# Patient Record
Sex: MALE | Race: WHITE | NOT HISPANIC OR LATINO | Employment: OTHER | URBAN - METROPOLITAN AREA
[De-identification: names, ages, dates, MRNs, and addresses within clinical notes are randomized per-mention and may not be internally consistent; named-entity substitution may affect disease eponyms.]

---

## 2017-01-10 ENCOUNTER — GENERIC CONVERSION - ENCOUNTER (OUTPATIENT)
Dept: FAMILY MEDICINE CLINIC | Facility: CLINIC | Age: 72
End: 2017-01-10

## 2017-01-21 ENCOUNTER — ALLSCRIPTS OFFICE VISIT (OUTPATIENT)
Dept: OTHER | Facility: OTHER | Age: 72
End: 2017-01-21

## 2017-06-02 ENCOUNTER — GENERIC CONVERSION - ENCOUNTER (OUTPATIENT)
Dept: OTHER | Facility: OTHER | Age: 72
End: 2017-06-02

## 2017-06-06 ENCOUNTER — GENERIC CONVERSION - ENCOUNTER (OUTPATIENT)
Dept: OTHER | Facility: OTHER | Age: 72
End: 2017-06-06

## 2017-06-09 ENCOUNTER — ALLSCRIPTS OFFICE VISIT (OUTPATIENT)
Dept: OTHER | Facility: OTHER | Age: 72
End: 2017-06-09

## 2017-06-12 RX ORDER — LISINOPRIL 10 MG/1
10 TABLET ORAL DAILY
COMMUNITY
End: 2018-12-05 | Stop reason: SDUPTHER

## 2017-06-16 ENCOUNTER — ALLSCRIPTS OFFICE VISIT (OUTPATIENT)
Dept: OTHER | Facility: OTHER | Age: 72
End: 2017-06-16

## 2017-06-19 ENCOUNTER — LAB CONVERSION - ENCOUNTER (OUTPATIENT)
Dept: OTHER | Facility: OTHER | Age: 72
End: 2017-06-19

## 2017-06-19 LAB
A/G RATIO (HISTORICAL): 2.1 (CALC) (ref 1–2.5)
ALBUMIN SERPL BCP-MCNC: 4.1 G/DL (ref 3.6–5.1)
ALP SERPL-CCNC: 53 U/L (ref 40–115)
ALT SERPL W P-5'-P-CCNC: 10 U/L (ref 9–46)
AST SERPL W P-5'-P-CCNC: 16 U/L (ref 10–35)
BILIRUB SERPL-MCNC: 0.5 MG/DL (ref 0.2–1.2)
BUN SERPL-MCNC: 17 MG/DL (ref 7–25)
BUN/CREA RATIO (HISTORICAL): ABNORMAL (CALC) (ref 6–22)
CALCIUM SERPL-MCNC: 9.6 MG/DL (ref 8.6–10.3)
CHLORIDE SERPL-SCNC: 105 MMOL/L (ref 98–110)
CHOLEST SERPL-MCNC: 154 MG/DL (ref 125–200)
CHOLEST/HDLC SERPL: 3.7 (CALC)
CO2 SERPL-SCNC: 26 MMOL/L (ref 20–31)
CREAT SERPL-MCNC: 1.14 MG/DL (ref 0.7–1.18)
EGFR AFRICAN AMERICAN (HISTORICAL): 75 ML/MIN/1.73M2
EGFR-AMERICAN CALC (HISTORICAL): 64 ML/MIN/1.73M2
GAMMA GLOBULIN (HISTORICAL): 2 G/DL (CALC) (ref 1.9–3.7)
GLUCOSE (HISTORICAL): 105 MG/DL (ref 65–99)
HBA1C MFR BLD HPLC: 6.4 % OF TOTAL HGB
HDLC SERPL-MCNC: 42 MG/DL
LDL CHOLESTEROL (HISTORICAL): 99 MG/DL (CALC)
NON-HDL-CHOL (CHOL-HDL) (HISTORICAL): 112 MG/DL (CALC)
POTASSIUM SERPL-SCNC: 5.2 MMOL/L (ref 3.5–5.3)
PROSTATE SPECIFIC ANTIGEN TOTAL (HISTORICAL): <0.1 NG/ML
SODIUM SERPL-SCNC: 140 MMOL/L (ref 135–146)
TOTAL PROTEIN (HISTORICAL): 6.1 G/DL (ref 6.1–8.1)
TRIGL SERPL-MCNC: 63 MG/DL

## 2017-06-20 ENCOUNTER — GENERIC CONVERSION - ENCOUNTER (OUTPATIENT)
Dept: OTHER | Facility: OTHER | Age: 72
End: 2017-06-20

## 2017-06-21 RX ORDER — LIDOCAINE HYDROCHLORIDE AND EPINEPHRINE 10; 10 MG/ML; UG/ML
1 INJECTION, SOLUTION INFILTRATION; PERINEURAL ONCE
Status: CANCELLED | OUTPATIENT
Start: 2017-06-22

## 2017-06-22 ENCOUNTER — HOSPITAL ENCOUNTER (OUTPATIENT)
Facility: HOSPITAL | Age: 72
Setting detail: OUTPATIENT SURGERY
Discharge: HOME | End: 2017-06-22
Attending: SURGERY | Admitting: SURGERY
Payer: MEDICARE

## 2017-06-22 VITALS
WEIGHT: 167 LBS | BODY MASS INDEX: 25.31 KG/M2 | RESPIRATION RATE: 16 BRPM | HEART RATE: 61 BPM | HEIGHT: 68 IN | OXYGEN SATURATION: 100 % | SYSTOLIC BLOOD PRESSURE: 167 MMHG | TEMPERATURE: 97.8 F | DIASTOLIC BLOOD PRESSURE: 72 MMHG

## 2017-06-22 DIAGNOSIS — C44.91 BASAL CELL CARCINOMA OF SKIN: ICD-10-CM

## 2017-06-22 PROCEDURE — 88305 TISSUE EXAM BY PATHOLOGIST: CPT | Performed by: SURGERY

## 2017-06-22 RX ORDER — LIDOCAINE HYDROCHLORIDE AND EPINEPHRINE 10; 10 MG/ML; UG/ML
INJECTION, SOLUTION INFILTRATION; PERINEURAL AS NEEDED
Status: DISCONTINUED | OUTPATIENT
Start: 2017-06-22 | End: 2017-06-22 | Stop reason: HOSPADM

## 2017-06-22 RX ORDER — LIDOCAINE HYDROCHLORIDE AND EPINEPHRINE 10; 10 MG/ML; UG/ML
INJECTION, SOLUTION INFILTRATION; PERINEURAL
Status: DISPENSED
Start: 2017-06-22 | End: 2017-06-22

## 2017-07-07 ENCOUNTER — ALLSCRIPTS OFFICE VISIT (OUTPATIENT)
Dept: OTHER | Facility: OTHER | Age: 72
End: 2017-07-07

## 2017-07-13 ENCOUNTER — ALLSCRIPTS OFFICE VISIT (OUTPATIENT)
Dept: OTHER | Facility: OTHER | Age: 72
End: 2017-07-13

## 2017-10-18 ENCOUNTER — GENERIC CONVERSION - ENCOUNTER (OUTPATIENT)
Dept: OTHER | Facility: OTHER | Age: 72
End: 2017-10-18

## 2017-12-18 ENCOUNTER — ALLSCRIPTS OFFICE VISIT (OUTPATIENT)
Dept: OTHER | Facility: OTHER | Age: 72
End: 2017-12-18

## 2017-12-18 DIAGNOSIS — E11.9 TYPE 2 DIABETES MELLITUS WITHOUT COMPLICATIONS (HCC): ICD-10-CM

## 2017-12-19 NOTE — PROGRESS NOTES
Assessment  1  Hypertension (401 9) (I10)   2  DMII (diabetes mellitus, type 2) (250 00) (E11 9)   3  Dyslipidemia (272 4) (E78 5)   4  Anemia of chronic disease (285 29) (D63 8)    Plan  DMII (diabetes mellitus, type 2)    · Atorvastatin Calcium 10 MG Oral Tablet; take 1 tablet every day   · (1) CBC/PLT/DIFF; Status:Active; Requested for:95Euq6950;    · (1) COMPREHENSIVE METABOLIC PANEL; Status:Active; Requested for:17Vlv8035;    · (1) HEMOGLOBIN A1C; Status:Active; Requested for:96Meu4955;    · (1) TSH; Status:Active; Requested for:42Ubo4025;    · Follow-up visit in 6 months Evaluation and Treatment  Follow-up  Status: Hold For -Scheduling  Requested for: 45WHH5247   · Fluzone High-Dose 0 5 ML Intramuscular Suspension Prefilled Syringe; Sorin Done: 97QRL5808  Need for immunization against influenza    · Prevnar 13 Intramuscular Suspension; INJECT 0 5  ML Intramuscular; To BeDone: 86IJV0439    Discussion/Summary    In summary then this is a 17-year-old male here for follow-up of type 2 diabetes, hypertension,, dyslipidemia, anemia of chronic disease as well as history of radiation proctitis  He received influenza vaccine today as well as a Prevnar  We noted that he is not on a statin we decided to add atorvastatin 10 mg daily to which she is in favor of  Will get blood for CBC CMP lipids and TSH  PSA over the summer was less than 0 1  Will see him back in 6 months or sooner as needed  He agrees  Chief Complaint  No burning, tingling or other foot c/o  No polyuria, polydipsia  Eye exam at [facility]  History of Present Illness  The patient is a 17-year-old male here for follow-up of essential hypertension, type 2 diabetes as well as dyslipidemia history of anemia of chronic disease and well as radiation proctitis  He states he is feeling well and he has no cardiovascular pulmonary complaint  He has no polyuria or polydipsia or polyphagia  He has no burning dysesthesias ulcers or lesions of his feet   He has been compliant with all of his medications and has no complaints was seen  The patient is being seen for follow-up of anemia of chronic disease  The patient reports doing well  The patient is currently asymptomatic  The patient presents for follow-up of essential hypertension  The patient states he has been stable with his blood pressure control since the last visit  He has no comorbid illnesses  Symptoms: The patient is currently asymptomatic  Associated symptoms include no headache  Home monitoring: The patient is not checking blood pressure at home  Lifestyle: Diet: He consumes a diverse and healthy diet  Weight Issues: He does not have any weight concerns  Exercise: He exercises regularly  Smoking: He does not use tobacco Alcohol: He denies alcohol use  Drug Use: He denies drug use  Medications: the patient is adherent with his medication regimen  -- He denies medication side effects  Medication(s): an ACE inhibitor  Disease Management: the patient is doing well with his blood pressure goals  The patient is due for a lipid panel-- and-- a serum creatinine  The patient states he has been stable with his Type II Diabetes control since the last visit  Comorbid Illnesses: hypertension  Symptoms: denies a foot ulcer-- and-- denies foot pain  Review of Systems   Constitutional: no recent weight gain-- and-- no recent weight loss  Cardiovascular: No complaints of slow heart rate, no fast heart rate, no chest pain, no palpitations, no leg claudication, no lower extremity  Respiratory: No complaints of shortness of breath, no wheezing, no cough, no SOB on exertion, no orthopnea or PND  Gastrointestinal: diarrhea-- and-- Radiation proctitis, but-- as noted in HPI  Active Problems  1  Actinic keratosis (702 0) (L57 0)   2  Adenocarcinoma of prostate (185) (C61)   3  Anemia of chronic disease (285 29) (D63 8)   4  Carcinoma, basal cell, skin (173 91) (C44 91)   5  Dermatitis (692 9) (L30 9)   6  DMII (diabetes mellitus, type 2) (250 00) (E11 9)   7  Dyslipidemia (272 4) (E78 5)   8  Hypertension (401 9) (I10)   9  Radiation proctitis (569 49) (K62 7)    Past Medical History  1  History of Abrasion, knee (916 0) (S80 219A)   2  History of Acute Lyme disease (088 81) (A69 20)    The active problems and past medical history were reviewed and updated today  Family History  Mother    1  Family history of cardiac disorder (V17 49) (Z82 49)  Brother    2  Family history of diabetes mellitus (V18 0) (Z83 3)    Social History   · Denied: History of Alcohol   · Never A Smoker   · Denied: History of Tobacco Use  The social history was reviewed and updated today  The social history was reviewed and is unchanged  Current Meds   1  Aspirin Low Dose 81 MG Oral Tablet Delayed Release; TAKE 1 TABLET DAILY AS DIRECTED; Therapy: 75JMX8949 to (Evaluate:36Cht2420); Last Rx:06Jun2016 Ordered   2  GlipiZIDE ER 5 MG Oral Tablet Extended Release 24 Hour; take one tablet by mouth twice daily; Therapy: 73ZYC9873 to (Evaluate:03Jun2018)  Requested for: 45ZOG2942; Last Rx:21Iwy4390 Ordered   3  Lisinopril 10 MG Oral Tablet; TAKE ONE TABLET BY MOUTH ONCE DAILY; Therapy: 42XIH6094 to (Fatou Best)  Requested for: 47GLT1637; Last Rx:80Fbp8174 Ordered   4  MetFORMIN HCl - 1000 MG Oral Tablet; take one tablet by mouth twice daily; Therapy: 27PWE4589 to (Evaluate:91Zod9759)  Requested for: 16IJK3372; Last Rx:13Nov2017 Ordered    Allergies  1  No Known Drug Allergies    Vitals  Vital Signs    Recorded: 02LXS8626 17:99LD   Systolic 735   Diastolic 72   Height 5 ft 7 8 in   Weight 157 lb    BMI Calculated 24 01   BSA Calculated 1 84     Physical Exam   Constitutional  General appearance: No acute distress, well appearing and well nourished  Pulmonary  Respiratory effort: No increased work of breathing or signs of respiratory distress     Auscultation of lungs: Clear to auscultation, equal breath sounds bilaterally, no wheezes, no rales, no rhonci  -- Lungs are clear to auscultation  Cardiovascular  Auscultation of heart: Normal rate and rhythm, normal S1 and S2, without murmurs  -- Cardiac exam reveals a regular rhythm and normal rate without murmur gallop  Examination of extremities for edema and/or varicosities: Normal  -- No edema  Carotid pulses: Normal  -- Normal carotid upstroke without bruit  Musculoskeletal  Digits and nails: Normal without clubbing or cyanosis  Neurologic  Cranial nerves: Abnormal  -- Hard of hearing  Psychiatric  Orientation to person, place and time: Normal    Mood and affect: Normal    Diabetic Foot Screen: Normal    Socks and shoes removed, the Right Foot: the foot was normal, no swelling, no erythema  Normal tactile sensation with monofilament testing throughout the right foot  Socks and shoes removed, Left Foot: the foot was normal, no swelling, no erythema  Normal tactile sensation with monofilament testing throughout the left foot  Capillary refills findings on the right were normal in the toes  Pulses:  1+ in the dorsalis pedis on the right  Capillary refills findings on the left were normal in the toes  Pulses:  1+ in the dorsalis pedis on the left  Assign Risk Category: 0: No loss of protective sensation, no deformity  No present risk        Signatures   Electronically signed by : RILEY Young ; Dec 18 2017 10:15AM EST                       (Author)

## 2017-12-20 ENCOUNTER — LAB CONVERSION - ENCOUNTER (OUTPATIENT)
Dept: OTHER | Facility: OTHER | Age: 72
End: 2017-12-20

## 2017-12-20 ENCOUNTER — GENERIC CONVERSION - ENCOUNTER (OUTPATIENT)
Dept: OTHER | Facility: OTHER | Age: 72
End: 2017-12-20

## 2017-12-20 LAB
A/G RATIO (HISTORICAL): 2 (CALC) (ref 1–2.5)
ALBUMIN SERPL BCP-MCNC: 4.3 G/DL (ref 3.6–5.1)
ALP SERPL-CCNC: 60 U/L (ref 40–115)
ALT SERPL W P-5'-P-CCNC: 11 U/L (ref 9–46)
AST SERPL W P-5'-P-CCNC: 18 U/L (ref 10–35)
BASOPHILS # BLD AUTO: 0.8 %
BASOPHILS # BLD AUTO: 59 CELLS/UL (ref 0–200)
BILIRUB SERPL-MCNC: 0.6 MG/DL (ref 0.2–1.2)
BUN SERPL-MCNC: 22 MG/DL (ref 7–25)
BUN/CREA RATIO (HISTORICAL): ABNORMAL (CALC) (ref 6–22)
CALCIUM SERPL-MCNC: 9.7 MG/DL (ref 8.6–10.3)
CHLORIDE SERPL-SCNC: 101 MMOL/L (ref 98–110)
CO2 SERPL-SCNC: 25 MMOL/L (ref 20–31)
CREAT SERPL-MCNC: 1.16 MG/DL (ref 0.7–1.18)
DEPRECATED RDW RBC AUTO: 13.4 % (ref 11–15)
EGFR AFRICAN AMERICAN (HISTORICAL): 73 ML/MIN/1.73M2
EGFR-AMERICAN CALC (HISTORICAL): 63 ML/MIN/1.73M2
EOSINOPHIL # BLD AUTO: 192 CELLS/UL (ref 15–500)
EOSINOPHIL # BLD AUTO: 2.6 %
GAMMA GLOBULIN (HISTORICAL): 2.2 G/DL (CALC) (ref 1.9–3.7)
GLUCOSE (HISTORICAL): 162 MG/DL (ref 65–99)
HBA1C MFR BLD HPLC: 6.4 % OF TOTAL HGB
HCT VFR BLD AUTO: 38.8 % (ref 38.5–50)
HGB BLD-MCNC: 13.1 G/DL (ref 13.2–17.1)
LYMPHOCYTES # BLD AUTO: 1258 CELLS/UL (ref 850–3900)
LYMPHOCYTES # BLD AUTO: 17 %
MCH RBC QN AUTO: 28.9 PG (ref 27–33)
MCHC RBC AUTO-ENTMCNC: 33.8 G/DL (ref 32–36)
MCV RBC AUTO: 85.7 FL (ref 80–100)
MONOCYTES # BLD AUTO: 414 CELLS/UL (ref 200–950)
MONOCYTES (HISTORICAL): 5.6 %
NEUTROPHILS # BLD AUTO: 5476 CELLS/UL (ref 1500–7800)
NEUTROPHILS # BLD AUTO: 74 %
PLATELET # BLD AUTO: 214 THOUSAND/UL (ref 140–400)
PMV BLD AUTO: 12 FL (ref 7.5–12.5)
POTASSIUM SERPL-SCNC: 4.9 MMOL/L (ref 3.5–5.3)
RBC # BLD AUTO: 4.53 MILLION/UL (ref 4.2–5.8)
SODIUM SERPL-SCNC: 139 MMOL/L (ref 135–146)
TOTAL PROTEIN (HISTORICAL): 6.5 G/DL (ref 6.1–8.1)
TSH SERPL DL<=0.05 MIU/L-ACNC: 3.21 MIU/L (ref 0.4–4.5)
WBC # BLD AUTO: 7.4 THOUSAND/UL (ref 3.8–10.8)

## 2018-01-10 ENCOUNTER — GENERIC CONVERSION - ENCOUNTER (OUTPATIENT)
Dept: FAMILY MEDICINE CLINIC | Facility: CLINIC | Age: 73
End: 2018-01-10

## 2018-01-10 NOTE — PROGRESS NOTES
Active Problems    1  Actinic keratosis (702 0) (L57 0)   2  Adenocarcinoma of prostate (185) (C61)   3  Anemia of chronic disease (285 29) (D63 8)   4  Carcinoma, basal cell, skin (173 91) (C44 91)   5  Dermatitis (692 9) (L30 9)   6  DMII (diabetes mellitus, type 2) (250 00) (E11 9)   7  Dyslipidemia (272 4) (E78 5)   8  Hypertension (401 9) (I10)   9  Radiation proctitis (569 49) (K62 7)    Current Meds   1  Aspirin Low Dose 81 MG Oral Tablet Delayed Release; TAKE 1 TABLET DAILY AS   DIRECTED; Therapy: 83PJX5940 to (Evaluate:72Msp2077); Last Rx:06Jun2016 Ordered   2  GlipiZIDE ER 5 MG Oral Tablet Extended Release 24 Hour (Glucotrol XL); take one   tablet by mouth twice daily; Therapy: 22JVV3637 to (Evaluate:62Zza6134)  Requested for: 39VBE7957; Last   Rx:16Jun2017 Ordered   3  Lisinopril 10 MG Oral Tablet; TAKE ONE TABLET BY MOUTH ONCE DAILY; Therapy: 05SUD4063 to (Evaluate:52Yis8433)  Requested for: 80UDR1109; Last   Rx:06Jun2016 Ordered   4  MetFORMIN HCl - 1000 MG Oral Tablet; take one tablet by mouth twice daily; Therapy: 26GLR6930 to (96 804061)  Requested for: 69Hoe6763; Last   Rx:13Dml7177 Ordered    Allergies    1  No Known Drug Allergies    Procedure  Procedure: suture removal  The wound was located on the left shoulder  Wound Exam: well healed with no sign of infection  Procedure Note: sutures were removed  Dressing: an antibiotic ointment was applied  Patient Status:  the patient tolerated the procedure well  Complications:  there were no complications  Discussion/Summary  Patient presents for suture removal left shoulder  Incision well healed  Post op scar instructions reviewed and given  Pathology report reviewed with patient that all margins were negative  Patient verbalized understanding of all  and will follow up with Dr Eddie Dalal in 3 months  Future Appointments    Date/Time Provider Specialty Site   12/18/2017 09:30 AM RILEY Pruitt   [facility] [address] [facility]   10/11/2017 11:00 AM RILEY Ch  Plastic Surgery BODY EVOLUTION PLASTIC RECONS [address] [facility]     Signatures   Electronically signed by : Joann Hanley RN; Jul 7 2017  3:12PM EST                       (Author)    Electronically signed by : RILEY eMnon ; Jul 12 2017  2:22PM EST

## 2018-01-10 NOTE — RESULT NOTES
Verified Results  (1) COMPREHENSIVE METABOLIC PANEL 70XLP0836 88:50AX Ayana Feliciano     Test Name Result Flag Reference   GLUCOSE 105 mg/dL H 65-99   Fasting reference interval     For someone without known diabetes, a glucose value  between 100 and 125 mg/dL is consistent with  prediabetes and should be confirmed with a  follow-up test    UREA NITROGEN (BUN) 17 mg/dL  7-25   CREATININE 1 14 mg/dL  0 70-1 18   For patients >52years of age, the reference limit  for Creatinine is approximately 13% higher for people  identified as -American  eGFR NON-AFR  AMERICAN 64 mL/min/1 73m2  > OR = 60   eGFR AFRICAN AMERICAN 75 mL/min/1 73m2  > OR = 60   BUN/CREATININE RATIO   1-05   NOT APPLICABLE (calc)   SODIUM 140 mmol/L  135-146   POTASSIUM 5 2 mmol/L  3 5-5 3   CHLORIDE 105 mmol/L     CARBON DIOXIDE 26 mmol/L  20-31   CALCIUM 9 6 mg/dL  8 6-10 3   PROTEIN, TOTAL 6 1 g/dL  6 1-8 1   ALBUMIN 4 1 g/dL  3 6-5 1   GLOBULIN 2 0 g/dL (calc)  1 9-3 7   ALBUMIN/GLOBULIN RATIO 2 1 (calc)  1 0-2 5   BILIRUBIN, TOTAL 0 5 mg/dL  0 2-1 2   ALKALINE PHOSPHATASE 53 U/L     AST 16 U/L  10-35   ALT 10 U/L  9-46     (1) LIPID PANEL, FASTING [id number] 12:00AM Jah Henderson     Test Name Result Flag Reference   CHOLESTEROL, TOTAL 154 mg/dL  125-200   HDL CHOLESTEROL 42 mg/dL  > OR = 40   TRIGLICERIDES 63 mg/dL  <642   LDL-CHOLESTEROL 99 mg/dL (calc)  <130   Desirable range <100 mg/dL for patients with CHD or  diabetes and <70 mg/dL for diabetic patients with  known heart disease  CHOL/HDLC RATIO 3 7 (calc)  < OR = 5 0   NON HDL CHOLESTEROL 112 mg/dL (calc)     Target for non-HDL cholesterol is 30 mg/dL higher than   LDL cholesterol target       (Q) CBC (H/H, RBC, INDICES, WBC, PLT) [id number] 12:00AM Jah Henderson     Test Name Result Flag Reference   WHITE BLOOD CELL COUNT 6 8 Thousand/uL  3 8-10 8   RED BLOOD CELL COUNT 4 33 Million/uL  4 20-5 80   HEMOGLOBIN 12 3 g/dL L 13 2-17 1   HEMATOCRIT 37 2 % L 38 5-50 0   MCV 85 8 fL  80 0-100 0   MCH 28 5 pg  27 0-33 0   MCHC 33 2 g/dL  32 0-36 0   RDW 14 5 %  11 0-15 0   PLATELET COUNT 707 Thousand/uL  140-400   WE RECEIVED YOUR HANDWRITTEN TEST ORDER AND  PERFORMED A HEMOGRAM WITH A PLATELET WITHOUT  A DIFFERENTIAL  IF THIS IS NOT WHAT YOU INTENDED  TO ORDER, PLEASE CONTACT YOUR LOCAL CLIENT SERVICE  REPRESENTATIVE IMMEDIATELY SO THAT WE CAN   ADJUST OUR BILLING APPROPRIATELY  YOU MAY ALSO   INQUIRE ABOUT ALTERNATIVE OR ADDITIONAL TESTING  MPV 10 9 fL  7 5-12 5           (1) PSA (SCREEN) (Dx V76 44 Screen for Prostate Cancer) [id number] 12:00AM [employer]     Test Name Result Flag Reference   PSA, TOTAL <0 1 ng/mL  < OR = 4 0   The total PSA value from this assay system is   standardized against the WHO standard  The test   result will be approximately 20% lower when compared   to the equimolar-standardized total PSA (Cat   Leo)  Comparison of serial PSA results should be   interpreted with this fact in mind  This test was performed using the Siemens   chemiluminescent method  Values obtained from   different assay methods cannot be used  interchangeably  PSA levels, regardless of  value, should not be interpreted as absolute  evidence of the presence or absence of disease  (Q) HEMOGLOBIN A1c [id number] 12:00AM [employer]     Test Name Result Flag Reference   HEMOGLOBIN A1c 6 4 % of total Hgb H <5 7   For someone without known diabetes, a hemoglobin   A1c value between 5 7% and 6 4% is consistent with  prediabetes and should be confirmed with a   follow-up test      For someone with known diabetes, a value <7%  indicates that their diabetes is well controlled  A1c  targets should be individualized based on duration of  diabetes, age, comorbid conditions, and other  considerations  This assay result is consistent with an increased risk  of diabetes  Currently, no consensus exists regarding use of  hemoglobin A1c for diagnosis of diabetes for children

## 2018-01-12 VITALS
TEMPERATURE: 97.9 F | WEIGHT: 158 LBS | HEIGHT: 68 IN | BODY MASS INDEX: 23.95 KG/M2 | SYSTOLIC BLOOD PRESSURE: 120 MMHG | DIASTOLIC BLOOD PRESSURE: 50 MMHG

## 2018-01-12 NOTE — RESULT NOTES
Verified Results  (1) CBC/PLT/DIFF [id number] 04:25PM Gladystine Mariella     Test Name Result Flag Reference   WBC COUNT 6 62 Thousand/uL  4 31-10 16   RBC COUNT 4 31 Million/uL  3 88-5 62   HEMOGLOBIN 12 3 g/dL  12 0-17 0   HEMATOCRIT 37 9 %  36 5-49 3   MCV 88 fL  82-98   MCH 28 5 pg  26 8-34 3   MCHC 32 5 g/dL  31 4-37 4   RDW 13 8 %  11 6-15 1   MPV 12 2 fL  8 9-12 7   PLATELET COUNT 215 Thousands/uL  149-390   nRBC AUTOMATED 0 /100 WBCs     NEUTROPHILS RELATIVE PERCENT 73 %  43-75   LYMPHOCYTES RELATIVE PERCENT 19 %  14-44   MONOCYTES RELATIVE PERCENT 6 %  4-12   EOSINOPHILS RELATIVE PERCENT 1 %  0-6   BASOPHILS RELATIVE PERCENT 1 %  0-1   NEUTROPHILS ABSOLUTE COUNT 4 80 Thousands/?L  1 85-7 62   LYMPHOCYTES ABSOLUTE COUNT 1 26 Thousands/?L  0 60-4 47   MONOCYTES ABSOLUTE COUNT 0 42 Thousand/?L  0 17-1 22   EOSINOPHILS ABSOLUTE COUNT 0 09 Thousand/?L  0 00-0 61   BASOPHILS ABSOLUTE COUNT 0 03 Thousands/?L  0 00-0 10     (1) COMPREHENSIVE METABOLIC PANEL [id number] 00:79KE Gladystine Mariella     Test Name Result Flag Reference   GLUCOSE,RANDM 124 mg/dL     If the patient is fasting, the ADA then defines impaired fasting glucose as > 100 mg/dL and diabetes as > or equal to 123 mg/dL  SODIUM 140 mmol/L  136-145   POTASSIUM 4 5 mmol/L  3 5-5 3   CHLORIDE 105 mmol/L  100-108   CARBON DIOXIDE 29 mmol/L  21-32   ANION GAP (CALC) 6 mmol/L  4-13   BLOOD UREA NITROGEN 14 mg/dL  5-25   CREATININE 1 13 mg/dL  0 60-1 30   Standardized to IDMS reference method   CALCIUM 9 3 mg/dL  8 3-10 1   BILI, TOTAL 0 33 mg/dL  0 20-1 00   ALK PHOSPHATAS 54 U/L     ALT (SGPT) 21 U/L  12-78   AST(SGOT) 15 U/L  5-45   ALBUMIN 3 6 g/dL  3 5-5 0   TOTAL PROTEIN 6 2 g/dL L 6 4-8 2   eGFR Non-African American      >60 0 ml/min/1 73sq [facility] Disease Education Program recommendations are as follows:  GFR calculation is accurate only with a steady state creatinine  Chronic Kidney disease less than 60 ml/min/1 73 sq  meters  Kidney failure less than 15 ml/min/1 73 sq  meters  (1) HEMOGLOBIN A1C [id number] 04:25PM Servando Kimberly     Test Name Result Flag Reference   HEMOGLOBIN A1C 7 1 % H 4 2-6 3   EST  AVG  GLUCOSE 157 mg/dl         Plan  DMII (diabetes mellitus, type 2)    · (1) HEMOGLOBIN A1C ; every 6 months;  Last [id number]; Status:Active

## 2018-01-13 VITALS
TEMPERATURE: 97.6 F | BODY MASS INDEX: 23.95 KG/M2 | HEIGHT: 68 IN | WEIGHT: 158 LBS | DIASTOLIC BLOOD PRESSURE: 50 MMHG | SYSTOLIC BLOOD PRESSURE: 102 MMHG

## 2018-01-13 VITALS — SYSTOLIC BLOOD PRESSURE: 136 MMHG | TEMPERATURE: 98.5 F | DIASTOLIC BLOOD PRESSURE: 70 MMHG

## 2018-01-13 NOTE — RESULT NOTES
Verified Results  (1) CBC/PLT/DIFF [id number] 12:00AM Duncan Lemons     Test Name Result Flag Reference   WHITE BLOOD CELL COUNT 6 8 Thousand/uL  3 8-10 8   RED BLOOD CELL COUNT 4 28 Million/uL  4 20-5 80   HEMOGLOBIN 12 2 g/dL L 13 2-17 1   HEMATOCRIT 37 5 % L 38 5-50 0   MCV 87 5 fL  80 0-100 0   MCH 28 6 pg  27 0-33 0   MCHC 32 7 g/dL  32 0-36 0   RDW 14 1 %  11 0-15 0   PLATELET COUNT 020 Thousand/uL  140-400   MPV 10 8 fL  7 5-11 5   ABSOLUTE NEUTROPHILS 5148 cells/uL  0857-2587   ABSOLUTE LYMPHOCYTES 1074 cells/uL  850-3900   ABSOLUTE MONOCYTES 374 cells/uL  200-950   ABSOLUTE EOSINOPHILS 184 cells/uL     ABSOLUTE BASOPHILS 20 cells/uL  0-200   NEUTROPHILS 75 7 %     LYMPHOCYTES 15 8 %     MONOCYTES 5 5 %     EOSINOPHILS 2 7 %     BASOPHILS 0 3 %       (1) COMPREHENSIVE METABOLIC PANEL [id number] 10:99SZ Jah Henderson     Test Name Result Flag Reference   GLUCOSE 156 mg/dL H 65-99   Fasting reference interval   UREA NITROGEN (BUN) 20 mg/dL  7-25   CREATININE 1 11 mg/dL  0 70-1 18   For patients >52years of age, the reference limit  for Creatinine is approximately 13% higher for people  identified as -American  eGFR NON-AFR   AMERICAN 66 mL/min/1 73m2  > OR = 60   eGFR AFRICAN AMERICAN 77 mL/min/1 73m2  > OR = 60   BUN/CREATININE RATIO   1-30   NOT APPLICABLE (calc)   SODIUM 140 mmol/L  135-146   POTASSIUM 4 7 mmol/L  3 5-5 3   CHLORIDE 106 mmol/L     CARBON DIOXIDE 27 mmol/L  20-31   CALCIUM 9 3 mg/dL  8 6-10 3   PROTEIN, TOTAL 6 1 g/dL  6 1-8 1   ALBUMIN 4 0 g/dL  3 6-5 1   GLOBULIN 2 1 g/dL (calc)  1 9-3 7   ALBUMIN/GLOBULIN RATIO 1 9 (calc)  1 0-2 5   BILIRUBIN, TOTAL 0 5 mg/dL  0 2-1 2   ALKALINE PHOSPHATASE 53 U/L     AST 16 U/L  10-35   ALT 13 U/L  9-46     (1) LIPID PANEL, FASTING [id number] 12:00AM Jah Henderson     Test Name Result Flag Reference   CHOLESTEROL, TOTAL 164 mg/dL  125-200   HDL CHOLESTEROL 41 mg/dL  > OR = 40   TRIGLICERIDES 81 mg/dL  <858   LDL-CHOLESTEROL 107 mg/dL (calc)  <130   Desirable range <100 mg/dL for patients with CHD or  diabetes and <70 mg/dL for diabetic patients with  known heart disease  CHOL/HDLC RATIO 4 0 (calc)  < OR = 5 0   NON HDL CHOLESTEROL 123 mg/dL (calc)     Target for non-HDL cholesterol is 30 mg/dL higher than   LDL cholesterol target  (Q) HEMOGLOBIN A1c [id number] 12:00AM Chandan Muta     Test Name Result Flag Reference   HEMOGLOBIN A1c 6 9 % of total Hgb H <5 7   According to ADA guidelines, hemoglobin A1c <7 0%  represents optimal control in non-pregnant diabetic  patients  Different metrics may apply to specific  patient populations  Standards of Medical Care in    Diabetes Care  2013;36:s11-s66     For the purpose of screening for the presence of  diabetes  <5 7%       Consistent with the absence of diabetes  5 7-6 4%    Consistent with increased risk for diabetes              (prediabetes)  >or=6 5%    Consistent with diabetes     This assay result is consistent with diabetes  mellitus  Currently, no consensus exists for use of hemoglobin  A1c for diagnosis of diabetes for children

## 2018-01-22 VITALS — HEIGHT: 68 IN | WEIGHT: 157 LBS | BODY MASS INDEX: 23.79 KG/M2

## 2018-01-23 VITALS
HEIGHT: 68 IN | BODY MASS INDEX: 23.79 KG/M2 | WEIGHT: 157 LBS | SYSTOLIC BLOOD PRESSURE: 138 MMHG | DIASTOLIC BLOOD PRESSURE: 72 MMHG

## 2018-01-23 NOTE — RESULT NOTES
Verified Results  (1) CBC/PLT/DIFF [id number] 12:00AM Jah Henderson     Test Name Result Flag Reference   WHITE BLOOD CELL COUNT 7 4 Thousand/uL  3 8-10 8   RED BLOOD CELL COUNT 4 53 Million/uL  4 20-5 80   HEMOGLOBIN 13 1 g/dL L 13 2-17 1   HEMATOCRIT 38 8 %  38 5-50 0   MCV 85 7 fL  80 0-100 0   MCH 28 9 pg  27 0-33 0   MCHC 33 8 g/dL  32 0-36 0   RDW 13 4 %  11 0-15 0   PLATELET COUNT 155 Thousand/uL  140-400   ABSOLUTE NEUTROPHILS 5476 cells/uL  8036-2480   ABSOLUTE LYMPHOCYTES 1258 cells/uL  850-3900   ABSOLUTE MONOCYTES 414 cells/uL  200-950   ABSOLUTE EOSINOPHILS 192 cells/uL     ABSOLUTE BASOPHILS 59 cells/uL  0-200   NEUTROPHILS 74 %     LYMPHOCYTES 17 0 %     MONOCYTES 5 6 %     EOSINOPHILS 2 6 %     BASOPHILS 0 8 %     MPV 12 0 fL  7 5-12 5     (1) COMPREHENSIVE METABOLIC PANEL [id number] 03:89MA Herminioio Dancer     Test Name Result Flag Reference   GLUCOSE 162 mg/dL H 65-99   Fasting reference interval     For someone without known diabetes, a glucose  value >125 mg/dL indicates that they may have  diabetes and this should be confirmed with a  follow-up test    UREA NITROGEN (BUN) 22 mg/dL  7-25   CREATININE 1 16 mg/dL  0 70-1 18   For patients >52years of age, the reference limit  for Creatinine is approximately 13% higher for people  identified as -American  eGFR NON-AFR   AMERICAN 63 mL/min/1 73m2  > OR = 60   eGFR AFRICAN AMERICAN 73 mL/min/1 73m2  > OR = 60   BUN/CREATININE RATIO   1-43   NOT APPLICABLE (calc)   SODIUM 139 mmol/L  135-146   POTASSIUM 4 9 mmol/L  3 5-5 3   CHLORIDE 101 mmol/L     CARBON DIOXIDE 25 mmol/L  20-31   CALCIUM 9 7 mg/dL  8 6-10 3   PROTEIN, TOTAL 6 5 g/dL  6 1-8 1   ALBUMIN 4 3 g/dL  3 6-5 1   GLOBULIN 2 2 g/dL (calc)  1 9-3 7   ALBUMIN/GLOBULIN RATIO 2 0 (calc)  1 0-2 5   BILIRUBIN, TOTAL 0 6 mg/dL  0 2-1 2   ALKALINE PHOSPHATASE 60 U/L     AST 18 U/L  10-35   ALT 11 U/L  9-46     (Q) TSH, 3RD GENERATION [id number] 12:00AM Jah Henderson Name Result Flag Reference   TSH 3 21 mIU/L  0 40-4 50     (Q) HEMOGLOBIN A1c [id number] 12:00AM Jah Henderson     Test Name Result Flag Reference   HEMOGLOBIN A1c 6 4 % of total Hgb H <5 7   For someone without known diabetes, a hemoglobin   A1c value between 5 7% and 6 4% is consistent with  prediabetes and should be confirmed with a   follow-up test      For someone with known diabetes, a value <7%  indicates that their diabetes is well controlled  A1c  targets should be individualized based on duration of  diabetes, age, comorbid conditions, and other  considerations  This assay result is consistent with an increased risk  of diabetes  Currently, no consensus exists regarding use of  hemoglobin A1c for diagnosis of diabetes for children

## 2018-05-31 DIAGNOSIS — E11.9 TYPE 2 DIABETES MELLITUS WITHOUT COMPLICATION, WITHOUT LONG-TERM CURRENT USE OF INSULIN (HCC): Primary | ICD-10-CM

## 2018-06-16 RX ORDER — ATORVASTATIN CALCIUM 10 MG/1
1 TABLET, FILM COATED ORAL DAILY
COMMUNITY
Start: 2017-12-18 | End: 2022-08-02 | Stop reason: SDUPTHER

## 2018-06-16 RX ORDER — GLIPIZIDE 5 MG/1
TABLET, EXTENDED RELEASE ORAL
COMMUNITY
Start: 2018-03-18 | End: 2018-06-21 | Stop reason: SDUPTHER

## 2018-06-21 ENCOUNTER — OFFICE VISIT (OUTPATIENT)
Dept: FAMILY MEDICINE CLINIC | Facility: CLINIC | Age: 73
End: 2018-06-21
Payer: MEDICARE

## 2018-06-21 VITALS
OXYGEN SATURATION: 99 % | HEART RATE: 50 BPM | DIASTOLIC BLOOD PRESSURE: 60 MMHG | WEIGHT: 156 LBS | TEMPERATURE: 97 F | BODY MASS INDEX: 23.64 KG/M2 | HEIGHT: 68 IN | SYSTOLIC BLOOD PRESSURE: 118 MMHG

## 2018-06-21 DIAGNOSIS — Z11.59 NEED FOR HEPATITIS C SCREENING TEST: ICD-10-CM

## 2018-06-21 DIAGNOSIS — E78.5 DYSLIPIDEMIA: ICD-10-CM

## 2018-06-21 DIAGNOSIS — I10 ESSENTIAL HYPERTENSION: ICD-10-CM

## 2018-06-21 DIAGNOSIS — D63.8 ANEMIA OF CHRONIC DISEASE: ICD-10-CM

## 2018-06-21 DIAGNOSIS — E11.9 TYPE 2 DIABETES MELLITUS WITHOUT COMPLICATION, UNSPECIFIED WHETHER LONG TERM INSULIN USE (HCC): Primary | ICD-10-CM

## 2018-06-21 DIAGNOSIS — Z13.0 SCREENING FOR IRON DEFICIENCY ANEMIA: ICD-10-CM

## 2018-06-21 LAB
ALBUMIN SERPL BCP-MCNC: 3.9 G/DL (ref 3.5–5)
ALP SERPL-CCNC: 51 U/L (ref 46–116)
ALT SERPL W P-5'-P-CCNC: 17 U/L (ref 12–78)
ANION GAP SERPL CALCULATED.3IONS-SCNC: 10 MMOL/L (ref 4–13)
AST SERPL W P-5'-P-CCNC: 15 U/L (ref 5–45)
BILIRUB SERPL-MCNC: 0.55 MG/DL (ref 0.2–1)
BUN SERPL-MCNC: 17 MG/DL (ref 5–25)
CALCIUM SERPL-MCNC: 8.9 MG/DL (ref 8.3–10.1)
CHLORIDE SERPL-SCNC: 104 MMOL/L (ref 100–108)
CHOLEST SERPL-MCNC: 168 MG/DL (ref 50–200)
CO2 SERPL-SCNC: 26 MMOL/L (ref 21–32)
CREAT SERPL-MCNC: 1.05 MG/DL (ref 0.6–1.3)
CREAT UR-MCNC: 81.9 MG/DL
ERYTHROCYTE [DISTWIDTH] IN BLOOD BY AUTOMATED COUNT: 14.1 % (ref 11.6–15.1)
GFR SERPL CREATININE-BSD FRML MDRD: 71 ML/MIN/1.73SQ M
GLUCOSE P FAST SERPL-MCNC: 95 MG/DL (ref 65–99)
HCT VFR BLD AUTO: 39.2 % (ref 36.5–49.3)
HDLC SERPL-MCNC: 40 MG/DL (ref 40–60)
HGB BLD-MCNC: 12.2 G/DL (ref 12–17)
LDLC SERPL CALC-MCNC: 114 MG/DL (ref 0–100)
MCH RBC QN AUTO: 28.3 PG (ref 26.8–34.3)
MCHC RBC AUTO-ENTMCNC: 31.1 G/DL (ref 31.4–37.4)
MCV RBC AUTO: 91 FL (ref 82–98)
MICROALBUMIN UR-MCNC: 9.1 MG/L (ref 0–20)
MICROALBUMIN/CREAT 24H UR: 11 MG/G CREATININE (ref 0–30)
NONHDLC SERPL-MCNC: 128 MG/DL
PLATELET # BLD AUTO: 195 THOUSANDS/UL (ref 149–390)
PMV BLD AUTO: 11.4 FL (ref 8.9–12.7)
POTASSIUM SERPL-SCNC: 4.6 MMOL/L (ref 3.5–5.3)
PROT SERPL-MCNC: 6.3 G/DL (ref 6.4–8.2)
RBC # BLD AUTO: 4.31 MILLION/UL (ref 3.88–5.62)
SL AMB POCT HEMOGLOBIN AIC: 6.7
SODIUM SERPL-SCNC: 140 MMOL/L (ref 136–145)
TRIGL SERPL-MCNC: 69 MG/DL
WBC # BLD AUTO: 6.27 THOUSAND/UL (ref 4.31–10.16)

## 2018-06-21 PROCEDURE — 36415 COLL VENOUS BLD VENIPUNCTURE: CPT | Performed by: FAMILY MEDICINE

## 2018-06-21 PROCEDURE — 80061 LIPID PANEL: CPT | Performed by: FAMILY MEDICINE

## 2018-06-21 PROCEDURE — 99214 OFFICE O/P EST MOD 30 MIN: CPT | Performed by: FAMILY MEDICINE

## 2018-06-21 PROCEDURE — 85027 COMPLETE CBC AUTOMATED: CPT | Performed by: FAMILY MEDICINE

## 2018-06-21 PROCEDURE — 86803 HEPATITIS C AB TEST: CPT | Performed by: FAMILY MEDICINE

## 2018-06-21 PROCEDURE — 82570 ASSAY OF URINE CREATININE: CPT | Performed by: FAMILY MEDICINE

## 2018-06-21 PROCEDURE — 83036 HEMOGLOBIN GLYCOSYLATED A1C: CPT | Performed by: FAMILY MEDICINE

## 2018-06-21 PROCEDURE — 82043 UR ALBUMIN QUANTITATIVE: CPT | Performed by: FAMILY MEDICINE

## 2018-06-21 PROCEDURE — 80053 COMPREHEN METABOLIC PANEL: CPT | Performed by: FAMILY MEDICINE

## 2018-06-21 RX ORDER — GLIPIZIDE 5 MG/1
5 TABLET, EXTENDED RELEASE ORAL DAILY
Qty: 90 TABLET | Refills: 1 | Status: SHIPPED | OUTPATIENT
Start: 2018-06-21 | End: 2018-11-26 | Stop reason: SDUPTHER

## 2018-06-21 NOTE — PATIENT INSTRUCTIONS
Continue with your current regimen  Please call in 1 week for your blood work results  Follow-up in 6 months

## 2018-06-21 NOTE — ASSESSMENT & PLAN NOTE
Lab Results   Component Value Date    HGBA1C 6 7 06/21/2018    His hemoglobin A1c was 6 7 today which is increased from 6 4 over the winter  We told him that he is still at goal but he needs to continue to watch his diet  He is in agreement  He did have his eye exam 2 months ago at [place]  No results for input(s): POCGLU in the last 72 hours      Blood Sugar Average: Last 72 hrs:

## 2018-06-21 NOTE — ASSESSMENT & PLAN NOTE
Blood pressure is well controlled and he will continue with lisinopril    We will get CBC CMP and lipids today

## 2018-06-21 NOTE — PROGRESS NOTES
Assessment/Plan:  DMII (diabetes mellitus, type 2) ([facility])  Lab Results   Component Value Date    HGBA1C 6 7 06/21/2018    His hemoglobin A1c was 6 7 today which is increased from 6 4 over the winter  We told him that he is still at goal but he needs to continue to watch his diet  He is in agreement  He did have his eye exam 2 months ago at [facility]  No results for input(s): POCGLU in the last 72 hours  Blood Sugar Average: Last 72 hrs:      Hypertension  Blood pressure is well controlled and he will continue with lisinopril  We will get CBC CMP and lipids today    Anemia of chronic disease  CBC today  Dyslipidemia  Continue with atorvastatin  Lipid profile today  Diagnoses and all orders for this visit:    Type 2 diabetes mellitus without complication, unspecified whether long term insulin use (HCC)  -     POCT hemoglobin A1c  -     GLIPIZIDE XL 5 MG 24 hr tablet; Take 1 tablet (5 mg total) by mouth daily  -     CBC and Platelet  -     Comprehensive metabolic panel  -     Microalbumin / creatinine urine ratio    Screening for iron deficiency anemia    Need for hepatitis C screening test  -     Hepatitis C antibody    Dyslipidemia  -     CBC and Platelet  -     Comprehensive metabolic panel  -     Lipid panel    Essential hypertension  -     CBC and Platelet  -     Comprehensive metabolic panel    Anemia of chronic disease    Other orders  -     atorvastatin (LIPITOR) 10 mg tablet; Take 1 tablet by mouth daily  -     Discontinue: GLIPIZIDE XL 5 MG 24 hr tablet;           Subjective:   Chief Complaint   Patient presents with    med check     pt here for his 6 month med check and fasting labs today  he does need a refill on his glipizide which i will renew  Patient ID: Renae Scott is a 67 y o  male  [facility]  HPI  The patient is a 63-year-old male with a history of type 2 diabetes, hypertension, hyperlipidemia as well as prostate carcinoma and basal cell carcinoma    He also has mild anemia of chronic disease  He states that overall he has been doing fairly well  He denies any polyuria polydipsia polyphagia  He has had no weight gain or weight loss  Has no cardiovascular pulmonary complaint  No headache diplopia double vision X cetera  He is compliant with his medications and has no issues with them  The following portions of the patient's history were reviewed and updated as appropriate: allergies, current medications, past family history, past medical history, past social history, past surgical history and problem list     Review of Systems   Constitution: Negative for decreased appetite, weight gain and weight loss  Cardiovascular: Negative  Respiratory: Negative  Endocrine: Negative for polydipsia, polyphagia and polyuria  Hematologic/Lymphatic: Negative for adenopathy and bleeding problem  Skin: Positive for skin cancer  Negative for suspicious lesions  Multiple scars from removals of basal cell carcinoma  No new lesions that he is aware of  Musculoskeletal: Negative  Gastrointestinal: Positive for diarrhea  Negative for constipation and hematochezia  Neurological: Negative  Psychiatric/Behavioral: Negative  Objective:    Physical Exam   Constitutional: He is oriented to person, place, and time  He appears well-developed and well-nourished  No distress  HENT:   Mouth/Throat: Oropharynx is clear and moist  No oropharyngeal exudate  Eyes: Conjunctivae are normal  No scleral icterus  Neck: Neck supple  No JVD present  No thyromegaly present  Cardiovascular: Normal rate, regular rhythm and normal heart sounds  Exam reveals no gallop  No murmur heard  Pulmonary/Chest: Effort normal and breath sounds normal  He has no wheezes  He has no rales  Musculoskeletal: He exhibits no edema  Lymphadenopathy:     He has no cervical adenopathy  Neurological: He is alert and oriented to person, place, and time   A cranial nerve deficit is present  Hard of hearing   Skin: No erythema  Multiple surgical scars from removals of basal cell carcinoma of his posterior thorax   Psychiatric: He has a normal mood and affect   Thought content normal

## 2018-06-22 LAB — HCV AB SER QL: NORMAL

## 2018-11-26 DIAGNOSIS — E11.9 TYPE 2 DIABETES MELLITUS WITHOUT COMPLICATION, UNSPECIFIED WHETHER LONG TERM INSULIN USE (HCC): ICD-10-CM

## 2018-11-26 RX ORDER — GLIPIZIDE 5 MG/1
5 TABLET, FILM COATED, EXTENDED RELEASE ORAL DAILY
Qty: 90 TABLET | Refills: 1 | Status: SHIPPED | OUTPATIENT
Start: 2018-11-26 | End: 2019-05-29 | Stop reason: SDUPTHER

## 2018-12-05 DIAGNOSIS — I10 ESSENTIAL HYPERTENSION: Primary | ICD-10-CM

## 2018-12-05 RX ORDER — LISINOPRIL 10 MG/1
10 TABLET ORAL DAILY
Qty: 90 TABLET | Refills: 1 | Status: SHIPPED | OUTPATIENT
Start: 2018-12-05 | End: 2019-06-05 | Stop reason: SDUPTHER

## 2018-12-17 ENCOUNTER — OFFICE VISIT (OUTPATIENT)
Dept: FAMILY MEDICINE CLINIC | Facility: CLINIC | Age: 73
End: 2018-12-17
Payer: MEDICARE

## 2018-12-17 VITALS
WEIGHT: 155 LBS | SYSTOLIC BLOOD PRESSURE: 118 MMHG | HEIGHT: 68 IN | DIASTOLIC BLOOD PRESSURE: 66 MMHG | OXYGEN SATURATION: 99 % | TEMPERATURE: 98.1 F | HEART RATE: 51 BPM | BODY MASS INDEX: 23.49 KG/M2

## 2018-12-17 DIAGNOSIS — E11.9 TYPE 2 DIABETES MELLITUS WITHOUT COMPLICATION, WITHOUT LONG-TERM CURRENT USE OF INSULIN (HCC): ICD-10-CM

## 2018-12-17 DIAGNOSIS — D63.8 ANEMIA OF CHRONIC DISEASE: ICD-10-CM

## 2018-12-17 DIAGNOSIS — E78.5 DYSLIPIDEMIA: ICD-10-CM

## 2018-12-17 DIAGNOSIS — I10 ESSENTIAL HYPERTENSION: ICD-10-CM

## 2018-12-17 DIAGNOSIS — Z23 ENCOUNTER FOR IMMUNIZATION: Primary | ICD-10-CM

## 2018-12-17 LAB — SL AMB POCT HEMOGLOBIN AIC: 6.9

## 2018-12-17 PROCEDURE — 90662 IIV NO PRSV INCREASED AG IM: CPT

## 2018-12-17 PROCEDURE — 36415 COLL VENOUS BLD VENIPUNCTURE: CPT | Performed by: FAMILY MEDICINE

## 2018-12-17 PROCEDURE — G0008 ADMIN INFLUENZA VIRUS VAC: HCPCS

## 2018-12-17 PROCEDURE — 99214 OFFICE O/P EST MOD 30 MIN: CPT | Performed by: FAMILY MEDICINE

## 2018-12-17 PROCEDURE — 83036 HEMOGLOBIN GLYCOSYLATED A1C: CPT | Performed by: FAMILY MEDICINE

## 2018-12-17 NOTE — ASSESSMENT & PLAN NOTE
His a history of dyslipidemia  He continues on atorvastatin for this as well as his diabetes  Will get a lipid profile today  Will continue to watch his diet

## 2018-12-17 NOTE — PROGRESS NOTES
Assessment/Plan:  DMII (diabetes mellitus, type 2) ([facility] [address] )  Lab Results   Component Value Date    HGBA1C 6 9 12/17/2018      Patient's hemoglobin A1c is controlled at 6 9  It is somewhat higher than his last visit  Going to work harder on his diet though he does admit he has been eating cookies during holiday season  His weight is stable  Will continue with his glipizide and metformin  Will see him back in 6 months or sooner as needed  No results for input(s): POCGLU in the last 72 hours  Blood Sugar Average: Last 72 hrs:      Hypertension  His blood pressure is well controlled on lisinopril today  He will continue with same  We are going to get CBC and CMP today  Dyslipidemia  His a history of dyslipidemia  He continues on atorvastatin for this as well as his diabetes  Will get a lipid profile today  Will continue to watch his diet  Anemia of chronic disease  Will evaluate CBC today  CBC CMP and lipids     Diagnoses and all orders for this visit:    Encounter for immunization  -     PREFERRED: influenza vaccine, [id number], high-dose, PF 0 5 mL, for patients 65 yr+ (FLUZONE HIGH-DOSE)    Type 2 diabetes mellitus without complication, without long-term current use of insulin (HCC)  -     POCT hemoglobin A1c    Essential hypertension    Dyslipidemia    Anemia of chronic disease          Subjective:   Chief Complaint   Patient presents with    med check     here for checkup and fbw today  pt given a flu shot  he does his diabetic foot exam         Patient ID: Elena Hopper is a 68 y o  male  HPI  The patient is a 80-year-old male who presents today for follow-up of hypertension, type 2 diabetes as well as dyslipidemia in addition to anemia of chronic disease state, history of prostate carcinoma and multiple basal cell carcinomas of the skin  He states he has been feeling well overall  He has been compliant with his Lipitor, glipizide as well as Zestril and Glucophage    The following portions of the patient's history were reviewed and updated as appropriate: allergies, current medications, past family history, past medical history, past social history, past surgical history and problem list     Review of Systems   Constitution: Negative for decreased appetite, weight gain and weight loss  Cardiovascular: Negative for chest pain, claudication, dyspnea on exertion, leg swelling, palpitations and paroxysmal nocturnal dyspnea  Respiratory: Negative for cough, shortness of breath and wheezing  Endocrine: Negative for polydipsia, polyphagia and polyuria  Gastrointestinal: Positive for diarrhea  Negative for constipation  Genitourinary:        2-3x   Neurological: Negative for dizziness, headaches, paresthesias and sensory change  Psychiatric/Behavioral: Negative for depression  The patient does not have insomnia and is not nervous/anxious  Objective:    Physical Exam   Constitutional: He is oriented to person, place, and time  He appears well-developed and well-nourished  Cardiovascular: Normal rate, regular rhythm and normal heart sounds  Exam reveals no gallop  Pulses are no weak pulses  No murmur heard  Pulses:       Dorsalis pedis pulses are 2+ on the right side, and 2+ on the left side  Pulmonary/Chest: Effort normal and breath sounds normal  No respiratory distress  He has no wheezes  He has no rales  Feet:   Right Foot:   Skin Integrity: Positive for dry skin  Negative for ulcer, skin breakdown, erythema or warmth  Left Foot:   Skin Integrity: Positive for dry skin  Negative for ulcer, skin breakdown, erythema, warmth or callus  Neurological: He is alert and oriented to person, place, and time  A cranial nerve deficit is present  [facility]    Psychiatric: He has a normal mood and affect  Thought content normal    Nursing note and vitals reviewed  Patient's shoes and socks removed  Right Foot/Ankle   Right Foot Inspection  Skin Exam: skin intact and dry skin no warmth, no erythema, no maceration, no abnormal color, no pre-ulcer and no ulcer                            Sensory       Monofilament testing: intact  Vascular  Capillary refills: < 3 seconds  The right DP pulse is 2+  Left Foot/Ankle  Left Foot Inspection  Skin Exam: skin intact and dry skinno warmth, no erythema, no maceration, normal color, no pre-ulcer, no ulcer and no callus                                         Sensory       Monofilament: intact  Vascular  Capillary refills: < 3 seconds  The left DP pulse is 2+  Assign Risk Category:  No deformity present; No loss of protective sensation;  No weak pulses       Risk: 0

## 2018-12-17 NOTE — ASSESSMENT & PLAN NOTE
Lab Results   Component Value Date    HGBA1C 6 9 12/17/2018      Patient's hemoglobin A1c is controlled at 6 9  It is somewhat higher than his last visit  Going to work harder on his diet though he does admit he has been eating cookies during holiday season  His weight is stable  Will continue with his glipizide and metformin  Will see him back in 6 months or sooner as needed  No results for input(s): POCGLU in the last 72 hours      Blood Sugar Average: Last 72 hrs:

## 2018-12-17 NOTE — ASSESSMENT & PLAN NOTE
His blood pressure is well controlled on lisinopril today  He will continue with same  We are going to get CBC and CMP today

## 2018-12-18 LAB
ALBUMIN SERPL-MCNC: 4.1 G/DL (ref 3.6–5.1)
ALBUMIN/GLOB SERPL: 2.1 (CALC) (ref 1–2.5)
ALP SERPL-CCNC: 49 U/L (ref 40–115)
ALT SERPL-CCNC: 10 U/L (ref 9–46)
AST SERPL-CCNC: 15 U/L (ref 10–35)
BILIRUB SERPL-MCNC: 0.5 MG/DL (ref 0.2–1.2)
BUN SERPL-MCNC: 15 MG/DL (ref 7–25)
BUN/CREAT SERPL: ABNORMAL (CALC) (ref 6–22)
CALCIUM SERPL-MCNC: 9.6 MG/DL (ref 8.6–10.3)
CHLORIDE SERPL-SCNC: 104 MMOL/L (ref 98–110)
CHOLEST SERPL-MCNC: 178 MG/DL
CHOLEST/HDLC SERPL: 3.7 (CALC)
CO2 SERPL-SCNC: 30 MMOL/L (ref 20–32)
CREAT SERPL-MCNC: 1.11 MG/DL (ref 0.7–1.18)
ERYTHROCYTE [DISTWIDTH] IN BLOOD BY AUTOMATED COUNT: 13.2 % (ref 11–15)
GLOBULIN SER CALC-MCNC: 2 G/DL (CALC) (ref 1.9–3.7)
GLUCOSE SERPL-MCNC: 162 MG/DL (ref 65–99)
HCT VFR BLD AUTO: 36.8 % (ref 38.5–50)
HDLC SERPL-MCNC: 48 MG/DL
HGB BLD-MCNC: 12.3 G/DL (ref 13.2–17.1)
LDLC SERPL CALC-MCNC: 115 MG/DL (CALC)
MCH RBC QN AUTO: 28.9 PG (ref 27–33)
MCHC RBC AUTO-ENTMCNC: 33.4 G/DL (ref 32–36)
MCV RBC AUTO: 86.6 FL (ref 80–100)
NONHDLC SERPL-MCNC: 130 MG/DL (CALC)
PLATELET # BLD AUTO: 240 THOUSAND/UL (ref 140–400)
PMV BLD REES-ECKER: 11.8 FL (ref 7.5–12.5)
POTASSIUM SERPL-SCNC: 5.2 MMOL/L (ref 3.5–5.3)
PROT SERPL-MCNC: 6.1 G/DL (ref 6.1–8.1)
RBC # BLD AUTO: 4.25 MILLION/UL (ref 4.2–5.8)
SL AMB EGFR AFRICAN AMERICAN: 76 ML/MIN/1.73M2
SL AMB EGFR NON AFRICAN AMERICAN: 66 ML/MIN/1.73M2
SODIUM SERPL-SCNC: 139 MMOL/L (ref 135–146)
TRIGL SERPL-MCNC: 59 MG/DL
WBC # BLD AUTO: 6.7 THOUSAND/UL (ref 3.8–10.8)

## 2019-02-19 ENCOUNTER — CLINICAL SUPPORT (OUTPATIENT)
Dept: FAMILY MEDICINE CLINIC | Facility: CLINIC | Age: 74
End: 2019-02-19
Payer: MEDICARE

## 2019-02-19 DIAGNOSIS — C61 ADENOCARCINOMA OF PROSTATE (HCC): Primary | ICD-10-CM

## 2019-02-19 PROCEDURE — 36415 COLL VENOUS BLD VENIPUNCTURE: CPT

## 2019-02-20 LAB — PSA SERPL-MCNC: <0.1 NG/ML (ref 0–4)

## 2019-05-29 DIAGNOSIS — E11.9 TYPE 2 DIABETES MELLITUS WITHOUT COMPLICATION, UNSPECIFIED WHETHER LONG TERM INSULIN USE (HCC): ICD-10-CM

## 2019-05-29 RX ORDER — GLIPIZIDE 5 MG/1
5 TABLET, FILM COATED, EXTENDED RELEASE ORAL DAILY
Qty: 90 TABLET | Refills: 1 | Status: SHIPPED | OUTPATIENT
Start: 2019-05-29 | End: 2019-11-27 | Stop reason: SDUPTHER

## 2019-06-05 DIAGNOSIS — I10 ESSENTIAL HYPERTENSION: ICD-10-CM

## 2019-06-05 RX ORDER — LISINOPRIL 10 MG/1
10 TABLET ORAL DAILY
Qty: 90 TABLET | Refills: 1 | Status: SHIPPED | OUTPATIENT
Start: 2019-06-05 | End: 2019-11-04 | Stop reason: SDUPTHER

## 2019-06-07 LAB
LEFT EYE DIABETIC RETINOPATHY: NORMAL
RIGHT EYE DIABETIC RETINOPATHY: NORMAL

## 2019-06-10 ENCOUNTER — OFFICE VISIT (OUTPATIENT)
Dept: FAMILY MEDICINE CLINIC | Facility: CLINIC | Age: 74
End: 2019-06-10
Payer: MEDICARE

## 2019-06-10 VITALS
HEART RATE: 50 BPM | SYSTOLIC BLOOD PRESSURE: 144 MMHG | HEIGHT: 68 IN | TEMPERATURE: 97.7 F | DIASTOLIC BLOOD PRESSURE: 70 MMHG | WEIGHT: 161 LBS | BODY MASS INDEX: 24.4 KG/M2 | OXYGEN SATURATION: 99 %

## 2019-06-10 DIAGNOSIS — C61 ADENOCARCINOMA OF PROSTATE (HCC): ICD-10-CM

## 2019-06-10 DIAGNOSIS — D63.8 ANEMIA OF CHRONIC DISEASE: ICD-10-CM

## 2019-06-10 DIAGNOSIS — I10 ESSENTIAL HYPERTENSION: ICD-10-CM

## 2019-06-10 DIAGNOSIS — E78.5 DYSLIPIDEMIA: ICD-10-CM

## 2019-06-10 DIAGNOSIS — E11.9 TYPE 2 DIABETES MELLITUS WITHOUT COMPLICATION, WITHOUT LONG-TERM CURRENT USE OF INSULIN (HCC): Primary | ICD-10-CM

## 2019-06-10 LAB — SL AMB POCT HEMOGLOBIN AIC: 7.2 (ref ?–6.5)

## 2019-06-10 PROCEDURE — 99214 OFFICE O/P EST MOD 30 MIN: CPT | Performed by: FAMILY MEDICINE

## 2019-06-10 PROCEDURE — 83036 HEMOGLOBIN GLYCOSYLATED A1C: CPT | Performed by: FAMILY MEDICINE

## 2019-06-10 PROCEDURE — 36415 COLL VENOUS BLD VENIPUNCTURE: CPT | Performed by: FAMILY MEDICINE

## 2019-06-10 PROCEDURE — G0439 PPPS, SUBSEQ VISIT: HCPCS | Performed by: FAMILY MEDICINE

## 2019-06-11 LAB
ALBUMIN SERPL-MCNC: 4 G/DL (ref 3.6–5.1)
ALBUMIN/GLOB SERPL: 2 (CALC) (ref 1–2.5)
ALP SERPL-CCNC: 52 U/L (ref 40–115)
ALT SERPL-CCNC: 10 U/L (ref 9–46)
AST SERPL-CCNC: 13 U/L (ref 10–35)
BILIRUB SERPL-MCNC: 0.4 MG/DL (ref 0.2–1.2)
BUN SERPL-MCNC: 17 MG/DL (ref 7–25)
BUN/CREAT SERPL: ABNORMAL (CALC) (ref 6–22)
CALCIUM SERPL-MCNC: 9.2 MG/DL (ref 8.6–10.3)
CHLORIDE SERPL-SCNC: 107 MMOL/L (ref 98–110)
CHOLEST SERPL-MCNC: 159 MG/DL
CHOLEST/HDLC SERPL: 3.3 (CALC)
CO2 SERPL-SCNC: 25 MMOL/L (ref 20–32)
CREAT SERPL-MCNC: 1.06 MG/DL (ref 0.7–1.18)
ERYTHROCYTE [DISTWIDTH] IN BLOOD BY AUTOMATED COUNT: 13.3 % (ref 11–15)
GLOBULIN SER CALC-MCNC: 2 G/DL (CALC) (ref 1.9–3.7)
GLUCOSE SERPL-MCNC: 148 MG/DL (ref 65–99)
HCT VFR BLD AUTO: 36.5 % (ref 38.5–50)
HDLC SERPL-MCNC: 48 MG/DL
HGB BLD-MCNC: 11.7 G/DL (ref 13.2–17.1)
LDLC SERPL CALC-MCNC: 97 MG/DL (CALC)
MCH RBC QN AUTO: 27.9 PG (ref 27–33)
MCHC RBC AUTO-ENTMCNC: 32.1 G/DL (ref 32–36)
MCV RBC AUTO: 86.9 FL (ref 80–100)
NONHDLC SERPL-MCNC: 111 MG/DL (CALC)
PLATELET # BLD AUTO: 202 THOUSAND/UL (ref 140–400)
PMV BLD REES-ECKER: 11.7 FL (ref 7.5–12.5)
POTASSIUM SERPL-SCNC: 5.2 MMOL/L (ref 3.5–5.3)
PROT SERPL-MCNC: 6 G/DL (ref 6.1–8.1)
RBC # BLD AUTO: 4.2 MILLION/UL (ref 4.2–5.8)
SL AMB EGFR AFRICAN AMERICAN: 80 ML/MIN/1.73M2
SL AMB EGFR NON AFRICAN AMERICAN: 69 ML/MIN/1.73M2
SODIUM SERPL-SCNC: 140 MMOL/L (ref 135–146)
TRIGL SERPL-MCNC: 47 MG/DL
WBC # BLD AUTO: 6.3 THOUSAND/UL (ref 3.8–10.8)

## 2019-06-17 LAB
ALBUMIN/CREAT UR: 15 MCG/MG CREAT
CREAT UR-MCNC: 40 MG/DL (ref 20–320)
MICROALBUMIN UR-MCNC: 0.6 MG/DL

## 2019-06-22 DIAGNOSIS — E11.9 TYPE 2 DIABETES MELLITUS WITHOUT COMPLICATION, WITHOUT LONG-TERM CURRENT USE OF INSULIN (HCC): ICD-10-CM

## 2019-06-29 DIAGNOSIS — E11.9 TYPE 2 DIABETES MELLITUS WITHOUT COMPLICATION, WITHOUT LONG-TERM CURRENT USE OF INSULIN (HCC): ICD-10-CM

## 2019-08-13 ENCOUNTER — OFFICE VISIT (OUTPATIENT)
Dept: FAMILY MEDICINE CLINIC | Facility: CLINIC | Age: 74
End: 2019-08-13
Payer: MEDICARE

## 2019-08-13 VITALS
SYSTOLIC BLOOD PRESSURE: 118 MMHG | WEIGHT: 151 LBS | BODY MASS INDEX: 22.88 KG/M2 | HEIGHT: 68 IN | DIASTOLIC BLOOD PRESSURE: 60 MMHG

## 2019-08-13 DIAGNOSIS — T14.8XXA WOUND OF SKIN: Primary | ICD-10-CM

## 2019-08-13 PROCEDURE — 99213 OFFICE O/P EST LOW 20 MIN: CPT | Performed by: FAMILY MEDICINE

## 2019-08-13 NOTE — ASSESSMENT & PLAN NOTE
The patient has what appears to be superficial skin wound of his left calf  Etiology is unclear  May possibly represent spider bite does appears relatively superficial   Could represent wound from CT though does not appear to be a scratch or bite  The epidermis is denuded  There is no evidence of secondary infection  We are going to give the patient some Silvadene ointment to use daily for the next 5-7 days  We also dressed with Telfa and gave him some dressings so that he may do the same  He is asked to call 5 7 days if it does not appear essentially healed  Will call sooner or seek more urgent medical attention if he sees drainage, develops fever or any other progressive symptoms  He agrees

## 2019-08-13 NOTE — PROGRESS NOTES
Assessment/Plan:  Wound of skin  The patient has what appears to be superficial skin wound of his left calf  Etiology is unclear  May possibly represent spider bite does appears relatively superficial   Could represent wound from CT though does not appear to be a scratch or bite  The epidermis is denuded  There is no evidence of secondary infection  We are going to give the patient some Silvadene ointment to use daily for the next 5-7 days  We also dressed with Telfa and gave him some dressings so that he may do the same  He is asked to call 5 7 days if it does not appear essentially healed  Will call sooner or seek more urgent medical attention if he sees drainage, develops fever or any other progressive symptoms  He agrees  Diagnoses and all orders for this visit:    Wound of skin          Subjective:   Chief Complaint   Patient presents with    Insect Bite     Left lower leg  Noticed this am         Patient ID: Radha Joseph is a 68 y o  male  HPI  The patient is a 72-year-old male who presents today stating that he noted 3 or 4 days ago a problem with his left posterior lower leg  This morning when he awakened it was uncomfortable and he was concerned about it  He is not sure if he may have been bitten or if his cat may have scratched him in that area  He states that the cat is known to scratch him about the ankles  He has had no fevers chills or constitutional symptoms  There has been some heme tinge on his socks but he has noticed no purulence  He has no other lesions  The following portions of the patient's history were reviewed and updated as appropriate: allergies, current medications, past medical history, past social history, past surgical history and problem list     Review of Systems   Constitution: Negative  Endocrine: Negative  Hematologic/Lymphatic: Positive for bleeding problem          As noted in the HPI   Skin:        As noted in the HPI Objective:    Physical Exam   Constitutional: He is oriented to person, place, and time  He appears well-developed and well-nourished  Neurological: He is alert and oriented to person, place, and time  A cranial nerve deficit is present  Severely hard of hearing   Skin:   Approximately 10 cm proximal to his left calcaneus over his posterior calf is irregular area of denuded epidermis of approximately 2 5 cm in diameter  There is as eschar present over the wound  There is no significant surrounding induration  There is no drainage  There is no lymphangitic streaking  Psychiatric: He has a normal mood and affect   Thought content normal

## 2019-09-18 ENCOUNTER — OFFICE VISIT (OUTPATIENT)
Dept: FAMILY MEDICINE CLINIC | Facility: CLINIC | Age: 74
End: 2019-09-18
Payer: MEDICARE

## 2019-09-18 VITALS
BODY MASS INDEX: 23.19 KG/M2 | DIASTOLIC BLOOD PRESSURE: 80 MMHG | HEIGHT: 68 IN | SYSTOLIC BLOOD PRESSURE: 128 MMHG | WEIGHT: 153 LBS

## 2019-09-18 DIAGNOSIS — L60.2 HYPERTROPHIC TOENAIL: Primary | ICD-10-CM

## 2019-09-18 DIAGNOSIS — B35.1 ONYCHOMYCOSIS: ICD-10-CM

## 2019-09-18 PROCEDURE — 99213 OFFICE O/P EST LOW 20 MIN: CPT | Performed by: FAMILY MEDICINE

## 2019-09-18 NOTE — ASSESSMENT & PLAN NOTE
His right great toenail was significantly hypertrophic due to onychomycosis  This was debrided by about 50%  It no longer comes in proximity to his 2nd toe  He is going to debride some of the fungal debris at home  He is asked to soak the foot in Epson salt soaks  If the 2nd toe does not appear to be improving over the next several days he is going to call for re-evaluation or sooner as needed  He agrees  I did tell him that he may require podiatric care in the future

## 2019-09-18 NOTE — PROGRESS NOTES
Assessment/Plan:  Hypertrophic toenail  His right great toenail was significantly hypertrophic due to onychomycosis  This was debrided by about 50%  It no longer comes in proximity to his 2nd toe  He is going to debride some of the fungal debris at home  He is asked to soak the foot in Epson salt soaks  If the 2nd toe does not appear to be improving over the next several days he is going to call for re-evaluation or sooner as needed  He agrees  I did tell him that he may require podiatric care in the future  Diagnoses and all orders for this visit:    Hypertrophic toenail    Onychomycosis          Subjective:   No chief complaint on file  Patient ID: Bereket Montano is a 76 y o  male  HPI  The patient is a 51-year-old male who presents today with concerns over his right great toenail  He states that it appears black  It is also doing into his 2nd toe and he is concerned about the possibility of infection in this area  He has no claudicatory symptoms  No significant drainage  It is uncomfortable especially when he is wearing shoe  The following portions of the patient's history were reviewed and updated as appropriate: allergies, current medications, past family history, past medical history, past social history, past surgical history and problem list     Review of Systems   Constitution: Negative  Cardiovascular: Negative  Endocrine: Negative  Hematologic/Lymphatic: Negative  Skin: Positive for color change and nail changes  Musculoskeletal: Positive for joint pain  Gastrointestinal: Negative  Genitourinary: Negative  Neurological: Negative  Psychiatric/Behavioral: Negative  Objective:    Physical Exam   Constitutional: He appears well-developed and well-nourished  Musculoskeletal: He exhibits no edema  Neurological: A cranial nerve deficit is present  Severely hard of hearing   Skin: No rash noted  There is erythema     He has a darkened hypertrophic right great toenail  It has significant curl laterally and the edge has lacerated the skin of the 2nd toe in the area of the dorsum the IP joint  There is also callus/hypertrophy of the skin in the area of the laceration  There is some mild erythema surrounding this area  There is no lymphangitis streaking  Using nail scissors, the nail is debrided to about 50% of its initial size  This is performed on the tibial margin of the nail so that it can no longer contact the 2nd toe  Much subungual debris consistent with onychomycosis is also removed

## 2019-11-04 DIAGNOSIS — I10 ESSENTIAL HYPERTENSION: ICD-10-CM

## 2019-11-04 RX ORDER — LISINOPRIL 10 MG/1
10 TABLET ORAL DAILY
Qty: 90 TABLET | Refills: 1 | Status: SHIPPED | OUTPATIENT
Start: 2019-11-04 | End: 2019-12-03 | Stop reason: SDUPTHER

## 2019-11-27 DIAGNOSIS — E11.9 TYPE 2 DIABETES MELLITUS WITHOUT COMPLICATION, UNSPECIFIED WHETHER LONG TERM INSULIN USE (HCC): ICD-10-CM

## 2019-11-27 RX ORDER — GLIPIZIDE 5 MG/1
5 TABLET, FILM COATED, EXTENDED RELEASE ORAL DAILY
Qty: 90 TABLET | Refills: 1 | Status: SHIPPED | OUTPATIENT
Start: 2019-11-27 | End: 2020-05-20 | Stop reason: SDUPTHER

## 2019-12-03 DIAGNOSIS — I10 ESSENTIAL HYPERTENSION: ICD-10-CM

## 2019-12-03 RX ORDER — LISINOPRIL 10 MG/1
10 TABLET ORAL DAILY
Qty: 90 TABLET | Refills: 1 | Status: SHIPPED | OUTPATIENT
Start: 2019-12-03 | End: 2020-03-05 | Stop reason: SDUPTHER

## 2019-12-10 ENCOUNTER — OFFICE VISIT (OUTPATIENT)
Dept: FAMILY MEDICINE CLINIC | Facility: CLINIC | Age: 74
End: 2019-12-10
Payer: MEDICARE

## 2019-12-10 VITALS
DIASTOLIC BLOOD PRESSURE: 62 MMHG | HEART RATE: 54 BPM | TEMPERATURE: 96.9 F | HEIGHT: 68 IN | BODY MASS INDEX: 23.49 KG/M2 | SYSTOLIC BLOOD PRESSURE: 128 MMHG | WEIGHT: 155 LBS | OXYGEN SATURATION: 99 %

## 2019-12-10 DIAGNOSIS — D63.8 ANEMIA OF CHRONIC DISEASE: ICD-10-CM

## 2019-12-10 DIAGNOSIS — E11.9 TYPE 2 DIABETES MELLITUS WITHOUT COMPLICATION, WITHOUT LONG-TERM CURRENT USE OF INSULIN (HCC): ICD-10-CM

## 2019-12-10 DIAGNOSIS — Z23 ENCOUNTER FOR IMMUNIZATION: Primary | ICD-10-CM

## 2019-12-10 DIAGNOSIS — I10 ESSENTIAL HYPERTENSION: ICD-10-CM

## 2019-12-10 DIAGNOSIS — E78.5 DYSLIPIDEMIA: ICD-10-CM

## 2019-12-10 LAB — SL AMB POCT HEMOGLOBIN AIC: 6.7 (ref ?–6.5)

## 2019-12-10 PROCEDURE — 36415 COLL VENOUS BLD VENIPUNCTURE: CPT | Performed by: FAMILY MEDICINE

## 2019-12-10 PROCEDURE — 83036 HEMOGLOBIN GLYCOSYLATED A1C: CPT | Performed by: FAMILY MEDICINE

## 2019-12-10 PROCEDURE — 99214 OFFICE O/P EST MOD 30 MIN: CPT | Performed by: FAMILY MEDICINE

## 2019-12-10 PROCEDURE — G0008 ADMIN INFLUENZA VIRUS VAC: HCPCS | Performed by: FAMILY MEDICINE

## 2019-12-10 PROCEDURE — 90662 IIV NO PRSV INCREASED AG IM: CPT | Performed by: FAMILY MEDICINE

## 2019-12-10 NOTE — PROGRESS NOTES
Assessment/Plan:  DMII (diabetes mellitus, type 2) ([facility])    Lab Results   Component Value Date    HGBA1C 6 7 (A) 12/10/2019   The patient's A1c has improved to goal range  We congratulated him for his efforts and encouraged him to continue with his healthy diet and exercise regimen  He agrees  Hypertension  Blood pressure well controlled today  Continue with lisinopril  Dyslipidemia  Continue with atorvastatin  Continue with diet exercise  Check lipids today  Anemia of chronic disease  CBC today  Diagnoses and all orders for this visit:    Encounter for immunization  -     FLUZONE HIGH-DOSE: influenza vaccine, high-dose, preservative-free 0 5 mL    Type 2 diabetes mellitus without complication, without long-term current use of insulin ([facility])  -     POCT hemoglobin A1c    Essential hypertension    Dyslipidemia    Anemia of chronic disease          Subjective:   Chief Complaint   Patient presents with    med check     here for checkup, fbw  pt given a flu shot  pt due for diabetic foot exam     Watching diet and walking daily  Patient ID: Maeve Reyna is a 76 y o  male  HPI  The patient is a 51-year-old male with multiple medical problems including type 2 diabetes, hypertension, dyslipidemia as well as anemia and history of adenocarcinoma of the prostate who presents today for routine follow-up  He states he has been walking at least a mile daily  He has been watching his diet  We note that his hemoglobin A1c has improved from 7 2-6 7  He states that he knows 3 individuals who of lost limbs day amputations recently he does not want that to happen him  He denies any polyuria polydipsia polyphagia  No cardiovascular pulmonary complaint  No claudicatory symptoms  No burning dysesthesias  He is compliant with his medications    The following portions of the patient's history were reviewed and updated as appropriate: allergies, current medications, past medical history, past social history, past surgical history and problem list     Review of Systems   Constitution: Negative for decreased appetite, malaise/fatigue, weight gain and weight loss  Cardiovascular: Negative for chest pain, irregular heartbeat and leg swelling  Respiratory: Negative for cough, shortness of breath and wheezing  Endocrine: Negative for polydipsia, polyphagia and polyuria  Hematologic/Lymphatic: Negative for adenopathy and bleeding problem  Does not bruise/bleed easily  Gastrointestinal: Positive for diarrhea  Negative for constipation and hematochezia  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  Neurological: Negative for dizziness and headaches  Psychiatric/Behavioral: Negative for depression  The patient does not have insomnia and is not nervous/anxious  Objective:    Physical Exam   Constitutional: He is oriented to person, place, and time  He appears well-developed and well-nourished  No distress  Cardiovascular: Normal rate and regular rhythm  Exam reveals no gallop  Pulses are no weak pulses  No murmur heard  Pulses:       Dorsalis pedis pulses are 2+ on the right side, and 2+ on the left side  Pulmonary/Chest: Effort normal and breath sounds normal  No respiratory distress  He has no wheezes  He has no rales  Musculoskeletal: He exhibits no edema  Feet:   Right Foot:   Skin Integrity: Negative for ulcer, skin breakdown, erythema, warmth, callus or dry skin  Left Foot:   Skin Integrity: Negative for ulcer, skin breakdown, erythema, warmth, callus or dry skin  Neurological: He is alert and oriented to person, place, and time  A cranial nerve deficit is present  Severely [place]   Psychiatric: He has a normal mood and affect  Nursing note and vitals reviewed  Patient's shoes and socks removed  Right Foot/Ankle   Right Foot Inspection  Skin Exam: skin normal and skin intact no dry skin, no warmth, no callus, no erythema, no maceration, no abnormal color, no pre-ulcer, no ulcer and no callus                          Toe Exam: ROM and strength within normal limits  Sensory       Monofilament testing: intact  Vascular  Capillary refills: < 3 seconds  The right DP pulse is 2+  Left Foot/Ankle  Left Foot Inspection  Skin Exam: skin normal and skin intactno dry skin, no warmth, no erythema, no maceration, normal color, no pre-ulcer, no ulcer and no callus                         Toe Exam: ROM and strength within normal limits                   Sensory       Monofilament: intact  Vascular  Capillary refills: < 3 seconds  The left DP pulse is 2+  Assign Risk Category:  No deformity present; No loss of protective sensation;  No weak pulses       Risk: 0

## 2019-12-10 NOTE — ASSESSMENT & PLAN NOTE
Lab Results   Component Value Date    HGBA1C 6 7 (A) 12/10/2019   The patient's A1c has improved to goal range  We congratulated him for his efforts and encouraged him to continue with his healthy diet and exercise regimen  He agrees

## 2019-12-11 LAB
ALBUMIN SERPL-MCNC: 4 G/DL (ref 3.6–5.1)
ALBUMIN/GLOB SERPL: 2.4 (CALC) (ref 1–2.5)
ALP SERPL-CCNC: 45 U/L (ref 40–115)
ALT SERPL-CCNC: 13 U/L (ref 9–46)
AST SERPL-CCNC: 18 U/L (ref 10–35)
BILIRUB SERPL-MCNC: 0.4 MG/DL (ref 0.2–1.2)
BUN SERPL-MCNC: 17 MG/DL (ref 7–25)
BUN/CREAT SERPL: ABNORMAL (CALC) (ref 6–22)
CALCIUM SERPL-MCNC: 9.1 MG/DL (ref 8.6–10.3)
CHLORIDE SERPL-SCNC: 104 MMOL/L (ref 98–110)
CHOLEST SERPL-MCNC: 160 MG/DL
CHOLEST/HDLC SERPL: 3.6 (CALC)
CO2 SERPL-SCNC: 27 MMOL/L (ref 20–32)
CREAT SERPL-MCNC: 1.09 MG/DL (ref 0.7–1.18)
ERYTHROCYTE [DISTWIDTH] IN BLOOD BY AUTOMATED COUNT: 13.5 % (ref 11–15)
GLOBULIN SER CALC-MCNC: 1.7 G/DL (CALC) (ref 1.9–3.7)
GLUCOSE SERPL-MCNC: 154 MG/DL (ref 65–99)
HCT VFR BLD AUTO: 35.2 % (ref 38.5–50)
HDLC SERPL-MCNC: 45 MG/DL
HGB BLD-MCNC: 11.5 G/DL (ref 13.2–17.1)
LDLC SERPL CALC-MCNC: 99 MG/DL (CALC)
MCH RBC QN AUTO: 29.4 PG (ref 27–33)
MCHC RBC AUTO-ENTMCNC: 32.7 G/DL (ref 32–36)
MCV RBC AUTO: 90 FL (ref 80–100)
NONHDLC SERPL-MCNC: 115 MG/DL (CALC)
PLATELET # BLD AUTO: 188 THOUSAND/UL (ref 140–400)
PMV BLD REES-ECKER: 11.9 FL (ref 7.5–12.5)
POTASSIUM SERPL-SCNC: 4.6 MMOL/L (ref 3.5–5.3)
PROT SERPL-MCNC: 5.7 G/DL (ref 6.1–8.1)
RBC # BLD AUTO: 3.91 MILLION/UL (ref 4.2–5.8)
SL AMB EGFR AFRICAN AMERICAN: 77 ML/MIN/1.73M2
SL AMB EGFR NON AFRICAN AMERICAN: 67 ML/MIN/1.73M2
SODIUM SERPL-SCNC: 139 MMOL/L (ref 135–146)
TRIGL SERPL-MCNC: 71 MG/DL
WBC # BLD AUTO: 6.1 THOUSAND/UL (ref 3.8–10.8)

## 2020-02-03 DIAGNOSIS — E11.9 TYPE 2 DIABETES MELLITUS WITHOUT COMPLICATION, WITHOUT LONG-TERM CURRENT USE OF INSULIN (HCC): ICD-10-CM

## 2020-02-21 ENCOUNTER — OFFICE VISIT (OUTPATIENT)
Dept: FAMILY MEDICINE CLINIC | Facility: CLINIC | Age: 75
End: 2020-02-21
Payer: MEDICARE

## 2020-02-21 VITALS
HEART RATE: 82 BPM | WEIGHT: 153 LBS | BODY MASS INDEX: 23.19 KG/M2 | SYSTOLIC BLOOD PRESSURE: 120 MMHG | TEMPERATURE: 98.3 F | OXYGEN SATURATION: 98 % | HEIGHT: 68 IN | DIASTOLIC BLOOD PRESSURE: 76 MMHG

## 2020-02-21 DIAGNOSIS — D63.8 ANEMIA OF CHRONIC DISEASE: ICD-10-CM

## 2020-02-21 DIAGNOSIS — K62.7 RADIATION PROCTITIS: ICD-10-CM

## 2020-02-21 DIAGNOSIS — K62.5 BRBPR (BRIGHT RED BLOOD PER RECTUM): Primary | ICD-10-CM

## 2020-02-21 PROBLEM — T14.8XXA WOUND OF SKIN: Status: RESOLVED | Noted: 2019-08-13 | Resolved: 2020-02-21

## 2020-02-21 PROCEDURE — 3078F DIAST BP <80 MM HG: CPT | Performed by: FAMILY MEDICINE

## 2020-02-21 PROCEDURE — 1036F TOBACCO NON-USER: CPT | Performed by: FAMILY MEDICINE

## 2020-02-21 PROCEDURE — 3074F SYST BP LT 130 MM HG: CPT | Performed by: FAMILY MEDICINE

## 2020-02-21 PROCEDURE — 4040F PNEUMOC VAC/ADMIN/RCVD: CPT | Performed by: FAMILY MEDICINE

## 2020-02-21 PROCEDURE — 99214 OFFICE O/P EST MOD 30 MIN: CPT | Performed by: FAMILY MEDICINE

## 2020-02-21 PROCEDURE — 3008F BODY MASS INDEX DOCD: CPT | Performed by: FAMILY MEDICINE

## 2020-02-21 PROCEDURE — 1160F RVW MEDS BY RX/DR IN RCRD: CPT | Performed by: FAMILY MEDICINE

## 2020-02-21 NOTE — ASSESSMENT & PLAN NOTE
Lab Results   Component Value Date    HGBA1C 6 7 (A) 12/10/2019   A1c acceptable in December  Continue with current therapy

## 2020-02-21 NOTE — ASSESSMENT & PLAN NOTE
Most likely cause of his bright red blood per rectum  Will ask Gastroenterology to re-evaluate  Will call should he have any progression of his symptoms  No blood in the vault presently

## 2020-02-21 NOTE — ASSESSMENT & PLAN NOTE
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

## 2020-02-21 NOTE — ASSESSMENT & PLAN NOTE
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

## 2020-02-21 NOTE — PROGRESS NOTES
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  Previous colonoscopy about 2 years ago revealed 1 polyp as well as internal hemorrhoids grade 2  Presently there is no active bleeding  He has no abdominal pain  He does have mild anemia  We are going to arrange an appointment with Gastroenterology for re-evaluation  Will follow up with him when he has had this evaluation  In the meantime he will call for any progression of his bleeding or any other concerns  He agrees  Radiation proctitis  Most likely cause of his bright red blood per rectum  Will ask Gastroenterology to re-evaluate  Will call should he have any progression of his symptoms  No blood in the vault presently  DMII (diabetes mellitus, type 2) ([facility] )    Lab Results   Component Value Date    HGBA1C 6 7 (A) 12/10/2019   A1c acceptable in December  Continue with current therapy  Hypertension  Blood pressure acceptable today  No change in therapy  Anemia of chronic disease  Continue observation  Will ask gastroenterology to re-evaluate  His chronic radiation proctitis may be the source  Diagnoses and all orders for this visit:    BRBPR (bright red blood per rectum)    Radiation proctitis    Anemia of chronic disease          Subjective:   Chief Complaint   Patient presents with    Rectal Problems     states he is having some drainage         Patient ID: Ryan Pena is a 76 y o  male  Avoid the 18th Earl November HPI  The patient is 77-year-old male with a history of prostate cancer and subsequent development of radiation proctitis  He also has a history of internal hemorrhoids noted on previous colonoscopy in 2018  He did have 1 adenoma as well which was removed at that time    He states that for the past 3-4 weeks he notes that when he wipes himself after a bowel movement that the toilet tissue looks red  He has had normal bowel movements otherwise which are brown informed   He has had no abdominal pain or fever  He has had no flank pain  He has had no nausea or vomiting he has normal appetite  He has had no purulent drainage  The following portions of the patient's history were reviewed and updated as appropriate: allergies, current medications, past medical history, past social history, past surgical history and problem list     Review of Systems   Constitution: Negative for chills, decreased appetite, fever, malaise/fatigue and weight loss  Respiratory: Negative for cough, shortness of breath, sputum production and wheezing  Endocrine: Negative for polydipsia, polyphagia and polyuria  Hematologic/Lymphatic: Positive for bleeding problem  Negative for adenopathy  Does not bruise/bleed easily  Skin: Negative for rash  Gastrointestinal: Positive for hematochezia and hemorrhoids  Negative for bloating, abdominal pain, change in bowel habit, constipation, diarrhea, dysphagia, heartburn, hematemesis, nausea and vomiting  Genitourinary:        History of prostate carcinoma with radiation proctitis   Neurological: Negative  Psychiatric/Behavioral: Negative  Objective:    Physical Exam   Constitutional: He is oriented to person, place, and time  He appears well-developed and well-nourished  No distress  Cardiovascular: Normal rate, regular rhythm and normal heart sounds  Pulmonary/Chest: Effort normal and breath sounds normal    Abdominal: Soft  Bowel sounds are normal  He exhibits no mass  There is no tenderness  There is no guarding  Genitourinary: Rectum normal    Genitourinary Comments: Examination the patient's anal and perianal region is grossly normal   There is no evidence of fissure  There is no evidence of abscess  There is no drainage present  Anal tone is normal   There is no rectal mass noted    Prostate bed is empty  There is no nodular or other mass appreciated  There is stool in the vault  There was no blood noted on the glove  Neurological: He is alert and oriented to person, place, and time  A cranial nerve deficit is present  Severely hard of hearing   Skin: No rash noted  Psychiatric: He has a normal mood and affect  Nursing note and vitals reviewed

## 2020-02-26 ENCOUNTER — OFFICE VISIT (OUTPATIENT)
Dept: GASTROENTEROLOGY | Facility: CLINIC | Age: 75
End: 2020-02-26
Payer: MEDICARE

## 2020-02-26 VITALS
HEART RATE: 78 BPM | WEIGHT: 152 LBS | SYSTOLIC BLOOD PRESSURE: 132 MMHG | BODY MASS INDEX: 23.04 KG/M2 | HEIGHT: 68 IN | DIASTOLIC BLOOD PRESSURE: 62 MMHG

## 2020-02-26 DIAGNOSIS — Z83.71 FAMILY HISTORY OF COLONIC POLYPS: ICD-10-CM

## 2020-02-26 DIAGNOSIS — K62.5 RECTAL BLEEDING: Primary | ICD-10-CM

## 2020-02-26 DIAGNOSIS — Z86.010 PERSONAL HISTORY OF COLONIC POLYPS: ICD-10-CM

## 2020-02-26 DIAGNOSIS — K62.7 RADIATION PROCTITIS: ICD-10-CM

## 2020-02-26 PROBLEM — Z86.0100 PERSONAL HISTORY OF COLONIC POLYPS: Status: ACTIVE | Noted: 2020-02-26

## 2020-02-26 PROBLEM — Z83.719 FAMILY HISTORY OF COLONIC POLYPS: Status: ACTIVE | Noted: 2020-02-26

## 2020-02-26 PROCEDURE — 4040F PNEUMOC VAC/ADMIN/RCVD: CPT | Performed by: NURSE PRACTITIONER

## 2020-02-26 PROCEDURE — 3075F SYST BP GE 130 - 139MM HG: CPT | Performed by: NURSE PRACTITIONER

## 2020-02-26 PROCEDURE — 1160F RVW MEDS BY RX/DR IN RCRD: CPT | Performed by: NURSE PRACTITIONER

## 2020-02-26 PROCEDURE — 99214 OFFICE O/P EST MOD 30 MIN: CPT | Performed by: NURSE PRACTITIONER

## 2020-02-26 PROCEDURE — 3008F BODY MASS INDEX DOCD: CPT | Performed by: NURSE PRACTITIONER

## 2020-02-26 PROCEDURE — 3078F DIAST BP <80 MM HG: CPT | Performed by: NURSE PRACTITIONER

## 2020-02-26 PROCEDURE — 1036F TOBACCO NON-USER: CPT | Performed by: NURSE PRACTITIONER

## 2020-02-26 NOTE — PROGRESS NOTES
[id number] - Outpatient Consultation  Raul Monroy 76 y o  male MRN: [id number]  Encounter: [id number]    ASSESSMENT AND PLAN:      1  Rectal bleeding  2  Radiation proctitis  Intermittent for the past month with BRBPR noted on toilet paper with wiping  Previous episode in 2013 associated with radiation proctitis, active bleeding requiring gold probe treatment to achieve hemostasis during endoscopic evaluation  At that time he was being evaluated for anemia and this was considered a possible etiology for blood loss  Less likely related to hemorrhoids with no report of straining with stools  -will arrange flexible sigmoidoscopy to evaluate for radiation proctitis or hemorrhoidal bleeding and treat as necessary    3  Personal history of colonic polyps  4  Family history of colonic polyps  High risk with prior polyps and brother with polyps  Last colonoscopy 01/10/2018 showed radiation proctitis in the distal rectum, small grade 2 internal hemorrhoids and 1 adenomatous polyp  Five year recall -2023  Followup Appointment:  4 weeks  ______________________________________________________________________    Chief Complaint   Patient presents with    Hematochezia     on toilet tissue       HPI:   Raul Monroy is a 76 y o   male who presents noting several episodes of red blood on the toilet paper with wiping for the past month  He has had rare episodes occur intermittently prior to this noted as 1-2 in a week but then none for 1 or 2 months  Patient reports his normal bowel pattern is 1 soft formed stool daily alternating with 2-5 nonbloody, non urgent liquid, watery stools daily  The pattern for this is random and not associated with particular food triggers or meals  Denies any straining or constipation  The loose stools have occurred since his prostatectomy and radiation treatment which produced radiation proctitis    Active bleeding was treated during his colonoscopy in  with gold probe  There was no evidence of active bleeding during his most recent colonoscopy in 2018  No abdominal or rectal pain, fevers or chills  Denies any UGI or alarm symptoms  Rare heartburn if he eats late at night  Appetite is normal   Weight is stable      Historical Information   Past Medical History:   Diagnosis Date    Aden[facility] of prostate ([facility]     [address]    Anemia     Colon polyp     [facility]     History of Lyme disease     Hyperlipidemia     Hypertension     Proctitis     Prostate cancer ([facility] )     Treated with radiation     Past Surgical History:   Procedure Laterality Date    COLONOSCOPY  01/10/2018    COLONOSCOPY  01/10/2018     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, grade 2 internal hemorrhoids, 1 adenomatous polyp    HI SPLIT GRFT TRUNK,ARM,LEG <100 SQCM Left 2017    Procedure: ANTERIOR SHOULDER EXCISION BCC- COMPLEX CLOSURE vs FLAP vs STSG;  Surgeon: Cynthia Geller MD;  Location:  MAIN OR;  Service: Plastics     Social History     Substance and Sexual Activity   Alcohol Use No    Frequency: Never    Binge frequency: Never     Social History     Substance and Sexual Activity   Drug Use No     Social History     Tobacco Use   Smoking Status Former Smoker    Types: Cigarettes    Last attempt to quit: 1977    Years since quittin 7   Smokeless Tobacco Never Used   Tobacco Comment    never a smoker noted in "allscripts" - Denied: History of tobacco use noted in "allscripts"      Family History   Problem Relation Age of Onset    Heart disease Mother     Diabetes Brother         mellitus     Colon polyps Brother     Throat cancer Sister     Colon cancer Neg Hx        Meds/Allergies     Current Outpatient Medications:     aspirin 81 MG tablet    atorvastatin (LIPITOR) 10 mg tablet    glipiZIDE (GLIPIZIDE XL) 5 mg 24 hr tablet    lisinopril (ZESTRIL) 10 mg tablet    metFORMIN (GLUCOPHAGE) 1000 MG tablet    No Known Allergies    PHYSICAL EXAM:    Blood pressure 132/62, pulse 78, height 5' 7 8" (1 722 m), weight 68 9 kg (152 lb)  Body mass index is 23 25 kg/m²  General Appearance: NAD, cooperative, alert  Head:  Normocephalic, atraumatic  Eyes: Anicteric, PERRLA, EOMI  ENT:  Normal external appearance, normal mucosa  Neck:  Supple, symmetrical, trachea midline,   Resp:  Clear to auscultation bilaterally; no rales, rhonchi or wheezing; respirations unlabored   CV:  S1 S2, Regular rate and rhythm; no murmur, rub, or gallop  GI:  Soft, non-tender, non-distended; normal bowel sounds; no masses, no organomegaly   Rectal:  Normal external exam, normal anal tone, internal exam without pain, fissures, large hemorrhoids, pain scant blood, small amount of brown stool guaiac negative   Musculoskeletal: No cyanosis, clubbing or edema  Normal ROM  Skin:  No jaundice, rashes, or lesions   Heme/Lymph: No palpable cervical lymphadenopathy  Psych: Normal affect, good eye contact  Neuro: No gross deficits, AAOx3    Lab Results:  Reviewed recent lab work per [facility] from December, 2019  Lab Results   Component Value Date    WBC 6 1 12/10/2019    HGB 11 5 (L) 12/10/2019    HCT 35 2 (L) 12/10/2019    MCV 90 0 12/10/2019     12/10/2019     Lab Results   Component Value Date     12/18/2017    K 4 6 12/10/2019     12/10/2019    CO2 27 12/10/2019    BUN 17 12/10/2019    CREATININE 1 09 12/10/2019    GLUF 95 06/21/2018    CALCIUM 9 1 12/10/2019    AST 18 12/10/2019    ALT 13 12/10/2019    ALKPHOS 45 12/10/2019    PROT 6 5 12/18/2017    BILITOT 0 6 12/18/2017    EGFR 71 06/21/2018     No results found for: IRON, TIBC, FERRITIN  No results found for: LIPASE    Radiology Results:   No results found  REVIEW OF SYSTEMS:    CONSTITUTIONAL: Denies any fever, chills, rigors, and weight loss  HEENT: No earache or tinnitus  Denies hearing loss or visual disturbances  CARDIOVASCULAR: No chest pain or palpitations     RESPIRATORY: Denies any cough, hemoptysis, shortness of breath or dyspnea on exertion  GASTROINTESTINAL: As noted in the History of Present Illness  Reports blood in stool  GENITOURINARY: No problems with urination  Denies any hematuria or dysuria  NEUROLOGIC: No dizziness or vertigo, denies headaches  MUSCULOSKELETAL: Denies any muscle or joint pain  Reports muscle aches  SKIN: Denies skin rashes or itching  ENDOCRINE: Denies excessive thirst  Denies intolerance to heat or cold  PSYCHOSOCIAL: Denies depression or anxiety  Denies any recent memory loss

## 2020-03-02 RX ORDER — TAMSULOSIN HYDROCHLORIDE 0.4 MG/1
CAPSULE ORAL
COMMUNITY
Start: 2020-02-17

## 2020-03-05 ENCOUNTER — OFFICE VISIT (OUTPATIENT)
Dept: FAMILY MEDICINE CLINIC | Facility: CLINIC | Age: 75
End: 2020-03-05
Payer: MEDICARE

## 2020-03-05 VITALS
WEIGHT: 154 LBS | DIASTOLIC BLOOD PRESSURE: 48 MMHG | OXYGEN SATURATION: 98 % | BODY MASS INDEX: 23.34 KG/M2 | HEART RATE: 63 BPM | HEIGHT: 68 IN | SYSTOLIC BLOOD PRESSURE: 134 MMHG

## 2020-03-05 DIAGNOSIS — C61 ADENOCARCINOMA OF PROSTATE (HCC): ICD-10-CM

## 2020-03-05 DIAGNOSIS — I10 ESSENTIAL HYPERTENSION: ICD-10-CM

## 2020-03-05 DIAGNOSIS — R42 ORTHOSTATIC DIZZINESS: Primary | ICD-10-CM

## 2020-03-05 PROCEDURE — 99214 OFFICE O/P EST MOD 30 MIN: CPT | Performed by: FAMILY MEDICINE

## 2020-03-05 PROCEDURE — 3008F BODY MASS INDEX DOCD: CPT | Performed by: FAMILY MEDICINE

## 2020-03-05 PROCEDURE — 4010F ACE/ARB THERAPY RXD/TAKEN: CPT | Performed by: FAMILY MEDICINE

## 2020-03-05 PROCEDURE — 3075F SYST BP GE 130 - 139MM HG: CPT | Performed by: FAMILY MEDICINE

## 2020-03-05 PROCEDURE — 3078F DIAST BP <80 MM HG: CPT | Performed by: FAMILY MEDICINE

## 2020-03-05 PROCEDURE — 1160F RVW MEDS BY RX/DR IN RCRD: CPT | Performed by: FAMILY MEDICINE

## 2020-03-05 PROCEDURE — 4040F PNEUMOC VAC/ADMIN/RCVD: CPT | Performed by: FAMILY MEDICINE

## 2020-03-05 PROCEDURE — 1036F TOBACCO NON-USER: CPT | Performed by: FAMILY MEDICINE

## 2020-03-05 RX ORDER — LISINOPRIL 5 MG/1
5 TABLET ORAL DAILY
Qty: 90 TABLET | Refills: 0 | Status: SHIPPED | OUTPATIENT
Start: 2020-03-05 | End: 2020-09-03 | Stop reason: SDUPTHER

## 2020-03-05 NOTE — PROGRESS NOTES
Assessment/Plan:  Orthostatic dizziness  Patient has some orthostatic dizziness  Orthostatic vital signs were plus-minus positive  We told the patient that we are going to cut his lisinopril down from 10 to 5 mg  This prescription was sent to the pharmacy  After he left the office I realize that he may have had tamsulosin added by another provider recently and this could be the explanation as well  He does not answer his phone due to his hard of hearing status  He does check in with the office and was asked to do so over the next few days  When he does will inquire as to the timing of addition of Flomax and his symptoms  This may be the etiology  He was asked to return should his symptoms not be improving over the next few days or certainly should they worsen  Diagnoses and all orders for this visit:    Orthostatic dizziness    Essential hypertension  -     lisinopril (ZESTRIL) 5 mg tablet; Take 1 tablet (5 mg total) by mouth daily    Adenocarcinoma of prostate ([address] )    Other orders  -     tamsulosin (FLOMAX) 0 4 mg          Subjective:   Chief Complaint   Patient presents with    Dizziness     States that it comes and goes for a couple of weeks        Patient ID: Aris Parks is a 76 y o  male  Feels fleeting light headedness after standing intermittently over the past 2 weeks, No vertigo  No further BRBPR  No CP or SOB  No ringing  No HA or focal neurologic sx  HPI  The patient is a 55-year-old male with history of type 2 diabetes, essential hypertension, radiation proctitis from treatment for prostate cancer in addition to other who presents today stating that for the past 2 weeks he has had some fleeting lightheadedness  It tends to occur after he stands up  There is no associated chest pain shortness of breath rapid irregular heartbeat X cetera  He has had no further bright red blood per rectum since his last visit    He saw gastroenterology and he is scheduled for a sigmoidoscopy next month   He has had no ringing in his ears and no vertiginous symptoms  He has had no headache or other focal neurologic symptom  He has been compliant with his medications which include metformin, lisinopril, glipizide, atorvastatin and aspirin in addition to Flomax  The following portions of the patient's history were reviewed and updated as appropriate: allergies, current medications, past medical history, past social history, past surgical history and problem list     Review of Systems   Constitution: Negative for decreased appetite, fever, malaise/fatigue, weight gain and weight loss  Cardiovascular: Negative for irregular heartbeat, leg swelling, orthopnea and paroxysmal nocturnal dyspnea  Respiratory: Negative for cough, shortness of breath and wheezing  Hematologic/Lymphatic: Negative for adenopathy and bleeding problem  Does not bruise/bleed easily  Gastrointestinal: Negative for hematochezia  Hematochezia resolved   Neurological: Positive for dizziness and light-headedness  Negative for difficulty with concentration, disturbances in coordination, focal weakness, paresthesias and vertigo  Psychiatric/Behavioral: Negative for depression  The patient does not have insomnia  Objective:    Physical Exam   Constitutional: He is oriented to person, place, and time  He appears well-developed and well-nourished  No distress  Neck: No JVD present  No thyromegaly present  Cardiovascular: Normal rate and regular rhythm  HR 70s  No carotid bruit or diminished upstroke  Pulmonary/Chest: Effort normal and breath sounds normal    Musculoskeletal: He exhibits no edema  Lymphadenopathy:     He has no cervical adenopathy  Neurological: He is alert and oriented to person, place, and time  [facility]   Psychiatric: He has a normal mood and affect  Thought content normal    Nursing note and vitals reviewed

## 2020-03-05 NOTE — ASSESSMENT & PLAN NOTE
Patient has some orthostatic dizziness  Orthostatic vital signs were plus-minus positive  We told the patient that we are going to cut his lisinopril down from 10 to 5 mg  This prescription was sent to the pharmacy  After he left the office I realize that he may have had tamsulosin added by another provider recently and this could be the explanation as well  He does not answer his phone due to his hard of hearing status  He does check in with the office and was asked to do so over the next few days  When he does will inquire as to the timing of addition of Flomax and his symptoms  This may be the etiology  He was asked to return should his symptoms not be improving over the next few days or certainly should they worsen

## 2020-03-30 RX ORDER — SODIUM CHLORIDE 9 MG/ML
20 INJECTION, SOLUTION INTRAVENOUS CONTINUOUS
Status: CANCELLED | OUTPATIENT
Start: 2020-04-03

## 2020-04-03 ENCOUNTER — HOSPITAL ENCOUNTER (OUTPATIENT)
Dept: GASTROENTEROLOGY | Facility: HOSPITAL | Age: 75
Setting detail: OUTPATIENT SURGERY
Discharge: HOME | End: 2020-04-03
Attending: INTERNAL MEDICINE | Admitting: INTERNAL MEDICINE
Payer: MEDICARE

## 2020-04-03 VITALS
RESPIRATION RATE: 18 BRPM | TEMPERATURE: 97.6 F | WEIGHT: 154 LBS | HEART RATE: 49 BPM | BODY MASS INDEX: 23.34 KG/M2 | OXYGEN SATURATION: 100 % | SYSTOLIC BLOOD PRESSURE: 172 MMHG | DIASTOLIC BLOOD PRESSURE: 70 MMHG | HEIGHT: 68 IN

## 2020-04-03 DIAGNOSIS — K62.7 RADIATION PROCTITIS: ICD-10-CM

## 2020-04-03 DIAGNOSIS — K62.5 RECTAL BLEEDING: ICD-10-CM

## 2020-04-03 PROCEDURE — 45334 SIGMOIDOSCOPY FOR BLEEDING: CPT | Performed by: INTERNAL MEDICINE

## 2020-04-03 PROCEDURE — NC001 PR NO CHARGE: Performed by: INTERNAL MEDICINE

## 2020-04-07 ENCOUNTER — OFFICE VISIT (OUTPATIENT)
Dept: GASTROENTEROLOGY | Facility: CLINIC | Age: 75
End: 2020-04-07
Payer: MEDICARE

## 2020-04-07 VITALS — WEIGHT: 150 LBS | BODY MASS INDEX: 22.73 KG/M2 | HEIGHT: 68 IN

## 2020-04-07 DIAGNOSIS — K62.7 RADIATION PROCTITIS: ICD-10-CM

## 2020-04-07 DIAGNOSIS — Z83.71 FAMILY HISTORY OF COLONIC POLYPS: ICD-10-CM

## 2020-04-07 DIAGNOSIS — K62.5 RECTAL BLEEDING: ICD-10-CM

## 2020-04-07 DIAGNOSIS — K55.21 AVM (ARTERIOVENOUS MALFORMATION) OF COLON WITH HEMORRHAGE: Primary | ICD-10-CM

## 2020-04-07 DIAGNOSIS — Z86.010 PERSONAL HISTORY OF COLONIC POLYPS: ICD-10-CM

## 2020-04-07 PROCEDURE — 99213 OFFICE O/P EST LOW 20 MIN: CPT | Performed by: NURSE PRACTITIONER

## 2020-04-07 PROCEDURE — 4040F PNEUMOC VAC/ADMIN/RCVD: CPT | Performed by: NURSE PRACTITIONER

## 2020-04-07 PROCEDURE — 1160F RVW MEDS BY RX/DR IN RCRD: CPT | Performed by: NURSE PRACTITIONER

## 2020-04-07 PROCEDURE — 3077F SYST BP >= 140 MM HG: CPT | Performed by: NURSE PRACTITIONER

## 2020-04-07 PROCEDURE — 3078F DIAST BP <80 MM HG: CPT | Performed by: NURSE PRACTITIONER

## 2020-04-07 PROCEDURE — 3008F BODY MASS INDEX DOCD: CPT | Performed by: NURSE PRACTITIONER

## 2020-04-07 PROCEDURE — 1036F TOBACCO NON-USER: CPT | Performed by: NURSE PRACTITIONER

## 2020-04-28 ENCOUNTER — APPOINTMENT (OUTPATIENT)
Dept: LAB | Facility: HOSPITAL | Age: 75
End: 2020-04-28
Payer: MEDICARE

## 2020-04-28 DIAGNOSIS — K62.5 RECTAL BLEEDING: ICD-10-CM

## 2020-04-28 LAB
ERYTHROCYTE [DISTWIDTH] IN BLOOD BY AUTOMATED COUNT: 13.5 % (ref 11.6–15.1)
HCT VFR BLD AUTO: 37.9 % (ref 36.5–49.3)
HGB BLD-MCNC: 12.1 G/DL (ref 12–17)
MCH RBC QN AUTO: 29.3 PG (ref 26.8–34.3)
MCHC RBC AUTO-ENTMCNC: 31.9 G/DL (ref 31.4–37.4)
MCV RBC AUTO: 92 FL (ref 82–98)
PLATELET # BLD AUTO: 182 THOUSANDS/UL (ref 149–390)
PMV BLD AUTO: 10.9 FL (ref 8.9–12.7)
RBC # BLD AUTO: 4.13 MILLION/UL (ref 3.88–5.62)
WBC # BLD AUTO: 6.97 THOUSAND/UL (ref 4.31–10.16)

## 2020-04-28 PROCEDURE — 85027 COMPLETE CBC AUTOMATED: CPT

## 2020-04-28 PROCEDURE — 36415 COLL VENOUS BLD VENIPUNCTURE: CPT

## 2020-05-20 DIAGNOSIS — E11.9 TYPE 2 DIABETES MELLITUS WITHOUT COMPLICATION, UNSPECIFIED WHETHER LONG TERM INSULIN USE (HCC): ICD-10-CM

## 2020-05-20 RX ORDER — GLIPIZIDE 5 MG/1
5 TABLET, FILM COATED, EXTENDED RELEASE ORAL DAILY
Qty: 90 TABLET | Refills: 1 | Status: SHIPPED | OUTPATIENT
Start: 2020-05-20 | End: 2020-11-24 | Stop reason: SDUPTHER

## 2020-06-15 RX ORDER — SILODOSIN 8 MG/1
CAPSULE ORAL
COMMUNITY
Start: 2020-04-27

## 2020-06-16 DIAGNOSIS — E11.9 TYPE 2 DIABETES MELLITUS WITHOUT COMPLICATION, WITHOUT LONG-TERM CURRENT USE OF INSULIN (HCC): ICD-10-CM

## 2020-06-18 ENCOUNTER — OFFICE VISIT (OUTPATIENT)
Dept: FAMILY MEDICINE CLINIC | Facility: CLINIC | Age: 75
End: 2020-06-18
Payer: MEDICARE

## 2020-06-18 VITALS
BODY MASS INDEX: 22.81 KG/M2 | HEIGHT: 68 IN | DIASTOLIC BLOOD PRESSURE: 60 MMHG | WEIGHT: 150.5 LBS | SYSTOLIC BLOOD PRESSURE: 126 MMHG

## 2020-06-18 DIAGNOSIS — K62.7 RADIATION PROCTITIS: ICD-10-CM

## 2020-06-18 DIAGNOSIS — I10 ESSENTIAL HYPERTENSION: ICD-10-CM

## 2020-06-18 DIAGNOSIS — E78.5 DYSLIPIDEMIA: ICD-10-CM

## 2020-06-18 DIAGNOSIS — Z00.00 MEDICARE ANNUAL WELLNESS VISIT, SUBSEQUENT: Primary | ICD-10-CM

## 2020-06-18 DIAGNOSIS — E11.9 TYPE 2 DIABETES MELLITUS WITHOUT COMPLICATION, WITHOUT LONG-TERM CURRENT USE OF INSULIN (HCC): ICD-10-CM

## 2020-06-18 LAB — SL AMB POCT HEMOGLOBIN AIC: 7.2 (ref ?–6.5)

## 2020-06-18 PROCEDURE — 1160F RVW MEDS BY RX/DR IN RCRD: CPT | Performed by: FAMILY MEDICINE

## 2020-06-18 PROCEDURE — 3051F HG A1C>EQUAL 7.0%<8.0%: CPT | Performed by: FAMILY MEDICINE

## 2020-06-18 PROCEDURE — 3074F SYST BP LT 130 MM HG: CPT | Performed by: FAMILY MEDICINE

## 2020-06-18 PROCEDURE — 99214 OFFICE O/P EST MOD 30 MIN: CPT | Performed by: FAMILY MEDICINE

## 2020-06-18 PROCEDURE — 4040F PNEUMOC VAC/ADMIN/RCVD: CPT | Performed by: FAMILY MEDICINE

## 2020-06-18 PROCEDURE — G0439 PPPS, SUBSEQ VISIT: HCPCS | Performed by: FAMILY MEDICINE

## 2020-06-18 PROCEDURE — 1125F AMNT PAIN NOTED PAIN PRSNT: CPT | Performed by: FAMILY MEDICINE

## 2020-06-18 PROCEDURE — 1170F FXNL STATUS ASSESSED: CPT | Performed by: FAMILY MEDICINE

## 2020-06-18 PROCEDURE — 3008F BODY MASS INDEX DOCD: CPT | Performed by: FAMILY MEDICINE

## 2020-06-18 PROCEDURE — 3078F DIAST BP <80 MM HG: CPT | Performed by: FAMILY MEDICINE

## 2020-06-18 PROCEDURE — 36415 COLL VENOUS BLD VENIPUNCTURE: CPT | Performed by: FAMILY MEDICINE

## 2020-06-18 PROCEDURE — 1036F TOBACCO NON-USER: CPT | Performed by: FAMILY MEDICINE

## 2020-06-18 PROCEDURE — 83036 HEMOGLOBIN GLYCOSYLATED A1C: CPT | Performed by: FAMILY MEDICINE

## 2020-06-19 LAB — SL AMB CONTAINER TYPE: NORMAL

## 2020-06-25 LAB
ALBUMIN SERPL-MCNC: 3.7 G/DL (ref 3.6–5.1)
ALBUMIN/GLOB SERPL: 1.9 (CALC) (ref 1–2.5)
ALP SERPL-CCNC: 41 U/L (ref 35–144)
ALT SERPL-CCNC: 13 U/L (ref 9–46)
AST SERPL-CCNC: 14 U/L (ref 10–35)
BILIRUB SERPL-MCNC: 0.5 MG/DL (ref 0.2–1.2)
BUN SERPL-MCNC: 23 MG/DL (ref 7–25)
BUN/CREAT SERPL: ABNORMAL (CALC) (ref 6–22)
CALCIUM SERPL-MCNC: 8.6 MG/DL (ref 8.6–10.3)
CHLORIDE SERPL-SCNC: 110 MMOL/L (ref 98–110)
CHOLEST SERPL-MCNC: 140 MG/DL
CHOLEST/HDLC SERPL: 3 (CALC)
CO2 SERPL-SCNC: 26 MMOL/L (ref 20–32)
CREAT SERPL-MCNC: 1.1 MG/DL (ref 0.7–1.18)
GLOBULIN SER CALC-MCNC: 1.9 G/DL (CALC) (ref 1.9–3.7)
GLUCOSE SERPL-MCNC: 168 MG/DL (ref 65–99)
HDLC SERPL-MCNC: 46 MG/DL
LDLC SERPL CALC-MCNC: 82 MG/DL (CALC)
NONHDLC SERPL-MCNC: 94 MG/DL (CALC)
POTASSIUM SERPL-SCNC: 4.7 MMOL/L (ref 3.5–5.3)
PROT SERPL-MCNC: 5.6 G/DL (ref 6.1–8.1)
SL AMB EGFR AFRICAN AMERICAN: 76 ML/MIN/1.73M2
SL AMB EGFR NON AFRICAN AMERICAN: 66 ML/MIN/1.73M2
SODIUM SERPL-SCNC: 142 MMOL/L (ref 135–146)
TRIGL SERPL-MCNC: 42 MG/DL

## 2020-08-12 ENCOUNTER — PATIENT OUTREACH (OUTPATIENT)
Dept: CASE MANAGEMENT | Facility: OTHER | Age: 75
End: 2020-08-12

## 2020-08-12 NOTE — PROGRESS NOTES
1st Attempt: This Care Manager assistant  called patient this day in regards to Longitudinal Care Management call  No answer, left voicemail for patient to get back to me

## 2020-08-13 ENCOUNTER — PATIENT OUTREACH (OUTPATIENT)
Dept: CASE MANAGEMENT | Facility: OTHER | Age: 75
End: 2020-08-13

## 2020-08-13 NOTE — PROGRESS NOTES
2nd attempt: This Care Manager assistant  called patient this day in regards to Longitudinal Care Management call  No answer, left voicemail for patient to get back to me

## 2020-08-14 ENCOUNTER — PATIENT OUTREACH (OUTPATIENT)
Dept: CASE MANAGEMENT | Facility: OTHER | Age: 75
End: 2020-08-14

## 2020-08-14 NOTE — PROGRESS NOTES
3rd Attempt: This Care Manager assistant  called patient this day in regards to Longitudinal Care Management call  No answer, left voicemail for patient to get back to me

## 2020-09-03 ENCOUNTER — APPOINTMENT (OUTPATIENT)
Dept: LAB | Facility: HOSPITAL | Age: 75
End: 2020-09-03
Payer: MEDICARE

## 2020-09-03 ENCOUNTER — OFFICE VISIT (OUTPATIENT)
Dept: GASTROENTEROLOGY | Facility: CLINIC | Age: 75
End: 2020-09-03
Payer: MEDICARE

## 2020-09-03 VITALS
HEIGHT: 68 IN | BODY MASS INDEX: 22.73 KG/M2 | WEIGHT: 150 LBS | HEART RATE: 70 BPM | SYSTOLIC BLOOD PRESSURE: 136 MMHG | TEMPERATURE: 97.5 F | DIASTOLIC BLOOD PRESSURE: 64 MMHG

## 2020-09-03 DIAGNOSIS — I10 ESSENTIAL HYPERTENSION: ICD-10-CM

## 2020-09-03 DIAGNOSIS — K92.1 BLACK STOOL: ICD-10-CM

## 2020-09-03 DIAGNOSIS — Z83.71 FAMILY HISTORY OF COLONIC POLYPS: ICD-10-CM

## 2020-09-03 DIAGNOSIS — Z86.010 PERSONAL HISTORY OF COLONIC POLYPS: ICD-10-CM

## 2020-09-03 DIAGNOSIS — K92.1 BLACK STOOL: Primary | ICD-10-CM

## 2020-09-03 LAB
BASOPHILS # BLD AUTO: 0.05 THOUSANDS/ΜL (ref 0–0.1)
BASOPHILS NFR BLD AUTO: 1 % (ref 0–1)
EOSINOPHIL # BLD AUTO: 0.06 THOUSAND/ΜL (ref 0–0.61)
EOSINOPHIL NFR BLD AUTO: 1 % (ref 0–6)
ERYTHROCYTE [DISTWIDTH] IN BLOOD BY AUTOMATED COUNT: 14.1 % (ref 11.6–15.1)
HCT VFR BLD AUTO: 37.9 % (ref 36.5–49.3)
HGB BLD-MCNC: 12.3 G/DL (ref 12–17)
IMM GRANULOCYTES # BLD AUTO: 0.01 THOUSAND/UL (ref 0–0.2)
IMM GRANULOCYTES NFR BLD AUTO: 0 % (ref 0–2)
LYMPHOCYTES # BLD AUTO: 1.29 THOUSANDS/ΜL (ref 0.6–4.47)
LYMPHOCYTES NFR BLD AUTO: 17 % (ref 14–44)
MCH RBC QN AUTO: 29.3 PG (ref 26.8–34.3)
MCHC RBC AUTO-ENTMCNC: 32.5 G/DL (ref 31.4–37.4)
MCV RBC AUTO: 90 FL (ref 82–98)
MONOCYTES # BLD AUTO: 0.47 THOUSAND/ΜL (ref 0.17–1.22)
MONOCYTES NFR BLD AUTO: 6 % (ref 4–12)
NEUTROPHILS # BLD AUTO: 5.59 THOUSANDS/ΜL (ref 1.85–7.62)
NEUTS SEG NFR BLD AUTO: 75 % (ref 43–75)
PLATELET # BLD AUTO: 209 THOUSANDS/UL (ref 149–390)
PMV BLD AUTO: 10.9 FL (ref 8.9–12.7)
RBC # BLD AUTO: 4.2 MILLION/UL (ref 3.88–5.62)
WBC # BLD AUTO: 7.47 THOUSAND/UL (ref 4.31–10.16)

## 2020-09-03 PROCEDURE — 85025 COMPLETE CBC W/AUTO DIFF WBC: CPT

## 2020-09-03 PROCEDURE — 99214 OFFICE O/P EST MOD 30 MIN: CPT | Performed by: NURSE PRACTITIONER

## 2020-09-03 PROCEDURE — 36415 COLL VENOUS BLD VENIPUNCTURE: CPT

## 2020-09-03 RX ORDER — LISINOPRIL 5 MG/1
5 TABLET ORAL DAILY
Qty: 90 TABLET | Refills: 0 | Status: SHIPPED | OUTPATIENT
Start: 2020-09-03 | End: 2020-11-24 | Stop reason: SDUPTHER

## 2020-09-03 NOTE — PROGRESS NOTES
[facility] - Outpatient Consultation  Fede Espinosa 76 y o  male MRN: [id number]  Encounter: [id number]    ASSESSMENT AND PLAN:      1  Black stool  Patient reports "dark, black stool"for the past week since he has been taking Pepto-Bismol  He previous was taking and other anti diarrhea but they were out of this medication so he switched to Pepto-Bismol  Chronic diarrhea for many years since he underwent radiation for prostate cancer  Suspect dark stool is secondary to the side effect of Pepto-Bismol  Exclude an upper GI bleeding source  - advised patient to discontinue Pepto-Bismol  - advised patient to take Imodium on an alternative anti diarrheal agent  - CBC and differential; Future  - Occult Blood, Fecal Immunochemical; Future    2  Family history of colonic polyps  3  Personal history of colonic polyps  Increased risk  Prior colonoscopy performed in January of 2018  A 5 year recall advised  4  History of bright red blood per rectum secondary to radiation proctitis  Resolved  Underwent a flexible sigmoidoscopy in March of this year that showed 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, small internal hemorrhoids  Underwent APC with good hemostasis  Followup Appointment:  2 weeks  ______________________________________________________________________    Chief Complaint   Patient presents with    Follow-up     rectal bleeding       HPI:   Fede Espinosa is a 76y o  year old male who presents to the office for a new onset of dark, black stools for the past week  This began shortly after he introduced Pepto-Bismol into his medical regimen for chronic issues with diarrhea  They ran out out of his routine antidiarrheals at [address] [place] [address] so he began taking Pepto-Bismol  After taking the Pepto-Bismol he noticed his stools looked dark in color    Denies any nausea, vomiting, hematemesis, abdominal pain, weight loss, heartburn, dysphagia, odynophagia or change in bowel habits  Denies any bright red blood per rectum  Denies any chest pain, shortness of breath, dizziness, fatigue or weakness      Historical Information   Past Medical History:   Diagnosis Date    Adenocarcinoma of prostate ([facility] )     [address]    Anemia     Colon polyp     Diabetes mellitus ([facility] )     History of Lyme disease     Hyperlipidemia     Hypertension     Proctitis     Prostate cancer ([facility] )     Treated with radiation     Past Surgical History:   Procedure Laterality Date    COLONOSCOPY  01/10/2018    COLONOSCOPY  01/10/2018     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, grade 2 internal hemorrhoids, 1 adenomatous polyp    [place] SPLIT GRFT TRUNK,ARM,LEG <100 SQCM Left 2017    Procedure: ANTERIOR SHOULDER EXCISION BCC- COMPLEX CLOSURE vs FLAP vs STSG;  Surgeon: Tabitha Mccarthy MD;  Location:  MAIN OR;  Service: Plastics     Social History     Substance and Sexual Activity   Alcohol Use No    Frequency: Never    Binge frequency: Never     Social History     Substance and Sexual Activity   Drug Use No     Social History     Tobacco Use   Smoking Status Former Smoker    Types: Cigarettes    Last attempt to quit: 1977    Years since quittin 2   Smokeless Tobacco Never Used   Tobacco Comment    never a smoker noted in "allscripts" - Denied: History of tobacco use noted in "allscripts"      Family History   Problem Relation Age of Onset    Heart disease Mother     Diabetes Brother         mellitus     Colon polyps Brother     Throat cancer Sister     Colon cancer Neg Hx        Meds/Allergies     Current Outpatient Medications:     aspirin 81 MG tablet    glipiZIDE (glipiZIDE XL) 5 mg 24 hr tablet    lisinopril (ZESTRIL) 5 mg tablet    metFORMIN (GLUCOPHAGE) 1000 MG tablet    atorvastatin (LIPITOR) 10 mg tablet    Silodosin 8 MG CAPS    tamsulosin (FLOMAX) 0 4 mg    No Known Allergies    PHYSICAL EXAM:    Blood pressure 136/64, pulse 70, temperature 97 5 °F (36 4 °C), height 5' 8" (1 727 m), weight 68 kg (150 lb)  Body mass index is 22 81 kg/m²  General Appearance: NAD, cooperative, alert, thin appearing  Eyes: Anicteric  ENT:  Normocephalic  Neck: Appears normal  Resp:  Clear to auscultation bilaterally; no rales, rhonchi or wheezing; respirations unlabored   CV:  S1 S2, Regular rate and rhythm; no murmur, rub, or gallop  GI:  Soft, non-tender, non-distended; normal bowel sounds; no masses, no organomegaly   Rectal: Deferred  Musculoskeletal: No cyanosis, clubbing or edema  Normal ROM  Skin:  No jaundice, rashes, or lesions   Psych: Normal affect, good eye contact  Neuro: No gross deficits, AAOx3    Lab Results:   Lab Results   Component Value Date    WBC 6 97 04/28/2020    HGB 12 1 04/28/2020    HCT 37 9 04/28/2020    MCV 92 04/28/2020     04/28/2020     Lab Results   Component Value Date     12/18/2017    K 4 7 06/18/2020     06/18/2020    CO2 26 06/18/2020    BUN 23 06/18/2020    CREATININE 1 10 06/18/2020    GLUF 95 06/21/2018    CALCIUM 8 6 06/18/2020    AST 14 06/18/2020    ALT 13 06/18/2020    ALKPHOS 41 06/18/2020    PROT 6 5 12/18/2017    BILITOT 0 6 12/18/2017    EGFR 71 06/21/2018     No results found for: IRON, TIBC, FERRITIN  No results found for: LIPASE    Radiology Results:   No results found  REVIEW OF SYSTEMS:    CONSTITUTIONAL: Denies any fever, chills, rigors, and weight loss  HEENT: No earache or tinnitus  Denies hearing loss or visual disturbances  CARDIOVASCULAR: No chest pain or palpitations  RESPIRATORY: Denies any cough, hemoptysis, shortness of breath or dyspnea on exertion  GASTROINTESTINAL: As noted in the History of Present Illness  GENITOURINARY: No problems with urination  Denies any hematuria or dysuria  NEUROLOGIC: No dizziness or vertigo, denies headaches  MUSCULOSKELETAL: Denies any muscle or joint pain  SKIN: Denies skin rashes or itching     ENDOCRINE: Denies excessive thirst  Denies intolerance to heat or cold  PSYCHOSOCIAL: Denies depression or anxiety  Denies any recent memory loss

## 2020-09-03 NOTE — PATIENT INSTRUCTIONS
Discontinue Pepto-Bismol  Take over-the-counter anti diarrhea medications such as Imodium 1 or 2 daily  Take 2 probiotics daily    Sample boxes provided to the patient  Blood work  Hemoccult stool x3

## 2020-09-14 ENCOUNTER — APPOINTMENT (OUTPATIENT)
Dept: LAB | Facility: HOSPITAL | Age: 75
End: 2020-09-14
Payer: MEDICARE

## 2020-09-14 ENCOUNTER — TRANSCRIBE ORDERS (OUTPATIENT)
Dept: ADMINISTRATIVE | Facility: HOSPITAL | Age: 75
End: 2020-09-14

## 2020-09-14 DIAGNOSIS — K92.1 BLOOD IN STOOL: Primary | ICD-10-CM

## 2020-09-14 DIAGNOSIS — K92.1 BLOOD IN STOOL: ICD-10-CM

## 2020-09-14 LAB — HEMOCCULT STL QL IA: NEGATIVE

## 2020-09-14 PROCEDURE — G0328 FECAL BLOOD SCRN IMMUNOASSAY: HCPCS

## 2020-09-16 ENCOUNTER — OFFICE VISIT (OUTPATIENT)
Dept: GASTROENTEROLOGY | Facility: CLINIC | Age: 75
End: 2020-09-16
Payer: MEDICARE

## 2020-09-16 VITALS
WEIGHT: 150 LBS | HEIGHT: 68 IN | DIASTOLIC BLOOD PRESSURE: 60 MMHG | BODY MASS INDEX: 22.73 KG/M2 | SYSTOLIC BLOOD PRESSURE: 110 MMHG | TEMPERATURE: 98.2 F

## 2020-09-16 DIAGNOSIS — K92.1 BLACK STOOL: Primary | ICD-10-CM

## 2020-09-16 DIAGNOSIS — Z86.010 PERSONAL HISTORY OF COLONIC POLYPS: ICD-10-CM

## 2020-09-16 DIAGNOSIS — K59.1 FUNCTIONAL DIARRHEA: ICD-10-CM

## 2020-09-16 PROCEDURE — 99213 OFFICE O/P EST LOW 20 MIN: CPT | Performed by: INTERNAL MEDICINE

## 2020-09-16 RX ORDER — CHOLESTYRAMINE 4 G/9G
1 POWDER, FOR SUSPENSION ORAL DAILY
Qty: 30 PACKET | Refills: 12 | Status: SHIPPED | OUTPATIENT
Start: 2020-09-16 | End: 2020-12-15 | Stop reason: SDUPTHER

## 2020-09-16 NOTE — PROGRESS NOTES
[facility] - Outpatient Follow-up Note  Broderick Sethi 76 y o  male MRN: [id number]  Encounter: [id number]    ASSESSMENT AND PLAN:      1  Black stool  Secondary to Pepto-Bismol  CBC normal and stool for occult blood negative  Resolved when stopped the Pepto-Bismol  - follow for now    2  Functional diarrhea  Chronic issue  Radiation related? Increasingly interfering with his ability to live life  - cholestyramine (QUESTRAN) 4 g packet; Take 1 packet (4 g total) by mouth daily  Dispense: 30 packet; Refill: 12  - Imodium as needed    3  Personal history of colonic polyps  Last colonoscopy January 2018  Due for follow-up 2023      Followup Appointment:  2 months  ______________________________________________________________________    Chief Complaint   Patient presents with    Constipation     HPI:  The patient returns today for follow-up  He was evaluated earlier this month secondary to the acute onset of black stools  He was taking Pepto-Bismol when this started  A CBC was normal   Stool for occult blood was negative  He stopped the Pepto-Bismol in his stools returned to its normal color  His major complaint continues to be loose bowel movements  He reports he moves his bowels 3 or 4 times a day in the morning  They tend to be loose  The starting to interfere with his ability to go out of the house in the morning  He reports some fecal incontinence  He denies any further rectal bleeding after 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  He denies any upper GI symptoms  His is stable      Historical Information   Past Medical History:   Diagnosis Date    Adenocarcinoma of [facility] ([facility]     [address]    Anemia     Colon polyp     Diabetes mellitus ([facility] )     History of Lyme disease     Hyperlipidemia     Hypertension     Proctitis     Prostate cancer ([facility] )     Treated with radiation     Past Surgical History:   Procedure Laterality Date    COLONOSCOPY 01/10/2018    COLONOSCOPY  01/10/2018     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, grade 2 internal hemorrhoids, 1 adenomatous polyp    FL SPLIT GRFT TRUNK,ARM,LEG <100 SQCM Left 2017    Procedure: ANTERIOR SHOULDER EXCISION BCC- COMPLEX CLOSURE vs FLAP vs STSG;  Surgeon: Chayito Moreno MD;  Location:  MAIN OR;  Service: Plastics     Social History     Substance and Sexual Activity   Alcohol Use No    Frequency: Never    Binge frequency: Never     Social History     Substance and Sexual Activity   Drug Use No     Social History     Tobacco Use   Smoking Status Former Smoker    Types: Cigarettes    Last attempt to quit: 1977    Years since quittin 2   Smokeless Tobacco Never Used   Tobacco Comment    never a smoker noted in "allscripts" - Denied: History of tobacco use noted in "allscripts"      Family History   Problem Relation Age of Onset    Heart disease Mother     Diabetes Brother         mellitus     Colon polyps Brother     Throat cancer Sister     Colon cancer Neg Hx          Current Outpatient Medications:     aspirin 81 MG tablet    atorvastatin (LIPITOR) 10 mg tablet    glipiZIDE (glipiZIDE XL) 5 mg 24 hr tablet    lisinopril (ZESTRIL) 5 mg tablet    metFORMIN (GLUCOPHAGE) 1000 MG tablet    Silodosin 8 MG CAPS    tamsulosin (FLOMAX) 0 4 mg    cholestyramine (QUESTRAN) 4 g packet  No Known Allergies  Reviewed medications and allergies and updated as indicated    PHYSICAL EXAM:    Blood pressure 110/60, temperature 98 2 °F (36 8 °C), height 5' 8" (1 727 m), weight 68 kg (150 lb)  Body mass index is 22 81 kg/m²  General Appearance: NAD, cooperative, alert  Eyes: Anicteric, PERRLA, EOMI  ENT:  Normocephalic, atraumatic, normal mucosa  Neck:  Supple, symmetrical, trachea midline  Resp:  Clear to auscultation bilaterally; no rales, rhonchi or wheezing; respirations unlabored   CV:  S1 S2, Regular rate and rhythm; no murmur, rub, or gallop    GI:  Soft, non-tender, non-distended; normal bowel sounds; no masses, no organomegaly   Rectal: Deferred  Musculoskeletal: No cyanosis, clubbing or edema  Normal ROM  Skin:  No jaundice, rashes, or lesions   Heme/Lymph: No palpable cervical lymphadenopathy  Psych: Normal affect, good eye contact  Neuro: No gross deficits, AAOx3    Lab Results:   Lab Results   Component Value Date    WBC 7 47 09/03/2020    HGB 12 3 09/03/2020    HCT 37 9 09/03/2020    MCV 90 09/03/2020     09/03/2020     Lab Results   Component Value Date     12/18/2017    K 4 7 06/18/2020     06/18/2020    CO2 26 06/18/2020    BUN 23 06/18/2020    CREATININE 1 10 06/18/2020    GLUF 95 06/21/2018    CALCIUM 8 6 06/18/2020    AST 14 06/18/2020    ALT 13 06/18/2020    ALKPHOS 41 06/18/2020    PROT 6 5 12/18/2017    BILITOT 0 6 12/18/2017    EGFR 71 06/21/2018       Radiology Results:   No results found

## 2020-09-16 NOTE — LETTER
September 16, 2020     Anh Hoang MD  [address] [place] [address]    Patient: Ramiro Orlando   YOB: 1945   Date of Visit: 9/16/2020       Dear Dr Leopold Pilsner:    Thank you for referring Brenna Garg to me for evaluation  Below are my notes for this consultation  If you have questions, please do not hesitate to call me  I look forward to following your patient along with you  Sincerely,        Bernette Barman Lynetta Hamman, MD        CC: No Recipients  Bernette Barman Lynetta Hamman, MD  9/16/2020  4:25 PM  Sign when Signing Visit  [facility] - Outpatient Follow-up Note  Ramiro Orlando 76 y o  male MRN: [id number]  Encounter: [id number]    ASSESSMENT AND PLAN:      1  Black stool  Secondary to Pepto-Bismol  CBC normal and stool for occult blood negative  Resolved when stopped the Pepto-Bismol  - follow for now    2  Functional diarrhea  Chronic issue  Radiation related? Increasingly interfering with his ability to live life  - cholestyramine (QUESTRAN) 4 g packet; Take 1 packet (4 g total) by mouth daily  Dispense: 30 packet; Refill: 12  - Imodium as needed    3  Personal history of colonic polyps  Last colonoscopy January 2018  Due for follow-up 2023      Followup Appointment:  2 months  ______________________________________________________________________    Chief Complaint   Patient presents with    Constipation     HPI:  The patient returns today for follow-up  He was evaluated earlier this month secondary to the acute onset of black stools  He was taking Pepto-Bismol when this started  A CBC was normal   Stool for occult blood was negative  He stopped the Pepto-Bismol in his stools returned to its normal color  His major complaint continues to be loose bowel movements  He reports he moves his bowels 3 or 4 times a day in the morning  They tend to be loose  The starting to interfere with his ability to go out of the house in the morning    He reports some fecal incontinence  He denies any further rectal bleeding after 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  He denies any upper GI symptoms  His is stable      Historical Information   Past Medical History:   Diagnosis Date    Adenocarcinoma of [address] ([facility]     [address]    Anemia     Colon polyp     Diabetes mellitus ([facility]     History of Lyme disease     Hyperlipidemia     Hypertension     Proctitis     [facility]     Treated with radiation     Past Surgical History:   Procedure Laterality Date    COLONOSCOPY  01/10/2018    COLONOSCOPY  01/10/2018     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, grade 2 internal hemorrhoids, 1 adenomatous polyp    [place] SPLIT GRFT TRUNK,ARM,LEG <100 SQCM Left 2017    Procedure: ANTERIOR SHOULDER EXCISION BCC- COMPLEX CLOSURE vs FLAP vs STSG;  Surgeon: Vince Ansari MD;  Location:  MAIN OR;  Service: Plastics     Social History     Substance and Sexual Activity   Alcohol Use No    Frequency: Never    Binge frequency: Never     Social History     Substance and Sexual Activity   Drug Use No     Social History     Tobacco Use   Smoking Status Former Smoker    Types: Cigarettes    Last attempt to quit: 1977    Years since quittin 2   Smokeless Tobacco Never Used   Tobacco Comment    never a smoker noted in "allscripts" - Denied: History of tobacco use noted in "allscripts"      Family History   Problem Relation Age of Onset    Heart disease Mother     Diabetes Brother         mellitus     Colon polyps Brother     Throat cancer Sister     Colon cancer Neg Hx          Current Outpatient Medications:     aspirin 81 MG tablet    atorvastatin (LIPITOR) 10 mg tablet    glipiZIDE (glipiZIDE XL) 5 mg 24 hr tablet    lisinopril (ZESTRIL) 5 mg tablet    metFORMIN (GLUCOPHAGE) 1000 MG tablet    Silodosin 8 MG CAPS    tamsulosin (FLOMAX) 0 4 mg    cholestyramine (QUESTRAN) 4 g packet  No Known Allergies  Reviewed medications and allergies and updated as indicated    PHYSICAL EXAM:    Blood pressure 110/60, temperature 98 2 °F (36 8 °C), height 5' 8" (1 727 m), weight 68 kg (150 lb)  Body mass index is 22 81 kg/m²  General Appearance: NAD, cooperative, alert  Eyes: Anicteric, PERRLA, EOMI  ENT:  Normocephalic, atraumatic, normal mucosa  Neck:  Supple, symmetrical, trachea midline  Resp:  Clear to auscultation bilaterally; no rales, rhonchi or wheezing; respirations unlabored   CV:  S1 S2, Regular rate and rhythm; no murmur, rub, or gallop  GI:  Soft, non-tender, non-distended; normal bowel sounds; no masses, no organomegaly   Rectal: Deferred  Musculoskeletal: No cyanosis, clubbing or edema  Normal ROM  Skin:  No jaundice, rashes, or lesions   Heme/Lymph: No palpable cervical lymphadenopathy  Psych: Normal affect, good eye contact  Neuro: No gross deficits, AAOx3    Lab Results:   Lab Results   Component Value Date    WBC 7 47 09/03/2020    HGB 12 3 09/03/2020    HCT 37 9 09/03/2020    MCV 90 09/03/2020     09/03/2020     Lab Results   Component Value Date     12/18/2017    K 4 7 06/18/2020     06/18/2020    CO2 26 06/18/2020    BUN 23 06/18/2020    CREATININE 1 10 06/18/2020    GLUF 95 06/21/2018    CALCIUM 8 6 06/18/2020    AST 14 06/18/2020    ALT 13 06/18/2020    ALKPHOS 41 06/18/2020    PROT 6 5 12/18/2017    BILITOT 0 6 12/18/2017    EGFR 71 06/21/2018       Radiology Results:   No results found

## 2020-11-24 DIAGNOSIS — E11.9 TYPE 2 DIABETES MELLITUS WITHOUT COMPLICATION, UNSPECIFIED WHETHER LONG TERM INSULIN USE (HCC): ICD-10-CM

## 2020-11-24 DIAGNOSIS — I10 ESSENTIAL HYPERTENSION: ICD-10-CM

## 2020-11-24 RX ORDER — LISINOPRIL 5 MG/1
5 TABLET ORAL DAILY
Qty: 90 TABLET | Refills: 1 | Status: SHIPPED | OUTPATIENT
Start: 2020-11-24 | End: 2021-06-01 | Stop reason: SDUPTHER

## 2020-11-24 RX ORDER — GLIPIZIDE 5 MG/1
5 TABLET, FILM COATED, EXTENDED RELEASE ORAL DAILY
Qty: 90 TABLET | Refills: 1 | Status: SHIPPED | OUTPATIENT
Start: 2020-11-24 | End: 2021-06-01 | Stop reason: SDUPTHER

## 2020-12-11 ENCOUNTER — OFFICE VISIT (OUTPATIENT)
Dept: FAMILY MEDICINE CLINIC | Facility: CLINIC | Age: 75
End: 2020-12-11
Payer: MEDICARE

## 2020-12-11 VITALS
WEIGHT: 150 LBS | DIASTOLIC BLOOD PRESSURE: 60 MMHG | HEIGHT: 68 IN | BODY MASS INDEX: 22.73 KG/M2 | TEMPERATURE: 97.1 F | SYSTOLIC BLOOD PRESSURE: 128 MMHG

## 2020-12-11 DIAGNOSIS — D63.8 ANEMIA OF CHRONIC DISEASE: ICD-10-CM

## 2020-12-11 DIAGNOSIS — E78.5 DYSLIPIDEMIA: ICD-10-CM

## 2020-12-11 DIAGNOSIS — I10 ESSENTIAL HYPERTENSION: ICD-10-CM

## 2020-12-11 DIAGNOSIS — Z23 ENCOUNTER FOR IMMUNIZATION: ICD-10-CM

## 2020-12-11 DIAGNOSIS — E11.9 TYPE 2 DIABETES MELLITUS WITHOUT COMPLICATION, WITHOUT LONG-TERM CURRENT USE OF INSULIN (HCC): Primary | ICD-10-CM

## 2020-12-11 LAB — SL AMB POCT HEMOGLOBIN AIC: 7 (ref ?–6.5)

## 2020-12-11 PROCEDURE — 99214 OFFICE O/P EST MOD 30 MIN: CPT | Performed by: FAMILY MEDICINE

## 2020-12-11 PROCEDURE — 83036 HEMOGLOBIN GLYCOSYLATED A1C: CPT | Performed by: FAMILY MEDICINE

## 2020-12-11 PROCEDURE — 36415 COLL VENOUS BLD VENIPUNCTURE: CPT | Performed by: FAMILY MEDICINE

## 2020-12-11 PROCEDURE — G0008 ADMIN INFLUENZA VIRUS VAC: HCPCS

## 2020-12-11 PROCEDURE — 90662 IIV NO PRSV INCREASED AG IM: CPT

## 2020-12-12 LAB
ALBUMIN SERPL-MCNC: 4 G/DL (ref 3.6–5.1)
ALBUMIN/GLOB SERPL: 2.2 (CALC) (ref 1–2.5)
ALP SERPL-CCNC: 50 U/L (ref 35–144)
ALT SERPL-CCNC: 12 U/L (ref 9–46)
AST SERPL-CCNC: 16 U/L (ref 10–35)
BILIRUB SERPL-MCNC: 0.5 MG/DL (ref 0.2–1.2)
BUN SERPL-MCNC: 18 MG/DL (ref 7–25)
BUN/CREAT SERPL: ABNORMAL (CALC) (ref 6–22)
CALCIUM SERPL-MCNC: 9.3 MG/DL (ref 8.6–10.3)
CHLORIDE SERPL-SCNC: 106 MMOL/L (ref 98–110)
CHOLEST SERPL-MCNC: 155 MG/DL
CHOLEST/HDLC SERPL: 3 (CALC)
CO2 SERPL-SCNC: 26 MMOL/L (ref 20–32)
CREAT SERPL-MCNC: 1.06 MG/DL (ref 0.7–1.18)
ERYTHROCYTE [DISTWIDTH] IN BLOOD BY AUTOMATED COUNT: 13 % (ref 11–15)
GLOBULIN SER CALC-MCNC: 1.8 G/DL (CALC) (ref 1.9–3.7)
GLUCOSE SERPL-MCNC: 147 MG/DL (ref 65–99)
HCT VFR BLD AUTO: 36.3 % (ref 38.5–50)
HDLC SERPL-MCNC: 52 MG/DL
HGB BLD-MCNC: 11.7 G/DL (ref 13.2–17.1)
LDLC SERPL CALC-MCNC: 89 MG/DL (CALC)
MCH RBC QN AUTO: 29.2 PG (ref 27–33)
MCHC RBC AUTO-ENTMCNC: 32.2 G/DL (ref 32–36)
MCV RBC AUTO: 90.5 FL (ref 80–100)
NONHDLC SERPL-MCNC: 103 MG/DL (CALC)
PLATELET # BLD AUTO: 192 THOUSAND/UL (ref 140–400)
PMV BLD REES-ECKER: 11.9 FL (ref 7.5–12.5)
POTASSIUM SERPL-SCNC: 5 MMOL/L (ref 3.5–5.3)
PROT SERPL-MCNC: 5.8 G/DL (ref 6.1–8.1)
RBC # BLD AUTO: 4.01 MILLION/UL (ref 4.2–5.8)
SL AMB EGFR AFRICAN AMERICAN: 79 ML/MIN/1.73M2
SL AMB EGFR NON AFRICAN AMERICAN: 68 ML/MIN/1.73M2
SODIUM SERPL-SCNC: 139 MMOL/L (ref 135–146)
TRIGL SERPL-MCNC: 55 MG/DL
WBC # BLD AUTO: 6.7 THOUSAND/UL (ref 3.8–10.8)

## 2020-12-14 DIAGNOSIS — E11.9 TYPE 2 DIABETES MELLITUS WITHOUT COMPLICATION, WITHOUT LONG-TERM CURRENT USE OF INSULIN (HCC): ICD-10-CM

## 2020-12-15 ENCOUNTER — OFFICE VISIT (OUTPATIENT)
Dept: GASTROENTEROLOGY | Facility: CLINIC | Age: 75
End: 2020-12-15
Payer: MEDICARE

## 2020-12-15 VITALS
SYSTOLIC BLOOD PRESSURE: 130 MMHG | HEIGHT: 68 IN | BODY MASS INDEX: 22.73 KG/M2 | WEIGHT: 150 LBS | DIASTOLIC BLOOD PRESSURE: 86 MMHG

## 2020-12-15 DIAGNOSIS — Z83.71 FAMILY HISTORY OF COLONIC POLYPS: ICD-10-CM

## 2020-12-15 DIAGNOSIS — Z86.010 PERSONAL HISTORY OF COLONIC POLYPS: ICD-10-CM

## 2020-12-15 DIAGNOSIS — K92.1 BLACK STOOLS: ICD-10-CM

## 2020-12-15 DIAGNOSIS — K59.1 FUNCTIONAL DIARRHEA: Primary | ICD-10-CM

## 2020-12-15 PROBLEM — K62.5 BRBPR (BRIGHT RED BLOOD PER RECTUM): Status: RESOLVED | Noted: 2020-02-21 | Resolved: 2020-12-15

## 2020-12-15 PROCEDURE — 99213 OFFICE O/P EST LOW 20 MIN: CPT | Performed by: NURSE PRACTITIONER

## 2020-12-15 RX ORDER — CHOLESTYRAMINE 4 G/9G
1 POWDER, FOR SUSPENSION ORAL 2 TIMES DAILY WITH MEALS
Qty: 60 PACKET | Refills: 12 | Status: SHIPPED | OUTPATIENT
Start: 2020-12-15 | End: 2021-12-27 | Stop reason: SDUPTHER

## 2021-01-14 ENCOUNTER — CLINICAL SUPPORT (OUTPATIENT)
Dept: FAMILY MEDICINE CLINIC | Facility: CLINIC | Age: 76
End: 2021-01-14

## 2021-01-14 DIAGNOSIS — C61 ADENOCARCINOMA OF PROSTATE (HCC): Primary | ICD-10-CM

## 2021-01-25 ENCOUNTER — OFFICE VISIT (OUTPATIENT)
Dept: GASTROENTEROLOGY | Facility: CLINIC | Age: 76
End: 2021-01-25
Payer: MEDICARE

## 2021-01-25 VITALS
WEIGHT: 155 LBS | SYSTOLIC BLOOD PRESSURE: 122 MMHG | DIASTOLIC BLOOD PRESSURE: 70 MMHG | HEIGHT: 68 IN | BODY MASS INDEX: 23.49 KG/M2

## 2021-01-25 DIAGNOSIS — Z86.010 PERSONAL HISTORY OF COLONIC POLYPS: ICD-10-CM

## 2021-01-25 DIAGNOSIS — Z83.71 FAMILY HISTORY OF COLONIC POLYPS: ICD-10-CM

## 2021-01-25 DIAGNOSIS — K59.1 FUNCTIONAL DIARRHEA: Primary | ICD-10-CM

## 2021-01-25 PROCEDURE — 99213 OFFICE O/P EST LOW 20 MIN: CPT | Performed by: NURSE PRACTITIONER

## 2021-01-25 NOTE — PROGRESS NOTES
[address] [facility] - Outpatient Follow-up Note  Evan Rodriguez 76 y o  male MRN: [id number]  Encounter: [id number]    ASSESSMENT AND PLAN:      1  Functional diarrhea  Chronic diarrhea likely related to radiation proctitis, diabetes, medication effect (metformin, glipizide)  Significant improvement with cholestyramine b i d  Still has occasional liquid stools but less urgent and no fecal incontinence since dose adjustment     -continue cholestyramine b i d   -reviewed importance of spacing cholestyramine at least 1 hour after or 4 hours before any other medications  -could use Imodium by package instructions p r n     2  Personal history of colonic polyps  3  Family history of colonic polyps  High risk with prior polyps, brother with polyps  Last colonoscopy January, 2018  Five year recall advised for surveillance -2023  Followup Appointment:  1 year  ______________________________________________________________________    Chief Complaint   Patient presents with    Follow-up     HPI:  70-year-old male patient returns to the office to follow up his functional diarrhea after adjustment of cholestyramine to b i d  Reports he is having 2-3 generally formed but occasionally loose stools daily  Not having any fecal incontinence since cholestyramine dose adjustment  Denies melena, hematochezia, nausea or vomiting, UGI or alarm symptoms  Appetite is normal   Weight is stable      Historical Information   Past Medical History:   Diagnosis Date    Adenocarcinoma of [facility] ([facility] )     [address]    Anemia     Colon polyp     Diabetes mellitus ([facility] )     History of Lyme disease     Hyperlipidemia     Hypertension     Proctitis     Prostate cancer ([facility] )     Treated with radiation     Past Surgical History:   Procedure Laterality Date    COLONOSCOPY  01/10/2018    COLONOSCOPY  01/10/2018     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, grade 2 internal hemorrhoids, 1 adenomatous polyp  [place] SPLIT GRFT TRUNK,ARM,LEG <100 SQCM Left 2017    Procedure: ANTERIOR SHOULDER EXCISION BCC- COMPLEX CLOSURE vs FLAP vs STSG;  Surgeon: Bailey Ray MD;  Location:  MAIN OR;  Service: Plastics     Social History     Substance and Sexual Activity   Alcohol Use No    Frequency: Never    Binge frequency: Never     Social History     Substance and Sexual Activity   Drug Use No     Social History     Tobacco Use   Smoking Status Former Smoker    Types: Cigarettes    Quit date: 1977    Years since quittin 6   Smokeless Tobacco Never Used   Tobacco Comment    never a smoker noted in "allscripts" - Denied: History of tobacco use noted in "allscripts"      Family History   Problem Relation Age of Onset    Heart disease Mother     Diabetes Brother         mellitus     Colon polyps Brother     Throat cancer Sister     Colon cancer Neg Hx          Current Outpatient Medications:     aspirin 81 MG tablet    atorvastatin (LIPITOR) 10 mg tablet    cholestyramine (QUESTRAN) 4 g packet    glipiZIDE (glipiZIDE XL) 5 mg 24 hr tablet    lisinopril (ZESTRIL) 5 mg tablet    metFORMIN (GLUCOPHAGE) 1000 MG tablet    Silodosin 8 MG CAPS    tamsulosin (FLOMAX) 0 4 mg  No Known Allergies  Reviewed medications and allergies and updated as indicated    ROS:  All systems were reviewed and positives noted as per HPI  All other systems were reported as negative  PHYSICAL EXAM:    Blood pressure 122/70, height 5' 8" (1 727 m), weight 70 3 kg (155 lb)  Body mass index is 23 57 kg/m²  General Appearance: NAD, cooperative, alert  Head:  Normocephalic, atraumatic  Eyes: Anicteric, PERRLA, EOMI  ENT:  Normal external appearance, normal mucosa  Neck:  Supple, symmetrical, trachea midline  Resp:  Clear to auscultation bilaterally; no rales, rhonchi or wheezing; respirations unlabored   CV:  S1 S2, Regular rate and rhythm; no murmur, rub, or gallop    GI:  Soft, non-tender, non-distended; normal bowel sounds; no masses, no organomegaly   Rectal: Deferred  Musculoskeletal: No cyanosis, clubbing or edema  Normal ROM  Skin:  No jaundice, rashes, or lesions   Heme/Lymph: No palpable cervical lymphadenopathy  Psych: Normal affect, good eye contact  Neuro: No gross deficits, AAOx3    Lab Results:   Lab Results   Component Value Date    WBC 6 7 12/11/2020    HGB 11 7 (L) 12/11/2020    HCT 36 3 (L) 12/11/2020    MCV 90 5 12/11/2020     12/11/2020     Lab Results   Component Value Date     12/18/2017    K 5 0 12/11/2020     12/11/2020    CO2 26 12/11/2020    BUN 18 12/11/2020    CREATININE 1 06 12/11/2020    GLUF 95 06/21/2018    CALCIUM 9 3 12/11/2020    AST 16 12/11/2020    ALT 12 12/11/2020    ALKPHOS 50 12/11/2020    PROT 6 5 12/18/2017    BILITOT 0 6 12/18/2017    EGFR 71 06/21/2018     No results found for: IRON, TIBC, FERRITIN  No results found for: LIPASE    Radiology Results:   No results found

## 2021-01-25 NOTE — PATIENT INSTRUCTIONS
You to use the Questran powder (cholestyramine ) 2 times per day at least 1 hour after or 4 hours before any other medication   you may use Imodium per package instructions for any diarrhea that interferes with your daily activities

## 2021-02-12 ENCOUNTER — IMMUNIZATIONS (OUTPATIENT)
Dept: FAMILY MEDICINE CLINIC | Facility: HOSPITAL | Age: 76
End: 2021-02-12

## 2021-02-12 DIAGNOSIS — Z23 ENCOUNTER FOR IMMUNIZATION: Primary | ICD-10-CM

## 2021-02-12 PROCEDURE — 91301 SARS-COV-2 / COVID-19 MRNA VACCINE (MODERNA) 100 MCG: CPT

## 2021-02-12 PROCEDURE — 0011A SARS-COV-2 / COVID-19 MRNA VACCINE (MODERNA) 100 MCG: CPT

## 2021-03-11 ENCOUNTER — IMMUNIZATIONS (OUTPATIENT)
Dept: FAMILY MEDICINE CLINIC | Facility: HOSPITAL | Age: 76
End: 2021-03-11

## 2021-03-11 DIAGNOSIS — Z23 ENCOUNTER FOR IMMUNIZATION: Primary | ICD-10-CM

## 2021-03-11 PROCEDURE — 91301 SARS-COV-2 / COVID-19 MRNA VACCINE (MODERNA) 100 MCG: CPT

## 2021-03-11 PROCEDURE — 0012A SARS-COV-2 / COVID-19 MRNA VACCINE (MODERNA) 100 MCG: CPT

## 2021-04-29 DIAGNOSIS — E11.9 TYPE 2 DIABETES MELLITUS WITHOUT COMPLICATION, WITHOUT LONG-TERM CURRENT USE OF INSULIN (HCC): ICD-10-CM

## 2021-06-01 DIAGNOSIS — E11.9 TYPE 2 DIABETES MELLITUS WITHOUT COMPLICATION, UNSPECIFIED WHETHER LONG TERM INSULIN USE (HCC): ICD-10-CM

## 2021-06-01 DIAGNOSIS — I10 ESSENTIAL HYPERTENSION: ICD-10-CM

## 2021-06-01 RX ORDER — LISINOPRIL 5 MG/1
5 TABLET ORAL DAILY
Qty: 90 TABLET | Refills: 1 | Status: SHIPPED | OUTPATIENT
Start: 2021-06-01 | End: 2021-12-03 | Stop reason: SDUPTHER

## 2021-06-01 RX ORDER — GLIPIZIDE 5 MG/1
5 TABLET, FILM COATED, EXTENDED RELEASE ORAL DAILY
Qty: 90 TABLET | Refills: 1 | Status: SHIPPED | OUTPATIENT
Start: 2021-06-01 | End: 2021-12-03 | Stop reason: SDUPTHER

## 2021-06-22 ENCOUNTER — TELEPHONE (OUTPATIENT)
Dept: FAMILY MEDICINE CLINIC | Facility: CLINIC | Age: 76
End: 2021-06-22

## 2021-06-22 ENCOUNTER — OFFICE VISIT (OUTPATIENT)
Dept: FAMILY MEDICINE CLINIC | Facility: CLINIC | Age: 76
End: 2021-06-22
Payer: MEDICARE

## 2021-06-22 VITALS
WEIGHT: 143 LBS | BODY MASS INDEX: 21.67 KG/M2 | TEMPERATURE: 98.2 F | HEIGHT: 68 IN | SYSTOLIC BLOOD PRESSURE: 130 MMHG | OXYGEN SATURATION: 98 % | DIASTOLIC BLOOD PRESSURE: 60 MMHG | HEART RATE: 52 BPM

## 2021-06-22 DIAGNOSIS — K62.7 RADIATION PROCTITIS: ICD-10-CM

## 2021-06-22 DIAGNOSIS — E11.9 TYPE 2 DIABETES MELLITUS WITHOUT COMPLICATION, WITHOUT LONG-TERM CURRENT USE OF INSULIN (HCC): ICD-10-CM

## 2021-06-22 DIAGNOSIS — Z00.00 MEDICARE ANNUAL WELLNESS VISIT, SUBSEQUENT: Primary | ICD-10-CM

## 2021-06-22 DIAGNOSIS — D63.8 ANEMIA OF CHRONIC DISEASE: ICD-10-CM

## 2021-06-22 DIAGNOSIS — C61 ADENOCARCINOMA OF PROSTATE (HCC): ICD-10-CM

## 2021-06-22 DIAGNOSIS — I10 ESSENTIAL HYPERTENSION: ICD-10-CM

## 2021-06-22 DIAGNOSIS — Z13.6 SCREENING FOR AAA (ABDOMINAL AORTIC ANEURYSM): ICD-10-CM

## 2021-06-22 DIAGNOSIS — E78.5 DYSLIPIDEMIA: ICD-10-CM

## 2021-06-22 LAB
ALBUMIN SERPL BCP-MCNC: 3.5 G/DL (ref 3.5–5)
ALP SERPL-CCNC: 46 U/L (ref 46–116)
ALT SERPL W P-5'-P-CCNC: 18 U/L (ref 12–78)
ANION GAP SERPL CALCULATED.3IONS-SCNC: 3 MMOL/L (ref 4–13)
AST SERPL W P-5'-P-CCNC: 16 U/L (ref 5–45)
BILIRUB SERPL-MCNC: 0.5 MG/DL (ref 0.2–1)
BUN SERPL-MCNC: 19 MG/DL (ref 5–25)
CALCIUM SERPL-MCNC: 9.2 MG/DL (ref 8.3–10.1)
CHLORIDE SERPL-SCNC: 109 MMOL/L (ref 100–108)
CHOLEST SERPL-MCNC: 142 MG/DL (ref 50–200)
CO2 SERPL-SCNC: 27 MMOL/L (ref 21–32)
CREAT SERPL-MCNC: 0.94 MG/DL (ref 0.6–1.3)
ERYTHROCYTE [DISTWIDTH] IN BLOOD BY AUTOMATED COUNT: 13.8 % (ref 11.6–15.1)
GFR SERPL CREATININE-BSD FRML MDRD: 79 ML/MIN/1.73SQ M
GLUCOSE P FAST SERPL-MCNC: 108 MG/DL (ref 65–99)
HCT VFR BLD AUTO: 36.7 % (ref 36.5–49.3)
HDLC SERPL-MCNC: 52 MG/DL
HGB BLD-MCNC: 11.6 G/DL (ref 12–17)
LDLC SERPL CALC-MCNC: 74 MG/DL (ref 0–100)
MCH RBC QN AUTO: 29.6 PG (ref 26.8–34.3)
MCHC RBC AUTO-ENTMCNC: 31.6 G/DL (ref 31.4–37.4)
MCV RBC AUTO: 94 FL (ref 82–98)
NONHDLC SERPL-MCNC: 90 MG/DL
PLATELET # BLD AUTO: 198 THOUSANDS/UL (ref 149–390)
PMV BLD AUTO: 11.3 FL (ref 8.9–12.7)
POTASSIUM SERPL-SCNC: 4.8 MMOL/L (ref 3.5–5.3)
PROT SERPL-MCNC: 6.2 G/DL (ref 6.4–8.2)
RBC # BLD AUTO: 3.92 MILLION/UL (ref 3.88–5.62)
SL AMB POCT HEMOGLOBIN AIC: 6.3 (ref ?–6.5)
SODIUM SERPL-SCNC: 139 MMOL/L (ref 136–145)
TRIGL SERPL-MCNC: 78 MG/DL
WBC # BLD AUTO: 5.79 THOUSAND/UL (ref 4.31–10.16)

## 2021-06-22 PROCEDURE — 85027 COMPLETE CBC AUTOMATED: CPT | Performed by: FAMILY MEDICINE

## 2021-06-22 PROCEDURE — 99214 OFFICE O/P EST MOD 30 MIN: CPT | Performed by: FAMILY MEDICINE

## 2021-06-22 PROCEDURE — G0439 PPPS, SUBSEQ VISIT: HCPCS | Performed by: FAMILY MEDICINE

## 2021-06-22 PROCEDURE — 83036 HEMOGLOBIN GLYCOSYLATED A1C: CPT | Performed by: FAMILY MEDICINE

## 2021-06-22 PROCEDURE — 80053 COMPREHEN METABOLIC PANEL: CPT | Performed by: FAMILY MEDICINE

## 2021-06-22 PROCEDURE — 80061 LIPID PANEL: CPT | Performed by: FAMILY MEDICINE

## 2021-06-22 PROCEDURE — 1123F ACP DISCUSS/DSCN MKR DOCD: CPT | Performed by: FAMILY MEDICINE

## 2021-06-22 PROCEDURE — 36415 COLL VENOUS BLD VENIPUNCTURE: CPT | Performed by: FAMILY MEDICINE

## 2021-06-22 PROCEDURE — NC001 PR NO CHARGE: Performed by: FAMILY MEDICINE

## 2021-06-22 NOTE — ASSESSMENT & PLAN NOTE
Follows with Dr Aguilar Stands  He had a PSA in January which was not resulted  Will try to obtain this

## 2021-06-22 NOTE — ASSESSMENT & PLAN NOTE
He presents for Medicare subsequent wellness visit  All components of the examination were addressed and completed  We again discussed 5 wishes and freezer pack and again gave him another copy which we asked him to complete  He has not had AAA screen  We asked him to have this performed based on his age and history of tobacco use  Gaps in care reveals diabetic eye exam but he states that he is up to date and he asks for a copy of his A1c to give to his eye physician which we provided

## 2021-06-22 NOTE — ASSESSMENT & PLAN NOTE
Lab Results   Component Value Date    HGBA1C 6 3 06/22/2021   His A1c was 6 3 today  Congratulated for excellent control  Continue same regimen

## 2021-06-22 NOTE — PROGRESS NOTES
Assessment/Plan:  Radiation proctitis  Continue follow-up with GI  Continue Questran  DMII (diabetes mellitus, type 2) ([facility] )    Lab Results   Component Value Date    HGBA1C 6 3 06/22/2021   His A1c was 6 3 today  Congratulated for excellent control  Continue same regimen  Hypertension  Blood pressure well controlled  Continue lisinopril  Dyslipidemia  Continue atorvastatin  Lipid profile today  Adenocarcinoma of prostate [facility])  Follows with Dr Rafaela Monsalve  He had a PSA in January which was not resulted  Will try to obtain this  Diagnoses and all orders for this visit:    Type 2 diabetes mellitus without complication, without long-term current use of insulin ([facility])  -     Comprehensive metabolic panel  -     Lipid panel  -     POCT hemoglobin A1c    Dyslipidemia  -     Lipid panel    Anemia of chronic disease  -     CBC    Essential hypertension  -     CBC    Radiation proctitis    Adenocarcinoma of prostate ([facility] )          Subjective:   Chief Complaint   Patient presents with   [facility] Wellness Visit     pt here for subsquent medicare wellness    Follow-up     here for 6m med check and fbw        Patient ID: Harrison Barros is a 76 y o  male  Not bad  HPI  The patient is a 28-year-old male presents today for routine follow-up of multiple medical problems which include type 2 diabetes, essential hypertension, hyperlipidemia, as well as history of anemia of chronic disease  He states he is doing fairly well  He denies any cardiovascular pulmonary complaint  No headache diplopia or focal neurologic symptom  His bowels have been moving well since he was started on Questran by Gastroenterology  He has had no further GI bleeding    The following portions of the patient's history were reviewed and updated as appropriate: allergies, current medications, past family history, past medical history, past social history, past surgical history and problem list     Review of Systems Constitutional: Negative  Cardiovascular: Negative  Respiratory: Negative  Endocrine: Negative  Negative for polydipsia, polyphagia and polyuria  Hematologic/Lymphatic: Negative for adenopathy and bleeding problem  Does not bruise/bleed easily  Gastrointestinal:        Per the HPI  Follows with GI for radiation proctitis   Genitourinary:        History of prostate carcinoma  Follows with Dr Daron Prakash  Need to follow-up on PSA from January  Neurological: Negative  Psychiatric/Behavioral: Negative  Objective:    Physical Exam  Constitutional:       Appearance: Normal appearance  He is not ill-appearing  Cardiovascular:      Rate and Rhythm: Normal rate and regular rhythm  Pulmonary:      Effort: Pulmonary effort is normal       Breath sounds: Normal breath sounds  Musculoskeletal:      Right lower leg: No edema  Left lower leg: No edema  Neurological:      Mental Status: He is alert and oriented to person, place, and time  Cranial Nerves: Cranial nerve deficit present  Comments: Hard of hearing   Psychiatric:         Mood and Affect: Mood normal          Thought Content:  Thought content normal          Judgment: Judgment normal          Wt Readings from Last 12 Encounters:   06/22/21 64 9 kg (143 lb)   01/25/21 70 3 kg (155 lb)   12/15/20 68 kg (150 lb)   12/11/20 68 kg (150 lb)   09/16/20 68 kg (150 lb)   09/03/20 68 kg (150 lb)   06/18/20 68 3 kg (150 lb 8 oz)   04/07/20 68 kg (150 lb)   04/03/20 69 9 kg (154 lb)   03/05/20 69 9 kg (154 lb)   02/26/20 68 9 kg (152 lb)   02/21/20 69 4 kg (153 lb)   ]

## 2021-06-22 NOTE — PATIENT INSTRUCTIONS
Medicare Preventive Visit Patient Instructions  Thank you for completing your Welcome to Medicare Visit or Medicare Annual Wellness Visit today  Your next wellness visit will be due in one year (6/23/2022)  The screening/preventive services that you may require over the next 5-10 years are detailed below  Some tests may not apply to you based off risk factors and/or age  Screening tests ordered at today's visit but not completed yet may show as past due  Also, please note that scanned in results may not display below  Preventive Screenings:  Service Recommendations Previous Testing/Comments   Colorectal Cancer Screening  · Colonoscopy    · Fecal Occult Blood Test (FOBT)/Fecal Immunochemical Test (FIT)  · Fecal DNA/Cologuard Test  · Flexible Sigmoidoscopy Age: 54-65 years old   Colonoscopy: every 10 years (May be performed more frequently if at higher risk)  OR  FOBT/FIT: every 1 year  OR  Cologuard: every 3 years  OR  Sigmoidoscopy: every 5 years  Screening may be recommended earlier than age 48 if at higher risk for colorectal cancer  Also, an individualized decision between you and your healthcare provider will decide whether screening between the ages of 74-80 would be appropriate   Colonoscopy: 01/10/2018  FOBT/FIT: 09/14/2020  Cologuard: Not on file  Sigmoidoscopy: 04/03/2020    Screening Current     Prostate Cancer Screening Individualized decision between patient and health care provider in men between ages of 53-78   Medicare will cover every 12 months beginning on the day after your 50th birthday PSA: <0 1 ng/mL     History Prostate Cancer  Screening Not Indicated     Hepatitis C Screening Once for adults born between 1945 and 1965  More frequently in patients at high risk for Hepatitis C Hep C Antibody: 06/21/2018    Screening Current   Diabetes Screening 1-2 times per year if you're at risk for diabetes or have pre-diabetes Fasting glucose: 95 mg/dL   A1C: 7 0    Screening Not Indicated  History Diabetes   Cholesterol Screening Once every 5 years if you don't have a lipid disorder  May order more often based on risk factors  Lipid panel: 12/11/2020    Screening Current      Other Preventive Screenings Covered by Medicare:  1  Abdominal Aortic Aneurysm (AAA) Screening: covered once if your at risk  You're considered to be at risk if you have a family history of AAA or a male between the age of 73-68 who smoking at least 100 cigarettes in your lifetime  2  Lung Cancer Screening: covers low dose CT scan once per year if you meet all of the following conditions: (1) Age 50-69; (2) No signs or symptoms of lung cancer; (3) Current smoker or have quit smoking within the last 15 years; (4) You have a tobacco smoking history of at least 30 pack years (packs per day x number of years you smoked); (5) You get a written order from a healthcare provider  3  Glaucoma Screening: covered annually if you're considered high risk: (1) You have diabetes OR (2) Family history of glaucoma OR (3)  aged 48 and older OR (3)  American aged 72 and older  3  Osteoporosis Screening: covered every 2 years if you meet one of the following conditions: (1) Have a vertebral abnormality; (2) On glucocorticoid therapy for more than 3 months; (3) Have primary hyperparathyroidism; (4) On osteoporosis medications and need to assess response to drug therapy  5  HIV Screening: covered annually if you're between the age of 12-76  Also covered annually if you are younger than 13 and older than 72 with risk factors for HIV infection  For pregnant patients, it is covered up to 3 times per pregnancy      Immunizations:  Immunization Recommendations   Influenza Vaccine Annual influenza vaccination during flu season is recommended for all persons aged >= 6 months who do not have contraindications   Pneumococcal Vaccine (Prevnar and Pneumovax)  * Prevnar = PCV13  * Pneumovax = PPSV23 Adults 25-60 years old: 1-3 doses may be recommended based on certain risk factors  Adults 72 years old: Prevnar (PCV13) vaccine recommended followed by Pneumovax (PPSV23) vaccine  If already received PPSV23 since turning 65, then PCV13 recommended at least one year after PPSV23 dose  Hepatitis B Vaccine 3 dose series if at intermediate or high risk (ex: diabetes, end stage renal disease, liver disease)   Tetanus (Td) Vaccine - COST NOT COVERED BY MEDICARE PART B Following completion of primary series, a booster dose should be given every 10 years to maintain immunity against tetanus  Td may also be given as tetanus wound prophylaxis  Tdap Vaccine - COST NOT COVERED BY MEDICARE PART B Recommended at least once for all adults  For pregnant patients, recommended with each pregnancy  Shingles Vaccine (Shingrix) - COST NOT COVERED BY MEDICARE PART B  2 shot series recommended in those aged 48 and above     Health Maintenance Due:      Topic Date Due    Colorectal Cancer Screening  01/10/2023    Hepatitis C Screening  Completed     Immunizations Due:  There are no preventive care reminders to display for this patient  Advance Directives   What are advance directives? Advance directives are legal documents that state your wishes and plans for medical care  These plans are made ahead of time in case you lose your ability to make decisions for yourself  Advance directives can apply to any medical decision, such as the treatments you want, and if you want to donate organs  What are the types of advance directives? There are many types of advance directives, and each state has rules about how to use them  You may choose a combination of any of the following:  · Living will: This is a written record of the treatment you want  You can also choose which treatments you do not want, which to limit, and which to stop at a certain time  This includes surgery, medicine, IV fluid, and tube feedings  · Durable power of  for healthcare [facility]):   This is a written record that states who you want to make healthcare choices for you when you are unable to make them for yourself  This person, called a proxy, is usually a family member or a friend  You may choose more than 1 proxy  · Do not resuscitate (DNR) order:  A DNR order is used in case your heart stops beating or you stop breathing  It is a request not to have certain forms of treatment, such as CPR  A DNR order may be included in other types of advance directives  · Medical directive: This covers the care that you want if you are in a coma, near death, or unable to make decisions for yourself  You can list the treatments you want for each condition  Treatment may include pain medicine, surgery, blood transfusions, dialysis, IV or tube feedings, and a ventilator (breathing machine)  · Values history: This document has questions about your views, beliefs, and how you feel and think about life  This information can help others choose the care that you would choose  Why are advance directives important? An advance directive helps you control your care  Although spoken wishes may be used, it is better to have your wishes written down  Spoken wishes can be misunderstood, or not followed  Treatments may be given even if you do not want them  An advance directive may make it easier for your family to make difficult choices about your care  Fall Prevention    Fall prevention  includes ways to make your home and other areas safer  It also includes ways you can move more carefully to prevent a fall  Health conditions that cause changes in your blood pressure, vision, or muscle strength and coordination may increase your risk for falls  Medicines may also increase your risk for falls if they make you dizzy, weak, or sleepy  Fall prevention tips:   · Stand or sit up slowly  · Use assistive devices as directed  · Wear shoes that fit well and have soles that   · Wear a personal alarm      · Stay active  · Manage your medical conditions  Home Safety Tips:  · Add items to prevent falls in the bathroom  · Keep paths clear  · Install bright lights in your home  · Keep items you use often on shelves within reach  · Paint or place reflective tape on the edges of your stairs  © Copyright [employer] 2018 Information is for End User's use only and may not be sold, redistributed or otherwise used for commercial purposes  All illustrations and images included in CareNotes® are the copyrighted property of [employer]  or [facility] Preventive Visit Patient Instructions  Thank you for completing your Welcome to Medicare Visit or Medicare Annual Wellness Visit today  Your next wellness visit will be due in one year (6/23/2022)  The screening/preventive services that you may require over the next 5-10 years are detailed below  Some tests may not apply to you based off risk factors and/or age  Screening tests ordered at today's visit but not completed yet may show as past due  Also, please note that scanned in results may not display below  Preventive Screenings:  Service Recommendations Previous Testing/Comments   Colorectal Cancer Screening  · Colonoscopy    · Fecal Occult Blood Test (FOBT)/Fecal Immunochemical Test (FIT)  · Fecal DNA/Cologuard Test  · Flexible Sigmoidoscopy Age: 54-65 years old   Colonoscopy: every 10 years (May be performed more frequently if at higher risk)  OR  FOBT/FIT: every 1 year  OR  Cologuard: every 3 years  OR  Sigmoidoscopy: every 5 years  Screening may be recommended earlier than age 48 if at higher risk for colorectal cancer  Also, an individualized decision between you and your healthcare provider will decide whether screening between the ages of 74-80 would be appropriate   Colonoscopy: 01/10/2018  FOBT/FIT: 09/14/2020  Cologuard: Not on file  Sigmoidoscopy: 04/03/2020    Screening Current     Prostate Cancer Screening Individualized decision between patient and health care provider in men between ages of 53-78   Medicare will cover every 12 months beginning on the day after your 50th birthday PSA: <0 1 ng/mL     History Prostate Cancer  Screening Not Indicated     Hepatitis C Screening Once for adults born between 1945 and 1965  More frequently in patients at high risk for Hepatitis C Hep C Antibody: 06/21/2018    Screening Current   Diabetes Screening 1-2 times per year if you're at risk for diabetes or have pre-diabetes Fasting glucose: 95 mg/dL   A1C: 6 3    Screening Not Indicated  History Diabetes   Cholesterol Screening Once every 5 years if you don't have a lipid disorder  May order more often based on risk factors  Lipid panel: 12/11/2020    Screening Current      Other Preventive Screenings Covered by Medicare:  6  Abdominal Aortic Aneurysm (AAA) Screening: covered once if your at risk  You're considered to be at risk if you have a family history of AAA or a male between the age of 73-68 who smoking at least 100 cigarettes in your lifetime  7  Lung Cancer Screening: covers low dose CT scan once per year if you meet all of the following conditions: (1) Age 50-69; (2) No signs or symptoms of lung cancer; (3) Current smoker or have quit smoking within the last 15 years; (4) You have a tobacco smoking history of at least 30 pack years (packs per day x number of years you smoked); (5) You get a written order from a healthcare provider  8  Glaucoma Screening: covered annually if you're considered high risk: (1) You have diabetes OR (2) Family history of glaucoma OR (3)  aged 48 and older OR (3)  American aged 72 and older  5   Osteoporosis Screening: covered every 2 years if you meet one of the following conditions: (1) Have a vertebral abnormality; (2) On glucocorticoid therapy for more than 3 months; (3) Have primary hyperparathyroidism; (4) On osteoporosis medications and need to assess response to drug therapy  10  HIV Screening: covered annually if you're between the age of 12-76  Also covered annually if you are younger than 13 and older than 72 with risk factors for HIV infection  For pregnant patients, it is covered up to 3 times per pregnancy  Immunizations:  Immunization Recommendations   Influenza Vaccine Annual influenza vaccination during flu season is recommended for all persons aged >= 6 months who do not have contraindications   Pneumococcal Vaccine (Prevnar and Pneumovax)  * Prevnar = PCV13  * Pneumovax = PPSV23 Adults 25-60 years old: 1-3 doses may be recommended based on certain risk factors  Adults 72 years old: Prevnar (PCV13) vaccine recommended followed by Pneumovax (PPSV23) vaccine  If already received PPSV23 since turning 65, then PCV13 recommended at least one year after PPSV23 dose  Hepatitis B Vaccine 3 dose series if at intermediate or high risk (ex: diabetes, end stage renal disease, liver disease)   Tetanus (Td) Vaccine - COST NOT COVERED BY MEDICARE PART B Following completion of primary series, a booster dose should be given every 10 years to maintain immunity against tetanus  Td may also be given as tetanus wound prophylaxis  Tdap Vaccine - COST NOT COVERED BY MEDICARE PART B Recommended at least once for all adults  For pregnant patients, recommended with each pregnancy  Shingles Vaccine (Shingrix) - COST NOT COVERED BY MEDICARE PART B  2 shot series recommended in those aged 48 and above     Health Maintenance Due:      Topic Date Due    Colorectal Cancer Screening  01/10/2023    Hepatitis C Screening  Completed     Immunizations Due:  There are no preventive care reminders to display for this patient  Advance Directives   What are advance directives? Advance directives are legal documents that state your wishes and plans for medical care  These plans are made ahead of time in case you lose your ability to make decisions for yourself   Advance directives can apply to any medical decision, such as the treatments you want, and if you want to donate organs  What are the types of advance directives? There are many types of advance directives, and each state has rules about how to use them  You may choose a combination of any of the following:  · Living will: This is a written record of the treatment you want  You can also choose which treatments you do not want, which to limit, and which to stop at a certain time  This includes surgery, medicine, IV fluid, and tube feedings  · Durable power of  for healthcare [facility]): This is a written record that states who you want to make healthcare choices for you when you are unable to make them for yourself  This person, called a proxy, is usually a family member or a friend  You may choose more than 1 proxy  · Do not resuscitate (DNR) order:  A DNR order is used in case your heart stops beating or you stop breathing  It is a request not to have certain forms of treatment, such as CPR  A DNR order may be included in other types of advance directives  · Medical directive: This covers the care that you want if you are in a coma, near death, or unable to make decisions for yourself  You can list the treatments you want for each condition  Treatment may include pain medicine, surgery, blood transfusions, dialysis, IV or tube feedings, and a ventilator (breathing machine)  · Values history: This document has questions about your views, beliefs, and how you feel and think about life  This information can help others choose the care that you would choose  Why are advance directives important? An advance directive helps you control your care  Although spoken wishes may be used, it is better to have your wishes written down  Spoken wishes can be misunderstood, or not followed  Treatments may be given even if you do not want them  An advance directive may make it easier for your family to make difficult choices about your care  Fall Prevention    Fall prevention  includes ways to make your home and other areas safer  It also includes ways you can move more carefully to prevent a fall  Health conditions that cause changes in your blood pressure, vision, or muscle strength and coordination may increase your risk for falls  Medicines may also increase your risk for falls if they make you dizzy, weak, or sleepy  Fall prevention tips:   · Stand or sit up slowly  · Use assistive devices as directed  · Wear shoes that fit well and have soles that   · Wear a personal alarm  · Stay active  · Manage your medical conditions  Home Safety Tips:  · Add items to prevent falls in the bathroom  · Keep paths clear  · Install bright lights in your home  · Keep items you use often on shelves within reach  · Paint or place reflective tape on the edges of your stairs  © Copyright [employer] 2018 Information is for End User's use only and may not be sold, redistributed or otherwise used for commercial purposes   All illustrations and images included in CareNotes® are the copyrighted property of [employer]  or [address] [place] [address] [employer]

## 2021-06-22 NOTE — PROGRESS NOTES
Assessment and Plan:     Problem List Items Addressed This Visit        Digestive    Radiation proctitis     Continue follow-up with GI  Continue Questran  Endocrine    DMII (diabetes mellitus, type 2) ([facility] [address] )       Lab Results   Component Value Date    HGBA1C 6 3 06/22/2021   His A1c was 6 3 today  Congratulated for excellent control  Continue same regimen  Relevant Orders    Comprehensive metabolic panel    Lipid panel    POCT hemoglobin A1c (Completed)       Cardiovascular and Mediastinum    Hypertension     Blood pressure well controlled  Continue lisinopril  Relevant Orders    CBC       Genitourinary    Aden[facility] of [facility] ([facility] )     Follows with Dr Barrientos Manner  He had a PSA in January which was not resulted  Will try to obtain this  Other    Medicare annual wellness visit, subsequent - Primary     He presents for Medicare subsequent wellness visit  All components of the examination were addressed and completed  We again discussed 5 wishes and freezer pack and again gave him another copy which we asked him to complete  He has not had AAA screen  We asked him to have this performed based on his age and history of tobacco use  Gaps in care reveals diabetic eye exam but he states that he is up to date and he asks for a copy of his A1c to give to his eye physician which we provided  Dyslipidemia     Continue atorvastatin  Lipid profile today  Relevant Orders    Lipid panel    Anemia of chronic disease    Relevant Orders    CBC      Other Visit Diagnoses     Screening for AAA (abdominal aortic aneurysm)        Relevant Orders    US abdominal aorta screening aaa           Preventive health issues were discussed with patient, and age appropriate screening tests were ordered as noted in patient's After Visit Summary    Personalized health advice and appropriate referrals for health education or preventive services given if needed, as noted in patient's After Visit Summary       History of Present Illness:     Patient presents for Medicare Annual Wellness visit    Patient Care Team:  China Dang MD as PCP - General  Zeferino Alcaraz PA-C     Problem List:     Patient Active Problem List   Diagnosis    Actinic keratosis    Adenocarcinoma of prostate ([facility] )    Anemia of chronic disease    Carcinoma, basal cell, skin    DMII (diabetes mellitus, type 2) ([facility] )    Dyslipidemia    Hypertension    Radiation proctitis    Hypertrophic toenail    Onychomycosis    Rectal bleeding    Personal history of colonic polyps    Family history of colonic polyps    Orthostatic dizziness    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    Medicare annual wellness visit, subsequent    Functional diarrhea      Past Medical and Surgical History:     Past Medical History:   Diagnosis Date    Adenocarcinoma of prostate ([facility] )     [address]  [employer]    Anemia     Colon polyp     Diabetes mellitus ([facility] )     History of Lyme disease     Hyperlipidemia     Hypertension     Proctitis     Prostate cancer ([facility] )     Treated with radiation     Past Surgical History:   Procedure Laterality Date    COLONOSCOPY  01/10/2018    COLONOSCOPY  01/10/2018     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, grade 2 internal hemorrhoids, 1 adenomatous polyp    FL SPLIT GRFT TRUNK,ARM,LEG <100 SQCM Left 6/22/2017    Procedure: ANTERIOR SHOULDER EXCISION BCC- COMPLEX CLOSURE vs FLAP vs STSG;  Surgeon: Arben Rodriguez MD;  Location:  MAIN OR;  Service: Plastics      Family History:     Family History   Problem Relation Age of Onset    Heart disease Mother     Diabetes Brother         mellitus     Colon polyps Brother     Throat cancer Sister     Colon cancer Neg Hx       Social History:     Social History     Socioeconomic History    Marital status: Single     Spouse name: None    Number of children: None    Years of education: None    Highest education level: None   Occupational History    None   Tobacco Use    Smoking status: Former Smoker     Types: Cigarettes     Quit date: 1977     Years since quittin 0    Smokeless tobacco: Never Used    Tobacco comment: never a smoker noted in "allscripts" - Denied: History of tobacco use noted in "allscripts"    Vaping Use    Vaping Use: Never used   Substance and Sexual Activity    Alcohol use: No    Drug use: No    Sexual activity: None   Other Topics Concern    None   Social History Narrative    None     Social Determinants of Health     Financial Resource Strain:     Difficulty of Paying Living Expenses:    Food Insecurity:     Worried About Running Out of Food in the Last Year:     Ran Out of Food in the Last Year:    Transportation Needs:     Lack of Transportation (Medical):      Lack of Transportation (Non-Medical):    Physical Activity:     Days of Exercise per Week:     Minutes of Exercise per Session:    Stress:     Feeling of Stress :    Social Connections:     Frequency of Communication with Friends and Family:     Frequency of Social Gatherings with Friends and Family:     Attends [employer] Services:     Active Member of Clubs or Organizations:     Attends Club or Organization Meetings:     Marital Status:    Intimate Partner Violence:     Fear of Current or Ex-Partner:     Emotionally Abused:     Physically Abused:     Sexually Abused:       Medications and Allergies:     Current Outpatient Medications   Medication Sig Dispense Refill    aspirin 81 MG tablet Take 81 mg by mouth daily      atorvastatin (LIPITOR) 10 mg tablet Take 1 tablet by mouth daily      cholestyramine (QUESTRAN) 4 g packet Take 1 packet (4 g total) by mouth 2 (two) times a day with meals lunch, dinner 60 packet 12    glipiZIDE (glipiZIDE XL) 5 mg 24 hr tablet Take 1 tablet (5 mg total) by mouth daily 90 tablet 1    lisinopril (ZESTRIL) 5 mg tablet Take 1 tablet (5 mg total) by mouth daily 90 tablet 1    metFORMIN (GLUCOPHAGE) 1000 MG tablet Take 1 tablet (1,000 mg total) by mouth 2 (two) times a day 180 tablet 1    Silodosin 8 MG CAPS       tamsulosin (FLOMAX) 0 4 mg        No current facility-administered medications for this visit  No Known Allergies   Immunizations:     Immunization History   Administered Date(s) Administered    INFLUENZA 11/26/2013, 12/02/2014    Influenza Quadrivalent Preservative Free 3 years and older IM 12/15/2016    Influenza Quadrivalent, 6-35 Months IM 12/03/2015    Influenza Split High Dose Preservative Free IM 12/18/2017    Influenza, high dose seasonal 0 7 mL 12/17/2018, 12/10/2019, 12/11/2020    Pneumococcal Conjugate 13-Valent 12/18/2017    Pneumococcal Polysaccharide PPV23 05/29/2014    SARS-CoV-2 / COVID-19 mRNA IM ([facility]) 02/12/2021, 03/11/2021    Tdap 05/29/2014      Health Maintenance:         Topic Date Due    Colorectal Cancer Screening  01/10/2023    Hepatitis C Screening  Completed     There are no preventive care reminders to display for this patient  Medicare Health Risk Assessment:     /60   Pulse (!) 52   Temp 98 2 °F (36 8 °C)   Ht 5' 8" (1 727 m)   Wt 64 9 kg (143 lb)   SpO2 98%   BMI 21 74 kg/m²      Shine Yuen is here for his Subsequent Wellness visit  Health Risk Assessment:   Patient rates overall health as good  Patient feels that their physical health rating is same  Patient is satisfied with their life  Eyesight was rated as same  Hearing was rated as same  Patient feels that their emotional and mental health rating is much better  Patients states they are never, rarely angry  Patient states they are sometimes unusually tired/fatigued  Pain experienced in the last 7 days has been none  Patient states that he has experienced no weight loss or gain in last 6 months  Depression Screening:   PHQ-2 Score: 0      Fall Risk Screening:    In the past year, patient has experienced: history of falling in past year    Number of falls: 1  Injured during fall?: No    Feels unsteady when standing or walking?: No    Worried about falling?: No      Home Safety:  Patient does not have trouble with stairs inside or outside of their home  Patient has no working smoke alarms and has no working carbon monoxide detector  Home safety hazards include: household clutter  Tripped  No CVPN sx    Nutrition:   Current diet is Diabetic  Medications:   Patient is not currently taking any over-the-counter supplements  Patient is able to manage medications  Activities of Daily Living (ADLs)/Instrumental Activities of Daily Living (IADLs):   Walk and transfer into and out of bed and chair?: Yes  Dress and groom yourself?: Yes    Bathe or shower yourself?: Yes    Feed yourself?  Yes  Do your laundry/housekeeping?: Yes  Manage your money, pay your bills and track your expenses?: Yes  Make your own meals?: Yes    Do your own shopping?: Yes    Durable Medical Equipment Suppliers  None    Previous Hospitalizations:   Any hospitalizations or ED visits within the last 12 months?: No      Advance Care Planning:   Living will: No    Durable POA for healthcare: No    Advanced directive: No      Comments: 5 Wishes given and discussed again    Cognitive Screening:   Provider or family/friend/caregiver concerned regarding cognition?: No    PREVENTIVE SCREENINGS      Cardiovascular Screening:    General: Screening Current      Diabetes Screening:     General: Screening Not Indicated and History Diabetes      Colorectal Cancer Screening:     General: Screening Current      Prostate Cancer Screening:    General: History Prostate Cancer and Screening Not Indicated      Osteoporosis Screening:    General: Screening Not Indicated      Abdominal Aortic Aneurysm (AAA) Screening:    Risk factors include: age between 73-67 yo and tobacco use        Lung Cancer Screening:     General: Screening Not Indicated      Hepatitis C Screening:    General: Screening Current      Preventive Screening Comments: Colonoscopy due 1/23   F/u PSA from January    Screening, Brief Intervention, and Referral to Treatment (SBIRT)    Screening      AUDIT-C Screening:    3) How often did you have 6 or more drinks on one occasion in the past year? never    Single Item Drug Screening:  How often have you used an illegal drug (including marijuana) or a prescription medication for non-medical reasons in the past year? never    Single Item Drug Screen Score: 0  Interpretation: Negative screen for possible drug use disorder      Ashly Cage MD

## 2021-09-28 LAB
LEFT EYE DIABETIC RETINOPATHY: NORMAL
RIGHT EYE DIABETIC RETINOPATHY: NORMAL
SEVERITY (EYE EXAM): NORMAL

## 2021-12-03 DIAGNOSIS — E11.9 TYPE 2 DIABETES MELLITUS WITHOUT COMPLICATION, UNSPECIFIED WHETHER LONG TERM INSULIN USE (HCC): ICD-10-CM

## 2021-12-03 DIAGNOSIS — I10 ESSENTIAL HYPERTENSION: ICD-10-CM

## 2021-12-03 RX ORDER — LISINOPRIL 5 MG/1
5 TABLET ORAL DAILY
Qty: 90 TABLET | Refills: 0 | Status: SHIPPED | OUTPATIENT
Start: 2021-12-03 | End: 2022-02-28 | Stop reason: SDUPTHER

## 2021-12-03 RX ORDER — GLIPIZIDE 5 MG/1
5 TABLET, FILM COATED, EXTENDED RELEASE ORAL DAILY
Qty: 90 TABLET | Refills: 0 | Status: SHIPPED | OUTPATIENT
Start: 2021-12-03 | End: 2022-02-28 | Stop reason: SDUPTHER

## 2021-12-27 DIAGNOSIS — K59.1 FUNCTIONAL DIARRHEA: ICD-10-CM

## 2021-12-27 RX ORDER — CHOLESTYRAMINE 4 G/9G
1 POWDER, FOR SUSPENSION ORAL 2 TIMES DAILY WITH MEALS
Qty: 60 PACKET | Refills: 12 | Status: SHIPPED | OUTPATIENT
Start: 2021-12-27 | End: 2022-04-19 | Stop reason: SDUPTHER

## 2022-01-04 ENCOUNTER — TELEPHONE (OUTPATIENT)
Dept: GASTROENTEROLOGY | Facility: CLINIC | Age: 77
End: 2022-01-04

## 2022-01-04 NOTE — TELEPHONE ENCOUNTER
[address] [facility]  request to refill cholestyramine 4 gm packets  Last OV   HPR/GS 1/25/2021  Continue cholestyramine and f/u in one year  Has recall in place for appt  Last rx was written by PCP 12/29/21 and ordered for one year to [facility]

## 2022-01-05 ENCOUNTER — TELEPHONE (OUTPATIENT)
Dept: ADMINISTRATIVE | Facility: OTHER | Age: 77
End: 2022-01-05

## 2022-01-05 NOTE — TELEPHONE ENCOUNTER
----- Message from Itzel Pastrana MA sent at 1/4/2022  9:57 AM EST -----  Regarding: Diabetic Eye Exam  Diabetic eye exam is scanned into record  12/21/21  Not falling off of health maintenance

## 2022-01-05 NOTE — TELEPHONE ENCOUNTER
Upon review of the In Basket request we were able to locate, review, and update the patient chart as requested for Diabetic Eye Exam     Any additional questions or concerns should be emailed to the Practice Liaisons via [email or web address] email, please do not reply via In Basket      Thank you  Pablo Bird MA

## 2022-01-10 DIAGNOSIS — E11.9 TYPE 2 DIABETES MELLITUS WITHOUT COMPLICATION, WITHOUT LONG-TERM CURRENT USE OF INSULIN (HCC): ICD-10-CM

## 2022-01-31 ENCOUNTER — OFFICE VISIT (OUTPATIENT)
Dept: FAMILY MEDICINE CLINIC | Facility: CLINIC | Age: 77
End: 2022-01-31
Payer: MEDICARE

## 2022-01-31 VITALS
HEIGHT: 68 IN | BODY MASS INDEX: 22.58 KG/M2 | WEIGHT: 149 LBS | SYSTOLIC BLOOD PRESSURE: 130 MMHG | DIASTOLIC BLOOD PRESSURE: 62 MMHG

## 2022-01-31 DIAGNOSIS — K59.1 FUNCTIONAL DIARRHEA: ICD-10-CM

## 2022-01-31 DIAGNOSIS — E11.9 TYPE 2 DIABETES MELLITUS WITHOUT COMPLICATION, WITHOUT LONG-TERM CURRENT USE OF INSULIN (HCC): Primary | ICD-10-CM

## 2022-01-31 DIAGNOSIS — D63.8 ANEMIA OF CHRONIC DISEASE: ICD-10-CM

## 2022-01-31 DIAGNOSIS — C61 ADENOCARCINOMA OF PROSTATE (HCC): ICD-10-CM

## 2022-01-31 DIAGNOSIS — E78.5 DYSLIPIDEMIA: ICD-10-CM

## 2022-01-31 DIAGNOSIS — I10 PRIMARY HYPERTENSION: ICD-10-CM

## 2022-01-31 DIAGNOSIS — Z23 ENCOUNTER FOR IMMUNIZATION: ICD-10-CM

## 2022-01-31 LAB — SL AMB POCT HEMOGLOBIN AIC: 6.9 (ref ?–6.5)

## 2022-01-31 PROCEDURE — 83036 HEMOGLOBIN GLYCOSYLATED A1C: CPT | Performed by: FAMILY MEDICINE

## 2022-01-31 PROCEDURE — 90662 IIV NO PRSV INCREASED AG IM: CPT

## 2022-01-31 PROCEDURE — G0008 ADMIN INFLUENZA VIRUS VAC: HCPCS

## 2022-01-31 PROCEDURE — 99214 OFFICE O/P EST MOD 30 MIN: CPT | Performed by: FAMILY MEDICINE

## 2022-01-31 PROCEDURE — 36415 COLL VENOUS BLD VENIPUNCTURE: CPT | Performed by: FAMILY MEDICINE

## 2022-01-31 NOTE — PROGRESS NOTES
Assessment/Plan:  Dyslipidemia  Continue with atorvastatin  Lipid profile today  Anemia of chronic disease  CBC today  Adenocarcinoma of prostate ([facility] [address] )  PSA today  Continued follow-up with Urology  Hypertension  Blood pressure control acceptable  Continue with lisinopril  DMII (diabetes mellitus, type 2) ([facility])  Hemoglobin A1c is acceptable at 6 9 though it has risen somewhat from his last visit  We asked him to continue to work on improvement in his diet and exercise regimen  For now continue with metformin a 1000 b i d  as well as glipizide XL 5 mg daily  Recheck 6 months  Lab Results   Component Value Date    HGBA1C 6 9 (A) 01/31/2022       Functional diarrhea  Continue with Questran and follow-up with GI  Diagnoses and all orders for this visit:    Type 2 diabetes mellitus without complication, without long-term current use of insulin ([facility])  -     CBC  -     Comprehensive metabolic panel  -     Lipid panel  -     POCT hemoglobin A1c    Adenocarcinoma of prostate (HCC)  -     PSA, total and free    Functional diarrhea    Encounter for immunization  -     influenza vaccine, high-dose, PF 0 7 mL (FLUZONE HIGH-DOSE)    Dyslipidemia    Anemia of chronic disease    Primary hypertension          Subjective:   Chief Complaint   Patient presents with    Follow-up     Med Check        Patient ID: Ramiro Orlando is a 68 y o  male  No CVP c/o  No P/P/P  Nocturia x 2    HPI  Type 2 diabetes, essential hypertension, prostate carcinoma, radiation proctitis in addition to dyslipidemia as well as anemia of chronic disease among other  He states that overall he has been feeling well  He denies any polyuria polydipsia polyphagia  He has no cardiovascular pulmonary complaint  He does admit to nocturia x2 but this is not bothersome to him    He has no dysesthesias or claudication  The following portions of the patient's history were reviewed and updated as appropriate: allergies, current medications, past family history, past medical history, past social history, past surgical history and problem list     ROS    Per the HPI  Objective:    Physical Exam  Constitutional:       Appearance: Normal appearance  He is not ill-appearing  Cardiovascular:      Rate and Rhythm: Normal rate and regular rhythm  Pulses: Normal pulses  no weak pulses          Dorsalis pedis pulses are 2+ on the right side and 2+ on the left side  Pulmonary:      Effort: Pulmonary effort is normal       Breath sounds: Normal breath sounds  Musculoskeletal:      Right lower leg: No edema  Left lower leg: No edema  Feet:      Right foot:      Skin integrity: No ulcer, skin breakdown, erythema, warmth, callus or dry skin  Left foot:      Skin integrity: No ulcer, skin breakdown, erythema, warmth, callus or dry skin  Skin:     Comments: R great toenail with onychomycosis   Neurological:      Mental Status: He is alert and oriented to person, place, and time  Cranial Nerves: Cranial nerve deficit present  Comments: [place]   Psychiatric:         Mood and Affect: Mood normal          Thought Content: Thought content normal          Judgment: Judgment normal          Patient's shoes and socks removed  Right Foot/Ankle   Right Foot Inspection  Skin Exam: skin normal and skin intact  No dry skin, no warmth, no callus, no erythema, no maceration, no abnormal color, no pre-ulcer, no ulcer and no callus  Sensory   Monofilament testing: intact    Vascular  Capillary refills: < 3 seconds  The right DP pulse is 2+  Left Foot/Ankle  Left Foot Inspection  Skin Exam: skin normal and skin intact  No dry skin, no warmth, no erythema, no maceration, normal color, no pre-ulcer, no ulcer and no callus  Sensory   Monofilament testing: intact    Vascular  Capillary refills: < 3 seconds  The left DP pulse is 2+       Assign Risk Category  No deformity present  No loss of protective sensation  No weak pulses  Risk: 0

## 2022-01-31 NOTE — ASSESSMENT & PLAN NOTE
Blood pressure control acceptable  Continue with lisinopril 
CBC today 
Continue with Questran and follow-up with GI 
Continue with atorvastatin  Lipid profile today 
Hemoglobin A1c is acceptable at 6 9 though it has risen somewhat from his last visit  We asked him to continue to work on improvement in his diet and exercise regimen  For now continue with metformin a 1000 b i d  as well as glipizide XL 5 mg daily  Recheck 6 months    Lab Results   Component Value Date    HGBA1C 6 9 (A) 01/31/2022
PSA today  Continued follow-up with Urology 
5

## 2022-02-01 LAB
ALBUMIN SERPL-MCNC: 4 G/DL (ref 3.6–5.1)
ALBUMIN/GLOB SERPL: 2.2 (CALC) (ref 1–2.5)
ALP SERPL-CCNC: 48 U/L (ref 35–144)
ALT SERPL-CCNC: 9 U/L (ref 9–46)
AST SERPL-CCNC: 15 U/L (ref 10–35)
BILIRUB SERPL-MCNC: 0.5 MG/DL (ref 0.2–1.2)
BUN SERPL-MCNC: 19 MG/DL (ref 7–25)
BUN/CREAT SERPL: ABNORMAL (CALC) (ref 6–22)
CALCIUM SERPL-MCNC: 9.1 MG/DL (ref 8.6–10.3)
CHLORIDE SERPL-SCNC: 105 MMOL/L (ref 98–110)
CHOLEST SERPL-MCNC: 153 MG/DL
CHOLEST/HDLC SERPL: 3.1 (CALC)
CO2 SERPL-SCNC: 27 MMOL/L (ref 20–32)
CREAT SERPL-MCNC: 1.06 MG/DL (ref 0.7–1.18)
ERYTHROCYTE [DISTWIDTH] IN BLOOD BY AUTOMATED COUNT: 13.4 % (ref 11–15)
GLOBULIN SER CALC-MCNC: 1.8 G/DL (CALC) (ref 1.9–3.7)
GLUCOSE SERPL-MCNC: 119 MG/DL (ref 65–99)
HCT VFR BLD AUTO: 34.1 % (ref 38.5–50)
HDLC SERPL-MCNC: 49 MG/DL
HGB BLD-MCNC: 11.4 G/DL (ref 13.2–17.1)
LDLC SERPL CALC-MCNC: 89 MG/DL (CALC)
MCH RBC QN AUTO: 29 PG (ref 27–33)
MCHC RBC AUTO-ENTMCNC: 33.4 G/DL (ref 32–36)
MCV RBC AUTO: 86.8 FL (ref 80–100)
NONHDLC SERPL-MCNC: 104 MG/DL (CALC)
PLATELET # BLD AUTO: 206 THOUSAND/UL (ref 140–400)
PMV BLD REES-ECKER: 11.7 FL (ref 7.5–12.5)
POTASSIUM SERPL-SCNC: 4.5 MMOL/L (ref 3.5–5.3)
PROT SERPL-MCNC: 5.8 G/DL (ref 6.1–8.1)
PSA FREE MFR SERPL: NORMAL % (CALC)
PSA FREE SERPL-MCNC: <0.1 NG/ML
PSA SERPL-MCNC: <0.1 NG/ML
RBC # BLD AUTO: 3.93 MILLION/UL (ref 4.2–5.8)
SL AMB EGFR AFRICAN AMERICAN: 79 ML/MIN/1.73M2
SL AMB EGFR NON AFRICAN AMERICAN: 68 ML/MIN/1.73M2
SODIUM SERPL-SCNC: 140 MMOL/L (ref 135–146)
TRIGL SERPL-MCNC: 68 MG/DL
WBC # BLD AUTO: 7.2 THOUSAND/UL (ref 3.8–10.8)

## 2022-02-28 DIAGNOSIS — I10 ESSENTIAL HYPERTENSION: ICD-10-CM

## 2022-02-28 DIAGNOSIS — E11.9 TYPE 2 DIABETES MELLITUS WITHOUT COMPLICATION, UNSPECIFIED WHETHER LONG TERM INSULIN USE (HCC): ICD-10-CM

## 2022-02-28 RX ORDER — GLIPIZIDE 5 MG/1
5 TABLET, FILM COATED, EXTENDED RELEASE ORAL DAILY
Qty: 90 TABLET | Refills: 0 | Status: SHIPPED | OUTPATIENT
Start: 2022-02-28 | End: 2022-06-06 | Stop reason: SDUPTHER

## 2022-02-28 RX ORDER — LISINOPRIL 5 MG/1
5 TABLET ORAL DAILY
Qty: 90 TABLET | Refills: 0 | Status: SHIPPED | OUTPATIENT
Start: 2022-02-28 | End: 2022-06-06 | Stop reason: SDUPTHER

## 2022-03-21 ENCOUNTER — OFFICE VISIT (OUTPATIENT)
Dept: FAMILY MEDICINE CLINIC | Facility: CLINIC | Age: 77
End: 2022-03-21
Payer: MEDICARE

## 2022-03-21 ENCOUNTER — HOSPITAL ENCOUNTER (OUTPATIENT)
Dept: RADIOLOGY | Facility: HOSPITAL | Age: 77
Discharge: HOME | End: 2022-03-21
Payer: MEDICARE

## 2022-03-21 VITALS
SYSTOLIC BLOOD PRESSURE: 116 MMHG | WEIGHT: 148 LBS | DIASTOLIC BLOOD PRESSURE: 60 MMHG | BODY MASS INDEX: 22.43 KG/M2 | HEIGHT: 68 IN

## 2022-03-21 DIAGNOSIS — E11.9 TYPE 2 DIABETES MELLITUS WITHOUT COMPLICATION, WITHOUT LONG-TERM CURRENT USE OF INSULIN (HCC): ICD-10-CM

## 2022-03-21 DIAGNOSIS — M25.511 ACUTE PAIN OF RIGHT SHOULDER: ICD-10-CM

## 2022-03-21 DIAGNOSIS — I10 PRIMARY HYPERTENSION: ICD-10-CM

## 2022-03-21 DIAGNOSIS — M25.511 ACUTE PAIN OF RIGHT SHOULDER: Primary | ICD-10-CM

## 2022-03-21 PROCEDURE — 73030 X-RAY EXAM OF SHOULDER: CPT

## 2022-03-21 PROCEDURE — 99214 OFFICE O/P EST MOD 30 MIN: CPT | Performed by: FAMILY MEDICINE

## 2022-03-21 RX ORDER — GABAPENTIN 100 MG/1
100 CAPSULE ORAL 3 TIMES DAILY
Qty: 30 CAPSULE | Refills: 1 | Status: SHIPPED | OUTPATIENT
Start: 2022-03-21 | End: 2022-08-02 | Stop reason: ALTCHOICE

## 2022-03-21 NOTE — PROGRESS NOTES
Assessment/Plan:  Acute pain of right shoulder  The patient has acute right shoulder pain with limited range of motion  This probably represents subacromial bursitis and/or rotator cuff tendinitis  He is asked to use ice rest and will give him a prescription of gabapentin to try with appropriate warnings  He is a diabetic and has a history of gastrointestinal bleeding so we are going to avoid steroids and anti-inflammatories  We are going to have him go for an x-ray  Will also ask Orthopedic surgery to evaluate him further  Will follow up with him when x-rays are available  He is asked call tomorrow with report of the gabapentin and for possible instructions as to increase dosing  He agrees with this plan    DMII (diabetes mellitus, type 2) ([facility] [address] )  Continue with current regimen  Lab Results   Component Value Date    HGBA1C 6 9 (A) 01/31/2022       Hypertension  Blood pressure remains well controlled  Diagnoses and all orders for this visit:    Acute pain of right shoulder  -     gabapentin (Neurontin) 100 mg capsule; Take 1 capsule (100 mg total) by mouth 3 (three) times a day  -     XR shoulder 2+ vw right; Future  -     Ambulatory Referral to Orthopedic Surgery; Future    Type 2 diabetes mellitus without complication, without long-term current use of insulin (HCC)    Primary hypertension          Subjective:   Chief Complaint   Patient presents with    Arm Pain     Right arm for 2 days  Cannot move arm up  Patient ID: Prince Montgomery is a 68 y o  male  I noticed it all at once Thursday night getting ready for bed  I had been throwing a ball to a new cat  Started over night  No CP or SOB  Pain with abduction  No fall, trauma, overuse, etc      HPI  The patient is a 14-year-old male who is hard of hearing with essential hypertension, type 2 diabetes, dyslipidemia among other who presents today with 3-4 days of acute right shoulder pain    He states that he went to bed on Thursday night and developed pain  He had no slip fall or other trauma  He had not been shoveling or digging though he does have a new CT and he was throwing a ball to it quite a bit  He states that he had a hard time sleeping at night  Since then he has noted that he cannot raise his arm overhead and he has had a hard time sleeping  He has no chest pain or shortness of breath  No fevers chills night sweats X cetera  The following portions of the patient's history were reviewed and updated as appropriate: allergies, current medications, past family history, past medical history, past social history, past surgical history and problem list     ROS    Per the HPI  Objective:    Physical Exam  Vitals and nursing note reviewed  Constitutional:       Comments: Uncomfortable appearance holding right arm with flexed elbow   Cardiovascular:      Rate and Rhythm: Normal rate and regular rhythm  Pulmonary:      Effort: Pulmonary effort is normal       Breath sounds: Normal breath sounds  Musculoskeletal:         General: Tenderness present  Right lower leg: No edema  Comments: He has some tenderness about the rotator cuff on the right  No obvious deformity  Active and passive abduction of the joint produce discomfort  Internal rotation is limited as well as flexion  He cannot actively abduct the arm  He has normal bulk strength and tone  Skin:     Findings: No rash  Neurological:      Mental Status: He is alert and oriented to person, place, and time  Psychiatric:         Thought Content:  Thought content normal          Judgment: Judgment normal

## 2022-03-21 NOTE — ASSESSMENT & PLAN NOTE
The patient has acute right shoulder pain with limited range of motion  This probably represents subacromial bursitis and/or rotator cuff tendinitis  He is asked to use ice rest and will give him a prescription of gabapentin to try with appropriate warnings  He is a diabetic and has a history of gastrointestinal bleeding so we are going to avoid steroids and anti-inflammatories  We are going to have him go for an x-ray  Will also ask Orthopedic surgery to evaluate him further  Will follow up with him when x-rays are available  He is asked call tomorrow with report of the gabapentin and for possible instructions as to increase dosing    He agrees with this plan

## 2022-04-01 ENCOUNTER — OFFICE VISIT (OUTPATIENT)
Dept: OBGYN CLINIC | Facility: CLINIC | Age: 77
End: 2022-04-01
Payer: MEDICARE

## 2022-04-01 VITALS
HEIGHT: 68 IN | BODY MASS INDEX: 22.82 KG/M2 | DIASTOLIC BLOOD PRESSURE: 64 MMHG | WEIGHT: 150.6 LBS | HEART RATE: 54 BPM | SYSTOLIC BLOOD PRESSURE: 136 MMHG

## 2022-04-01 DIAGNOSIS — M19.011 OSTEOARTHRITIS OF GLENOHUMERAL JOINT, RIGHT: Primary | ICD-10-CM

## 2022-04-01 DIAGNOSIS — M75.31 CALCIFIC TENDINITIS OF RIGHT SHOULDER: ICD-10-CM

## 2022-04-01 PROCEDURE — 99203 OFFICE O/P NEW LOW 30 MIN: CPT | Performed by: ORTHOPAEDIC SURGERY

## 2022-04-01 NOTE — PATIENT INSTRUCTIONS
Exercises for Internal and External Shoulder Rotation   WHAT YOU NEED TO KNOW:   Exercises for internal shoulder rotation work the muscles in your chest and front of your shoulder  Exercises for external shoulder rotation work the muscles in the back of your shoulder and upper back  DISCHARGE INSTRUCTIONS:   Contact your healthcare provider if:   · You have sharp or worsening pain during exercise or at rest     · You have questions or concerns about your shoulder exercises  Before you exercise:  Warm up and stretch before you exercise  Walk or ride a stationary bike for 5 to 10 minutes to help you warm up  Stretching helps increase range of motion  It may also decrease muscle soreness and help prevent another injury  Your healthcare provider will tell you which of the following stretches to do:  · Crossover arm stretch:  Relax your shoulders  Hold your upper arm with the opposite hand  Pull your arm across your chest until you feel a stretch  Hold the stretch for 30 seconds  Return to the starting position  · Shoulder flexion stretch:  Stand facing a wall  Slowly walk your fingers up the wall until you feel a stretch  Hold the stretch for 30 seconds  Return to the starting position  · Sleeper stretch:  Lie on your injured side on a firm, flat surface  Bend the elbow of your injured arm 90° with your hand facing up  Use your arm that is not injured to slowly push your injured arm down  Stop when you feel a stretch at the back of your injured shoulder  Hold the stretch for 30 seconds  Slowly return to the starting position  How to exercise with a weight:  Your healthcare provider will tell you how much weight to exercise with  · Shoulder internal rotation:  Sit in a chair  Place a rolled up towel between your elbow and your side  Bend your elbow to 90°  Gently squeeze the towel with your elbow to prevent it from falling out  Hold the weight with your thumb pointing up   Slowly move the weight across your chest  Stop when your hand reaches your opposite arm  Hold this position for as many seconds as directed  Slowly return to the starting position  · Shoulder external rotation:  Lie on your side with your injured shoulder facing up  Bend your elbow 90°  Place a rolled up towel between your elbow and your side  Hold a weight in your hand  Gently squeeze the towel with your elbow to prevent it from falling out  Slowly rotate your arm outward, but keep your elbow bent  Stop when you feel a stretch  Hold this position for 30 seconds or as directed  Slowly return to the starting position  How to exercise with an exercise band:   · Shoulder internal rotation:  Tie one end of the exercise band to a heavy, secure object  Sit in a chair  Place a rolled up towel between your elbow and your side  Bend your elbow to 90°  Gently squeeze the towel with your elbow to prevent it from falling out  Slowly pull the band across your chest  Stop when your hand reaches your opposite arm  Hold this position for as many seconds as directed  Slowly return to the starting position  · Shoulder external rotation:  Hold one end of the exercise band on the side that is not injured  Place a rolled up towel between your elbow and your side  Bend your elbow 90°  Squeeze the towel with your elbow  Grab the end of the band and slowly turn your arm outward, but keep your elbow bent  Stop when you feel a stretch  Hold this position for 30 seconds or as directed  Slowly return to the starting position  Follow up with your physical therapist as directed:  Write down your questions so you remember to ask them at your visits  © Copyright [employer] 2022 Information is for End User's use only and may not be sold, redistributed or otherwise used for commercial purposes   All illustrations and images included in CareNotes® are the copyrighted property of [employer]  or [employer]  The above information is an  only  It is not intended as medical advice for individual conditions or treatments  Talk to your doctor, nurse or pharmacist before following any medical regimen to see if it is safe and effective for you  Exercises for Shoulder Abduction and Adduction   WHAT YOU NEED TO KNOW:   Shoulder abduction and adduction exercises work the muscles at the back of your shoulder and your upper back  DISCHARGE INSTRUCTIONS:   Contact your healthcare provider if:   · You have sharp or worsening pain during exercise or at rest     · You have questions or concerns about your shoulder exercises  Before you exercise:  Warm up and stretch before you exercise  Walk or ride a stationary bike for 5 to 10 minutes to help you warm up  Stretching helps increase range of motion  It may also decrease muscle soreness and help prevent another injury  Your healthcare provider will tell you which of the following stretches to do:  · Crossover arm stretch:  Relax your shoulders  Hold your upper arm with the opposite hand  Pull your arm across your chest until you feel a stretch  Hold the stretch for 30 seconds  Return to the starting position  · Shoulder flexion stretch:  Stand facing a wall  Slowly walk your fingers up the wall until you feel a stretch  Hold the stretch for 30 seconds  Return to the starting position  · Sleeper stretch:  Lie on your injured side on a firm, flat surface  Bend the elbow of your injured arm 90° with your hand facing up  Use your arm that is not injured to slowly push your injured arm down  Stop when you feel a stretch at the back of your injured shoulder  Hold the stretch for 30 seconds  Slowly return to the starting position  How to exercise with a weight:  Your healthcare provider will tell you how much weight to use  · Shoulder abduction:  Stand and hold a weight in your hand with your palm facing your body  Slowly raise your arm to the side with your thumb pointing up  Then raise your arm over your head as far as you can without pain  Hold this position for as long as directed  Do not raise your arm over your head unless your healthcare provider says it is okay  · Shoulder adduction:  Lie on your back on a firm surface  Extend your arm out to a "T " Bend your elbow so your forearm in the air  Hold a weight in your hand  Slowly raise your arm toward the ceiling and straighten your elbow  Hold this position for as long as directed  Slowly return to the starting position  How to exercise with an exercise band:   · Shoulder abduction:  Wrap the exercise band around a heavy, stable object near your foot  Grab the band with the hand of your injured shoulder  Keep your arm straight  Slowly raise your arm to the side with your thumb pointing up  Then, slowly pull the band over your head as far as you can without pain  Do not raise your arm over your head unless your healthcare provider says it is okay  Do not let your shoulder shrug  Hold this position for as long as directed  Slowly return to the starting position  · Shoulder adduction:  Wrap the exercise band around a heavy, stable object  Stand and face away from where the band is anchored  Hold each end of the band in both hands with your elbows bent  Your elbows should not be behind your body  Keep your arms parallel to the floor and slowly straighten your elbows  Hold this position for as long as directed  Slowly return to the starting position  Follow up with your physical therapist as directed:  Write down your questions so you remember to ask them at your visits  © Copyright [employer] 2022 Information is for End User's use only and may not be sold, redistributed or otherwise used for commercial purposes  All illustrations and images included in CareNotes® are the copyrighted property of [employer] M [employer]  or [employer] Pendleton  The above information is an  only   It is not intended as medical advice for individual conditions or treatments  Talk to your doctor, nurse or pharmacist before following any medical regimen to see if it is safe and effective for you

## 2022-04-01 NOTE — PROGRESS NOTES
Assessment/Plan:  1  Osteoarthritis of glenohumeral joint, right     2  Calcific tendinitis of right shoulder  Ambulatory Referral to Orthopedic Surgery       Scribe Attestation    I,:  [place] Nena am acting as a scribe while in the presence of the attending physician :       I,:  Geovanna Oh MD personally performed the services described in this documentation    as scribed in my presence :             Khushbu upon review x-rays of right shoulder does demonstrate a calcific density laterally to the greater tuberosity consistent with calcific tendinitis  He does have moderate glenohumeral joint osteoarthritis as well  Overall, he demonstrates a relatively benign exam with some weakness into abduction secondary to pain  I am encouraged that he does appear to be experiencing symptomatic improvement with minimal intervention at this point  I did note if he does become significantly more symptomatic he could consider an ultrasound-guided lavage aspiration  I do believe his initial onset of pain is associated calcific tendinitis  However, he does not necessarily need to consider this at this time, as he does appear to be improving  I did provide him with a physician directed exercise program to work on strengthening of her shoulder and encourage range of motion  He was instructed on proper execution of exercises today by my   Khushbu may contact his primary care a refill should need  Khushbu verbalized understanding and was amenable to the treatment plan presented no questions  I will see him back on an as-needed basis  Subjective:   Hannah Quinones is a 68 y o  male who presents to the office today for initial evaluation of his right shoulder  He is referred to me today by Dr Sia Hurley  He has been experiencing activity related pain into the lateral posterior aspect of his shoulder over the past couple weeks  He denies a traumatic injury resulting in his painful symptoms  He states that overhead reaching activities will exacerbate his pain  He states that since the onset of his pain he has had symptomatic improvements  He has been provided gabapentin from Dr Morenita Cardozo and that has been helpful  He states that he has his own to mitigate stiffness  He does have a history of diabetes that is well controlled with metformin  Review of Systems   Constitutional: Negative for chills, fever and unexpected weight change  HENT: Negative for hearing loss, nosebleeds and sore throat  Eyes: Negative for pain, redness and visual disturbance  Respiratory: Negative for cough, shortness of breath and wheezing  Cardiovascular: Negative for chest pain, palpitations and leg swelling  Gastrointestinal: Negative for abdominal pain, nausea and vomiting  Endocrine: Negative for polyphagia and polyuria  Genitourinary: Negative for dysuria and hematuria  Musculoskeletal:        See HPI   Skin: Negative for rash and wound  Neurological: Negative for dizziness, numbness and headaches  Psychiatric/Behavioral: Negative for decreased concentration and suicidal ideas  The patient is not nervous/anxious            Past Medical History:   Diagnosis Date    Adenocarcinoma of prostate ([facility] )     [address]    Anemia     Colon polyp     Diabetes mellitus ([facility] )     History of Lyme disease     Hyperlipidemia     Hypertension     Proctitis     Prostate cancer ([facility] )     Treated with radiation       Past Surgical History:   Procedure Laterality Date    COLONOSCOPY  01/10/2018    COLONOSCOPY  01/10/2018     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, grade 2 internal hemorrhoids, 1 adenomatous polyp    PA SPLIT GRFT TRUNK,ARM,LEG <100 SQCM Left 6/22/2017    Procedure: ANTERIOR SHOULDER EXCISION BCC- COMPLEX CLOSURE vs FLAP vs STSG;  Surgeon: Karey Herzog MD;  Location:  MAIN OR;  Service: Plastics       Family History   Problem Relation Age of Onset    Heart disease Mother    Subha Rowena Diabetes Brother         mellitus     Colon polyps Brother     Throat cancer Sister     Colon cancer Neg Hx        Social History     Occupational History    Not on file   Tobacco Use    Smoking status: Former Smoker     Types: Cigarettes     Quit date: 1977     Years since quittin 8    Smokeless tobacco: Never Used    Tobacco comment: never a smoker noted in "allscripts" - Denied: History of tobacco use noted in "allscripts"    Vaping Use    Vaping Use: Never used   Substance and Sexual Activity    Alcohol use: No    Drug use: No    Sexual activity: Not on file         Current Outpatient Medications:     aspirin 81 MG tablet, Take 81 mg by mouth daily, Disp: , Rfl:     atorvastatin (LIPITOR) 10 mg tablet, Take 1 tablet by mouth daily, Disp: , Rfl:     cholestyramine (QUESTRAN) 4 g packet, Take 1 packet (4 g total) by mouth 2 (two) times a day with meals lunch, dinner, Disp: 60 packet, Rfl: 12    gabapentin (Neurontin) 100 mg capsule, Take 1 capsule (100 mg total) by mouth 3 (three) times a day, Disp: 30 capsule, Rfl: 1    glipiZIDE (glipiZIDE XL) 5 mg 24 hr tablet, Take 1 tablet (5 mg total) by mouth daily, Disp: 90 tablet, Rfl: 0    lisinopril (ZESTRIL) 5 mg tablet, Take 1 tablet (5 mg total) by mouth daily, Disp: 90 tablet, Rfl: 0    metFORMIN (GLUCOPHAGE) 1000 MG tablet, Take 1 tablet (1,000 mg total) by mouth 2 (two) times a day, Disp: 180 tablet, Rfl: 1    Silodosin 8 MG CAPS, , Disp: , Rfl:     tamsulosin (FLOMAX) 0 4 mg, , Disp: , Rfl:     No Known Allergies    Objective:  Vitals:    22 0840   BP: 136/64   Pulse: (!) 54       Right Shoulder Exam     Tenderness   The patient is experiencing no tenderness      Range of Motion   Active abduction: 140   External rotation: 70   Internal rotation 90 degrees: 50     Muscle Strength   Abduction: 5/5   Internal rotation: 5/5   External rotation: 5/5     Tests   Drop arm: negative    Other   Erythema: absent  Scars: absent  Sensation: normal  Pulse: present            Physical Exam  Vitals reviewed  HENT:      Head: Normocephalic and atraumatic  Eyes:      General:         Right eye: No discharge  Left eye: No discharge  Conjunctiva/sclera: Conjunctivae normal       Pupils: Pupils are equal, round, and reactive to light  Cardiovascular:      Rate and Rhythm: Normal rate  Pulmonary:      Effort: Pulmonary effort is normal  No respiratory distress  Musculoskeletal:      Cervical back: Normal range of motion and neck supple  Comments: As noted in HPI   Skin:     General: Skin is warm and dry  Neurological:      Mental Status: He is alert and oriented to person, place, and time  Psychiatric:         Mood and Affect: Mood normal          Behavior: Behavior normal          I have personally reviewed pertinent films in PACS and my interpretation is as follows:    X-rays right shoulder obtained on 3/21/2022 demonstrate a calcific density lateral to the greater tuberosity  There is moderate glenohumeral joint osteoarthritis osteophyte formation inferiorly at glenoid  No acute fractures  No obvious lytic or blastic lesions demonstrated

## 2022-04-01 NOTE — LETTER
April 1, 2022     Gabbie Churchill MD  [address] [id number]    Patient: Whitney Worthington   YOB: 1945   Date of Visit: 4/1/2022     Dear Dr Todd Fitch      Thank you for referring Monserrat Verdin to me for evaluation  Below are the relevant portions of my assessment and plan of care  If you have questions, please do not hesitate to call me  I look forward to following Gary Awkward along with you           Sincerely,        Tracy Hutchinson MD        CC: No Recipients    Progress Notes:

## 2022-04-14 ENCOUNTER — HOSPITAL ENCOUNTER (EMERGENCY)
Facility: HOSPITAL | Age: 77
Discharge: HOME | End: 2022-04-14
Attending: EMERGENCY MEDICINE | Admitting: EMERGENCY MEDICINE
Payer: MEDICARE

## 2022-04-14 VITALS
TEMPERATURE: 98 F | RESPIRATION RATE: 20 BRPM | SYSTOLIC BLOOD PRESSURE: 150 MMHG | HEART RATE: 54 BPM | DIASTOLIC BLOOD PRESSURE: 70 MMHG | OXYGEN SATURATION: 99 %

## 2022-04-14 DIAGNOSIS — R33.9 URINARY RETENTION: Primary | ICD-10-CM

## 2022-04-14 PROCEDURE — 99283 EMERGENCY DEPT VISIT LOW MDM: CPT

## 2022-04-14 PROCEDURE — 99284 EMERGENCY DEPT VISIT MOD MDM: CPT | Performed by: EMERGENCY MEDICINE

## 2022-04-14 NOTE — DISCHARGE INSTRUCTIONS
Moreno Catheter Placement and Care   WHAT YOU NEED TO KNOW:   A Moreno catheter is a sterile tube that is inserted into your bladder to drain urine  It is also called an indwelling urinary catheter  DISCHARGE INSTRUCTIONS:   Seek care immediately if:   · Your catheter comes out  · You suddenly have material that looks like sand in the tubing or drainage bag  · No urine is draining into the bag and you have checked the system  · You have pain in your hip, back, pelvis, or lower abdomen  · You are confused or cannot think clearly  Call your doctor or urologist if:   · You have a fever  · You have bladder spasms for more than 1 day after the catheter is placed  · You see blood in the tubing or drainage bag  · You have a rash or itching where the catheter tube is secured to your skin  · Urine leaks from or around the catheter, tubing, or drainage bag  · The closed drainage system has accidently come open or apart  · You see a layer of crystals inside the tubing  · You have questions or concerns about your condition or care  Care for your catheter and drainage bag: You can reduce your risk for infection and injury by caring for your catheter and drainage bag properly  · Wash your hands often  Wash before and after you touch your catheter, tubing, or drainage bag  Use soap and water  Wear clean disposable gloves when you care for your catheter or disconnect the drainage bag  Wash your hands before you prepare or eat food  · Clean your genital area 2 times every day  Clean your catheter area and anal opening after every bowel movement  ? For men:  Use a soapy cloth to clean the tip of your penis  Start where the catheter enters  Wipe backward making sure to pull back the foreskin  Then use a cloth with clear water in the same direction to clean away the soap  ? For women:  Use a soapy cloth to clean the area that the catheter enters your body   Make sure to separate your labia and wipe toward the anus  Then use a cloth with clear water and wipe in the same direction  · Secure the catheter tube  so you do not pull or move the catheter  This helps prevent pain and bladder spasms  Healthcare providers will show you how to use medical tape or a strap to secure the catheter tube to your body  · Keep a closed drainage system  Your catheter should always be attached to the drainage bag to form a closed system  Do not disconnect any part of the closed system unless you need to change the bag  · Keep the drainage bag below the level of your waist   This helps stop urine from moving back up the tubing and into your bladder  Do not loop or kink the tubing  This can cause urine to back up and collect in your bladder  Do not let the drainage bag touch or lie on the floor  · Empty the drainage bag when needed  The weight of a full drainage bag can be painful  Empty the drainage bag every 3 to 6 hours or when it is ? full  · Clean and change the drainage bag as directed  Ask your healthcare provider how often you should change the drainage bag and what cleaning solution to use  Wear disposable gloves when you change the bag  Do not allow the end of the catheter or tubing to touch anything  Clean the ends with an alcohol pad before you reconnect them  What to do if problems develop:   · No urine is draining into the bag:      ? Check for kinks in the tubing and straighten them out  ? Check the tape or strap used to secure the catheter tube to your skin  Make sure it is not blocking the tube  ? Make sure you are not sitting or lying on the tubing  ? Make sure the urine bag is hanging below the level of your waist     · Urine leaks from or around the catheter, tubing, or drainage bag:  Check if the closed drainage system has accidently come open or apart  Clean the catheter and tubing ends with a new alcohol pad and reconnect them      Follow up with your doctor or urologist as directed:  Write down your questions so you remember to ask them during your visits  © Copyright [employer] 2022 Information is for End User's use only and may not be sold, redistributed or otherwise used for commercial purposes  All illustrations and images included in CareNotes® are the copyrighted property of [employer] M [employer]  or [employer]   The above information is an  only  It is not intended as medical advice for individual conditions or treatments  Talk to your doctor, nurse or pharmacist before following any medical regimen to see if it is safe and effective for you

## 2022-04-14 NOTE — ED NOTES
Bladder scan approx 500  Moreno completed with sterile technique  Immediate urine return       Menard Factor, RN  04/14/22 [id number]

## 2022-04-14 NOTE — ED PROVIDER NOTES
History  Chief Complaint   Patient presents with    Urinary Retention     Pt difficult to understand  States has been having this problem off and on  Showed a bottle of cipro abx  Low ab pain  [facility] amount of time since last urinated  Bladder scan is approx 500  Patient presents to the ER for inability urinate  Patient states he is having this problem off and on  For last few days he has had burning on urination  Patient is currently on Cipro for UTI  Patient unsure last time he urinated  Patient having lower abdominal pain pressure  Denies any blood in the urine  Denies any fever or chills  Denies any flank pain  History provided by:  Patient   used: No        Prior to Admission Medications   Prescriptions Last Dose Informant Patient Reported? Taking?    Silodosin 8 MG CAPS  Self Yes No   aspirin 81 MG tablet  Self Yes No   Sig: Take 81 mg by mouth daily   atorvastatin (LIPITOR) 10 mg tablet  Self Yes No   Sig: Take 1 tablet by mouth daily   cholestyramine (QUESTRAN) 4 g packet   No No   Sig: Take 1 packet (4 g total) by mouth 2 (two) times a day with meals lunch, dinner   gabapentin (Neurontin) 100 mg capsule   No No   Sig: Take 1 capsule (100 mg total) by mouth 3 (three) times a day   glipiZIDE (glipiZIDE XL) 5 mg 24 hr tablet   No No   Sig: Take 1 tablet (5 mg total) by mouth daily   lisinopril (ZESTRIL) 5 mg tablet   No No   Sig: Take 1 tablet (5 mg total) by mouth daily   metFORMIN (GLUCOPHAGE) 1000 MG tablet   No No   Sig: Take 1 tablet (1,000 mg total) by mouth 2 (two) times a day   tamsulosin (FLOMAX) 0 4 mg  Self Yes No      Facility-Administered Medications: None       Past Medical History:   Diagnosis Date    Adenocarcinoma of [address] ([facility] [address] )     [address]    Anemia     Colon polyp     Diabetes mellitus ([facility] [address] )     History of Lyme disease     Hyperlipidemia     Hypertension     Proctitis     Prostate cancer ([facility] [address] )     Treated with radiation       Past Surgical History: Procedure Laterality Date    COLONOSCOPY  01/10/2018    COLONOSCOPY  01/10/2018     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, grade 2 internal hemorrhoids, 1 adenomatous polyp    [place] SPLIT GRFT TRUNK,ARM,LEG <100 SQCM Left 2017    Procedure: ANTERIOR SHOULDER EXCISION BCC- COMPLEX CLOSURE vs FLAP vs STSG;  Surgeon: Bailey Ray MD;  Location:  MAIN OR;  Service: Plastics       Family History   Problem Relation Age of Onset    Heart disease Mother     Diabetes Brother         mellitus     Colon polyps Brother     Throat cancer Sister     Colon cancer Neg Hx      I have reviewed and agree with the history as documented  E-Cigarette/Vaping    E-Cigarette Use Never User      E-Cigarette/Vaping Substances    Nicotine No     THC No     CBD No     Flavoring No     Other No     Unknown No      Social History     Tobacco Use    Smoking status: Former Smoker     Types: Cigarettes     Quit date: 1977     Years since quittin 8    Smokeless tobacco: Never Used    Tobacco comment: never a smoker noted in "allscripts" - Denied: History of tobacco use noted in "allscripts"    Vaping Use    Vaping Use: Never used   Substance Use Topics    Alcohol use: No    Drug use: No       Review of Systems   Constitutional: Negative for chills and fever  HENT: Negative for ear pain and sore throat  Eyes: Negative for pain and visual disturbance  Respiratory: Negative for cough and shortness of breath  Cardiovascular: Negative for chest pain and palpitations  Gastrointestinal: Positive for abdominal pain  Negative for vomiting  Genitourinary: Positive for difficulty urinating and dysuria  Negative for hematuria  Musculoskeletal: Negative for arthralgias and back pain  Skin: Negative for color change and rash  Neurological: Negative for seizures and syncope  All other systems reviewed and are negative  Physical Exam  Physical Exam  Vitals and nursing note reviewed  Constitutional:       General: He is not in acute distress  HENT:      Head: Atraumatic  Right Ear: External ear normal       Left Ear: External ear normal       Nose: Nose normal       Mouth/Throat:      Mouth: Mucous membranes are moist       Pharynx: Oropharynx is clear  Eyes:      General: No scleral icterus  Conjunctiva/sclera: Conjunctivae normal    Cardiovascular:      Rate and Rhythm: Normal rate and regular rhythm  Pulses: Normal pulses  Pulmonary:      Effort: Pulmonary effort is normal  No respiratory distress  Breath sounds: Normal breath sounds  Abdominal:      General: Abdomen is flat  Bowel sounds are normal  There is no distension  Palpations: Abdomen is soft  Tenderness: There is abdominal tenderness  There is no guarding or rebound  Comments: Mild tenderness and fullness suprapubic   Musculoskeletal:         General: No deformity  Normal range of motion  Skin:     Capillary Refill: Capillary refill takes less than 2 seconds  Findings: No rash  Neurological:      General: No focal deficit present  Mental Status: He is alert and oriented to person, place, and time           Vital Signs  ED Triage Vitals [04/14/22 0624]   Temperature Pulse Respirations Blood Pressure SpO2   98 °F (36 7 °C) 69 22 (!) 193/88 100 %      Temp Source Heart Rate Source Patient Position - Orthostatic VS BP Location FiO2 (%)   Tympanic Monitor Sitting Right arm --      Pain Score       10 - Worst Possible Pain           Vitals:    04/14/22 0624 04/14/22 0715   BP: (!) 193/88 150/70   Pulse: 69 (!) 54   Patient Position - Orthostatic VS: Sitting Lying         Visual Acuity      ED Medications  Medications - No data to display    Diagnostic Studies  Results Reviewed     Procedure Component Value Units Date/Time    UA w Reflex to Microscopic w Reflex to Culture [[id number]]     Lab Status: No result Specimen: Urine                  No orders to display Procedures  Procedures         ED Course                                             MDM  Number of Diagnoses or Management Options  Urinary retention  Diagnosis management comments: Pulse ox 99% on room air indicating adequate oxygenation  Bladder scan showed approximately 500 mL in the urine  Moreno catheter placed up by nursing staff  On repeat exam patient no longer and pressure discomfort in the lower abdomen  His abdomen on repeat exam was soft and nontender  Urine was clear with no signs of hematuria or clots  Patient advised to continue the antibiotics and follow-up with Urology  Amount and/or Complexity of Data Reviewed  Clinical lab tests: ordered and reviewed  Decide to obtain previous medical records or to obtain history from someone other than the patient: yes  Review and summarize past medical records: yes    Patient Progress  Patient progress: stable      Disposition  Final diagnoses:   Urinary retention     Time reflects when diagnosis was documented in both MDM as applicable and the Disposition within this note     Time User Action Codes Description Comment    4/14/2022  7:07 AM Sanjuana Dolan Add [R33 9] Urinary retention       ED Disposition     ED Disposition Condition Date/Time Comment    Discharge Stable Thu Apr 14, 2022  7:07 AM Cleaster Chute Pursell discharge to home/self care              Follow-up Information     Follow up With Specialties Details Why Contact Info    Madhuri Feng MD Urology In 3 days  [address]  [phone]            Discharge Medication List as of 4/14/2022  7:08 AM      CONTINUE these medications which have NOT CHANGED    Details   aspirin 81 MG tablet Take 81 mg by mouth daily, Historical Med      atorvastatin (LIPITOR) 10 mg tablet Take 1 tablet by mouth daily, Starting Mon 12/18/2017, Historical Med      cholestyramine (QUESTRAN) 4 g packet Take 1 packet (4 g total) by mouth 2 (two) times a day with meals lunch, dinner, Starting Mon 12/27/2021, Normal      gabapentin (Neurontin) 100 mg capsule Take 1 capsule (100 mg total) by mouth 3 (three) times a day, Starting Mon 3/21/2022, Normal      glipiZIDE (glipiZIDE XL) 5 mg 24 hr tablet Take 1 tablet (5 mg total) by mouth daily, Starting Mon 2/28/2022, Normal      lisinopril (ZESTRIL) 5 mg tablet Take 1 tablet (5 mg total) by mouth daily, Starting Mon 2/28/2022, Normal      metFORMIN (GLUCOPHAGE) 1000 MG tablet Take 1 tablet (1,000 mg total) by mouth 2 (two) times a day, Starting Mon 1/10/2022, Normal      Silodosin 8 MG CAPS Starting Mon 4/27/2020, Historical Med      tamsulosin (FLOMAX) 0 4 mg Starting Mon 2/17/2020, Historical Med             No discharge procedures on file      PDMP Review     None          ED Provider  Electronically Signed by           Tyrone Lynch DO  04/14/22 0716

## 2022-04-19 ENCOUNTER — OFFICE VISIT (OUTPATIENT)
Dept: GASTROENTEROLOGY | Facility: CLINIC | Age: 77
End: 2022-04-19
Payer: MEDICARE

## 2022-04-19 VITALS
HEART RATE: 68 BPM | WEIGHT: 150 LBS | DIASTOLIC BLOOD PRESSURE: 90 MMHG | HEIGHT: 68 IN | BODY MASS INDEX: 22.73 KG/M2 | SYSTOLIC BLOOD PRESSURE: 148 MMHG

## 2022-04-19 DIAGNOSIS — K59.1 FUNCTIONAL DIARRHEA: ICD-10-CM

## 2022-04-19 DIAGNOSIS — Z86.010 PERSONAL HISTORY OF COLONIC POLYPS: Primary | ICD-10-CM

## 2022-04-19 PROCEDURE — 99213 OFFICE O/P EST LOW 20 MIN: CPT | Performed by: REGISTERED NURSE

## 2022-04-19 RX ORDER — CHOLESTYRAMINE 4 G/9G
1 POWDER, FOR SUSPENSION ORAL 2 TIMES DAILY WITH MEALS
Qty: 60 PACKET | Refills: 12 | Status: SHIPPED | OUTPATIENT
Start: 2022-04-19

## 2022-04-19 NOTE — PROGRESS NOTES
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, diabetes, medication effect  Patient has been taking cholestyramine powder b i d  Bowel movements range from 1-3 per day or at times 1 every few days  Denies any fecal incontinence  Discussed that it is okay to try taking cholestyramine powder once daily and then twice daily needed  - cholestyramine (QUESTRAN) 4 g packet; Take 1 packet (4 g total) by mouth 2 (two) times a day with meals lunch, dinner  Dispense: 60 packet; Refill: 12    2  Personal history of colonic polyps  Previous colonoscopy 01/2018  Five year recall in place due to personal history of colon polyps  Next colonoscopy due 01/2023      Followup Appointment: 1 year  ______________________________________________________________________    Chief Complaint   Patient presents with    Follow up-diarrhea     HPI:  29-year-old male presents for yearly follow-up for functional diarrhea  He has been taking cholestyramine powder b i d  with good results  Some days he has 1-3 bowel movements other times he may go 2 or 3 days without a bowel movement  He denies any fecal incontinence  Denies any abdominal pain, melena, hematochezia  Denies any upper GI symptoms  Appetite is good and his weight is stable      Historical Information   Past Medical History:   Diagnosis Date    Adenocarcinoma of prostate ([facility] [address] )     [address]    Anemia     Colon polyp     Diabetes mellitus ([facility] [address] )     History of Lyme disease     Hyperlipidemia     Hypertension     Proctitis     Prostate cancer ([facility] [address] )     Treated with radiation     Past Surgical History:   Procedure Laterality Date    COLONOSCOPY  01/10/2018    COLONOSCOPY  01/10/2018     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, grade 2 internal hemorrhoids, 1 adenomatous polyp    [place] SPLIT GRFT TRUNK,ARM,LEG <100 SQCM Left 2017    Procedure: ANTERIOR SHOULDER EXCISION BCC- COMPLEX CLOSURE vs FLAP vs STSG;  Surgeon: Roopa Rosario MD;  Location: [facility];  Service: Plastics     Social History     Substance and Sexual Activity   Alcohol Use No     Social History     Substance and Sexual Activity   Drug Use No     Social History     Tobacco Use   Smoking Status Former Smoker    Types: Cigarettes    Quit date: 1977    Years since quittin 8   Smokeless Tobacco Never Used   Tobacco Comment    never a smoker noted in "allscripts" - Denied: History of tobacco use noted in "allscripts"      Family History   Problem Relation Age of Onset    Heart disease Mother     Diabetes Brother         mellitus     Colon polyps Brother     Throat cancer Sister     Colon cancer Neg Hx          Current Outpatient Medications:     aspirin 81 MG tablet    atorvastatin (LIPITOR) 10 mg tablet    cholestyramine (QUESTRAN) 4 g packet    gabapentin (Neurontin) 100 mg capsule    glipiZIDE (glipiZIDE XL) 5 mg 24 hr tablet    lisinopril (ZESTRIL) 5 mg tablet    metFORMIN (GLUCOPHAGE) 1000 MG tablet    Silodosin 8 MG CAPS    tamsulosin (FLOMAX) 0 4 mg  No Known Allergies  Reviewed medications and allergies and updated as indicated    PHYSICAL EXAM:    Blood pressure 148/90, pulse 68, height 5' 8" (1 727 m), weight 68 kg (150 lb)  Body mass index is 22 81 kg/m²  General Appearance: NAD, cooperative, alert  Eyes: Anicteric, PERRLA, EOMI  ENT:  Normocephalic, atraumatic, normal mucosa  Neck:  Supple, symmetrical, trachea midline  Resp:  Clear to auscultation bilaterally; no rales, rhonchi or wheezing; respirations unlabored   CV:  S1 S2, Regular rate and rhythm; no murmur, rub, or gallop  GI:  Soft, non-tender, non-distended; normal bowel sounds; no masses, no organomegaly   Rectal: Deferred  Musculoskeletal: No cyanosis, clubbing or edema  Normal ROM    Skin:  No jaundice, rashes, or lesions   Heme/Lymph: No palpable cervical lymphadenopathy  Psych: Normal affect, good eye contact  Neuro: No gross deficits, AAOx3    Lab Results:   Lab Results   Component Value Date    WBC 7 2 01/31/2022    HGB 11 4 (L) 01/31/2022    HCT 34 1 (L) 01/31/2022    MCV 86 8 01/31/2022     01/31/2022     Lab Results   Component Value Date     12/18/2017    K 4 5 01/31/2022     01/31/2022    CO2 27 01/31/2022    BUN 19 01/31/2022    CREATININE 1 06 01/31/2022    GLUF 108 (H) 06/22/2021    CALCIUM 9 1 01/31/2022    AST 15 01/31/2022    ALT 9 01/31/2022    ALKPHOS 48 01/31/2022    PROT 6 5 12/18/2017    BILITOT 0 6 12/18/2017    EGFR 79 06/22/2021     No results found for: IRON, TIBC, FERRITIN  No results found for: LIPASE    Radiology Results:   XR shoulder 2+ vw right    Result Date: 3/24/2022  Narrative: RIGHT SHOULDER INDICATION:   M25 511: Pain in right shoulder  COMPARISON:  None VIEWS:  XR SHOULDER 2+ VW RIGHT FINDINGS: There is no acute fracture or dislocation  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

## 2022-05-11 ENCOUNTER — CONSULT (OUTPATIENT)
Dept: PLASTIC SURGERY | Facility: HOSPITAL | Age: 77
End: 2022-05-11
Payer: MEDICARE

## 2022-05-11 VITALS
DIASTOLIC BLOOD PRESSURE: 71 MMHG | TEMPERATURE: 98.6 F | BODY MASS INDEX: 22.73 KG/M2 | HEIGHT: 68 IN | SYSTOLIC BLOOD PRESSURE: 137 MMHG | HEART RATE: 56 BPM | WEIGHT: 150 LBS | RESPIRATION RATE: 16 BRPM

## 2022-05-11 DIAGNOSIS — C44.519 BASAL CELL CARCINOMA (BCC) OF ANTERIOR CHEST: Primary | ICD-10-CM

## 2022-05-11 PROCEDURE — 99204 OFFICE O/P NEW MOD 45 MIN: CPT | Performed by: PHYSICIAN ASSISTANT

## 2022-05-11 NOTE — PROGRESS NOTES
Assessment/Plan:  Ilene Argueta is a 68year old male who presents in consultation for a basal cell carcinoma of the anterior chest  He is referred to us by Dr Hilda Nolan  Please see HPI  Basal cell carcinoma with complex closure verses local flap  He understood and agreed  He would prefer to do this under local anesthesia as he does not have anyone to drive him  We discussed with the patient the options, benefits, and risks of surgery such as anesthesia, bleeding, infection, scarring and the need for additional procedures  Consent was obtained and all questions answered to his satisfaction  We will plan for surgery at his earliest convenience  Diagnoses and all orders for this visit:    Basal cell carcinoma (BCC) of anterior chest          Subjective:      Patient ID: Preethi Sunshine is a 68 y o  male  HPI   He reports having a lesion on his chest for just a few weeks prior to seeing Dr Hilda Nolan  She performed a biopsy in March and found the lesion to be basal cell carcinoma  He denies any bleeding associated with the lesion  He states that he does have a history for other skin cancers  He denies a family history for skin cancer  He does report having sun burns as a kid as he trouble to the beach frequently  The following portions of the patient's history were reviewed and updated as appropriate: He  has a past medical history of Adenocarcinoma of prostate ([address] ), Anemia, Colon polyp, Diabetes mellitus ([address] ), History of Lyme disease, Hyperlipidemia, Hypertension, Proctitis, and Prostate cancer ([address] )  He  has a past surgical history that includes pr split grft trunk,arm,leg <100 sqcm (Left, 6/22/2017); Colonoscopy (01/10/2018); and Colonoscopy (01/10/2018)  His family history includes Colon polyps in his brother; Diabetes in his brother; Heart disease in his mother; Throat cancer in his sister  He  reports that he quit smoking about 44 years ago  His smoking use included cigarettes   He has never used smokeless tobacco  He reports that he does not drink alcohol and does not use drugs       Review of Systems   HENT: Negative for hearing loss  Eyes: Negative for visual disturbance  Respiratory: Negative for shortness of breath  Cardiovascular: Negative for chest pain  Gastrointestinal: Negative for abdominal pain, blood in stool, constipation, diarrhea, nausea and vomiting  Genitourinary: Negative for hematuria  Musculoskeletal: Negative for gait problem  Skin:        As per HPI  Neurological: Negative for seizures and headaches  Hematological: Does not bruise/bleed easily  Psychiatric/Behavioral: The patient is not nervous/anxious  Objective: There were no vitals taken for this visit  Physical Exam  Constitutional:       General: He is not in acute distress  Appearance: He is well-developed  HENT:      Head: Normocephalic and atraumatic  Eyes:      General: No scleral icterus  Pupils: Pupils are equal, round, and reactive to light  Neck:      Thyroid: No thyromegaly  Trachea: No tracheal deviation  Cardiovascular:      Rate and Rhythm: Normal rate and regular rhythm  Heart sounds: No murmur heard  No friction rub  No gallop  Pulmonary:      Effort: Pulmonary effort is normal       Breath sounds: Normal breath sounds  No wheezing or rales  Abdominal:      General: Bowel sounds are normal  There is no distension  Palpations: Abdomen is soft  Tenderness: There is no abdominal tenderness  There is no guarding or rebound  Musculoskeletal:         General: Normal range of motion  Cervical back: Neck supple  Lymphadenopathy:      Cervical: No cervical adenopathy  Skin:     Comments: Right upper chest medial to the right nipple is a biopsy site which measures approximately 2 5 cm  It is flat and pink with an associated eschar  The biopsy site is located 4 cm medial to the right nipple    Please see photos in epic    Neurological:      Mental Status: He is alert and oriented to person, place, and time  Cranial Nerves: No cranial nerve deficit

## 2022-05-11 NOTE — LETTER
May 11, 2022     Kofi Amanda MD  [address] [place] [address]    Patient: Fariha Baird   YOB: 1945   Date of Visit: 5/11/2022       Dear Dr Luis Felipe Cummings:    Thank you for referring Bahman Carvalho to me for evaluation  Below are my notes for this consultation  If you have questions, please do not hesitate to call me  I look forward to following your patient along with you  Sincerely,        Lui Merrill PA-C        CC: Tia Heaton, DO Lui Merrill PA-C  5/11/2022  1:58 PM  Sign when Signing Visit  Assessment/Plan:  Rachel Scott is a 68year old male who presents in consultation for a basal cell carcinoma of the anterior chest  He is referred to us by Dr Tia Heaton  Please see HPI  Basal cell carcinoma with complex closure verses local flap  He understood and agreed  He would prefer to do this under local anesthesia as he does not have anyone to drive him  We discussed with the patient the options, benefits, and risks of surgery such as anesthesia, bleeding, infection, scarring and the need for additional procedures  Consent was obtained and all questions answered to his satisfaction  We will plan for surgery at his earliest convenience  Diagnoses and all orders for this visit:    Basal cell carcinoma (BCC) of anterior chest          Subjective:      Patient ID: Fariha Baird is a 68 y o  male  HPI   He reports having a lesion on his chest for just a few weeks prior to seeing Dr Tia Heaton  She performed a biopsy in March and found the lesion to be basal cell carcinoma  He denies any bleeding associated with the lesion  He states that he does have a history for other skin cancers  He denies a family history for skin cancer  He does report having sun burns as a kid as he trouble to the beach frequently      The following portions of the patient's history were reviewed and updated as appropriate: He  has a past medical history of Adenocarcinoma of prostate ([facility] [address] ), Anemia, Colon polyp, Diabetes mellitus ([facility] [address] ), History of Lyme disease, Hyperlipidemia, Hypertension, Proctitis, and Prostate cancer ([facility] [address] )  He  has a past surgical history that includes pr split grft trunk,arm,leg <100 sqcm (Left, 6/22/2017); Colonoscopy (01/10/2018); and Colonoscopy (01/10/2018)  His family history includes Colon polyps in his brother; Diabetes in his brother; Heart disease in his mother; Throat cancer in his sister  He  reports that he quit smoking about 44 years ago  His smoking use included cigarettes  He has never used smokeless tobacco  He reports that he does not drink alcohol and does not use drugs       Review of Systems   HENT: Negative for hearing loss  Eyes: Negative for visual disturbance  Respiratory: Negative for shortness of breath  Cardiovascular: Negative for chest pain  Gastrointestinal: Negative for abdominal pain, blood in stool, constipation, diarrhea, nausea and vomiting  Genitourinary: Negative for hematuria  Musculoskeletal: Negative for gait problem  Skin:        As per HPI  Neurological: Negative for seizures and headaches  Hematological: Does not bruise/bleed easily  Psychiatric/Behavioral: The patient is not nervous/anxious  Objective: There were no vitals taken for this visit  Physical Exam  Constitutional:       General: He is not in acute distress  Appearance: He is well-developed  HENT:      Head: Normocephalic and atraumatic  Eyes:      General: No scleral icterus  Pupils: Pupils are equal, round, and reactive to light  Neck:      Thyroid: No thyromegaly  Trachea: No tracheal deviation  Cardiovascular:      Rate and Rhythm: Normal rate and regular rhythm  Heart sounds: No murmur heard  No friction rub  No gallop  Pulmonary:      Effort: Pulmonary effort is normal       Breath sounds: Normal breath sounds  No wheezing or rales     Abdominal:      General: Bowel sounds are normal  There is no distension  Palpations: Abdomen is soft  Tenderness: There is no abdominal tenderness  There is no guarding or rebound  Musculoskeletal:         General: Normal range of motion  Cervical back: Neck supple  Lymphadenopathy:      Cervical: No cervical adenopathy  Skin:     Comments: Right upper chest medial to the right nipple is a biopsy site which measures approximately 2 5 cm  It is flat and pink with an associated eschar  The biopsy site is located 4 cm medial to the right nipple  Please see photos in epic  Neurological:      Mental Status: He is alert and oriented to person, place, and time  Cranial Nerves: No cranial nerve deficit

## 2022-05-12 ENCOUNTER — APPOINTMENT (OUTPATIENT)
Dept: LAB | Facility: HOSPITAL | Age: 77
End: 2022-05-12
Payer: MEDICARE

## 2022-05-12 DIAGNOSIS — R33.9 RETENTION OF URINE, UNSPECIFIED: ICD-10-CM

## 2022-05-12 DIAGNOSIS — R33.9 RETENTION OF URINE: ICD-10-CM

## 2022-05-12 LAB
ANION GAP SERPL CALCULATED.3IONS-SCNC: 8 MMOL/L (ref 4–13)
ATRIAL RATE: 49 BPM
BUN SERPL-MCNC: 20 MG/DL (ref 5–25)
CALCIUM SERPL-MCNC: 8.9 MG/DL (ref 8.3–10.1)
CHLORIDE SERPL-SCNC: 105 MMOL/L (ref 100–108)
CO2 SERPL-SCNC: 26 MMOL/L (ref 21–32)
CREAT SERPL-MCNC: 1.26 MG/DL (ref 0.6–1.3)
ERYTHROCYTE [DISTWIDTH] IN BLOOD BY AUTOMATED COUNT: 13.5 % (ref 11.6–15.1)
GFR SERPL CREATININE-BSD FRML MDRD: 55 ML/MIN/1.73SQ M
GLUCOSE SERPL-MCNC: 104 MG/DL (ref 65–140)
HCT VFR BLD AUTO: 34.6 % (ref 36.5–49.3)
HGB BLD-MCNC: 11.1 G/DL (ref 12–17)
MCH RBC QN AUTO: 29.1 PG (ref 26.8–34.3)
MCHC RBC AUTO-ENTMCNC: 32.1 G/DL (ref 31.4–37.4)
MCV RBC AUTO: 91 FL (ref 82–98)
P AXIS: 34 DEGREES
PLATELET # BLD AUTO: 201 THOUSANDS/UL (ref 149–390)
PMV BLD AUTO: 11.1 FL (ref 8.9–12.7)
POTASSIUM SERPL-SCNC: 4.8 MMOL/L (ref 3.5–5.3)
PR INTERVAL: 152 MS
QRS AXIS: -7 DEGREES
QRSD INTERVAL: 94 MS
QT INTERVAL: 444 MS
QTC INTERVAL: 401 MS
RBC # BLD AUTO: 3.81 MILLION/UL (ref 3.88–5.62)
SODIUM SERPL-SCNC: 139 MMOL/L (ref 136–145)
T WAVE AXIS: 30 DEGREES
VENTRICULAR RATE: 49 BPM
WBC # BLD AUTO: 7.51 THOUSAND/UL (ref 4.31–10.16)

## 2022-05-12 PROCEDURE — 36415 COLL VENOUS BLD VENIPUNCTURE: CPT

## 2022-05-12 PROCEDURE — 93005 ELECTROCARDIOGRAM TRACING: CPT

## 2022-05-12 PROCEDURE — 85027 COMPLETE CBC AUTOMATED: CPT

## 2022-05-12 PROCEDURE — 80048 BASIC METABOLIC PNL TOTAL CA: CPT

## 2022-05-24 ENCOUNTER — PREP FOR PROCEDURE (OUTPATIENT)
Dept: PLASTIC SURGERY | Facility: CLINIC | Age: 77
End: 2022-05-24

## 2022-05-24 DIAGNOSIS — C44.519 BASAL CELL CARCINOMA, TRUNK: Primary | ICD-10-CM

## 2022-05-24 NOTE — PRE-PROCEDURE INSTRUCTIONS
My Surgical Experience    The following information was developed to assist you to prepare for your operation  What do I need to do before coming to the hospital?   Arrange for a responsible person to drive you to and from the hospital    Arrange care for your children at home  Children are not allowed in the recovery areas of the hospital   Plan to wear clothing that is easy to put on and take off  If you are having shoulder surgery, wear a shirt that buttons or zippers in the front  Bathing  o Shower the evening before and the morning of your surgery with an antibacterial soap  Please refer to the Pre Op Showering Instructions for Surgery Patients Sheet   o Remove nail polish and all body piercing jewelry  o Do not shave any body part for at least 24 hours before surgery-this includes face, arms, legs and upper body  Food  o Nothing to eat or drink after midnight the night before your surgery  This includes candy and chewing gum  o Exception: If your surgery is after 12:00pm (noon), you may have clear liquids such as 7-Up®, ginger ale, apple or cranberry juice, Jell-O®, water, or clear broth until 8:00 am  o Do not drink milk or juice with pulp on the morning before surgery  o Do not drink alcohol 24 hours before surgery  Medicine  o Follow instructions you received from your surgeon about which medicines you may take on the day of surgery  o If instructed to take medicine on the morning of surgery, take pills with just a small sip of water  Call your prescribing doctor for specific infroamtion on what to do if you take insulin    What should I bring to the hospital?    Bring:  Valeriy Espinosa or a walker, if you have them, for foot or knee surgery   A list of the daily medicines, vitamins, minerals, herbals and nutritional supplements you take   Include the dosages of medicines and the time you take them each day   Glasses, dentures or hearing aids   Minimal clothing; you will be wearing hospital sleepwear   Photo ID; required to verify your identity   If you have a Living Will or Power of , bring a copy of the documents   If you have an ostomy, bring an extra pouch and any supplies you use    Do not bring   Medicines or inhalers   Money, valuables or jewelry    What other information should I know about the day of surgery?  Notify your surgeons if you develop a cold, sore throat, cough, fever, rash or any other illness   Report to the Ambulatory Surgical/Same Day Surgery Unit   You will be instructed to stop at Registration only if you have not been pre-registered   Inform your  fi they do not stay that they will be asked by the staff to leave a phone number where they can be reached   Be available to be reached before surgery  In the event the operating room schedule changes, you may be asked to come in earlier or later than expected    *It is important to tell your doctor and others involved in your health care if you are taking or have been taking any non-prescription drugs, vitamins, minerals, herbals or other nutritional supplements  Any of these may interact with some food or medicines and cause a reaction      Pre-Surgery Instructions:   Medication Instructions    aspirin 81 MG tablet Stop taking 7 days prior to surgery   atorvastatin (LIPITOR) 10 mg tablet Take night before surgery    cholestyramine (QUESTRAN) 4 g packet Hold day of surgery   gabapentin (Neurontin) 100 mg capsule Hold day of surgery   glipiZIDE (glipiZIDE XL) 5 mg 24 hr tablet Hold day of surgery   lisinopril (ZESTRIL) 5 mg tablet Hold day of surgery   metFORMIN (GLUCOPHAGE) 1000 MG tablet Hold day of surgery   Silodosin 8 MG CAPS Hold day of surgery      tamsulosin (FLOMAX) 0 4 mg Take night before surgery

## 2022-05-25 ENCOUNTER — ANESTHESIA EVENT (OUTPATIENT)
Dept: PERIOP | Facility: HOSPITAL | Age: 77
End: 2022-05-25
Payer: MEDICARE

## 2022-05-25 NOTE — H&P
H&P Exam - Urology       Patient: Estefania Triplett   : 1945 Sex: male   MRN: [id number]     CSN: [id number]      History of Present Illness   HPI:  Estefania Triplett is a 68 y o  male known to me history of prostate cancer status post palladium seeding with worsening voiding complaints failing alpha blockers finally going into retention a few months ago now wishes to undergo cysto transurethral incision prostate aware risk of postop incontinence in light of previous radiation therapy and scarring of the external sphincter as well as risk of anesthesia infection bleeding additional urologic procedures and recurrent retention        Review of Systems:   Constitutional:  Negative for activity change, fever, chills and diaphoresis  HENT: Negative for hearing loss and trouble swallowing  Eyes: Negative for itching and visual disturbance  Respiratory: Negative for chest tightness and shortness of breath  Cardiovascular: Negative for chest pain, edema  Gastrointestinal: Negative for abdominal distention, na abdominal pain, constipation, diarrhea, Nausea and vomiting  Genitourinary: Negative for decreased urine volume, difficulty urinating, dysuria, enuresis, frequency, hematuria and urgency  Musculoskeletal: Negative for gait problem and myalgias  Neurological: Negative for dizziness and headaches  Hematological: Does not bruise/bleed easily         Historical Information   Past Medical History:   Diagnosis Date    Adenocarcinoma of prostate ([facility] )     [address]    Anemia     Colon polyp     Diabetes mellitus ([facility] )     History of Lyme disease     Hyperlipidemia     Hypertension     Proctitis     Prostate cancer ([facility] )     Treated with radiation     Past Surgical History:   Procedure Laterality Date    COLONOSCOPY  01/10/2018    COLONOSCOPY  01/10/2018     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, grade 2 internal hemorrhoids, 1 adenomatous polyp    [place] SPLIT GRFT TRUNK,ARM,LEG <100 SQCM Left 2017    Procedure: ANTERIOR SHOULDER EXCISION BCC- COMPLEX CLOSURE vs FLAP vs STSG;  Surgeon: Marylou Rosen MD;  Location: [facility];  Service: Plastics     Social History   Social History     Substance and Sexual Activity   Alcohol Use No     Social History     Substance and Sexual Activity   Drug Use No     Social History     Tobacco Use   Smoking Status Former Smoker    Types: Cigarettes    Quit date: 1977    Years since quittin 9   Smokeless Tobacco Never Used   Tobacco Comment    never a smoker noted in "allscripts" - Denied: History of tobacco use noted in "allscripts"      Family History:   Family History   Problem Relation Age of Onset    Heart disease Mother     Diabetes Brother         mellitus     Colon polyps Brother     Throat cancer Sister     Colon cancer Neg Hx        Meds/Allergies   No medications prior to admission  No Known Allergies    Objective   Vitals: There were no vitals taken for this visit  Physical Exam:  General Alert awake   Normocephalic atraumatic PERRLA  Lungs clear bilaterally  Cardiac normal S1 normal S2  Abdomen soft, flank pain  Moreno catheter draining clear urine  Extremities no edema    No intake/output data recorded      Invasive Devices  Report    Drain  Duration           Urethral Catheter Straight-tip 16 Fr  41 days                    Lab Results: CBC:   Lab Results   Component Value Date    WBC 7 51 2022    HGB 11 1 (L) 2022    HCT 34 6 (L) 2022    MCV 91 2022     2022    MCH 29 1 2022    MCHC 32 1 2022    RDW 13 5 2022    MPV 11 1 2022    NRBC 0 2016     CMP:   Lab Results   Component Value Date     2017     2022     2022    CO2 26 2022    CO2 27 2022    BUN 20 2022    BUN 19 2022    CREATININE 1 26 2022    CREATININE 1 16 2017    CALCIUM 8 9 2022    CALCIUM 9 1 2022    AST 15 2022 ALT 9 01/31/2022    ALKPHOS 48 01/31/2022    PROT 6 5 12/18/2017    BILITOT 0 6 12/18/2017    EGFR 55 05/12/2022     Urinalysis: No results found for: Nikita Puma, SPECGRAV, PHUR, LEUKOCYTESUR, NITRITE, PROTEINUA, GLUCOSEU, KETONESU, BILIRUBINUR, BLOODU  Urine Culture: No results found for: URINECX  PSA:   Lab Results   Component Value Date    PSA <0 1 01/31/2022    PSA <0 1 02/19/2019           Assessment/ Plan:  Cysto transurethral incision prostate      Ken De León MD

## 2022-05-26 ENCOUNTER — HOSPITAL ENCOUNTER (OUTPATIENT)
Facility: HOSPITAL | Age: 77
Setting detail: OUTPATIENT SURGERY
Discharge: HOME | End: 2022-05-26
Attending: SPECIALIST | Admitting: SPECIALIST
Payer: MEDICARE

## 2022-05-26 ENCOUNTER — ANESTHESIA (OUTPATIENT)
Dept: PERIOP | Facility: HOSPITAL | Age: 77
End: 2022-05-26
Payer: MEDICARE

## 2022-05-26 VITALS
SYSTOLIC BLOOD PRESSURE: 143 MMHG | BODY MASS INDEX: 22.78 KG/M2 | HEART RATE: 70 BPM | OXYGEN SATURATION: 99 % | RESPIRATION RATE: 18 BRPM | DIASTOLIC BLOOD PRESSURE: 63 MMHG | WEIGHT: 149.8 LBS | TEMPERATURE: 97.1 F

## 2022-05-26 LAB — GLUCOSE SERPL-MCNC: 125 MG/DL (ref 65–140)

## 2022-05-26 PROCEDURE — 82948 REAGENT STRIP/BLOOD GLUCOSE: CPT

## 2022-05-26 RX ORDER — SODIUM CHLORIDE, SODIUM LACTATE, POTASSIUM CHLORIDE, CALCIUM CHLORIDE 600; 310; 30; 20 MG/100ML; MG/100ML; MG/100ML; MG/100ML
125 INJECTION, SOLUTION INTRAVENOUS CONTINUOUS
Status: DISCONTINUED | OUTPATIENT
Start: 2022-05-26 | End: 2022-05-26 | Stop reason: HOSPADM

## 2022-05-26 RX ORDER — FENTANYL CITRATE 50 UG/ML
INJECTION, SOLUTION INTRAMUSCULAR; INTRAVENOUS AS NEEDED
Status: DISCONTINUED | OUTPATIENT
Start: 2022-05-26 | End: 2022-05-26

## 2022-05-26 RX ORDER — ONDANSETRON 2 MG/ML
4 INJECTION INTRAMUSCULAR; INTRAVENOUS ONCE AS NEEDED
Status: DISCONTINUED | OUTPATIENT
Start: 2022-05-26 | End: 2022-05-26 | Stop reason: HOSPADM

## 2022-05-26 RX ORDER — LIDOCAINE HYDROCHLORIDE 10 MG/ML
INJECTION, SOLUTION EPIDURAL; INFILTRATION; INTRACAUDAL; PERINEURAL AS NEEDED
Status: DISCONTINUED | OUTPATIENT
Start: 2022-05-26 | End: 2022-05-26

## 2022-05-26 RX ORDER — PROPOFOL 10 MG/ML
INJECTION, EMULSION INTRAVENOUS AS NEEDED
Status: DISCONTINUED | OUTPATIENT
Start: 2022-05-26 | End: 2022-05-26

## 2022-05-26 RX ORDER — CEFAZOLIN SODIUM 1 G/3ML
INJECTION, POWDER, FOR SOLUTION INTRAMUSCULAR; INTRAVENOUS AS NEEDED
Status: DISCONTINUED | OUTPATIENT
Start: 2022-05-26 | End: 2022-05-26

## 2022-05-26 RX ORDER — SODIUM CHLORIDE, SODIUM LACTATE, POTASSIUM CHLORIDE, CALCIUM CHLORIDE 600; 310; 30; 20 MG/100ML; MG/100ML; MG/100ML; MG/100ML
INJECTION, SOLUTION INTRAVENOUS CONTINUOUS PRN
Status: DISCONTINUED | OUTPATIENT
Start: 2022-05-26 | End: 2022-05-26

## 2022-05-26 RX ORDER — GLYCINE 1.5 G/100ML
SOLUTION IRRIGATION AS NEEDED
Status: DISCONTINUED | OUTPATIENT
Start: 2022-05-26 | End: 2022-05-26 | Stop reason: HOSPADM

## 2022-05-26 RX ORDER — CEFAZOLIN SODIUM 2 G/50ML
2000 SOLUTION INTRAVENOUS ONCE
Status: DISCONTINUED | OUTPATIENT
Start: 2022-05-26 | End: 2022-05-26 | Stop reason: HOSPADM

## 2022-05-26 RX ORDER — FENTANYL CITRATE/PF 50 MCG/ML
25 SYRINGE (ML) INJECTION
Status: DISCONTINUED | OUTPATIENT
Start: 2022-05-26 | End: 2022-05-26 | Stop reason: HOSPADM

## 2022-05-26 RX ORDER — GLYCOPYRROLATE 0.2 MG/ML
INJECTION INTRAMUSCULAR; INTRAVENOUS AS NEEDED
Status: DISCONTINUED | OUTPATIENT
Start: 2022-05-26 | End: 2022-05-26

## 2022-05-26 RX ADMIN — SODIUM CHLORIDE, SODIUM LACTATE, POTASSIUM CHLORIDE, AND CALCIUM CHLORIDE: .6; .31; .03; .02 INJECTION, SOLUTION INTRAVENOUS at 15:30

## 2022-05-26 RX ADMIN — FENTANYL CITRATE 25 MCG: 50 INJECTION, SOLUTION INTRAMUSCULAR; INTRAVENOUS at 16:41

## 2022-05-26 RX ADMIN — FENTANYL CITRATE 25 MCG: 50 INJECTION, SOLUTION INTRAMUSCULAR; INTRAVENOUS at 15:49

## 2022-05-26 RX ADMIN — FENTANYL CITRATE 25 MCG: 50 INJECTION, SOLUTION INTRAMUSCULAR; INTRAVENOUS at 15:44

## 2022-05-26 RX ADMIN — PROPOFOL 50 MG: 10 INJECTION, EMULSION INTRAVENOUS at 15:39

## 2022-05-26 RX ADMIN — FENTANYL CITRATE 25 MCG: 50 INJECTION, SOLUTION INTRAMUSCULAR; INTRAVENOUS at 15:38

## 2022-05-26 RX ADMIN — SODIUM CHLORIDE, SODIUM LACTATE, POTASSIUM CHLORIDE, AND CALCIUM CHLORIDE 125 ML/HR: .6; .31; .03; .02 INJECTION, SOLUTION INTRAVENOUS at 13:31

## 2022-05-26 RX ADMIN — GLYCOPYRROLATE 0.1 MG: 0.2 INJECTION INTRAMUSCULAR; INTRAVENOUS at 15:53

## 2022-05-26 RX ADMIN — LIDOCAINE HYDROCHLORIDE 50 MG: 10 INJECTION, SOLUTION EPIDURAL; INFILTRATION; INTRACAUDAL at 15:38

## 2022-05-26 RX ADMIN — CEFAZOLIN SODIUM 2000 MG: 1 INJECTION, POWDER, FOR SOLUTION INTRAMUSCULAR; INTRAVENOUS at 15:42

## 2022-05-26 RX ADMIN — PROPOFOL 100 MG: 10 INJECTION, EMULSION INTRAVENOUS at 15:38

## 2022-05-26 RX ADMIN — FENTANYL CITRATE 25 MCG: 50 INJECTION, SOLUTION INTRAMUSCULAR; INTRAVENOUS at 15:32

## 2022-05-26 NOTE — ANESTHESIA PREPROCEDURE EVALUATION
Procedure:  CYSTOSCOPY, TRANSURETHRAL INCISION OF PROSTATE (TUIP) (N/A Abdomen)    Relevant Problems   CARDIO   (+) Hypertension      ENDO   (+) DMII (diabetes mellitus, type 2) (HCC)      GI/HEPATIC   (+) Rectal bleeding      /RENAL   (+) Adenocarcinoma of prostate (HCC)      HEMATOLOGY   (+) Anemia of chronic disease      NEURO/PSYCH   (+) Personal history of colonic polyps        Physical Exam    Airway    Mallampati score: II  TM Distance: >3 FB  Neck ROM: full     Dental   No notable dental hx     Cardiovascular  Cardiovascular exam normal    Pulmonary  Pulmonary exam normal     Other Findings        Anesthesia Plan  ASA Score- 2     Anesthesia Type- general with ASA Monitors  Additional Monitors:   Airway Plan: LMA  Plan Factors-Exercise tolerance (METS): >4 METS  Chart reviewed  Imaging results reviewed  Existing labs reviewed  Patient summary reviewed  Patient is not a current smoker  Induction- intravenous  Postoperative Plan- Plan for postoperative opioid use  Informed Consent- Anesthetic plan and risks discussed with patient  I personally reviewed this patient with the CRNA  Discussed and agreed on the Anesthesia Plan with the CRNA  Vidya Gutiérrez

## 2022-05-26 NOTE — PERIOPERATIVE NURSING NOTE
Moreno patent for pink tinged urine with occasional small clot   Irrigation clamped as per Dr Thom Flores

## 2022-05-26 NOTE — DISCHARGE INSTRUCTIONS
#1 no heavy straining or lifting above 10 pounds for 2 weeks    #2 call office fevers, chills, or worsening blood in the urine  #3 Patient has follow-up Dr Helio Fishre Wednesday June 1st 9:15 a m  To have Moreno catheter removed      Albert TARIQ  [address] [facility] office  [address] [address]  [phone]  8:30 AM to 4:30 PM  Monday through Friday    [place] office  [phone]  [place] [address]  [place], [address] [place] [address]  [phone]  1:00 to 5:00 PM  Wednesday

## 2022-05-26 NOTE — PROGRESS NOTES
Progress Note - Urology      Patient: Mary Nelson   : 1945 Sex: male   MRN: [id number]     CSN: [id number]  Unit/Bed#: OR POOL     SUBJECTIVE:   Patient in preop holding area no change to history and physical      Objective   Vitals: BP (!) 176/75   Pulse (!) 51   Temp (!) 97 2 °F (36 2 °C) (Temporal)   Resp 16   Wt 67 9 kg (149 lb 12 8 oz)   SpO2 98%   BMI 22 78 kg/m²     No intake/output data recorded        Physical Exam:   General Alert awake   Normocephalic atraumatic PERRLA  Lungs clear bilaterally  Cardiac normal S1 normal S2  Abdomen soft, flank pain  Extremities no edema      Lab Results: CBC:   Lab Results   Component Value Date    WBC 7 51 2022    HGB 11 1 (L) 2022    HCT 34 6 (L) 2022    MCV 91 2022     2022    MCH 29 1 2022    MCHC 32 1 2022    RDW 13 5 2022    MPV 11 1 2022    NRBC 0 2016     CMP:   Lab Results   Component Value Date     2017     2022     2022    CO2 26 2022    CO2 27 2022    BUN 20 2022    BUN 19 2022    CREATININE 1 26 2022    CREATININE 1 16 2017    CALCIUM 8 9 2022    CALCIUM 9 1 2022    AST 15 2022    ALT 9 2022    ALKPHOS 48 2022    PROT 6 5 2017    BILITOT 0 6 2017    EGFR 55 2022     Urinalysis: No results found for: Hollie Safer, SPECGRAV, PHUR, LEUKOCYTESUR, NITRITE, PROTEINUA, GLUCOSEU, KETONESU, BILIRUBINUR, BLOODU  Urine Culture: No results found for: URINECX  PSA:   Lab Results   Component Value Date    PSA <0 1 2022    PSA <0 1 2019         Assessment/ Plan:  Urinary tension  History of prostate cancer status post seeding  Cysto TURP patient aware risk of postop incontinence recurrent urinary tension persistent voiding complaints          Dara Briggs, MD

## 2022-05-26 NOTE — ANESTHESIA POSTPROCEDURE EVALUATION
Post-Op Assessment Note    CV Status:  Stable    Pain management: adequate     Mental Status:  Arousable   Hydration Status:  Euvolemic   PONV Controlled:  Controlled   Airway Patency:  Patent      Post Op Vitals Reviewed: Yes      Staff: CRNA         No complications documented      BP  168/82   Temp 97 4   Pulse 72   Resp 14   SpO2 98

## 2022-05-26 NOTE — OP NOTE
OPERATIVE REPORT  PATIENT NAME: Hazel Braga    :    MRN: [id number]  Pt Location: [facility] OR ROOM 01    SURGERY DATE: 2022    Surgeon(s) and Role:     * Yasmeen Medellin MD - Primary    Preop Diagnosis:  Retention of urine, unspecified [R33 9]    Post-Op Diagnosis Codes:     * Retention of urine, unspecified [R33 9]    Procedure(s) (LRB):  CYSTOSCOPY, TRANSURETHRAL INCISION OF PROSTATE (TUIP) (N/A)    Specimen(s):  * No specimens in log *    Estimated Blood Loss:   Minimal    Drains:  Urethral Catheter Latex; Three way 22 Fr  (Active)   Number of days: 0       Anesthesia Type:   General    Operative Indications:  Retention of urine, unspecified [R359]  49-year-old male with history of prostate adenocarcinoma Sumner grade 3+4=7 undergoing successful seeding and external beam radiation 11 years ago over the years failing alpha blockers  Patient recently went into urinary tension and in light of previous radiation and small prostate size now to undergo transurethral incision of the prostate aware risk of anesthesia infection bleeding postop stress incontinence and recurrent urinary tension    Operative Findings:  Bilobar 2 0 cm prostatic urethra incision 5 and 7:00 a m  from bladder neck to verumontanum completely open 22 Indian 3 Moreno catheter inserted clear urine    Complications:   None    Procedure and Technique:  After patient identified in the holding area consent signed placed the op suite  After anesthesia induced placed in thigh position draped prep standard fashion  Time-out performed  Twenty-two Indian cystoscope with 30 lens passed through through the bladder  Anterior showed no abnormalities  Posterior the confirmed 2 0 cm bilobar obstructing prostatic urethra  Scope was inserted the bladder lumen there was severe bladder trabeculation cellules  Scope was removed 24 [place] resectoscope with perpendicular loop inserted incision was made at 5:00 a m   Just proximal to the bladder neck down through bladder neck prostatic urethra to verumontanum to capsular fibers were seen  First on the left side in similar fashion    At the completion procedure from view for the verumontanum prostatic urethra completely open scope was removed 22 Kisw[place]ili 3 Moreno catheter inserted with 30 cc in the balloon to mild traction at the time of dictation continuous bladder irrigation clear postop procedure awakened taken recovery stable condition   I was present for the entire procedure, I was present for all critical portions of the procedure and A qualified resident physician was not available    Patient Disposition:  PACU       SIGNATURE: Pedro Teran MD  DATE: May 26, 2022  TIME: 4:23 PM

## 2022-05-26 NOTE — PERIOPERATIVE NURSING NOTE
Catheter converted to leg bag, emptied for 250 cc light pink urine  Patient aware of use of boyce and leg bag  Encouraged to increase fluids  Complete discharge instructions reviewed with patient

## 2022-06-06 DIAGNOSIS — I10 ESSENTIAL HYPERTENSION: ICD-10-CM

## 2022-06-06 DIAGNOSIS — E11.9 TYPE 2 DIABETES MELLITUS WITHOUT COMPLICATION, UNSPECIFIED WHETHER LONG TERM INSULIN USE (HCC): ICD-10-CM

## 2022-06-06 RX ORDER — GLIPIZIDE 5 MG/1
5 TABLET, FILM COATED, EXTENDED RELEASE ORAL DAILY
Qty: 90 TABLET | Refills: 0 | Status: SHIPPED | OUTPATIENT
Start: 2022-06-06

## 2022-06-06 RX ORDER — LISINOPRIL 5 MG/1
5 TABLET ORAL DAILY
Qty: 90 TABLET | Refills: 0 | Status: SHIPPED | OUTPATIENT
Start: 2022-06-06

## 2022-06-07 ENCOUNTER — HOSPITAL ENCOUNTER (EMERGENCY)
Facility: HOSPITAL | Age: 77
Discharge: HOME | End: 2022-06-08
Attending: EMERGENCY MEDICINE
Payer: MEDICARE

## 2022-06-07 VITALS
RESPIRATION RATE: 20 BRPM | TEMPERATURE: 98.9 F | HEART RATE: 58 BPM | SYSTOLIC BLOOD PRESSURE: 201 MMHG | DIASTOLIC BLOOD PRESSURE: 85 MMHG | OXYGEN SATURATION: 100 %

## 2022-06-07 DIAGNOSIS — R33.8 ACUTE URINARY RETENTION: Primary | ICD-10-CM

## 2022-06-07 PROCEDURE — 99284 EMERGENCY DEPT VISIT MOD MDM: CPT | Performed by: EMERGENCY MEDICINE

## 2022-06-07 PROCEDURE — 99283 EMERGENCY DEPT VISIT LOW MDM: CPT
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
## 2022-06-08 NOTE — ED PROVIDER NOTES
History  Chief Complaint   Patient presents with    Urinary Retention     Pt reports he had his boyce catheter removed by Dr Martínez Morning this morning and has been unable to urinate since  Bladder scan reads 405 ml  Patient presents for evaluation of difficulty urinating  Patient states his catheters removed in   In [facility] office this morning  He was initially able to urinate a little bit  However since his increasing difficulty urinating to now he is unable to urinate at all  History provided by:  Patient   used: No        Prior to Admission Medications   Prescriptions Last Dose Informant Patient Reported? Taking? Silodosin 8 MG CAPS  Self Yes No   aspirin 81 MG tablet  Self Yes No   Sig: Take 81 mg by mouth in the morning  Last dose  5/19/22     atorvastatin (LIPITOR) 10 mg tablet  Self Yes No   Sig: Take 1 tablet by mouth daily   cholestyramine (QUESTRAN) 4 g packet   No No   Sig: Take 1 packet (4 g total) by mouth 2 (two) times a day with meals lunch, dinner   gabapentin (Neurontin) 100 mg capsule  Self No No   Sig: Take 1 capsule (100 mg total) by mouth 3 (three) times a day   glipiZIDE (glipiZIDE XL) 5 mg 24 hr tablet   No No   Sig: Take 1 tablet (5 mg total) by mouth daily   lisinopril (ZESTRIL) 5 mg tablet   No No   Sig: Take 1 tablet (5 mg total) by mouth daily   metFORMIN (GLUCOPHAGE) 1000 MG tablet  Self No No   Sig: Take 1 tablet (1,000 mg total) by mouth 2 (two) times a day   tamsulosin (FLOMAX) 0 4 mg  Self Yes No      Facility-Administered Medications: None       Past Medical History:   Diagnosis Date    [facility]en[facility] ([facility]     [address]    Anemia     Colon polyp     Diabetes mellitus ([facility] )     History of Lyme disease     Hyperlipidemia     Hypertension     Proctitis     Prostate cancer ([facility] )     Treated with radiation       Past Surgical History:   Procedure Laterality Date    COLONOSCOPY  01/10/2018    COLONOSCOPY  01/10/2018     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, grade 2 internal hemorrhoids, 1 adenomatous polyp    TX SPLIT GRFT TRUNK,ARM,LEG <100 SQCM Left 2017    Procedure: ANTERIOR SHOULDER EXCISION BCC- COMPLEX CLOSURE vs FLAP vs STSG;  Surgeon: Darrick Mccurdy MD;  Location:  MAIN OR;  Service: Plastics    TX TRANSURETHRAL INCISION OF PROSTATE N/A 2022    Procedure: CYSTOSCOPY, TRANSURETHRAL INCISION OF PROSTATE (TUIP); Surgeon: Emiliana Allan MD;  Location: [facility] OR;  Service: Urology       Family History   Problem Relation Age of Onset    Heart disease Mother     Diabetes Brother         mellitus     Colon polyps Brother     Throat cancer Sister     Colon cancer Neg Hx      I have reviewed and agree with the history as documented  E-Cigarette/Vaping    E-Cigarette Use Never User      E-Cigarette/Vaping Substances    Nicotine No     THC No     CBD No     Flavoring No     Other No     Unknown No      Social History     Tobacco Use    Smoking status: Former Smoker     Types: Cigarettes     Quit date: 1977     Years since quittin 9    Smokeless tobacco: Never Used    Tobacco comment: never a smoker noted in "allscripts" - Denied: History of tobacco use noted in "allscripts"    Vaping Use    Vaping Use: Never used   Substance Use Topics    Alcohol use: No    Drug use: No       Review of Systems   Constitutional: Negative for chills and fever  HENT: Negative for ear pain and sore throat  Eyes: Negative for pain and visual disturbance  Respiratory: Negative for cough and shortness of breath  Cardiovascular: Negative for chest pain and palpitations  Gastrointestinal: Negative for abdominal pain and vomiting  Genitourinary: Positive for difficulty urinating  Negative for dysuria and hematuria  Musculoskeletal: Negative for arthralgias and back pain  Skin: Negative for color change and rash  Neurological: Negative for seizures and syncope     All other systems reviewed and are negative  Physical Exam  Physical Exam  Vitals and nursing note reviewed  Constitutional:       General: He is not in acute distress  HENT:      Head: Atraumatic  Right Ear: External ear normal       Left Ear: External ear normal       Nose: Nose normal       Mouth/Throat:      Mouth: Mucous membranes are moist       Pharynx: Oropharynx is clear  Eyes:      General: No scleral icterus  Conjunctiva/sclera: Conjunctivae normal    Cardiovascular:      Rate and Rhythm: Normal rate and regular rhythm  Pulses: Normal pulses  Pulmonary:      Effort: Pulmonary effort is normal  No respiratory distress  Breath sounds: Normal breath sounds  Abdominal:      General: Abdomen is flat  Bowel sounds are normal  There is no distension  Palpations: Abdomen is soft  Tenderness: There is abdominal tenderness  There is no guarding or rebound  Comments: Mild tenderness and fullness of suprapubic   Musculoskeletal:         General: No deformity  Normal range of motion  Skin:     Capillary Refill: Capillary refill takes less than 2 seconds  Findings: No rash  Neurological:      General: No focal deficit present  Mental Status: He is alert and oriented to person, place, and time           Vital Signs  ED Triage Vitals [06/07/22 2313]   Temperature Pulse Respirations Blood Pressure SpO2   98 9 °F (37 2 °C) 58 20 (!) 201/85 100 %      Temp Source Heart Rate Source Patient Position - Orthostatic VS BP Location FiO2 (%)   Oral Monitor Lying Left arm --      Pain Score       No Pain           Vitals:    06/07/22 2313   BP: (!) 201/85   Pulse: 58   Patient Position - Orthostatic VS: Lying         Visual Acuity      ED Medications  Medications - No data to display    Diagnostic Studies  Results Reviewed     None                 No orders to display              Procedures  Procedures         ED Course                                             MDM  Number of Diagnoses or Management Options  Acute urinary retention  Diagnosis management comments: Pulse ox 100% on room air indicating adequate oxygenation  Moreno catheter placed and patient felt better  Abdomen was soft and nontender after Moreno catheter placement  Advised follow-up primary care physician to recheck blood pressure  Amount and/or Complexity of Data Reviewed  Decide to obtain previous medical records or to obtain history from someone other than the patient: yes  Review and summarize past medical records: yes    Patient Progress  Patient progress: stable      Disposition  Final diagnoses:   Acute urinary retention     Time reflects when diagnosis was documented in both MDM as applicable and the Disposition within this note     Time User Action Codes Description Comment    6/7/2022 11:32 PM Scot Pallas [R33 8] Acute urinary retention       ED Disposition     ED Disposition   Discharge    Condition   Stable    Date/Time   Tue Jun 7, 2022 11:32 PM    Comment   Leland Santana discharge to home/self care  Follow-up Information     Follow up With Specialties Details Why Contact Info    Cami An MD Urology In 3 days  [address]  [phone]            Discharge Medication List as of 6/8/2022 12:06 AM      CONTINUE these medications which have NOT CHANGED    Details   glipiZIDE (glipiZIDE XL) 5 mg 24 hr tablet Take 1 tablet (5 mg total) by mouth daily, Starting Mon 6/6/2022, Normal      lisinopril (ZESTRIL) 5 mg tablet Take 1 tablet (5 mg total) by mouth daily, Starting Mon 6/6/2022, Normal      aspirin 81 MG tablet Take 81 mg by mouth in the morning   Last dose  5/19/22 , Historical Med      atorvastatin (LIPITOR) 10 mg tablet Take 1 tablet by mouth daily, Starting Mon 12/18/2017, Historical Med      cholestyramine (QUESTRAN) 4 g packet Take 1 packet (4 g total) by mouth 2 (two) times a day with meals lunch, dinner, Starting Tue 4/19/2022, Normal      gabapentin (Neurontin) 100 mg capsule Take 1 capsule (100 mg total) by mouth 3 (three) times a day, Starting Mon 3/21/2022, Normal      metFORMIN (GLUCOPHAGE) 1000 MG tablet Take 1 tablet (1,000 mg total) by mouth 2 (two) times a day, Starting Mon 1/10/2022, Normal      Silodosin 8 MG CAPS Starting Mon 4/27/2020, Historical Med      tamsulosin (FLOMAX) 0 4 mg Starting Mon 2/17/2020, Historical Med             No discharge procedures on file      PDMP Review     None          ED Provider  Electronically Signed by           Twyla Han DO  06/08/22 9674

## 2022-06-16 ENCOUNTER — HOSPITAL ENCOUNTER (OUTPATIENT)
Facility: HOSPITAL | Age: 77
Setting detail: OUTPATIENT SURGERY
Discharge: HOME | End: 2022-06-16
Attending: SURGERY | Admitting: SURGERY
Payer: MEDICARE

## 2022-06-16 VITALS
TEMPERATURE: 97.3 F | DIASTOLIC BLOOD PRESSURE: 70 MMHG | HEART RATE: 61 BPM | HEIGHT: 69 IN | SYSTOLIC BLOOD PRESSURE: 172 MMHG | BODY MASS INDEX: 22.22 KG/M2 | RESPIRATION RATE: 18 BRPM | WEIGHT: 150 LBS | OXYGEN SATURATION: 100 %

## 2022-06-16 PROCEDURE — 13101 CMPLX RPR TRUNK 2.6-7.5 CM: CPT | Performed by: SURGERY

## 2022-06-16 PROCEDURE — 11606 EXC TR-EXT MAL+MARG >4 CM: CPT | Performed by: SURGERY

## 2022-06-16 PROCEDURE — 88305 TISSUE EXAM BY PATHOLOGIST: CPT | Performed by: PATHOLOGY

## 2022-06-16 RX ORDER — LIDOCAINE HYDROCHLORIDE AND EPINEPHRINE 10; 10 MG/ML; UG/ML
20 INJECTION, SOLUTION INFILTRATION; PERINEURAL ONCE
Status: COMPLETED | OUTPATIENT
Start: 2022-06-16 | End: 2022-06-16

## 2022-06-16 NOTE — OP NOTE
OPERATIVE REPORT  PATIENT NAME: Joycelyn Bunch    :    MRN: [id number]  Pt Location:  GI ROOM 01    SURGERY DATE: 2022    Surgeon(s) and Role:     Miladys Grace MD - Primary    Preop Diagnosis:    basal cell carcinoma of chest basal cell carcinoma of chest    Procedure 1  Excision basal cell carcinoma of chest 6 cm 2  Complex closure chest wound status post basal cell excision 6 8 cm  Specimen(s):  ID Type Source Tests Collected by Time Destination   1 : Right Chest  Long suture  12 o'clock superior, short suture 6 o'clock inferior Tissue Chest Wall TISSUE EXAM Sidra Dent MD 2022 1523        Estimated Blood Loss:   Minimal    Drains:  Urethral Catheter Latex; Three way 22 Fr  (Active)   Number of days: 21       Anesthesia Type:   Local *    Operative Indications:    Basal cell carcinoma of chest    Operative Findings:  As above    Complications:   None    Procedure and Technique:  Rachel Cameron was seen preoperatively in the holding area, the surgical site was marked with his participation  I reviewed with him the planned procedure, potential risks, complications, and limitations  He was taken to the surgical treatment room, the surgical site was then prepped and draped in sterile fashion a proper time-out was performed  2 5 loupe magnification was used to aid in visualization  The area of concern was marked to be excised to include a margin of normal-appearing skin, this led to a 6 cm excision  Local anesthesia was administered and a 15 blade used to create skin incisions these were carried down through the dermis and lesion was excised deep in the plane of subcutaneous fat utilizing Bovie cautery  It was marked with sutures for orientation and placed in formalin  The wound edges were then widely and circumferentially undermined deep to the dermis utilizing the Bovie cautery    This was accomplished for distance greater than 2 5 times the with of the wound in order to close the wound without significant, undue tension  Once the wide undermining had been completed the wound was thoroughly irrigated and hemostasis assured  Closure was then accomplished utilizing 3-0 PDS sutures buried at the level of the deep dermis and this was followed by running subcuticular 4-0 PDS    Benzoin, Steri-Strips and a light pressure dressing were applied the patient was transferred   I was present for the entire procedure    Patient Disposition:  APU      SIGNATURE: Sharron Clayton MD  DATE: June 16, 2022  TIME: 4:02 PM

## 2022-06-16 NOTE — INTERVAL H&P NOTE
H&P reviewed  After examining the patient I find no changes in the patients condition since the H&P had been written      Vitals:    06/16/22 1442   BP: 169/77   Pulse: 55   Resp: 14   Temp: (!) 97 3 °F (36 3 °C)   SpO2: 100%

## 2022-06-16 NOTE — INTERIM OP NOTE
EXCISION WIDE LESION TRUNK/ABDOMEN/BACK  Postoperative Note  PATIENT NAME: Pat Velasquez  :   MRN: [id number]  [facility] GI ROOM 01    Surgery Date: 2022    Preop Diagnosis:  * No pre-op diagnosis entered *    * No Diagnosis Codes entered *    Procedure(s) (LRB):  EXCISION WIDE LESION TRUNK/ABDOMEN/BACK (N/A)    Surgeon(s) and Role:     Angelita Hillman MD - Primary    Specimens:  ID Type Source Tests Collected by Time Destination   1 : Right Chest  Long suture  12 o'clock superior, short suture 6 o'clock inferior Tissue Chest Wall TISSUE Ale Patel MD 2022 1523        Estimated Blood Loss:   Minimal    Anesthesia Type:   * No anesthesia type entered *     Findings:    None  Complications:   None    SIGNATURE: Christina Polanco PA-C   DATE: 2022   TIME: 3:36 PM

## 2022-06-16 NOTE — DISCHARGE INSTRUCTIONS
[facility]  Dr Roslyn Sosa   [address] [facility], [address]  Phone: [phone]     Postoperative Instructions for Outpatient Surgery     These instructions are being provided by your doctor to give you basic guidelines during your post-op recovery  Please let our office know if your contact information has changed  Please call the office today for an appointment in 2 weeks for suture removal       Dressings:  Remove outer dressing on Saturday  Leave Steri-Strips on  Activity Restrictions:  Nothing strenuous for at least 48 hours  Bathing: You may shower after dressing removal on Saturday  Pat Steri-Strips dry afterwards  Medications:    Resume pre-op medications  You may take tylenol, aleve, or ibuprofen for pain control                 Other: You may apply ice at 15 minute intervals as needed for any pain or swelling

## 2022-06-20 ENCOUNTER — HOSPITAL ENCOUNTER (EMERGENCY)
Facility: HOSPITAL | Age: 77
Discharge: HOME | End: 2022-06-20
Attending: EMERGENCY MEDICINE
Payer: MEDICARE

## 2022-06-20 VITALS
DIASTOLIC BLOOD PRESSURE: 80 MMHG | HEART RATE: 64 BPM | SYSTOLIC BLOOD PRESSURE: 182 MMHG | OXYGEN SATURATION: 100 % | TEMPERATURE: 97.8 F

## 2022-06-20 DIAGNOSIS — R33.9 URINARY RETENTION: Primary | ICD-10-CM

## 2022-06-20 DIAGNOSIS — N39.0 URINARY TRACT INFECTION: ICD-10-CM

## 2022-06-20 LAB
BACTERIA UR QL AUTO: ABNORMAL /HPF
BILIRUB UR QL STRIP: NEGATIVE
CLARITY UR: CLEAR
COLOR UR: ABNORMAL
GLUCOSE UR STRIP-MCNC: NEGATIVE MG/DL
HGB UR QL STRIP.AUTO: ABNORMAL
HYALINE CASTS #/AREA URNS LPF: ABNORMAL /LPF
KETONES UR STRIP-MCNC: NEGATIVE MG/DL
LEUKOCYTE ESTERASE UR QL STRIP: ABNORMAL
NITRITE UR QL STRIP: NEGATIVE
NON-SQ EPI CELLS URNS QL MICRO: ABNORMAL /HPF
PH UR STRIP.AUTO: 6 [PH]
PROT UR STRIP-MCNC: ABNORMAL MG/DL
RBC #/AREA URNS AUTO: ABNORMAL /HPF
SP GR UR STRIP.AUTO: 1.01 (ref 1–1.03)
UROBILINOGEN UR QL STRIP.AUTO: 0.2 E.U./DL
WBC #/AREA URNS AUTO: ABNORMAL /HPF
WBC CLUMPS # UR AUTO: ABNORMAL /UL

## 2022-06-20 PROCEDURE — 81001 URINALYSIS AUTO W/SCOPE: CPT | Performed by: EMERGENCY MEDICINE

## 2022-06-20 PROCEDURE — 99284 EMERGENCY DEPT VISIT MOD MDM: CPT | Performed by: EMERGENCY MEDICINE

## 2022-06-20 PROCEDURE — 99283 EMERGENCY DEPT VISIT LOW MDM: CPT

## 2022-06-20 RX ORDER — CEPHALEXIN 500 MG/1
500 CAPSULE ORAL EVERY 6 HOURS SCHEDULED
Qty: 40 CAPSULE | Refills: 0 | Status: SHIPPED | OUTPATIENT
Start: 2022-06-20 | End: 2022-06-30

## 2022-06-20 RX ORDER — CEPHALEXIN 500 MG/1
500 CAPSULE ORAL ONCE
Status: COMPLETED | OUTPATIENT
Start: 2022-06-20 | End: 2022-06-20

## 2022-06-20 RX ADMIN — CEPHALEXIN 500 MG: 500 CAPSULE ORAL at 20:06

## 2022-06-22 NOTE — ED PROVIDER NOTES
History  Chief Complaint   Patient presents with    Difficulty Urinating     States had catheter removed about 10am  Unable to void, uncomfortable     HPI  Patient is a 68year old male multiple prior presentations for urinary retention presenting for evaluation of dysuria, suprapubic pain, difficulty urinating for the past day  Patient had catheter removed at 10 am this same day and has been unable to void since that time  Patient denies fevers, chills, flank pain, abdominal pain  Prior to Admission Medications   Prescriptions Last Dose Informant Patient Reported? Taking? Silodosin 8 MG CAPS  Self Yes No   aspirin 81 MG tablet  Self Yes No   Sig: Take 81 mg by mouth in the morning  Last dose  5/19/22     atorvastatin (LIPITOR) 10 mg tablet  Self Yes No   Sig: Take 1 tablet by mouth daily   cholestyramine (QUESTRAN) 4 g packet   No No   Sig: Take 1 packet (4 g total) by mouth 2 (two) times a day with meals lunch, dinner   gabapentin (Neurontin) 100 mg capsule  Self No No   Sig: Take 1 capsule (100 mg total) by mouth 3 (three) times a day   glipiZIDE (glipiZIDE XL) 5 mg 24 hr tablet   No No   Sig: Take 1 tablet (5 mg total) by mouth daily   lisinopril (ZESTRIL) 5 mg tablet   No No   Sig: Take 1 tablet (5 mg total) by mouth daily   metFORMIN (GLUCOPHAGE) 1000 MG tablet  Self No No   Sig: Take 1 tablet (1,000 mg total) by mouth 2 (two) times a day   tamsulosin (FLOMAX) 0 4 mg  Self Yes No      Facility-Administered Medications: None       Past Medical History:   Diagnosis Date    [facility] ([facility] )     [address]    Anemia     Colon polyp     Diabetes mellitus ([facility] )     History of Lyme disease     Hyperlipidemia     Hypertension     Proctitis     Prostate cancer ([facility] )     Treated with radiation       Past Surgical History:   Procedure Laterality Date    COLONOSCOPY  01/10/2018    COLONOSCOPY  01/10/2018     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, grade 2 internal hemorrhoids, 1 adenomatous polyp    CO SPLIT GRFT TRUNK,ARM,LEG <100 SQCM Left 2017    Procedure: ANTERIOR SHOULDER EXCISION BCC- COMPLEX CLOSURE vs FLAP vs STSG;  Surgeon: Vince Ansari MD;  Location: [facility] OR;  Service: Plastics    CO TRANSURETHRAL INCISION OF PROSTATE N/A 2022    Procedure: CYSTOSCOPY, TRANSURETHRAL INCISION OF PROSTATE (TUIP); Surgeon: Davis Perdue MD;  Location: [address] [place] [address];  Service: Urology    SKIN LESION EXCISION N/A 2022    Procedure: EXCISION WIDE LESION TRUNK/ABDOMEN/BACK;  Surgeon: Vince Ansari MD;  Location: [facility];  Service: Plastics       Family History   Problem Relation Age of Onset    Heart disease Mother     Diabetes Brother         mellitus     Colon polyps Brother     Throat cancer Sister     Colon cancer Neg Hx      I have reviewed and agree with the history as documented  E-Cigarette/Vaping    E-Cigarette Use Never User      E-Cigarette/Vaping Substances    Nicotine No     THC No     CBD No     Flavoring No     Other No     Unknown No      Social History     Tobacco Use    Smoking status: Former Smoker     Packs/day: 0 10     Years: 2 00     Pack years: 0 20     Types: Cigarettes     Quit date: 1977     Years since quittin 0    Smokeless tobacco: Never Used    Tobacco comment: never a smoker noted in "allscripts" - Denied: History of tobacco use noted in "allscripts"    Vaping Use    Vaping Use: Never used   Substance Use Topics    Alcohol use: No    Drug use: No       Review of Systems   Constitutional: Negative for chills, fatigue and fever  HENT: Negative for congestion, rhinorrhea and sore throat  Eyes: Negative for photophobia and visual disturbance  Respiratory: Negative for chest tightness and shortness of breath  Cardiovascular: Negative for chest pain, palpitations and leg swelling  Gastrointestinal: Negative for abdominal distention, abdominal pain, diarrhea, nausea and vomiting     Endocrine: Negative for polydipsia and polyuria  Genitourinary: Positive for difficulty urinating and dysuria  Negative for hematuria  Musculoskeletal: Negative for arthralgias and myalgias  Skin: Negative for color change, pallor, rash and wound  Neurological: Negative for weakness, numbness and headaches  Psychiatric/Behavioral: Negative for confusion  Physical Exam  Physical Exam  Vitals and nursing note reviewed  Constitutional:       General: He is not in acute distress  Appearance: He is well-developed  He is not diaphoretic  HENT:      Head: Normocephalic and atraumatic  Right Ear: External ear normal       Left Ear: External ear normal       Nose: Nose normal       Mouth/Throat:      Pharynx: No oropharyngeal exudate  Eyes:      Conjunctiva/sclera: Conjunctivae normal       Pupils: Pupils are equal, round, and reactive to light  Cardiovascular:      Rate and Rhythm: Normal rate and regular rhythm  Heart sounds: Normal heart sounds  No murmur heard  No friction rub  No gallop  Pulmonary:      Effort: Pulmonary effort is normal  No respiratory distress  Breath sounds: Normal breath sounds  No wheezing  Chest:      Chest wall: No tenderness  Abdominal:      General: Bowel sounds are normal  There is no distension  Palpations: Abdomen is soft  There is no mass  Tenderness: There is no abdominal tenderness  There is no guarding or rebound  Genitourinary:     Comments: Suprapubic fullness and tenderness  No CVA tenderness  Musculoskeletal:         General: No deformity  Skin:     General: Skin is warm and dry  Capillary Refill: Capillary refill takes less than 2 seconds  Neurological:      Mental Status: He is alert and oriented to person, place, and time     Psychiatric:         Behavior: Behavior normal          Vital Signs  ED Triage Vitals [06/20/22 1805]   Temperature Pulse Resp Blood Pressure SpO2   97 8 °F (36 6 °C) 64 -- (!) 182/80 100 %      Temp Source Heart Rate Source Patient Position - Orthostatic VS BP Location FiO2 (%)   Tympanic Monitor Sitting Right arm --      Pain Score       8           Vitals:    06/20/22 1805   BP: (!) 182/80   Pulse: 64   Patient Position - Orthostatic VS: Sitting         Visual Acuity      ED Medications  Medications   cephalexin (KEFLEX) capsule 500 mg (500 mg Oral Given 6/20/22 2006)       Diagnostic Studies  Results Reviewed     Procedure Component Value Units Date/Time    Urinalysis with microscopic [[id number]]  (Abnormal) Collected: 06/20/22 1918    Lab Status: Final result Specimen: Urine, Indwelling Boyce Catheter Updated: 06/20/22 1947     Clarity, UA Clear     Color, UA Light Yellow     Specific Gravity, UA 1 010     pH, UA 6 0     Glucose, UA Negative mg/dl      Ketones, UA Negative mg/dl      Blood, UA Moderate     Protein, UA 30 (1+) mg/dl      Nitrite, UA Negative     Bilirubin, UA Negative     Urobilinogen, UA 0 2 E U /dl      Leukocytes, UA Small     WBC, UA 10-20 /hpf      RBC, UA 2-4 /hpf      Hyaline Casts, UA 0-1 /lpf      Bacteria, UA Occasional /hpf      Epithelial Cells None Seen /hpf      WBC Clumps Occasional                 No orders to display              Procedures  Procedures         ED Course                                             MDM  Number of Diagnoses or Management Options  Urinary retention  Urinary tract infection  Diagnosis management comments: Boyce able to be placed with some resistance, output of roughly 500 cc UOP with relief of discomfort  UA with evidence of UTI  Patient treated for complex UTI, boyce maintained in place, discharged with instructions to follow up for removal or discussion of utility of chronic boyce for retention         Disposition  Final diagnoses:   Urinary retention   Urinary tract infection     Time reflects when diagnosis was documented in both MDM as applicable and the Disposition within this note     Time User Action Codes Description Comment    6/20/2022  7:55 PM Rachel Round Add [R33 9] Urinary retention     6/20/2022  7:55 PM Rachel Round Add [N39 0] Urinary tract infection       ED Disposition     ED Disposition   Discharge    Condition   Stable    Date/Time   Mon Jun 20, 2022  7:55 PM    Comment   Elissa Santana discharge to home/self care  Follow-up Information     Follow up With Specialties Details Why Contact Info Additional Information    [address] [facility] Emergency Department Emergency Medicine  If symptoms worsen [address] [facility] [address]  [address] [address] Emergency Department, [place], [place], [address]          Discharge Medication List as of 6/20/2022  8:27 PM      START taking these medications    Details   cephalexin (KEFLEX) 500 mg capsule Take 1 capsule (500 mg total) by mouth every 6 (six) hours for 10 days, Starting Mon 6/20/2022, Until Thu 6/30/2022, Normal         CONTINUE these medications which have NOT CHANGED    Details   aspirin 81 MG tablet Take 81 mg by mouth in the morning   Last dose  5/19/22 , Historical Med      atorvastatin (LIPITOR) 10 mg tablet Take 1 tablet by mouth daily, Starting Mon 12/18/2017, Historical Med      cholestyramine (QUESTRAN) 4 g packet Take 1 packet (4 g total) by mouth 2 (two) times a day with meals lunch, dinner, Starting Tue 4/19/2022, Normal      gabapentin (Neurontin) 100 mg capsule Take 1 capsule (100 mg total) by mouth 3 (three) times a day, Starting Mon 3/21/2022, Normal      glipiZIDE (glipiZIDE XL) 5 mg 24 hr tablet Take 1 tablet (5 mg total) by mouth daily, Starting Mon 6/6/2022, Normal      lisinopril (ZESTRIL) 5 mg tablet Take 1 tablet (5 mg total) by mouth daily, Starting Mon 6/6/2022, Normal      metFORMIN (GLUCOPHAGE) 1000 MG tablet Take 1 tablet (1,000 mg total) by mouth 2 (two) times a day, Starting Mon 1/10/2022, Normal      Silodosin 8 MG CAPS Starting Mon 4/27/2020, Historical Med      tamsulosin [facility]) 0 4 mg Starting Mon 2/17/2020, Historical Med             No discharge procedures on file      PDMP Review     None          ED Provider  Electronically Signed by           Tia Beck MD  06/22/22 9453

## 2022-06-30 ENCOUNTER — OFFICE VISIT (OUTPATIENT)
Dept: PLASTIC SURGERY | Facility: CLINIC | Age: 77
End: 2022-06-30

## 2022-06-30 DIAGNOSIS — C44.519 BASAL CELL CARCINOMA (BCC) OF SKIN OF OTHER PART OF TORSO: Primary | ICD-10-CM

## 2022-06-30 PROCEDURE — 99024 POSTOP FOLLOW-UP VISIT: CPT | Performed by: PHYSICIAN ASSISTANT

## 2022-06-30 NOTE — PROGRESS NOTES
Assessment/Plan:     Patient is a 66-year-old male who is status post excision of basal cell carcinoma of the chest with complex closure with Dr Dagoberto Billingsley on 06/16/2022  Please see HPI  The patient presents to the office today for 1st postoperative visit and suture removal     The patient has had no issues postoperatively  He will return to the office in approximately 6 weeks for a scar check  Diagnoses and all orders for this visit:    Basal cell carcinoma (BCC) of skin of other part of torso          Subjective:     Patient ID: Marcela Del Valle is a 68 y o  male  HPI     Patient reports no issues or concerns today  Review of Systems    See HPI    Objective:     Physical Exam      Incision and sutures are clean, dry and intact

## 2022-08-02 ENCOUNTER — OFFICE VISIT (OUTPATIENT)
Dept: FAMILY MEDICINE CLINIC | Facility: CLINIC | Age: 77
End: 2022-08-02
Payer: MEDICARE

## 2022-08-02 VITALS
BODY MASS INDEX: 21.18 KG/M2 | WEIGHT: 143 LBS | DIASTOLIC BLOOD PRESSURE: 60 MMHG | SYSTOLIC BLOOD PRESSURE: 118 MMHG | HEIGHT: 69 IN

## 2022-08-02 DIAGNOSIS — N18.31 STAGE 3A CHRONIC KIDNEY DISEASE (HCC): ICD-10-CM

## 2022-08-02 DIAGNOSIS — C61 ADENOCARCINOMA OF PROSTATE (HCC): ICD-10-CM

## 2022-08-02 DIAGNOSIS — E78.5 DYSLIPIDEMIA: ICD-10-CM

## 2022-08-02 DIAGNOSIS — E11.9 TYPE 2 DIABETES MELLITUS WITHOUT COMPLICATION, WITHOUT LONG-TERM CURRENT USE OF INSULIN (HCC): Primary | ICD-10-CM

## 2022-08-02 DIAGNOSIS — I10 PRIMARY HYPERTENSION: ICD-10-CM

## 2022-08-02 DIAGNOSIS — K62.7 RADIATION PROCTITIS: ICD-10-CM

## 2022-08-02 DIAGNOSIS — M25.511 ACUTE PAIN OF RIGHT SHOULDER: ICD-10-CM

## 2022-08-02 DIAGNOSIS — K55.21 AVM (ARTERIOVENOUS MALFORMATION) OF COLON WITH HEMORRHAGE: ICD-10-CM

## 2022-08-02 PROBLEM — K62.5 RECTAL BLEEDING: Status: RESOLVED | Noted: 2020-02-26 | Resolved: 2022-08-02

## 2022-08-02 LAB — SL AMB POCT HEMOGLOBIN AIC: 6.3 (ref ?–6.5)

## 2022-08-02 PROCEDURE — 36415 COLL VENOUS BLD VENIPUNCTURE: CPT | Performed by: FAMILY MEDICINE

## 2022-08-02 PROCEDURE — 83036 HEMOGLOBIN GLYCOSYLATED A1C: CPT | Performed by: FAMILY MEDICINE

## 2022-08-02 PROCEDURE — 99214 OFFICE O/P EST MOD 30 MIN: CPT | Performed by: FAMILY MEDICINE

## 2022-08-02 RX ORDER — PREDNISOLONE ACETATE 10 MG/ML
SUSPENSION/ DROPS OPHTHALMIC
COMMUNITY
Start: 2022-06-22 | End: 2022-08-31 | Stop reason: ALTCHOICE

## 2022-08-02 RX ORDER — ATORVASTATIN CALCIUM 10 MG/1
10 TABLET, FILM COATED ORAL DAILY
Qty: 30 TABLET | Refills: 2 | Status: SHIPPED | OUTPATIENT
Start: 2022-08-02

## 2022-08-02 NOTE — ASSESSMENT & PLAN NOTE
We reviewed the patient's medications today  It appears that he has not been taking a atorvastatin  We discussed that due to his diabetes it is certainly recommended that he be on a statin  We ordered atorvastatin 10 mg daily  He is in agreement with this plan

## 2022-08-02 NOTE — PROGRESS NOTES
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  Continue surveillance through GI  DMII (diabetes mellitus, type 2) ([facility])  His hemoglobin A1c today is improved at 6 3  He is applauded for his excellent control  Will continue with his glipizide as well as metformin and follow up routinely  Lab Results   Component Value Date    HGBA1C 6 3 08/02/2022       Hypertension  His blood pressure is well controlled  Will continue with lisinopril  CBC and CMP today  He will need to follow-up with a new primary provider this fall due to my half-way  We recommended THE [facility] for him  He agrees with this plan  Adenocarcinoma of prostate ([facility] )  Continue to follow-up with Dr Bozena Coppola    Dyslipidemia  We reviewed the patient's medications today  It appears that he has not been taking a atorvastatin  We discussed that due to his diabetes it is certainly recommended that he be on a statin  We ordered atorvastatin 10 mg daily  He is in agreement with this plan  Diagnoses and all orders for this visit:    Type 2 diabetes mellitus without complication, without long-term current use of insulin (HCC)  -     CBC  -     Comprehensive metabolic panel  -     Lipid panel  -     POCT hemoglobin A1c  -     atorvastatin (LIPITOR) 10 mg tablet; Take 1 tablet (10 mg total) by mouth daily    Acute pain of right shoulder    Dyslipidemia  -     atorvastatin (LIPITOR) 10 mg tablet;  Take 1 tablet (10 mg total) by mouth daily    Stage 3a chronic kidney disease (HCC)    Radiation proctitis    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    Primary hypertension    Adenocarcinoma of prostate ([facility] )    Other orders  -     prednisoLONE acetate (PRED FORTE) 1 % ophthalmic suspension          Subjective:   Chief Complaint   Patient presents with    Follow-up     Med check Patient ID: Fiona Regalado is a 68 y o  male  No P/P/P  Has had repeated episodes of urinary retention  Managed by Dr Carlos Sun  Has indwelling boyce now  Had tur prostatic incision 5/22    HPI  The patient is a 68-year-old male who presents today for follow-up of type 2 diabetes, essential hypertension, hyperlipidemia, chronic kidney disease among other  He states that overall he has been doing fairly well other than with his issues related to his prostate/urinary retention  He recently was in the emergency room in urinary retention again  He did see Dr Carlos Sun  In the spring he had a TUR/prostate incision  He has had issues with retention since then  He presently has indwelling Boyce catheter and has follow-up scheduled with Dr Carlos Sun  He denies polyuria polydipsia polyphagia  He denies any cardiovascular pulmonary complaint  He has radiation proctitis is managed by Gastroenterology  The following portions of the patient's history were reviewed and updated as appropriate: allergies, current medications, past family history, past medical history, past social history, past surgical history and problem list     ROS    Per the HPI, otherwise negative  Objective:    Physical Exam  Vitals and nursing note reviewed  Constitutional:       Appearance: Normal appearance  Neck:      Vascular: No carotid bruit  Comments: No JVD  Cardiovascular:      Rate and Rhythm: Normal rate and regular rhythm  Heart sounds: No murmur heard  Pulmonary:      Effort: Pulmonary effort is normal       Breath sounds: Normal breath sounds  No wheezing, rhonchi or rales  Musculoskeletal:      Comments: Trace pretibial edema, no calf tenderness or cord   Lymphadenopathy:      Cervical: No cervical adenopathy  Neurological:      Mental Status: He is alert and oriented to person, place, and time  Comments: [facility], SN HL   Psychiatric:         Mood and Affect: Mood normal          Thought Content:  Thought content normal          Judgment: Judgment normal          Wt Readings from Last 12 Encounters:   08/02/22 64 9 kg (143 lb)   06/16/22 68 kg (150 lb)   06/16/22 68 kg (150 lb)   05/26/22 67 9 kg (149 lb 12 8 oz)   05/11/22 68 kg (150 lb)   04/19/22 68 kg (150 lb)   04/01/22 68 3 kg (150 lb 9 6 oz)   03/21/22 67 1 kg (148 lb)   01/31/22 67 6 kg (149 lb)   06/22/21 64 9 kg (143 lb)   01/25/21 70 3 kg (155 lb)   12/15/20 68 kg (150 lb)   ]

## 2022-08-02 NOTE — ASSESSMENT & PLAN NOTE
His hemoglobin A1c today is improved at 6 3  He is applauded for his excellent control  Will continue with his glipizide as well as metformin and follow up routinely    Lab Results   Component Value Date    HGBA1C 6 3 08/02/2022

## 2022-08-02 NOTE — ASSESSMENT & PLAN NOTE
His blood pressure is well controlled  Will continue with lisinopril  CBC and CMP today  He will need to follow-up with a new primary provider this fall due to my [facility]  We recommended [facility] for him  He agrees with this plan

## 2022-08-03 DIAGNOSIS — E11.9 TYPE 2 DIABETES MELLITUS WITHOUT COMPLICATION, WITHOUT LONG-TERM CURRENT USE OF INSULIN (HCC): ICD-10-CM

## 2022-08-03 LAB
ALBUMIN SERPL-MCNC: 3.7 G/DL (ref 3.6–5.1)
ALBUMIN/GLOB SERPL: 1.6 (CALC) (ref 1–2.5)
ALP SERPL-CCNC: 60 U/L (ref 35–144)
ALT SERPL-CCNC: 10 U/L (ref 9–46)
AST SERPL-CCNC: 15 U/L (ref 10–35)
BILIRUB SERPL-MCNC: 0.6 MG/DL (ref 0.2–1.2)
BUN SERPL-MCNC: 19 MG/DL (ref 7–25)
BUN/CREAT SERPL: ABNORMAL (CALC) (ref 6–22)
CALCIUM SERPL-MCNC: 9 MG/DL (ref 8.6–10.3)
CHLORIDE SERPL-SCNC: 106 MMOL/L (ref 98–110)
CHOLEST SERPL-MCNC: 108 MG/DL
CHOLEST/HDLC SERPL: 4.3 (CALC)
CO2 SERPL-SCNC: 29 MMOL/L (ref 20–32)
CREAT SERPL-MCNC: 1.09 MG/DL (ref 0.7–1.28)
ERYTHROCYTE [DISTWIDTH] IN BLOOD BY AUTOMATED COUNT: 14.3 % (ref 11–15)
GFR/BSA.PRED SERPLBLD CYS-BASED-ARV: 70 ML/MIN/1.73M2
GLOBULIN SER CALC-MCNC: 2.3 G/DL (CALC) (ref 1.9–3.7)
GLUCOSE SERPL-MCNC: 100 MG/DL (ref 65–99)
HCT VFR BLD AUTO: 29.1 % (ref 38.5–50)
HDLC SERPL-MCNC: 25 MG/DL
HGB BLD-MCNC: 9.3 G/DL (ref 13.2–17.1)
LDLC SERPL CALC-MCNC: 63 MG/DL (CALC)
MCH RBC QN AUTO: 28.1 PG (ref 27–33)
MCHC RBC AUTO-ENTMCNC: 32 G/DL (ref 32–36)
MCV RBC AUTO: 87.9 FL (ref 80–100)
NONHDLC SERPL-MCNC: 83 MG/DL (CALC)
PLATELET # BLD AUTO: 172 THOUSAND/UL (ref 140–400)
PMV BLD REES-ECKER: 11.3 FL (ref 7.5–12.5)
POTASSIUM SERPL-SCNC: 4.5 MMOL/L (ref 3.5–5.3)
PROT SERPL-MCNC: 6 G/DL (ref 6.1–8.1)
RBC # BLD AUTO: 3.31 MILLION/UL (ref 4.2–5.8)
SODIUM SERPL-SCNC: 139 MMOL/L (ref 135–146)
TRIGL SERPL-MCNC: 116 MG/DL
WBC # BLD AUTO: 5.3 THOUSAND/UL (ref 3.8–10.8)

## 2022-08-31 ENCOUNTER — OFFICE VISIT (OUTPATIENT)
Dept: FAMILY MEDICINE CLINIC | Facility: CLINIC | Age: 77
End: 2022-08-31
Payer: MEDICARE

## 2022-08-31 VITALS
SYSTOLIC BLOOD PRESSURE: 138 MMHG | WEIGHT: 142 LBS | TEMPERATURE: 97.9 F | OXYGEN SATURATION: 99 % | HEIGHT: 69 IN | RESPIRATION RATE: 16 BRPM | BODY MASS INDEX: 21.03 KG/M2 | DIASTOLIC BLOOD PRESSURE: 62 MMHG | HEART RATE: 63 BPM

## 2022-08-31 DIAGNOSIS — C61 ADENOCARCINOMA OF PROSTATE (HCC): ICD-10-CM

## 2022-08-31 DIAGNOSIS — E78.5 DYSLIPIDEMIA: ICD-10-CM

## 2022-08-31 DIAGNOSIS — E11.9 TYPE 2 DIABETES MELLITUS WITHOUT COMPLICATION, WITHOUT LONG-TERM CURRENT USE OF INSULIN (HCC): Primary | ICD-10-CM

## 2022-08-31 DIAGNOSIS — D64.9 ANEMIA, UNSPECIFIED TYPE: ICD-10-CM

## 2022-08-31 DIAGNOSIS — Z59.9 FINANCIAL DIFFICULTIES: ICD-10-CM

## 2022-08-31 DIAGNOSIS — I10 PRIMARY HYPERTENSION: ICD-10-CM

## 2022-08-31 DIAGNOSIS — K62.7 RADIATION PROCTITIS: ICD-10-CM

## 2022-08-31 DIAGNOSIS — K55.21 AVM (ARTERIOVENOUS MALFORMATION) OF COLON WITH HEMORRHAGE: ICD-10-CM

## 2022-08-31 DIAGNOSIS — Z00.00 MEDICARE ANNUAL WELLNESS VISIT, SUBSEQUENT: ICD-10-CM

## 2022-08-31 PROCEDURE — G0439 PPPS, SUBSEQ VISIT: HCPCS | Performed by: FAMILY MEDICINE

## 2022-08-31 PROCEDURE — 99204 OFFICE O/P NEW MOD 45 MIN: CPT | Performed by: FAMILY MEDICINE

## 2022-08-31 SDOH — ECONOMIC STABILITY - INCOME SECURITY: PROBLEM RELATED TO HOUSING AND ECONOMIC CIRCUMSTANCES, UNSPECIFIED: Z59.9

## 2022-08-31 NOTE — PROGRESS NOTES
Assessment and Plan:   1  Type 2 diabetes mellitus without complication, without long-term current use of insulin (HCC)  Comments:  Last hemoglobin A1c was controlled at 6 3  Continue metformin and glipizide  Eye and foot exam are up to date  Orders:  -     Hemoglobin A1C; Future; Expected date: 02/27/2023    2  Financial difficulties  -     Ambulatory referral to social work care management program; Future; Expected date: 08/31/2022    3  Adenocarcinoma of prostate [facility])  Comments: Follows urologist Dr Rafaela Monsalve  4  Dyslipidemia  Comments:  Controlled on atorvastatin  Orders:  -     Comprehensive metabolic panel; Future; Expected date: 02/27/2023  -     Lipid Panel with Direct LDL reflex; Future; Expected date: 02/27/2023    5  Medicare annual wellness visit, subsequent    6  Anemia, unspecified type  Comments:  Hemoglobin dropped on recent CBC  Pt is advised to follow up with his gastroenterologist as soon as possible for further evalaution  He reports no blood in stool  Orders:  -     CBC and Platelet; Future; Expected date: 02/27/2023    7  Primary hypertension  Comments:  Controlled on lisinopril  8  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  Comments:  Managed by GI    9  Radiation proctitis  Comments:  Continue questran, follow up with GI      Depression Screening and Follow-up Plan: Patient was screened for depression during today's encounter  They screened negative with a PHQ-2 score of 0  Preventive health issues were discussed with patient, and age appropriate screening tests were ordered as noted in patient's After Visit Summary  Personalized health advice and appropriate referrals for health education or preventive services given if needed, as noted in patient's After Visit Summary       History of Present Illness:     Patient presents for a Medicare Wellness Visit    Mr Jin Madrigal is a 69 yo male with a history of prostate cancer, type II DM, hypertension, hyperlipidemia, anemia, radiation proctitis, AVM of colon with hemorrhage  He presents today to establish care  He is a former patient of Dr López Mcdonnell  He reports no acute issues/concerns today  He saw Dr Markell Ramos for follow up earlier this month and had blood work done which overall looked good other than CBC showing decreased hemoglobin level  He was advised to follow up with GI, but has not done so yet  He reports being asymptomatic and not seeing any blood in his stool  Patient Care Team:  López Mcdonnell MD as PCP - General Humberto Brock PA-C     Review of Systems:     Review of Systems   Constitutional: Negative  HENT: Negative  Eyes: Negative  Respiratory: Negative  Cardiovascular: Negative  Gastrointestinal: Negative  Endocrine: Negative  Genitourinary: Negative  Musculoskeletal: Negative  Neurological: Negative  Hematological: Negative  Psychiatric/Behavioral: Negative           Problem List:     Patient Active Problem List   Diagnosis    Actinic keratosis    [facility] [address] [facility]    Anemia of chronic disease    Carcinoma, basal cell, skin    DMII (diabetes mellitus, type 2) ([facility] )    Dyslipidemia    Hypertension    Radiation proctitis    Hypertrophic toenail    Onychomycosis    Personal history of colonic polyps    Family history of colonic polyps    Orthostatic dizziness    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    Medicare annual wellness visit, subsequent    Functional diarrhea    Stage 3a chronic kidney disease ([facility] )      Past Medical and Surgical History:     Past Medical History:   Diagnosis Date    Acute pain of right shoulder 3/21/2022    Adenocarcinoma of prostate ([facility] )     [address]    Anemia     Colon polyp     Diabetes mellitus ([facility] )     History of Lyme disease     Hyperlipidemia     Hypertension     Proctitis     Prostate cancer ([facility] )     Treated with radiation     Past Surgical History:   Procedure Laterality Date    COLONOSCOPY  01/10/2018    COLONOSCOPY  01/10/2018     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, grade 2 internal hemorrhoids, 1 adenomatous polyp    WY SPLIT GRFT TRUNK,ARM,LEG <100 SQCM Left 2017    Procedure: ANTERIOR SHOULDER EXCISION BCC- COMPLEX CLOSURE vs FLAP vs STSG;  Surgeon: Karen Gonzalez MD;  Location: [facility];  Service: Plastics    WY TRANSURETHRAL INCISION OF PROSTATE N/A 2022    Procedure: CYSTOSCOPY, TRANSURETHRAL INCISION OF PROSTATE (TUIP);   Surgeon: Kevin Cueva MD;  Location: [facility];  Service: Urology    SKIN LESION EXCISION N/A 2022    Procedure: EXCISION WIDE LESION TRUNK/ABDOMEN/BACK;  Surgeon: Karen Gonzalez MD;  Location: [facility];  Service: Plastics      Family History:     Family History   Problem Relation Age of Onset    Heart disease Mother     Diabetes Brother         mellitus     Colon polyps Brother     Throat cancer Sister     Colon cancer Neg Hx       Social History:     Social History     Socioeconomic History    Marital status: Single     Spouse name: None    Number of children: None    Years of education: None    Highest education level: None   Occupational History    None   Tobacco Use    Smoking status: Former Smoker     Packs/day: 0 10     Years: 2 00     Pack years: 0 20     Types: Cigarettes     Quit date: 1977     Years since quittin 2    Smokeless tobacco: Never Used    Tobacco comment: never a smoker noted in "allscripts" - Denied: History of tobacco use noted in "allscripts"    Vaping Use    Vaping Use: Never used   Substance and Sexual Activity    Alcohol use: No    Drug use: No    Sexual activity: None   Other Topics Concern    None   Social History Narrative    None     Social Determinants of Health     Financial Resource Strain: High Risk    Difficulty of Paying Living Expenses: Hard   Food Insecurity: Not on file   Transportation Needs: No Transportation Needs    Lack of Transportation (Medical): No    Lack of Transportation (Non-Medical): No   Physical Activity: Not on file   Stress: Not on file   Social Connections: Not on file   Intimate Partner Violence: Not on file   Housing Stability: Not on file      Medications and Allergies:     Current Outpatient Medications   Medication Sig Dispense Refill    aspirin 81 MG tablet Take 81 mg by mouth in the morning  Last dose  5/19/22   atorvastatin (LIPITOR) 10 mg tablet Take 1 tablet (10 mg total) by mouth daily 30 tablet 2    cholestyramine (QUESTRAN) 4 g packet Take 1 packet (4 g total) by mouth 2 (two) times a day with meals lunch, dinner 60 packet 12    glipiZIDE (glipiZIDE XL) 5 mg 24 hr tablet Take 1 tablet (5 mg total) by mouth daily 90 tablet 0    lisinopril (ZESTRIL) 5 mg tablet Take 1 tablet (5 mg total) by mouth daily 90 tablet 0    metFORMIN (GLUCOPHAGE) 1000 MG tablet Take 1 tablet (1,000 mg total) by mouth 2 (two) times a day 180 tablet 1    Silodosin 8 MG CAPS       tamsulosin (FLOMAX) 0 4 mg        No current facility-administered medications for this visit       No Known Allergies   Immunizations:     Immunization History   Administered Date(s) Administered    COVID-19 MODERNA VACC 0 5 ML IM 02/12/2021, 03/11/2021    INFLUENZA 11/26/2013, 12/02/2014    Influenza Quadrivalent Preservative Free 3 years and older IM 12/15/2016    Influenza Quadrivalent, 6-35 Months IM 12/03/2015    Influenza Split High Dose Preservative Free IM 12/18/2017    Influenza, high dose seasonal 0 7 mL 12/17/2018, 12/10/2019, 12/11/2020, 01/31/2022    Pneumococcal Conjugate 13-Valent 12/18/2017    Pneumococcal Polysaccharide PPV23 05/29/2014    Tdap 05/29/2014      Health Maintenance:         Topic Date Due    Colorectal Cancer Screening  01/10/2023    Hepatitis C Screening  Completed         Topic Date Due    COVID-19 Vaccine (3 - Booster for Moderna series) 08/11/2021    Influenza Vaccine (1) 09/01/2022      Medicare Screening Tests and Risk Assessments:     Boom Guevara is here for his Subsequent Wellness visit  Health Risk Assessment:   Patient rates overall health as good  Patient feels that their physical health rating is same  Patient is satisfied with their life  Eyesight was rated as same  Hearing was rated as same  Patient feels that their emotional and mental health rating is same  Patients states they are never, rarely angry  Patient states they are sometimes unusually tired/fatigued  Pain experienced in the last 7 days has been none  Patient states that he has experienced no weight loss or gain in last 6 months  Depression Screening:   PHQ-2 Score: 0      Fall Risk Screening: In the past year, patient has experienced: no history of falling in past year      Home Safety:  Patient does not have trouble with stairs inside or outside of their home  Patient has no working smoke alarms and has no working carbon monoxide detector  Home safety hazards include: household clutter  Nutrition:   Current diet is Regular  Medications:   Patient is currently taking over-the-counter supplements  OTC medications include: see medication list  Patient is able to manage medications  Activities of Daily Living (ADLs)/Instrumental Activities of Daily Living (IADLs):   Walk and transfer into and out of bed and chair?: Yes  Dress and groom yourself?: Yes    Bathe or shower yourself?: Yes    Feed yourself?  Yes  Do your laundry/housekeeping?: Yes  Manage your money, pay your bills and track your expenses?: Yes  Make your own meals?: Yes    Do your own shopping?: Yes    Previous Hospitalizations:   Any hospitalizations or ED visits within the last 12 months?: Yes    How many hospitalizations have you had in the last year?: 1-2    Advance Care Planning:   Living will: No      PREVENTIVE SCREENINGS      Cardiovascular Screening:    General: Screening Current      Diabetes Screening:     General: Screening Not Indicated and History Diabetes Colorectal Cancer Screening:     General: Screening Current      Prostate Cancer Screening:    General: History Prostate Cancer and Screening Not Indicated      Osteoporosis Screening:    General: Screening Not Indicated      Abdominal Aortic Aneurysm (AAA) Screening:    Risk factors include: tobacco use        General: Screening Not Indicated      Lung Cancer Screening:     General: Screening Not Indicated      Hepatitis C Screening:    General: Screening Current    Screening, Brief Intervention, and Referral to Treatment (SBIRT)    Screening      AUDIT-C Screenin) How often did you have a drink containing alcohol in the past year? never  2) How many drinks did you have on a typical day when you were drinking in the past year? 0  3) How often did you have 6 or more drinks on one occasion in the past year? never    AUDIT-C Score: 0  Interpretation: Score 0-3 (male): Negative screen for alcohol misuse    Single Item Drug Screening:  How often have you used an illegal drug (including marijuana) or a prescription medication for non-medical reasons in the past year? never    Single Item Drug Screen Score: 0  Interpretation: Negative screen for possible drug use disorder    Brief Intervention  Alcohol & drug use screenings were reviewed  No concerns regarding substance use disorder identified  No exam data present     Physical Exam:     /62   Pulse 63   Temp 97 9 °F (36 6 °C)   Resp 16   Ht 5' 9" (1 753 m) Comment: per patient  Wt 64 4 kg (142 lb)   SpO2 99%   BMI 20 97 kg/m²     Physical Exam  Vitals and nursing note reviewed  Constitutional:       Appearance: He is well-developed  HENT:      Head: Normocephalic and atraumatic  Eyes:      Conjunctiva/sclera: Conjunctivae normal    Cardiovascular:      Rate and Rhythm: Normal rate and regular rhythm  Heart sounds: No murmur heard  Pulmonary:      Effort: Pulmonary effort is normal  No respiratory distress        Breath sounds: Normal breath sounds  Abdominal:      General: There is no distension  Palpations: Abdomen is soft  There is no mass  Tenderness: There is no abdominal tenderness  There is no guarding or rebound  Hernia: No hernia is present  Musculoskeletal:      Cervical back: Neck supple  Skin:     General: Skin is warm and dry  Neurological:      Mental Status: He is alert            Deandre Osborne MD

## 2022-08-31 NOTE — PATIENT INSTRUCTIONS
Medicare Preventive Visit Patient Instructions  Thank you for completing your Welcome to Medicare Visit or Medicare Annual Wellness Visit today  Your next wellness visit will be due in one year (9/1/2023)  The screening/preventive services that you may require over the next 5-10 years are detailed below  Some tests may not apply to you based off risk factors and/or age  Screening tests ordered at today's visit but not completed yet may show as past due  Also, please note that scanned in results may not display below  Preventive Screenings:  Service Recommendations Previous Testing/Comments   Colorectal Cancer Screening  · Colonoscopy    · Fecal Occult Blood Test (FOBT)/Fecal Immunochemical Test (FIT)  · Fecal DNA/Cologuard Test  · Flexible Sigmoidoscopy Age: 39-70 years old   Colonoscopy: every 10 years (May be performed more frequently if at higher risk)  OR  FOBT/FIT: every 1 year  OR  Cologuard: every 3 years  OR  Sigmoidoscopy: every 5 years  Screening may be recommended earlier than age 39 if at higher risk for colorectal cancer  Also, an individualized decision between you and your healthcare provider will decide whether screening between the ages of 74-80 would be appropriate   Colonoscopy: 01/10/2018  FOBT/FIT: 09/14/2020  Cologuard: Not on file  Sigmoidoscopy: 04/03/2020    Screening Current     Prostate Cancer Screening Individualized decision between patient and health care provider in men between ages of 53-78   Medicare will cover every 12 months beginning on the day after your 50th birthday PSA: <0 1 ng/mL     History Prostate Cancer  Screening Not Indicated     Hepatitis C Screening Once for adults born between 1945 and 1965  More frequently in patients at high risk for Hepatitis C Hep C Antibody: 06/21/2018    Screening Current   Diabetes Screening 1-2 times per year if you're at risk for diabetes or have pre-diabetes Fasting glucose: 108 mg/dL (6/22/2021)  A1C: 6 3 (8/2/2022)  Screening Not Indicated  History Diabetes   Cholesterol Screening Once every 5 years if you don't have a lipid disorder  May order more often based on risk factors  Lipid panel: 08/02/2022  Screening Current      Other Preventive Screenings Covered by Medicare:  1  Abdominal Aortic Aneurysm (AAA) Screening: covered once if your at risk  You're considered to be at risk if you have a family history of AAA or a male between the age of 73-68 who smoking at least 100 cigarettes in your lifetime  2  Lung Cancer Screening: covers low dose CT scan once per year if you meet all of the following conditions: (1) Age 50-69; (2) No signs or symptoms of lung cancer; (3) Current smoker or have quit smoking within the last 15 years; (4) You have a tobacco smoking history of at least 20 pack years (packs per day x number of years you smoked); (5) You get a written order from a healthcare provider  3  Glaucoma Screening: covered annually if you're considered high risk: (1) You have diabetes OR (2) Family history of glaucoma OR (3)  aged 48 and older OR (3)  American aged 72 and older  3  Osteoporosis Screening: covered every 2 years if you meet one of the following conditions: (1) Have a vertebral abnormality; (2) On glucocorticoid therapy for more than 3 months; (3) Have primary hyperparathyroidism; (4) On osteoporosis medications and need to assess response to drug therapy  5  HIV Screening: covered annually if you're between the age of 12-76  Also covered annually if you are younger than 13 and older than 72 with risk factors for HIV infection  For pregnant patients, it is covered up to 3 times per pregnancy      Immunizations:  Immunization Recommendations   Influenza Vaccine Annual influenza vaccination during flu season is recommended for all persons aged >= 6 months who do not have contraindications   Pneumococcal Vaccine   * Pneumococcal conjugate vaccine = PCV13 (Prevnar 13), PCV15 (Vaxneuvance), PCV20 (Prevnar 20)  * Pneumococcal polysaccharide vaccine = PPSV23 (Pneumovax) Adults 2364 years old: 1-3 doses may be recommended based on certain risk factors  Adults 72 years old: 1-2 doses may be recommended based off what pneumonia vaccine you previously received   Hepatitis B Vaccine 3 dose series if at intermediate or high risk (ex: diabetes, end stage renal disease, liver disease)   Tetanus (Td) Vaccine - COST NOT COVERED BY MEDICARE PART B Following completion of primary series, a booster dose should be given every 10 years to maintain immunity against tetanus  Td may also be given as tetanus wound prophylaxis  Tdap Vaccine - COST NOT COVERED BY MEDICARE PART B Recommended at least once for all adults  For pregnant patients, recommended with each pregnancy  Shingles Vaccine (Shingrix) - COST NOT COVERED BY MEDICARE PART B  2 shot series recommended in those aged 48 and above     Health Maintenance Due:      Topic Date Due    Colorectal Cancer Screening  01/10/2023    Hepatitis C Screening  Completed     Immunizations Due:      Topic Date Due    COVID-19 Vaccine (3 - Booster for Moderna series) 08/11/2021    Influenza Vaccine (1) 09/01/2022     Advance Directives   What are advance directives? Advance directives are legal documents that state your wishes and plans for medical care  These plans are made ahead of time in case you lose your ability to make decisions for yourself  Advance directives can apply to any medical decision, such as the treatments you want, and if you want to donate organs  What are the types of advance directives? There are many types of advance directives, and each state has rules about how to use them  You may choose a combination of any of the following:  · Living will: This is a written record of the treatment you want  You can also choose which treatments you do not want, which to limit, and which to stop at a certain time   This includes surgery, medicine, IV fluid, and tube feedings  · Durable power of  for healthcare [facility]): This is a written record that states who you want to make healthcare choices for you when you are unable to make them for yourself  This person, called a proxy, is usually a family member or a friend  You may choose more than 1 proxy  · Do not resuscitate (DNR) order:  A DNR order is used in case your heart stops beating or you stop breathing  It is a request not to have certain forms of treatment, such as CPR  A DNR order may be included in other types of advance directives  · Medical directive: This covers the care that you want if you are in a coma, near death, or unable to make decisions for yourself  You can list the treatments you want for each condition  Treatment may include pain medicine, surgery, blood transfusions, dialysis, IV or tube feedings, and a ventilator (breathing machine)  · Values history: This document has questions about your views, beliefs, and how you feel and think about life  This information can help others choose the care that you would choose  Why are advance directives important? An advance directive helps you control your care  Although spoken wishes may be used, it is better to have your wishes written down  Spoken wishes can be misunderstood, or not followed  Treatments may be given even if you do not want them  An advance directive may make it easier for your family to make difficult choices about your care  © Copyright [employer] 2018 Information is for End User's use only and may not be sold, redistributed or otherwise used for commercial purposes   All illustrations and images included in CareNotes® are the copyrighted property of A D A M , Inc  or [address] [place], [place] [address]

## 2022-09-07 ENCOUNTER — TELEPHONE (OUTPATIENT)
Dept: GASTROENTEROLOGY | Facility: CLINIC | Age: 77
End: 2022-09-07

## 2022-09-07 ENCOUNTER — OFFICE VISIT (OUTPATIENT)
Dept: GASTROENTEROLOGY | Facility: CLINIC | Age: 77
End: 2022-09-07
Payer: MEDICARE

## 2022-09-07 VITALS
BODY MASS INDEX: 21.45 KG/M2 | WEIGHT: 144.8 LBS | HEIGHT: 69 IN | DIASTOLIC BLOOD PRESSURE: 82 MMHG | SYSTOLIC BLOOD PRESSURE: 124 MMHG

## 2022-09-07 DIAGNOSIS — K55.21 AVM (ARTERIOVENOUS MALFORMATION) OF COLON WITH HEMORRHAGE: ICD-10-CM

## 2022-09-07 DIAGNOSIS — D64.9 ANEMIA, UNSPECIFIED TYPE: Primary | ICD-10-CM

## 2022-09-07 DIAGNOSIS — Z86.010 PERSONAL HISTORY OF COLONIC POLYPS: ICD-10-CM

## 2022-09-07 DIAGNOSIS — K62.7 RADIATION PROCTITIS: ICD-10-CM

## 2022-09-07 PROCEDURE — 99213 OFFICE O/P EST LOW 20 MIN: CPT | Performed by: REGISTERED NURSE

## 2022-09-07 NOTE — PROGRESS NOTES
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  Known history of anemia of chronic disease however hemoglobin was greater than 11 in January and 9 3 last month  Patient denies any overt signs of GI bleeding  He does have a known history of AVM's secondary to radiation proctitis noted on flex sig in 2020  Discussed with patient CBC as well iron panel to characterize further in about 2 weeks and then we will proceed with upper and lower endoscopy as well as possible capsule endoscopy to follow  He is scheduled for scopes at [facility]) but if hemoglobin is less than 8 need to reschedule for the hospital setting     - CBC at [facility])  - Iron Panel (Includes Ferritin, Iron Sat%, Iron, and TIBC); Future  - Colonoscopy at [facility])  - EGD; Future  - polyethylene glycol (COLYTE) 4000 mL solution; Take 4,000 mL by mouth once for 1 dose Take 4000 mL by mouth once for 1 dose  Use as directed  Dispense: 4000 mL; Refill: 0        4  Personal history of colonic polyps  Previous colonoscopy in 2018 with a 5 year recall  Given the Given his anemia as well as history of AVM's will proceed sooner  Followup Appointment:  3 months  ______________________________________________________________________    Chief Complaint   Patient presents with    Anemia     HPI:  49-year-old male referred from his PCP for anemia  Two point drop in hemoglobin over the past 8 months  Denies any overt signs of GI bleed  He does have a known history of anemia of chronic disease  He does also have a history of he recto/sigmoid AVMs due to radiation proctitis  In the past he did have significant diarrhea  His symptoms have actually pretty much resolved to where most days he is having daily, soft formed bowel movements  Every once a while he will experience diarrhea but nothing significant  He denies any rectal bleeding  He denies any rectal pain  Denies any lower abdominal pain or epigastric pain  Denies any melena  He does have periodic heartburn but does not take any daily medications  Historical Information   Past Medical History:   Diagnosis Date    Acute pain of right shoulder 3/21/2022    Ad[facility] prostate ([facility] )     BCC    Anemia     Colon polyp     Diabetes mellitus ([facility] )     History of Lyme disease     Hyperlipidemia     Hypertension     Proctitis     Prostate cancer ([facility] )     Treated with radiation     Past Surgical History:   Procedure Laterality Date    COLONOSCOPY  01/10/2018    COLONOSCOPY  01/10/2018     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, grade 2 internal hemorrhoids, 1 adenomatous polyp    AL SPLIT GRFT TRUNK,ARM,LEG <100 SQCM Left 2017    Procedure: ANTERIOR SHOULDER EXCISION BCC- COMPLEX CLOSURE vs FLAP vs STSG;  Surgeon: Tabitha Mccarthy MD;  Location: [facility];  Service: Plastics    AL TRANSURETHRAL INCISION OF PROSTATE N/A 2022    Procedure: CYSTOSCOPY, TRANSURETHRAL INCISION OF PROSTATE (TUIP);   Surgeon: Harshal Mccollum MD;  Location: [address] [place] [address];  Service: Urology    SKIN LESION EXCISION N/A 2022    Procedure: EXCISION WIDE LESION TRUNK/ABDOMEN/BACK;  Surgeon: Tabitha Mccarthy MD;  Location: [facility];  Service: Plastics     Social History     Substance and Sexual Activity   Alcohol Use No     Social History     Substance and Sexual Activity   Drug Use No     Social History     Tobacco Use   Smoking Status Former Smoker    Packs/day: 0 10    Years: 2 00    Pack years: 0 20    Types: Cigarettes    Quit date: 1977    Years since quittin 2   Smokeless Tobacco Never Used   Tobacco Comment    never a smoker noted in "allscripts" - Denied: History of tobacco use noted in "allscripts"      Family History   Problem Relation Age of Onset    Heart disease Mother     Diabetes Brother         mellitus     Colon polyps Brother     Throat cancer Sister     Colon cancer Neg Hx          Current Outpatient Medications:     aspirin 81 MG tablet    atorvastatin (LIPITOR) 10 mg tablet    cholestyramine (QUESTRAN) 4 g packet    glipiZIDE (glipiZIDE XL) 5 mg 24 hr tablet    lisinopril (ZESTRIL) 5 mg tablet    metFORMIN (GLUCOPHAGE) 1000 MG tablet    polyethylene glycol (COLYTE) 4000 mL solution    Silodosin 8 MG CAPS    tamsulosin (FLOMAX) 0 4 mg  No Known Allergies  Reviewed medications and allergies and updated as indicated    PHYSICAL EXAM:    Blood pressure 124/82, height 5' 9" (1 753 m), weight 65 7 kg (144 lb 12 8 oz)  Body mass index is 21 38 kg/m²  General Appearance: NAD, cooperative, alert  Eyes: Anicteric, PERRLA, EOMI  ENT:  Normocephalic, atraumatic, normal mucosa  Neck:  Supple, symmetrical, trachea midline  Resp:  Clear to auscultation bilaterally; no rales, rhonchi or wheezing; respirations unlabored   CV:  S1 S2, Regular rate and rhythm; no murmur, rub, or gallop  GI:  Soft, non-tender, non-distended; normal bowel sounds; no masses, no organomegaly   Rectal: Deferred  Musculoskeletal: No cyanosis, clubbing or edema  Normal ROM    Skin:  No jaundice, rashes, or lesions   Heme/Lymph: No palpable cervical lymphadenopathy  Psych: Normal affect, good eye contact  Neuro: No gross deficits, AAOx3    Lab Results:   Lab Results   Component Value Date    WBC 5 3 08/02/2022    HGB 9 3 (L) 08/02/2022    HCT 29 1 (L) 08/02/2022    MCV 87 9 08/02/2022     08/02/2022     Lab Results   Component Value Date     12/18/2017    K 4 5 08/02/2022     08/02/2022    CO2 29 08/02/2022    BUN 19 08/02/2022    CREATININE 1 09 08/02/2022    GLUF 108 (H) 06/22/2021    CALCIUM 9 0 08/02/2022    AST 15 08/02/2022    ALT 10 08/02/2022    ALKPHOS 60 08/02/2022    PROT 6 5 12/18/2017    BILITOT 0 6 12/18/2017    EGFR 70 08/02/2022     No results found for: IRON, TIBC, FERRITIN  No results found for: LIPASE    Radiology Results:   No results found

## 2022-09-07 NOTE — TELEPHONE ENCOUNTER
Scheduled date of colonoscopy (as of today):10/19/2022  Physician performing colonoscopy:  Dr Jewel Nicholson  Location of colonoscopy:  [facility] ([facility])  Bowel prep reviewed with patient: Rock Gloria  Instructions reviewed with patient by:  PD  Clearances: NONE

## 2022-09-08 ENCOUNTER — TELEPHONE (OUTPATIENT)
Dept: OTHER | Facility: OTHER | Age: 77
End: 2022-09-08

## 2022-09-08 DIAGNOSIS — Z86.010 PERSONAL HISTORY OF COLONIC POLYPS: Primary | ICD-10-CM

## 2022-09-08 NOTE — TELEPHONE ENCOUNTER
I spoke with Eliane Rosario at [facility] to confirm which Carlen Ray they have in stock  Gavilyte-G  New rx sent in as requested

## 2022-09-08 NOTE — TELEPHONE ENCOUNTER
Pharmacy stated that polyethylene glycol (COLYTE) 4000 mL solution is not covered under patients insurance and they are requesting the script be changed to Gavilyte 4000 ML Solution

## 2022-09-12 DIAGNOSIS — I10 ESSENTIAL HYPERTENSION: ICD-10-CM

## 2022-09-12 RX ORDER — LISINOPRIL 5 MG/1
5 TABLET ORAL DAILY
Qty: 90 TABLET | Refills: 0 | Status: SHIPPED | OUTPATIENT
Start: 2022-09-12

## 2022-09-16 ENCOUNTER — APPOINTMENT (OUTPATIENT)
Dept: LAB | Facility: HOSPITAL | Age: 77
End: 2022-09-16
Payer: MEDICARE

## 2022-09-16 DIAGNOSIS — D64.9 ANEMIA, UNSPECIFIED TYPE: ICD-10-CM

## 2022-09-16 LAB
ERYTHROCYTE [DISTWIDTH] IN BLOOD BY AUTOMATED COUNT: 15.4 % (ref 11.6–15.1)
FERRITIN SERPL-MCNC: 81 NG/ML (ref 8–388)
HCT VFR BLD AUTO: 30.5 % (ref 36.5–49.3)
HGB BLD-MCNC: 9.7 G/DL (ref 12–17)
IRON SATN MFR SERPL: 21 % (ref 20–50)
IRON SERPL-MCNC: 58 UG/DL (ref 65–175)
MCH RBC QN AUTO: 28.6 PG (ref 26.8–34.3)
MCHC RBC AUTO-ENTMCNC: 31.8 G/DL (ref 31.4–37.4)
MCV RBC AUTO: 90 FL (ref 82–98)
PLATELET # BLD AUTO: 163 THOUSANDS/UL (ref 149–390)
PMV BLD AUTO: 11.2 FL (ref 8.9–12.7)
RBC # BLD AUTO: 3.39 MILLION/UL (ref 3.88–5.62)
TIBC SERPL-MCNC: 278 UG/DL (ref 250–450)
WBC # BLD AUTO: 5.86 THOUSAND/UL (ref 4.31–10.16)

## 2022-09-16 PROCEDURE — 36415 COLL VENOUS BLD VENIPUNCTURE: CPT

## 2022-09-16 PROCEDURE — 85027 COMPLETE CBC AUTOMATED: CPT

## 2022-09-16 PROCEDURE — 83540 ASSAY OF IRON: CPT

## 2022-09-16 PROCEDURE — 83550 IRON BINDING TEST: CPT

## 2022-09-16 PROCEDURE — 82728 ASSAY OF FERRITIN: CPT

## 2022-09-19 ENCOUNTER — PATIENT OUTREACH (OUTPATIENT)
Dept: FAMILY MEDICINE CLINIC | Facility: CLINIC | Age: 77
End: 2022-09-19

## 2022-09-19 DIAGNOSIS — E11.9 TYPE 2 DIABETES MELLITUS WITHOUT COMPLICATION, UNSPECIFIED WHETHER LONG TERM INSULIN USE (HCC): ICD-10-CM

## 2022-09-19 RX ORDER — GLIPIZIDE 5 MG/1
5 TABLET, FILM COATED, EXTENDED RELEASE ORAL DAILY
Qty: 90 TABLET | Refills: 0 | Status: SHIPPED | OUTPATIENT
Start: 2022-09-19

## 2022-09-19 NOTE — PROGRESS NOTES
SW received referral for SW outreach  SW called pt, reached voicemail and left message requesting call back

## 2022-09-26 ENCOUNTER — PATIENT OUTREACH (OUTPATIENT)
Dept: FAMILY MEDICINE CLINIC | Facility: CLINIC | Age: 77
End: 2022-09-26

## 2022-09-26 NOTE — PROGRESS NOTES
SW called and spoke with pt  SW informed pt that referral was made in regards to financial difficulties, and SW explained role in care management  Pt said he is okay, overall  Pt declined any financial assistance, including help in paying for utilities, medical care, food or housing  Note routed to Dr Fermín Whipple

## 2022-10-17 ENCOUNTER — TELEPHONE (OUTPATIENT)
Dept: GASTROENTEROLOGY | Facility: AMBULATORY SURGERY CENTER | Age: 77
End: 2022-10-17

## 2022-10-19 ENCOUNTER — HOSPITAL ENCOUNTER (OUTPATIENT)
Dept: GASTROENTEROLOGY | Facility: AMBULATORY SURGERY CENTER | Age: 77
Discharge: HOME | End: 2022-10-19

## 2022-10-19 ENCOUNTER — ANESTHESIA EVENT (OUTPATIENT)
Dept: GASTROENTEROLOGY | Facility: AMBULATORY SURGERY CENTER | Age: 77
End: 2022-10-19

## 2022-10-19 ENCOUNTER — ANESTHESIA (OUTPATIENT)
Dept: GASTROENTEROLOGY | Facility: AMBULATORY SURGERY CENTER | Age: 77
End: 2022-10-19

## 2022-10-19 VITALS
HEIGHT: 69 IN | SYSTOLIC BLOOD PRESSURE: 183 MMHG | HEART RATE: 46 BPM | RESPIRATION RATE: 16 BRPM | OXYGEN SATURATION: 99 % | TEMPERATURE: 98 F | BODY MASS INDEX: 21.33 KG/M2 | DIASTOLIC BLOOD PRESSURE: 76 MMHG | WEIGHT: 144 LBS

## 2022-10-19 DIAGNOSIS — K22.70 BARRETT'S ESOPHAGUS WITHOUT DYSPLASIA: Primary | ICD-10-CM

## 2022-10-19 DIAGNOSIS — D64.9 ANEMIA, UNSPECIFIED TYPE: ICD-10-CM

## 2022-10-19 RX ORDER — SODIUM CHLORIDE, SODIUM LACTATE, POTASSIUM CHLORIDE, CALCIUM CHLORIDE 600; 310; 30; 20 MG/100ML; MG/100ML; MG/100ML; MG/100ML
50 INJECTION, SOLUTION INTRAVENOUS CONTINUOUS
Status: DISCONTINUED | OUTPATIENT
Start: 2022-10-19 | End: 2022-10-23 | Stop reason: HOSPADM

## 2022-10-19 RX ORDER — PROPOFOL 10 MG/ML
INJECTION, EMULSION INTRAVENOUS AS NEEDED
Status: DISCONTINUED | OUTPATIENT
Start: 2022-10-19 | End: 2022-10-19

## 2022-10-19 RX ADMIN — PROPOFOL 50 MG: 10 INJECTION, EMULSION INTRAVENOUS at 10:48

## 2022-10-19 RX ADMIN — SODIUM CHLORIDE, SODIUM LACTATE, POTASSIUM CHLORIDE, CALCIUM CHLORIDE 50 ML/HR: 600; 310; 30; 20 INJECTION, SOLUTION INTRAVENOUS at 09:54

## 2022-10-19 RX ADMIN — PROPOFOL 50 MG: 10 INJECTION, EMULSION INTRAVENOUS at 10:46

## 2022-10-19 RX ADMIN — PROPOFOL 100 MG: 10 INJECTION, EMULSION INTRAVENOUS at 10:37

## 2022-10-19 RX ADMIN — PROPOFOL 50 MG: 10 INJECTION, EMULSION INTRAVENOUS at 10:40

## 2022-10-19 RX ADMIN — PROPOFOL 50 MG: 10 INJECTION, EMULSION INTRAVENOUS at 10:59

## 2022-10-19 RX ADMIN — PROPOFOL 50 MG: 10 INJECTION, EMULSION INTRAVENOUS at 10:42

## 2022-10-19 RX ADMIN — PROPOFOL 50 MG: 10 INJECTION, EMULSION INTRAVENOUS at 10:53

## 2022-10-19 NOTE — ANESTHESIA POSTPROCEDURE EVALUATION
Post-Op Assessment Note    CV Status:  Stable  Pain Score: 0    Pain management: adequate     Mental Status:  Alert and awake   Hydration Status:  Euvolemic   PONV Controlled:  Controlled   Airway Patency:  Patent   Two or more mitigation strategies used for obstructive sleep apnea   Post Op Vitals Reviewed: Yes      Staff: Anesthesiologist, CRNA         No complications documented      BP  168/88   Temp      Pulse  48   Resp   22   SpO2   99

## 2022-10-19 NOTE — ANESTHESIA PREPROCEDURE EVALUATION
Procedure:  COLONOSCOPY  EGD    Relevant Problems   ANESTHESIA (within normal limits)   (-) History of anesthesia complications      CARDIO   (+) Hypertension   (-) Chest pain   (-) KEBEDE (dyspnea on exertion)      ENDO   (+) DMII (diabetes mellitus, type 2) (HCC)      /RENAL   (+) Adenocarcinoma of prostate (HCC)   (+) Stage 3a chronic kidney disease (HCC)      HEMATOLOGY   (+) Anemia of chronic disease      PULMONARY   (-) Shortness of breath   (-) URI (upper respiratory infection)        Physical Exam    Airway    Mallampati score: II  TM Distance: >3 FB  Neck ROM: full     Dental       Cardiovascular      Pulmonary      Other Findings        Anesthesia Plan  ASA Score- 3     Anesthesia Type- IV sedation with anesthesia with ASA Monitors  Additional Monitors:   Airway Plan:           Plan Factors-Exercise tolerance (METS): >4 METS  Chart reviewed  EKG reviewed  Patient summary reviewed  Obstructive sleep apnea risk education given perioperatively  Induction- intravenous  Postoperative Plan-     Informed Consent- Anesthetic plan and risks discussed with patient  I personally reviewed this patient with the CRNA  Discussed and agreed on the Anesthesia Plan with the CRNA  Michelle Chinchilla

## 2022-10-19 NOTE — H&P
History and Physical - [facility]  Susu Hernández 68 y o  male MRN: [id number]    HPI: Susu Hernández is a 68y o  year old male who presents for EGD and colonoscopy  Evaluation of iron deficiency anemia  History of radiation proctitis in [facility]  REVIEW OF SYSTEMS: Per the HPI, and otherwise unremarkable  Historical Information   Past Medical History:   Diagnosis Date   • Acute pain of right shoulder 2022   • Adenocarcinoma of prostate ([facility] )     BCC   • Anemia    • Cancer [facility])    • Chronic kidney disease    • Colon polyp    • Diabetes mellitus ([place] [address] )    • History of Lyme disease    • Hyperlipidemia    • Hypertension    • Proctitis    • Prostate cancer ([place] [address] )     Treated with radiation     Past Surgical History:   Procedure Laterality Date   • COLONOSCOPY  01/10/2018   • COLONOSCOPY  01/10/2018     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, grade 2 internal hemorrhoids, 1 adenomatous polyp   • ME SPLIT GRFT TRUNK,ARM,LEG <100 SQCM Left 2017    Procedure: ANTERIOR SHOULDER EXCISION BCC- COMPLEX CLOSURE vs FLAP vs STSG;  Surgeon: Chanell Renner MD;  Location: [facility]  Service: Plastics   • ME TRANSURETHRAL INCISION OF PROSTATE N/A 2022    Procedure: CYSTOSCOPY, TRANSURETHRAL INCISION OF PROSTATE (TUIP);   Surgeon: Andre Sousa MD;  Location: [address] [place] [address];  Service: Urology   • SKIN LESION EXCISION N/A 2022    Procedure: EXCISION WIDE LESION TRUNK/ABDOMEN/BACK;  Surgeon: Chanell Renner MD;  Location: [facility];  Service: Plastics     Social History   Social History     Substance and Sexual Activity   Alcohol Use No     Social History     Substance and Sexual Activity   Drug Use No     Social History     Tobacco Use   Smoking Status Former Smoker   • Packs/day: 0 10   • Years: 2 00   • Pack years: 0 20   • Types: Cigarettes   • Quit date: 1977   • Years since quittin 3   Smokeless Tobacco Never Used   Tobacco Comment    never a smoker noted in "allscripts" - Denied: History of tobacco use noted in "allscripts"      Family History   Problem Relation Age of Onset   • Heart disease Mother    • Diabetes Brother         mellitus    • Colon polyps Brother    • Throat cancer Sister    • Colon cancer Neg Hx        Meds/Allergies       Current Outpatient Medications:   •  aspirin 81 MG tablet  •  atorvastatin (LIPITOR) 10 mg tablet  •  cholestyramine (QUESTRAN) 4 g packet  •  glipiZIDE (glipiZIDE XL) 5 mg 24 hr tablet  •  lisinopril (ZESTRIL) 5 mg tablet  •  metFORMIN (GLUCOPHAGE) 1000 MG tablet  •  Silodosin 8 MG CAPS  •  tamsulosin (FLOMAX) 0 4 mg  •  polyethylene glycol (GOLYTELY) 4000 mL solution    Current Facility-Administered Medications:   •  lactated ringers infusion, 50 mL/hr, Intravenous, Continuous, Continue from Pre-op at 10/19/22 1033    No Known Allergies    Objective     BP (!) 196/91   Pulse 55   Temp 98 °F (36 7 °C) (Temporal)   Resp 17   Ht 5' 9" (1 753 m)   Wt 65 3 kg (144 lb)   SpO2 100%   BMI 21 27 kg/m²     PHYSICAL EXAM    Gen: NAD AAOx3  Head: Normocephalic, Atraumatic  CV: S1S2 RRR no m/r/g  CHEST: Clear b/l no c/r/w  ABD: soft, +BS NT/ND  EXT: no edema    ASSESSMENT/PLAN:  This is a 68y o  year old male here for EGD and colonoscopy, and he is stable and optimized for his procedure

## 2022-11-02 RX ORDER — OMEPRAZOLE 20 MG/1
20 CAPSULE, DELAYED RELEASE ORAL DAILY
Qty: 90 CAPSULE | Refills: 3 | Status: SHIPPED | OUTPATIENT
Start: 2022-11-02 | End: 2023-11-02

## 2022-11-07 DIAGNOSIS — E78.5 DYSLIPIDEMIA: ICD-10-CM

## 2022-11-07 DIAGNOSIS — E11.9 TYPE 2 DIABETES MELLITUS WITHOUT COMPLICATION, WITHOUT LONG-TERM CURRENT USE OF INSULIN (HCC): ICD-10-CM

## 2022-11-09 DIAGNOSIS — E78.5 DYSLIPIDEMIA: ICD-10-CM

## 2022-11-09 DIAGNOSIS — E11.9 TYPE 2 DIABETES MELLITUS WITHOUT COMPLICATION, WITHOUT LONG-TERM CURRENT USE OF INSULIN (HCC): ICD-10-CM

## 2022-11-09 RX ORDER — ATORVASTATIN CALCIUM 10 MG/1
TABLET, FILM COATED ORAL
Qty: 30 TABLET | Refills: 0 | Status: SHIPPED | OUTPATIENT
Start: 2022-11-09

## 2022-11-09 RX ORDER — ATORVASTATIN CALCIUM 10 MG/1
10 TABLET, FILM COATED ORAL DAILY
Qty: 30 TABLET | Refills: 2 | Status: SHIPPED | OUTPATIENT
Start: 2022-11-09

## 2022-12-07 DIAGNOSIS — E11.9 TYPE 2 DIABETES MELLITUS WITHOUT COMPLICATION, WITHOUT LONG-TERM CURRENT USE OF INSULIN (HCC): ICD-10-CM

## 2022-12-07 DIAGNOSIS — E78.5 DYSLIPIDEMIA: ICD-10-CM

## 2022-12-07 RX ORDER — ATORVASTATIN CALCIUM 10 MG/1
10 TABLET, FILM COATED ORAL DAILY
Qty: 30 TABLET | Refills: 0 | Status: SHIPPED | OUTPATIENT
Start: 2022-12-07

## 2022-12-08 ENCOUNTER — OFFICE VISIT (OUTPATIENT)
Dept: GASTROENTEROLOGY | Facility: CLINIC | Age: 77
End: 2022-12-08

## 2022-12-08 VITALS
HEIGHT: 69 IN | BODY MASS INDEX: 22.33 KG/M2 | SYSTOLIC BLOOD PRESSURE: 150 MMHG | DIASTOLIC BLOOD PRESSURE: 68 MMHG | WEIGHT: 150.8 LBS

## 2022-12-08 DIAGNOSIS — K55.21 AVM (ARTERIOVENOUS MALFORMATION) OF COLON WITH HEMORRHAGE: ICD-10-CM

## 2022-12-08 DIAGNOSIS — D64.9 ANEMIA, UNSPECIFIED TYPE: Primary | ICD-10-CM

## 2022-12-08 DIAGNOSIS — K59.1 FUNCTIONAL DIARRHEA: ICD-10-CM

## 2022-12-08 DIAGNOSIS — K22.70 BARRETT'S ESOPHAGUS WITHOUT DYSPLASIA: ICD-10-CM

## 2022-12-08 DIAGNOSIS — Z86.010 PERSONAL HISTORY OF COLONIC POLYPS: ICD-10-CM

## 2022-12-08 NOTE — PROGRESS NOTES
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 noted on EGD on 10/19/2022  On omeprazole 20 mg once daily  Plan for repeat endoscopy in 5 years  3  Personal history of colonic polyps  10/19/2022 colonoscopy with total of 6 subcentimeter polyps and one 11 mm polyp removed  6 tubular adenomas   One inflammatory type polyp in the sigmoid   Repeat recommended in 3 years  4  Functional diarrhea  Noted  Currently better controlled at this time  On intermittent cholestyramine 1 has more frequent bowel movements  5  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  Back in 2020  Most recent colonoscopy in 2022 without signs of radiation proctitis  Follow up appointment: Based on CBC and iron panel   ______________________________________________________________________    Chief Complaint   Patient presents with   • Follow-up     HPI:   26-year-old past medical history of iron deficiency anemia, 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, personal history of colon polyps, Dickson's esophagus, GERD, functional diarrhea who is presenting for follow-up  Patient had an EGD and colonoscopy on 10/19/2022  EGD with Dickson's esophagus  C0M2  Biopsies without dysplasia  Recall in 5 years  Continue omeprazole 20 mg once daily  Colonoscopy with total of 6 subcentimeter polyps and one 11 mm polyp removed   6 tubular adenomas   One inflammatory type polyp in the sigmoid   Repeat recommended in 3 years      Patient otherwise has been asymptomatic  Denies any lightheadedness dizziness nausea vomiting dysphagia heartburn abdominal pain  Has 1-3 bowel movements a day  Regular without any blood or melena  Patient does state that cholestyramine has been helping when he has more frequent stools  Denies any unintentional weight loss  Last CBC in September with a hemoglobin of 9 7 MCV of 90  Historical Information   Past Medical History:   Diagnosis Date   • Acute pain of right shoulder 03/21/2022   • Adenocarcinoma of prostate ([place] [address] )     BCC   • Anemia    • Cancer [facility])    • Chronic kidney disease    • Colon polyp    • Diabetes mellitus ([place] [address] )    • History of Lyme disease    • Hyperlipidemia    • Hypertension    • Proctitis    • Prostate cancer ([place] [address] )     Treated with radiation     Past Surgical History:   Procedure Laterality Date   • COLONOSCOPY  01/10/2018   • COLONOSCOPY  01/10/2018     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, grade 2 internal hemorrhoids, 1 adenomatous polyp   • DC SPLIT GRFT TRUNK,ARM,LEG <100 SQCM Left 6/22/2017    Procedure: ANTERIOR SHOULDER EXCISION BCC- COMPLEX CLOSURE vs FLAP vs STSG;  Surgeon: Alka De Guzman MD;  Location: [facility];  Service: Plastics   • DC TRANSURETHRAL INCISION OF PROSTATE N/A 5/26/2022    Procedure: CYSTOSCOPY, TRANSURETHRAL INCISION OF PROSTATE (TUIP);   Surgeon: Charlee Pappas MD;  Location: [address] [place] [address];  Service: Urology   • SKIN LESION EXCISION N/A 6/16/2022    Procedure: EXCISION WIDE LESION TRUNK/ABDOMEN/BACK;  Surgeon: Alka De Guzman MD;  Location: [facility];  Service: Plastics     Social History     Substance and Sexual Activity   Alcohol Use No     Social History     Substance and Sexual Activity   Drug Use No     Social History     Tobacco Use   Smoking Status Former   • Packs/day: 0 10   • Years: 2 00   • Pack years: 0 20   • Types: Cigarettes   • Quit date: 1977   • Years since quittin 4   Smokeless Tobacco Never   Tobacco Comments    never a smoker noted in "allscripts" - Denied: History of tobacco use noted in "allscripts"      Family History   Problem Relation Age of Onset   • Heart disease Mother    • Diabetes Brother         mellitus    • Colon polyps Brother    • Throat cancer Sister    • Colon cancer Neg Hx          Current Outpatient Medications:   •  aspirin 81 MG tablet  •  atorvastatin (LIPITOR) 10 mg tablet  •  cholestyramine (QUESTRAN) 4 g packet  •  glipiZIDE (glipiZIDE XL) 5 mg 24 hr tablet  •  lisinopril (ZESTRIL) 5 mg tablet  •  metFORMIN (GLUCOPHAGE) 1000 MG tablet  •  omeprazole (PriLOSEC) 20 mg delayed release capsule  •  Silodosin 8 MG CAPS  •  tamsulosin (FLOMAX) 0 4 mg  •  atorvastatin (LIPITOR) 10 mg tablet  No Known Allergies  Reviewed medications and allergies and updated as indicated    PHYSICAL EXAM:    Blood pressure 150/68, height 5' 9" (1 753 m), weight 68 4 kg (150 lb 12 8 oz)  Body mass index is 22 27 kg/m²  General Appearance: NAD, cooperative, alert  Eyes: Anicteric, EOMI  ENT:  Normocephalic, atraumatic  Neck:  Supple, symmetrical, trachea midline  Resp:  Air entry present bilaterally  CV: S1, S2 present    GI:  Soft, non-tender, non-distended; normal bowel sounds; no masses, no organomegaly   Rectal: Deferred  Musculoskeletal: No cyanosis, clubbing or edema    Skin:  No jaundice, rashes, or lesions   Psych: Normal affect, good eye contact  Neuro: No gross deficits    Lab Results:   Lab Results   Component Value Date    WBC 5 86 2022    WBC 5 3 2022    WBC 7 51 2022    HGB 9 7 (L) 2022    HGB 9 3 (L) 2022    HGB 11 1 (L) 2022    MCV 90 2022     2022     2022     2022     Lab Results   Component Value Date     2017    K 4 5 2022     2022    CO2 29 2022    BUN 19 2022    CREATININE 1 09 08/02/2022    GLUF 108 (H) 06/22/2021    CALCIUM 9 0 08/02/2022    AST 15 08/02/2022    AST 15 01/31/2022    AST 16 06/22/2021    ALT 10 08/02/2022    ALT 9 01/31/2022    ALT 18 06/22/2021    ALKPHOS 60 08/02/2022    ALKPHOS 48 01/31/2022    ALKPHOS 46 06/22/2021    PROT 6 5 12/18/2017    BILITOT 0 6 12/18/2017    BILITOT 0 5 06/16/2017    BILITOT 0 5 12/15/2016    EGFR 70 08/02/2022     Lab Results   Component Value Date    IRON 58 (L) 09/16/2022    TIBC 278 09/16/2022    FERRITIN 81 09/16/2022     No results found for: LIPASE    Radiology Results:   No results found

## 2022-12-19 DIAGNOSIS — I10 ESSENTIAL HYPERTENSION: ICD-10-CM

## 2022-12-19 RX ORDER — LISINOPRIL 5 MG/1
5 TABLET ORAL DAILY
Qty: 90 TABLET | Refills: 0 | Status: SHIPPED | OUTPATIENT
Start: 2022-12-19

## 2022-12-29 DIAGNOSIS — E11.9 TYPE 2 DIABETES MELLITUS WITHOUT COMPLICATION, UNSPECIFIED WHETHER LONG TERM INSULIN USE (HCC): ICD-10-CM

## 2022-12-29 RX ORDER — GLIPIZIDE 5 MG/1
5 TABLET, FILM COATED, EXTENDED RELEASE ORAL DAILY
Qty: 90 TABLET | Refills: 0 | Status: SHIPPED | OUTPATIENT
Start: 2022-12-29

## 2023-01-24 DIAGNOSIS — E78.5 DYSLIPIDEMIA: ICD-10-CM

## 2023-01-24 DIAGNOSIS — E11.9 TYPE 2 DIABETES MELLITUS WITHOUT COMPLICATION, WITHOUT LONG-TERM CURRENT USE OF INSULIN (HCC): ICD-10-CM

## 2023-01-24 DIAGNOSIS — K59.1 FUNCTIONAL DIARRHEA: ICD-10-CM

## 2023-01-24 RX ORDER — CHOLESTYRAMINE 4 G/9G
1 POWDER, FOR SUSPENSION ORAL 2 TIMES DAILY WITH MEALS
Qty: 60 PACKET | Refills: 1 | Status: SHIPPED | OUTPATIENT
Start: 2023-01-24

## 2023-01-24 RX ORDER — ATORVASTATIN CALCIUM 10 MG/1
10 TABLET, FILM COATED ORAL DAILY
Qty: 30 TABLET | Refills: 1 | Status: SHIPPED | OUTPATIENT
Start: 2023-01-24

## 2023-02-16 DIAGNOSIS — E11.9 TYPE 2 DIABETES MELLITUS WITHOUT COMPLICATION, WITHOUT LONG-TERM CURRENT USE OF INSULIN (HCC): ICD-10-CM

## 2023-03-09 ENCOUNTER — OFFICE VISIT (OUTPATIENT)
Dept: FAMILY MEDICINE CLINIC | Facility: CLINIC | Age: 78
End: 2023-03-09

## 2023-03-09 VITALS
HEART RATE: 80 BPM | DIASTOLIC BLOOD PRESSURE: 74 MMHG | WEIGHT: 150 LBS | RESPIRATION RATE: 18 BRPM | SYSTOLIC BLOOD PRESSURE: 110 MMHG | BODY MASS INDEX: 22.15 KG/M2 | TEMPERATURE: 97.4 F

## 2023-03-09 DIAGNOSIS — K51.40 PSEUDOPOLYPOSIS OF COLON WITHOUT COMPLICATION, UNSPECIFIED PART OF COLON (HCC): Primary | ICD-10-CM

## 2023-03-09 DIAGNOSIS — E11.9 TYPE 2 DIABETES MELLITUS WITHOUT COMPLICATION, WITHOUT LONG-TERM CURRENT USE OF INSULIN (HCC): ICD-10-CM

## 2023-03-09 DIAGNOSIS — C61 ADENOCARCINOMA OF PROSTATE (HCC): ICD-10-CM

## 2023-03-09 DIAGNOSIS — I10 PRIMARY HYPERTENSION: ICD-10-CM

## 2023-03-09 DIAGNOSIS — E78.5 DYSLIPIDEMIA: ICD-10-CM

## 2023-03-09 DIAGNOSIS — N18.31 STAGE 3A CHRONIC KIDNEY DISEASE (HCC): ICD-10-CM

## 2023-03-09 LAB
CREAT UR-MCNC: 54 MG/DL
MICROALBUMIN UR-MCNC: 48 MG/L (ref 0–20)
MICROALBUMIN/CREAT 24H UR: 89 MG/G CREATININE (ref 0–30)
SL AMB POCT HEMOGLOBIN AIC: 8 (ref ?–6.5)

## 2023-03-09 RX ORDER — ATORVASTATIN CALCIUM 10 MG/1
10 TABLET, FILM COATED ORAL DAILY
Qty: 30 TABLET | Refills: 1 | Status: SHIPPED | OUTPATIENT
Start: 2023-03-09

## 2023-03-09 NOTE — PROGRESS NOTES
Name: Celine Fraire      :       MRN: [id number]  Encounter Provider: Sabina Kirkland MD  Encounter Date: 3/9/2023   Encounter department: [address]     1  Pseudopolyposis of colon without complication, unspecified part of colon ([facility] )  Comments: [address] [facility]  Recently had colonoscopy     2  Adenocarcinoma of prostate [facility])  Comments: Follows urologist Dr Linda David  3  Type 2 diabetes mellitus without complication, without long-term current use of insulin ([facility])  Assessment & Plan:  A1c is uncontrolled on glipizide and metformin  Will add jardiance today  He does see an ophthalmologist in [place], [place] regularly for eye exams, but does not recall the name  F/u in 3 m  Lab Results   Component Value Date    HGBA1C 8 0 (A) 2023       Orders:  -     POCT hemoglobin A1c  -     Microalbumin / creatinine urine ratio  -     Ambulatory referral to Podiatry; Future  -     Empagliflozin (JARDIANCE) 10 MG TABS tablet; Take 1 tablet (10 mg total) by mouth daily    4  Stage 3a chronic kidney disease ([facility] )  Comments:  Stable  5  Primary hypertension  Comments:  Controlled on lisinopril  6  Dyslipidemia  Comments:  Continue atorvastatin  Orders:  -     Lipid Panel with Direct LDL reflex; Future; Expected date: 2023  -     Comprehensive metabolic panel; Future; Expected date: 2023           Subjective      HPI   Lisbeth Denton presents today for routine follow up visit  He has no acute issues/concerns  Review of Systems   Constitutional: Negative  HENT: Negative  Eyes: Negative  Respiratory: Negative  Cardiovascular: Negative  Gastrointestinal: Negative  Endocrine: Negative  Genitourinary: Negative  Musculoskeletal: Negative  Neurological: Negative  Hematological: Negative  Psychiatric/Behavioral: Negative          Current Outpatient Medications on File Prior to Visit   Medication Sig   • aspirin 81 MG tablet Take 81 mg by mouth in the morning  Last dose  5/19/22  • atorvastatin (LIPITOR) 10 mg tablet Take 1 tablet (10 mg total) by mouth daily   • cholestyramine (QUESTRAN) 4 g packet Take 1 packet (4 g total) by mouth 2 (two) times a day with meals lunch, dinner   • glipiZIDE (glipiZIDE XL) 5 mg 24 hr tablet Take 1 tablet (5 mg total) by mouth daily   • lisinopril (ZESTRIL) 5 mg tablet Take 1 tablet (5 mg total) by mouth daily   • metFORMIN (GLUCOPHAGE) 1000 MG tablet Take 1 tablet (1,000 mg total) by mouth 2 (two) times a day   • Silodosin 8 MG CAPS    • tamsulosin (FLOMAX) 0 4 mg    • [DISCONTINUED] atorvastatin (LIPITOR) 10 mg tablet Take 1 tablet (10 mg total) by mouth daily (Patient not taking: Reported on 12/8/2022)   • [DISCONTINUED] omeprazole (PriLOSEC) 20 mg delayed release capsule Take 1 capsule (20 mg total) by mouth daily (Patient not taking: Reported on 3/9/2023)   • [DISCONTINUED] polyethylene glycol (GOLYTELY) 4000 mL solution Take 4,000 mL by mouth once for 1 dose       Objective     /74   Pulse 80   Temp (!) 97 4 °F (36 3 °C)   Resp 18   Wt 68 kg (150 lb)   BMI 22 15 kg/m²     Physical Exam  Constitutional:       General: He is not in acute distress  Appearance: He is well-developed  He is not diaphoretic  HENT:      Head: Normocephalic and atraumatic  Eyes:      General: No scleral icterus  Right eye: No discharge  Left eye: No discharge  Conjunctiva/sclera: Conjunctivae normal    Cardiovascular:      Rate and Rhythm: Normal rate and regular rhythm  Pulses: Normal pulses  no weak pulses  Pulmonary:      Effort: Pulmonary effort is normal  No respiratory distress  Breath sounds: Normal breath sounds  No stridor  No wheezing, rhonchi or rales  Chest:      Chest wall: No tenderness  Musculoskeletal:         General: Normal range of motion  Cervical back: Normal range of motion        Comments: Feet unkempt, toenail fungus, long nails   Feet: Right foot:      Skin integrity: No ulcer, skin breakdown, erythema, warmth, callus or dry skin  Left foot:      Skin integrity: No ulcer, skin breakdown, erythema, warmth, callus or dry skin  Skin:     General: Skin is warm  Neurological:      Mental Status: He is alert and oriented to person, place, and time  Psychiatric:         Mood and Affect: Mood normal          Behavior: Behavior normal          Thought Content: Thought content normal          Judgment: Judgment normal      Patient's shoes and socks removed  Right Foot/Ankle   Right Foot Inspection  Skin Exam: skin normal and skin intact  No dry skin, no warmth, no callus, no erythema, no maceration, no abnormal color, no pre-ulcer, no ulcer and no callus  Toe Exam: ROM and strength within normal limits  Sensory   Monofilament testing: diminished    Vascular  Capillary refills: < 3 seconds          Left Foot/Ankle  Left Foot Inspection  Skin Exam: skin normal and skin intact  No dry skin, no warmth, no erythema, no maceration, normal color, no pre-ulcer, no ulcer and no callus  Toe Exam: ROM and strength within normal limits       Sensory   Monofilament testing: diminished    Vascular  Capillary refills: < 3 seconds        Assign Risk Category  No deformity present  Loss of protective sensation  No weak pulses  Risk: 1    Edd Reyes MD

## 2023-03-09 NOTE — ASSESSMENT & PLAN NOTE
A1c is uncontrolled on glipizide and metformin  Will add jardiance today  He does see an ophthalmologist in [place], [place] regularly for eye exams, but does not recall the name  F/u in 3 m     Lab Results   Component Value Date    HGBA1C 8 0 (A) 03/09/2023

## 2023-03-24 DIAGNOSIS — I10 ESSENTIAL HYPERTENSION: ICD-10-CM

## 2023-03-24 RX ORDER — LISINOPRIL 5 MG/1
TABLET ORAL
Qty: 90 TABLET | Refills: 0 | Status: SHIPPED | OUTPATIENT
Start: 2023-03-24

## 2023-03-27 ENCOUNTER — APPOINTMENT (OUTPATIENT)
Dept: LAB | Facility: HOSPITAL | Age: 78
End: 2023-03-27
Attending: FAMILY MEDICINE

## 2023-03-27 ENCOUNTER — OFFICE VISIT (OUTPATIENT)
Dept: FAMILY MEDICINE CLINIC | Facility: CLINIC | Age: 78
End: 2023-03-27

## 2023-03-27 VITALS
RESPIRATION RATE: 16 BRPM | TEMPERATURE: 97.9 F | DIASTOLIC BLOOD PRESSURE: 62 MMHG | HEIGHT: 69 IN | WEIGHT: 148 LBS | HEART RATE: 91 BPM | SYSTOLIC BLOOD PRESSURE: 140 MMHG | OXYGEN SATURATION: 99 % | BODY MASS INDEX: 21.92 KG/M2

## 2023-03-27 DIAGNOSIS — E78.5 DYSLIPIDEMIA: ICD-10-CM

## 2023-03-27 DIAGNOSIS — D64.9 ANEMIA, UNSPECIFIED TYPE: ICD-10-CM

## 2023-03-27 DIAGNOSIS — R33.9 RETENTION OF URINE, UNSPECIFIED: ICD-10-CM

## 2023-03-27 DIAGNOSIS — I10 PRIMARY HYPERTENSION: ICD-10-CM

## 2023-03-27 DIAGNOSIS — S89.92XA INJURY OF LEFT KNEE, INITIAL ENCOUNTER: Primary | ICD-10-CM

## 2023-03-27 DIAGNOSIS — E11.9 TYPE 2 DIABETES MELLITUS WITHOUT COMPLICATION, WITHOUT LONG-TERM CURRENT USE OF INSULIN (HCC): ICD-10-CM

## 2023-03-27 LAB
ALBUMIN SERPL BCP-MCNC: 4.2 G/DL (ref 3.5–5)
ALP SERPL-CCNC: 55 U/L (ref 34–104)
ALT SERPL W P-5'-P-CCNC: 10 U/L (ref 7–52)
ANION GAP SERPL CALCULATED.3IONS-SCNC: 8 MMOL/L (ref 4–13)
AST SERPL W P-5'-P-CCNC: 15 U/L (ref 13–39)
BASOPHILS # BLD AUTO: 0.05 THOUSANDS/ÂΜL (ref 0–0.1)
BASOPHILS NFR BLD AUTO: 1 % (ref 0–1)
BILIRUB SERPL-MCNC: 0.67 MG/DL (ref 0.2–1)
BUN SERPL-MCNC: 18 MG/DL (ref 5–25)
CALCIUM SERPL-MCNC: 9.1 MG/DL (ref 8.4–10.2)
CHLORIDE SERPL-SCNC: 106 MMOL/L (ref 96–108)
CHOLEST SERPL-MCNC: 102 MG/DL
CO2 SERPL-SCNC: 26 MMOL/L (ref 21–32)
CREAT SERPL-MCNC: 1.09 MG/DL (ref 0.6–1.3)
EOSINOPHIL # BLD AUTO: 0.04 THOUSAND/ÂΜL (ref 0–0.61)
EOSINOPHIL NFR BLD AUTO: 1 % (ref 0–6)
ERYTHROCYTE [DISTWIDTH] IN BLOOD BY AUTOMATED COUNT: 14.2 % (ref 11.6–15.1)
EST. AVERAGE GLUCOSE BLD GHB EST-MCNC: 169 MG/DL
FERRITIN SERPL-MCNC: 36 NG/ML (ref 8–388)
GFR SERPL CREATININE-BSD FRML MDRD: 65 ML/MIN/1.73SQ M
GLUCOSE P FAST SERPL-MCNC: 112 MG/DL (ref 65–99)
HBA1C MFR BLD: 7.5 %
HCT VFR BLD AUTO: 38.5 % (ref 36.5–49.3)
HDLC SERPL-MCNC: 46 MG/DL
HGB BLD-MCNC: 12.1 G/DL (ref 12–17)
IMM GRANULOCYTES # BLD AUTO: 0.02 THOUSAND/UL (ref 0–0.2)
IMM GRANULOCYTES NFR BLD AUTO: 0 % (ref 0–2)
LDLC SERPL CALC-MCNC: 45 MG/DL (ref 0–100)
LYMPHOCYTES # BLD AUTO: 1.37 THOUSANDS/ÂΜL (ref 0.6–4.47)
LYMPHOCYTES NFR BLD AUTO: 16 % (ref 14–44)
MCH RBC QN AUTO: 28 PG (ref 26.8–34.3)
MCHC RBC AUTO-ENTMCNC: 31.4 G/DL (ref 31.4–37.4)
MCV RBC AUTO: 89 FL (ref 82–98)
MONOCYTES # BLD AUTO: 0.51 THOUSAND/ÂΜL (ref 0.17–1.22)
MONOCYTES NFR BLD AUTO: 6 % (ref 4–12)
NEUTROPHILS # BLD AUTO: 6.61 THOUSANDS/ÂΜL (ref 1.85–7.62)
NEUTS SEG NFR BLD AUTO: 76 % (ref 43–75)
NRBC BLD AUTO-RTO: 0 /100 WBCS
PLATELET # BLD AUTO: 212 THOUSANDS/UL (ref 149–390)
PMV BLD AUTO: 11.9 FL (ref 8.9–12.7)
POTASSIUM SERPL-SCNC: 5 MMOL/L (ref 3.5–5.3)
PROT SERPL-MCNC: 6.5 G/DL (ref 6.4–8.4)
RBC # BLD AUTO: 4.32 MILLION/UL (ref 3.88–5.62)
SODIUM SERPL-SCNC: 140 MMOL/L (ref 135–147)
TRIGL SERPL-MCNC: 55 MG/DL
WBC # BLD AUTO: 8.6 THOUSAND/UL (ref 4.31–10.16)

## 2023-03-27 RX ORDER — CEPHALEXIN 500 MG/1
500 CAPSULE ORAL EVERY 12 HOURS SCHEDULED
Qty: 20 CAPSULE | Refills: 0 | Status: SHIPPED | OUTPATIENT
Start: 2023-03-27 | End: 2023-04-06

## 2023-03-27 NOTE — PATIENT INSTRUCTIONS
Cephalexin (By mouth)   Cephalexin ([id number]-NWP-[id number])  Treats infections  This medicine is a cephalosporin antibiotic  Brand Name(s): Keflex   There may be other brand names for this medicine  When This Medicine Should Not Be Used: This medicine is not right for everyone  Do not use this medicine if you had an allergic reaction to cephalexin or another cephalosporin medicine  How to Use This Medicine:   Capsule, Tablet, Tablet for Suspension, Liquid  Your doctor will tell you how much medicine to use  Do not use more than directed  Read and follow the patient instructions that come with this medicine  Talk to your doctor or pharmacist if you have any questions  You may take your medicine with food or milk to avoid stomach upset  Oral liquid: Shake well just before use  Measure the oral liquid medicine with a marked measuring spoon, oral syringe, or medicine cup  Take all of the medicine in your prescription to clear up your infection, even if you feel better after the first few doses  Missed dose: Take a dose as soon as you remember  If it is almost time for your next dose, wait until then and take a regular dose  Do not take extra medicine to make up for a missed dose  Capsule or tablet: Store at room temperature away from heat, moisture, and direct light  Oral liquid: Store in the refrigerator for 14 days  After 14 days, throw away any unused medicine  Do not freeze  Drugs and Foods to Avoid:   Ask your doctor or pharmacist before using any other medicine, including over-the-counter medicines, vitamins, and herbal products  Some medicines and foods can affect how cephalexin works  Tell your doctor if you are also using  Metformin  Probenecid  Warnings While Using This Medicine:   Tell your doctor if you are pregnant or breastfeeding, or if you have kidney disease, liver disease, or a history of digestive problems, such as colitis  Tell your doctor if you are allergic to penicillin    This medicine can cause diarrhea  Call your doctor if the diarrhea becomes severe, does not stop, or is bloody  Do not take any medicine to stop diarrhea until you have talked to your doctor  Diarrhea can occur 2 months or more after you stop taking this medicine  Tell any doctor or dentist who treats you that you are using this medicine  This medicine may affect certain medical test results  Call your doctor if your symptoms do not improve or if they get worse  Keep all medicine out of the reach of children  Never share your medicine with anyone  Possible Side Effects While Using This Medicine:   Call your doctor right away if you notice any of these side effects: Allergic reaction: Itching or hives, swelling in your face or hands, swelling or tingling in your mouth or throat, chest tightness, trouble breathing  Blistering, peeling, red skin rash  Severe diarrhea, especially if bloody or ongoing  Severe stomach pain, vomiting  If you notice these less serious side effects, talk with your doctor:   Mild diarrhea or nausea  If you notice other side effects that you think are caused by this medicine, tell your doctor  Call your doctor for medical advice about side effects  You may report side effects to FDA at 5-472-FDA-8590  © Copyright [employer] 2022 Information is for End User's use only and may not be sold, redistributed or otherwise used for commercial purposes  The above information is an  only  It is not intended as medical advice for individual conditions or treatments  Talk to your doctor, nurse or pharmacist before following any medical regimen to see if it is safe and effective for you

## 2023-03-27 NOTE — PROGRESS NOTES
"Assessment/Plan:    1  Injury of left knee, initial encounter  -     cephalexin (KEFLEX) 500 mg capsule; Take 1 capsule (500 mg total) by mouth every 12 (twelve) hours for 10 days  -     mupirocin (BACTROBAN) 2 % ointment; Apply topically 3 (three) times a day    2  Primary hypertension  Assessment & Plan:  Stable with current regimen      3  Dyslipidemia  Assessment & Plan:  Complaint with statin and tolerating it well                Patient Instructions: Take medication with food  It is important that you take the entire course of antibiotics prescribed  May also take a probiotic of your choice to maintain healthy GI dariusz  Can take some probiotic and yogurt with the medication  Supportive care discussed and advised  Advised to RTO for any worsening and no improvement  Follow up for no improvement and worsening of conditions  Patient advised and educated when to see immediate medical care  Return if symptoms worsen or fail to improve  Future Appointments   Date Time Provider Brianna Rocha   3/29/2023  9:00 AM [facility] ROOM 2 [place] [address]   4/28/2023 10:30 [facility]   6/9/2023  9:00 AM Colleen Davis MD [facility]           Subjective:      Patient ID: Deandre Doshi is a 68 y o  male  Chief Complaint   Patient presents with   • Knee Pain     Left knee  Jodie Neumann MA           Vitals:  /62   Pulse 91   Temp 97 9 °F (36 6 °C)   Resp 16   Ht 5' 9\" (1 753 m)   Wt 67 1 kg (148 lb)   SpO2 99%   BMI 21 86 kg/m²     HPI  Patient stated that noticed sore on left knee about a week ago and not sure where he banged and hurt  Stated that no pain in area  Denies fever, chills and drainage of area  Complaint with medications and tolerating it well           The following portions of the patient's history were reviewed and updated as appropriate: allergies, current medications, past family history, past medical history, past social history, past " "surgical history and problem list       Review of Systems   HENT: Negative  Respiratory: Negative  Cardiovascular: Negative  Gastrointestinal: Negative  Skin:        As noted in HPI     Neurological: Negative  Objective:    Social History     Tobacco Use   Smoking Status Former   • Packs/day: 0 10   • Years: 2 00   • Pack years: 0 20   • Types: Cigarettes   • Start date: 1975   • Quit date: 1977   • Years since quittin 7   Smokeless Tobacco Never   Tobacco Comments    never a smoker noted in \"allscripts\" - Denied: History of tobacco use noted in \"allscripts\"        Allergies: No Known Allergies      Current Outpatient Medications   Medication Sig Dispense Refill   • aspirin 81 MG tablet Take 81 mg by mouth in the morning  Last dose  22  • atorvastatin (LIPITOR) 10 mg tablet Take 1 tablet (10 mg total) by mouth daily 30 tablet 1   • cephalexin (KEFLEX) 500 mg capsule Take 1 capsule (500 mg total) by mouth every 12 (twelve) hours for 10 days 20 capsule 0   • cholestyramine (QUESTRAN) 4 g packet Take 1 packet (4 g total) by mouth 2 (two) times a day with meals lunch, dinner 60 packet 1   • Empagliflozin (JARDIANCE) 10 MG TABS tablet Take 1 tablet (10 mg total) by mouth daily 30 tablet 1   • glipiZIDE (glipiZIDE XL) 5 mg 24 hr tablet Take 1 tablet (5 mg total) by mouth daily 90 tablet 0   • lisinopril (ZESTRIL) 5 mg tablet Take 1 tablet by mouth once daily 90 tablet 0   • metFORMIN (GLUCOPHAGE) 1000 MG tablet Take 1 tablet (1,000 mg total) by mouth 2 (two) times a day 180 tablet 1   • mupirocin (BACTROBAN) 2 % ointment Apply topically 3 (three) times a day 22 g 0   • Silodosin 8 MG CAPS      • tamsulosin (FLOMAX) 0 4 mg        No current facility-administered medications for this visit  Physical Exam  Constitutional:       Appearance: Normal appearance  HENT:      Head: Normocephalic        Nose: Nose normal    Eyes:      Conjunctiva/sclera: Conjunctivae normal  " Cardiovascular:      Rate and Rhythm: Normal rate and regular rhythm  Heart sounds: Normal heart sounds  Pulmonary:      Effort: Pulmonary effort is normal       Breath sounds: Normal breath sounds  Musculoskeletal:         General: No swelling or tenderness  Normal range of motion  Left knee: Normal range of motion  No tenderness  Skin:     General: Skin is warm and dry  Neurological:      Mental Status: He is alert and oriented to person, place, and time  Psychiatric:         Mood and Affect: Mood normal          Behavior: Behavior normal          Thought Content:  Thought content normal          Judgment: Judgment normal                        CHAYA Kirby

## 2023-03-31 LAB
IRON SATN MFR SERPL: 17 % (ref 20–50)
IRON SERPL-MCNC: 52 UG/DL (ref 65–175)
TIBC SERPL-MCNC: 307 UG/DL (ref 250–450)

## 2023-03-31 NOTE — PRE-PROCEDURE INSTRUCTIONS
My Surgical Experience    The following information was developed to assist you to prepare for your operation  What do I need to do before coming to the hospital?  • Arrange for a responsible person to drive you to and from the hospital   • Arrange care for your children at home  Children are not allowed in the recovery areas of the hospital  • Plan to wear clothing that is easy to put on and take off  If you are having shoulder surgery, wear a shirt that buttons or zippers in the front  Bathing  o Shower the evening before and the morning of your surgery with an antibacterial soap  Please refer to the Pre Op Showering Instructions for Surgery Patients Sheet   o Remove nail polish and all body piercing jewelry  o Do not shave any body part for at least 24 hours before surgery-this includes face, arms, legs and upper body  Food  o Nothing to eat or drink after midnight the night before your surgery  This includes candy and chewing gum  o Exception: If your surgery is after 12:00pm (noon), you may have clear liquids such as 7-Up®, ginger ale, apple or cranberry juice, Jell-O®, water, or clear broth until 8:00 am  o Do not drink milk or juice with pulp on the morning before surgery  o Do not drink alcohol 24 hours before surgery  Medicine  o Follow instructions you received from your surgeon about which medicines you may take on the day of surgery  o If instructed to take medicine on the morning of surgery, take pills with just a small sip of water  Call your prescribing doctor for specific infroamtion on what to do if you take insulin    What should I bring to the hospital?    Bring:  • Crutches or a walker, if you have them, for foot or knee surgery  • A list of the daily medicines, vitamins, minerals, herbals and nutritional supplements you take   Include the dosages of medicines and the time you take them each day  • Glasses, dentures or hearing aids  • Minimal clothing; you will be wearing hospital sleepwear  • Photo ID; required to verify your identity  • If you have a Living Will or Power of , bring a copy of the documents  • If you have an ostomy, bring an extra pouch and any supplies you use    Do not bring  • Medicines or inhalers  • Money, valuables or jewelry    What other information should I know about the day of surgery? • Notify your surgeons if you develop a cold, sore throat, cough, fever, rash or any other illness  • Report to the Ambulatory Surgical/Same Day Surgery Unit  • You will be instructed to stop at Registration only if you have not been pre-registered  • Inform your  fi they do not stay that they will be asked by the staff to leave a phone number where they can be reached  • Be available to be reached before surgery  In the event the operating room schedule changes, you may be asked to come in earlier or later than expected    *It is important to tell your doctor and others involved in your health care if you are taking or have been taking any non-prescription drugs, vitamins, minerals, herbals or other nutritional supplements  Any of these may interact with some food or medicines and cause a reaction      Pre-Surgery Instructions:   Medication Instructions   • aspirin 81 MG tablet Stop taking 7 days prior to surgery  • atorvastatin (LIPITOR) 10 mg tablet Take night before surgery   • cephalexin (KEFLEX) 500 mg capsule Instructions provided by MD   • cholestyramine (QUESTRAN) 4 g packet Hold day of surgery  • Empagliflozin (JARDIANCE) 10 MG TABS tablet Hold day of surgery  • glipiZIDE (glipiZIDE XL) 5 mg 24 hr tablet Hold day of surgery  • lisinopril (ZESTRIL) 5 mg tablet Hold day of surgery  • metFORMIN (GLUCOPHAGE) 1000 MG tablet Hold day of surgery  • mupirocin (BACTROBAN) 2 % ointment Hold day of surgery  • Silodosin 8 MG CAPS Hold day of surgery

## 2023-04-02 DIAGNOSIS — E11.9 TYPE 2 DIABETES MELLITUS WITHOUT COMPLICATION, UNSPECIFIED WHETHER LONG TERM INSULIN USE (HCC): ICD-10-CM

## 2023-04-03 RX ORDER — GLIPIZIDE 5 MG/1
TABLET, FILM COATED, EXTENDED RELEASE ORAL
Qty: 90 TABLET | Refills: 0 | Status: SHIPPED | OUTPATIENT
Start: 2023-04-03

## 2023-04-05 ENCOUNTER — ANESTHESIA EVENT (OUTPATIENT)
Dept: PERIOP | Facility: HOSPITAL | Age: 78
End: 2023-04-05

## 2023-04-05 NOTE — H&P
H&P Exam - Urology       Patient: Pardeep Jaramillo   :  Sex: male   MRN: [id number]     CSN: [id number]      History of Present Illness   HPI:  Pardeep Jaramillo is a 68 y o  male who presents with urinary retention history of prostate cancer status post palladium seeding status post channeling TURP now to go on Axonapprupt neurostimulator probe insertion for possible voiding history urinary retention aware 40% chance of success rate as well as risk of clean intermittent cath        Review of Systems:   Constitutional:  Negative for activity change, fever, chills and diaphoresis  HENT: Negative for hearing loss and trouble swallowing  Eyes: Negative for itching and visual disturbance  Respiratory: Negative for chest tightness and shortness of breath  Cardiovascular: Negative for chest pain, edema  Gastrointestinal: Negative for abdominal distention, na abdominal pain, constipation, diarrhea, Nausea and vomiting  Genitourinary: Negative for decreased urine volume, difficulty urinating, dysuria, enuresis, frequency, hematuria and urgency  Musculoskeletal: Negative for gait problem and myalgias  Neurological: Negative for dizziness and headaches  Hematological: Does not bruise/bleed easily         Historical Information   Past Medical History:   Diagnosis Date   • Acute pain of right shoulder 2022   • Adenocarcinoma of prostate (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, grade 2 internal hemorrhoids, 1 adenomatous polyp   • UT SPLIT AGRFT T/A/L 1ST 100 CM/&/1% BDY INFT/CHLD Left 2017    Procedure: ANTERIOR SHOULDER "EXCISION BCC- COMPLEX CLOSURE vs FLAP vs STSG;  Surgeon: Shanta Tanner MD;  Location: [facility] OR;  Service: Plastics   • NV TRANSURETHRAL INCISION PROSTATE N/A 2022    Procedure: CYSTOSCOPY, TRANSURETHRAL INCISION OF PROSTATE (TUIP); Surgeon: Fish Roach MD;  Location: [facility];  Service: Urology   • SKIN LESION EXCISION N/A 2022    Procedure: EXCISION WIDE LESION TRUNK/ABDOMEN/BACK;  Surgeon: Shanta Tanner MD;  Location: [facility];  Service: Plastics     Social History   Social History     Substance and Sexual Activity   Alcohol Use No     Social History     Substance and Sexual Activity   Drug Use No     Social History     Tobacco Use   Smoking Status Former   • Packs/day: 0 10   • Years: 2 00   • Pack years: 0 20   • Types: Cigarettes   • Start date: 1975   • Quit date: 1977   • Years since quittin 8   Smokeless Tobacco Never   Tobacco Comments    never a smoker noted in \"allscripts\" - Denied: History of tobacco use noted in \"allscripts\"      Family History:   Family History   Problem Relation Age of Onset   • Heart disease Mother    • Diabetes Brother         mellitus    • Colon polyps Brother    • Throat cancer Sister    • Colon cancer Neg Hx        Meds/Allergies   No medications prior to admission  No Known Allergies    Objective   Vitals: There were no vitals taken for this visit  Physical Exam:  General Alert awake   Normocephalic atraumatic PERRLA  Lungs clear bilaterally  Cardiac normal S1 normal S2  Abdomen soft, flank pain  Extremities no edema    No intake/output data recorded  Invasive Devices     Drain  Duration           Urethral Catheter Latex; Three way 22 Fr  313 days    Urethral Catheter Straight-tip 288 days                    Lab Results: CBC:   Lab Results   Component Value Date    WBC 8 60 2023    HGB 12 1 2023    HCT 38 5 2023    MCV 89 2023     2023    MCH 28 0 2023    MCHC 31 4 2023    RDW " 14 2 03/27/2023    MPV 11 9 03/27/2023    NRBC 0 03/27/2023     CMP:   Lab Results   Component Value Date     12/18/2017     03/27/2023     08/02/2022    CO2 26 03/27/2023    CO2 29 08/02/2022    BUN 18 03/27/2023    BUN 19 08/02/2022    CREATININE 1 09 03/27/2023    CREATININE 1 16 12/18/2017    CALCIUM 9 1 03/27/2023    CALCIUM 9 0 08/02/2022    AST 15 03/27/2023    AST 15 08/02/2022    ALT 10 03/27/2023    ALT 10 08/02/2022    ALKPHOS 55 03/27/2023    ALKPHOS 60 08/02/2022    PROT 6 5 12/18/2017    BILITOT 0 6 12/18/2017    EGFR 65 03/27/2023     Urinalysis:   Lab Results   Component Value Date    COLORU Light Yellow 06/20/2022    CLARITYU Clear 06/20/2022    SPECGRAV 1 010 06/20/2022    PHUR 6 0 06/20/2022    LEUKOCYTESUR Small (A) 06/20/2022    NITRITE Negative 06/20/2022    GLUCOSEU Negative 06/20/2022    KETONESU Negative 06/20/2022    BILIRUBINUR Negative 06/20/2022    BLOODU Moderate (A) 06/20/2022     Urine Culture: No results found for: URINECX  PSA:   Lab Results   Component Value Date    PSA <0 1 01/31/2022    PSA <0 1 02/19/2019           Assessment/ Plan:  Part 1 Axonics neurostimulator insertion      Devon Salgado MD

## 2023-04-05 NOTE — ANESTHESIA PREPROCEDURE EVALUATION
Procedure:  PART 1, INCISION FOR IMPLANTATION OF NEUROSTIMULATOR, ELECTRONIC ANALYSIS NEUROSTIMULATOR, STAGE 1 (Bladder)    Relevant Problems   CARDIO   (+) Hypertension      ENDO   (+) DMII (diabetes mellitus, type 2) (HCC)      /RENAL   (+) Adenocarcinoma of prostate (HCC)   (+) Stage 3a chronic kidney disease (HCC)      HEMATOLOGY   (+) Anemia of chronic disease      NEURO/PSYCH   (+) Personal history of colonic polyps        Physical Exam    Airway    Mallampati score: II  TM Distance: >3 FB  Neck ROM: full     Dental   No notable dental hx     Cardiovascular  Cardiovascular exam normal    Pulmonary  Pulmonary exam normal     Other Findings        Anesthesia Plan  ASA Score- 2     Anesthesia Type- IV sedation with anesthesia with ASA Monitors  Additional Monitors:   Airway Plan:           Plan Factors-Exercise tolerance (METS): >4 METS  Chart reviewed  Imaging results reviewed  Existing labs reviewed  Patient summary reviewed  Patient is not a current smoker  Induction-     Postoperative Plan-     Informed Consent- Anesthetic plan and risks discussed with patient  I personally reviewed this patient with the CRNA  Discussed and agreed on the Anesthesia Plan with the CRNA  Laurie Sheffield

## 2023-04-06 ENCOUNTER — APPOINTMENT (OUTPATIENT)
Dept: RADIOLOGY | Facility: HOSPITAL | Age: 78
End: 2023-04-06

## 2023-04-06 ENCOUNTER — ANESTHESIA (OUTPATIENT)
Dept: PERIOP | Facility: HOSPITAL | Age: 78
End: 2023-04-06

## 2023-04-06 ENCOUNTER — HOSPITAL ENCOUNTER (OUTPATIENT)
Facility: HOSPITAL | Age: 78
Setting detail: OUTPATIENT SURGERY
Discharge: HOME | End: 2023-04-06
Attending: SPECIALIST | Admitting: SPECIALIST

## 2023-04-06 VITALS
BODY MASS INDEX: 21.56 KG/M2 | SYSTOLIC BLOOD PRESSURE: 159 MMHG | TEMPERATURE: 97.2 F | HEIGHT: 69 IN | HEART RATE: 95 BPM | WEIGHT: 145.6 LBS | OXYGEN SATURATION: 100 % | DIASTOLIC BLOOD PRESSURE: 72 MMHG | RESPIRATION RATE: 18 BRPM

## 2023-04-06 DIAGNOSIS — R33.9 RETENTION OF URINE, UNSPECIFIED: ICD-10-CM

## 2023-04-06 LAB — GLUCOSE SERPL-MCNC: 119 MG/DL (ref 65–140)

## 2023-04-06 DEVICE — PERCUTANEOUS EXTENSION
Type: IMPLANTABLE DEVICE | Site: FLANK | Status: FUNCTIONAL
Brand: AXONICS

## 2023-04-06 DEVICE — TINED LEAD KIT
Type: IMPLANTABLE DEVICE | Site: FLANK | Status: FUNCTIONAL
Brand: AXONICS

## 2023-04-06 RX ORDER — FENTANYL CITRATE 50 UG/ML
INJECTION, SOLUTION INTRAMUSCULAR; INTRAVENOUS AS NEEDED
Status: DISCONTINUED | OUTPATIENT
Start: 2023-04-06 | End: 2023-04-06

## 2023-04-06 RX ORDER — MIDAZOLAM HYDROCHLORIDE 2 MG/2ML
INJECTION, SOLUTION INTRAMUSCULAR; INTRAVENOUS AS NEEDED
Status: DISCONTINUED | OUTPATIENT
Start: 2023-04-06 | End: 2023-04-06

## 2023-04-06 RX ORDER — KETAMINE HCL IN NACL, ISO-OSM 100MG/10ML
SYRINGE (ML) INJECTION AS NEEDED
Status: DISCONTINUED | OUTPATIENT
Start: 2023-04-06 | End: 2023-04-06

## 2023-04-06 RX ORDER — LIDOCAINE HYDROCHLORIDE 20 MG/ML
INJECTION, SOLUTION EPIDURAL; INFILTRATION; INTRACAUDAL; PERINEURAL AS NEEDED
Status: DISCONTINUED | OUTPATIENT
Start: 2023-04-06 | End: 2023-04-06

## 2023-04-06 RX ORDER — LIDOCAINE HYDROCHLORIDE AND EPINEPHRINE 10; 10 MG/ML; UG/ML
INJECTION, SOLUTION INFILTRATION; PERINEURAL AS NEEDED
Status: DISCONTINUED | OUTPATIENT
Start: 2023-04-06 | End: 2023-04-06 | Stop reason: HOSPADM

## 2023-04-06 RX ORDER — FENTANYL CITRATE/PF 50 MCG/ML
25 SYRINGE (ML) INJECTION
Status: DISCONTINUED | OUTPATIENT
Start: 2023-04-06 | End: 2023-04-06 | Stop reason: HOSPADM

## 2023-04-06 RX ORDER — SODIUM CHLORIDE, SODIUM LACTATE, POTASSIUM CHLORIDE, CALCIUM CHLORIDE 600; 310; 30; 20 MG/100ML; MG/100ML; MG/100ML; MG/100ML
75 INJECTION, SOLUTION INTRAVENOUS CONTINUOUS
Status: DISCONTINUED | OUTPATIENT
Start: 2023-04-06 | End: 2023-04-06 | Stop reason: HOSPADM

## 2023-04-06 RX ORDER — PROPOFOL 10 MG/ML
INJECTION, EMULSION INTRAVENOUS CONTINUOUS PRN
Status: DISCONTINUED | OUTPATIENT
Start: 2023-04-06 | End: 2023-04-06

## 2023-04-06 RX ORDER — PROPOFOL 10 MG/ML
INJECTION, EMULSION INTRAVENOUS AS NEEDED
Status: DISCONTINUED | OUTPATIENT
Start: 2023-04-06 | End: 2023-04-06

## 2023-04-06 RX ORDER — MAGNESIUM HYDROXIDE 1200 MG/15ML
LIQUID ORAL AS NEEDED
Status: DISCONTINUED | OUTPATIENT
Start: 2023-04-06 | End: 2023-04-06 | Stop reason: HOSPADM

## 2023-04-06 RX ORDER — SODIUM CHLORIDE, SODIUM LACTATE, POTASSIUM CHLORIDE, CALCIUM CHLORIDE 600; 310; 30; 20 MG/100ML; MG/100ML; MG/100ML; MG/100ML
125 INJECTION, SOLUTION INTRAVENOUS CONTINUOUS
Status: DISCONTINUED | OUTPATIENT
Start: 2023-04-06 | End: 2023-04-06

## 2023-04-06 RX ADMIN — PROPOFOL 20 MG: 10 INJECTION, EMULSION INTRAVENOUS at 09:37

## 2023-04-06 RX ADMIN — FENTANYL CITRATE 25 MCG: 50 INJECTION, SOLUTION INTRAMUSCULAR; INTRAVENOUS at 08:53

## 2023-04-06 RX ADMIN — SODIUM CHLORIDE 3 G: 9 INJECTION, SOLUTION INTRAVENOUS at 07:23

## 2023-04-06 RX ADMIN — Medication 20 MG: at 08:36

## 2023-04-06 RX ADMIN — MIDAZOLAM 1 MG: 1 INJECTION INTRAMUSCULAR; INTRAVENOUS at 08:35

## 2023-04-06 RX ADMIN — PROPOFOL 20 MG: 10 INJECTION, EMULSION INTRAVENOUS at 08:35

## 2023-04-06 RX ADMIN — FENTANYL CITRATE 25 MCG: 50 INJECTION, SOLUTION INTRAMUSCULAR; INTRAVENOUS at 09:23

## 2023-04-06 RX ADMIN — Medication 30 MG: at 09:00

## 2023-04-06 RX ADMIN — MIDAZOLAM 1 MG: 1 INJECTION INTRAMUSCULAR; INTRAVENOUS at 08:29

## 2023-04-06 RX ADMIN — FENTANYL CITRATE 25 MCG: 50 INJECTION, SOLUTION INTRAMUSCULAR; INTRAVENOUS at 08:35

## 2023-04-06 RX ADMIN — LIDOCAINE HYDROCHLORIDE 30 MG: 20 INJECTION, SOLUTION EPIDURAL; INFILTRATION; INTRACAUDAL; PERINEURAL at 08:35

## 2023-04-06 RX ADMIN — FENTANYL CITRATE 25 MCG: 50 INJECTION, SOLUTION INTRAMUSCULAR; INTRAVENOUS at 09:13

## 2023-04-06 RX ADMIN — PROPOFOL 20 MG: 10 INJECTION, EMULSION INTRAVENOUS at 09:23

## 2023-04-06 RX ADMIN — PROPOFOL 50 MCG/KG/MIN: 10 INJECTION, EMULSION INTRAVENOUS at 08:35

## 2023-04-06 RX ADMIN — SODIUM CHLORIDE, SODIUM LACTATE, POTASSIUM CHLORIDE, AND CALCIUM CHLORIDE 125 ML/HR: .6; .31; .03; .02 INJECTION, SOLUTION INTRAVENOUS at 07:23

## 2023-04-06 NOTE — PROGRESS NOTES
Reviewed discharge instructions with patient and sister who is bedside  Dressing visualized with sister  Patient and sister state understanding regarding instructions  Emptied catheter bag of 450mL urine  Assisted patient into civilian clothing and transported via wheelchair to [facility]

## 2023-04-06 NOTE — PROGRESS NOTES
Patient received from PACU alert and oriented  Vital signs WNL, denies any pain at this time  Bilateral buttock dressing in place; old drainage and bruising noted- same per PACU RN  Refreshments and warm blanket given  Ride called and message left  Call bell within reach, will continue to monitor   Hany Verma

## 2023-04-06 NOTE — ANESTHESIA POSTPROCEDURE EVALUATION
Post-Op Assessment Note    CV Status:  Stable  Pain Score: 0    Pain management: adequate     Mental Status:  Alert and awake   Hydration Status:  Euvolemic and stable   PONV Controlled:  None   Airway Patency:  Patent and adequate      Post Op Vitals Reviewed: Yes      Staff: CRNA         No notable events documented      BP      Temp      Pulse     Resp      SpO2

## 2023-04-06 NOTE — OP NOTE
OPERATIVE REPORT  PATIENT NAME: Frederick Olivera    :    MRN: [id number]  Pt Location: [facility] OR ROOM 03    SURGERY DATE: 2023    Surgeon(s) and Role:     * Hang Reyes MD - Primary    Preop Diagnosis:  Retention of urine, unspecified [R33 9]    Post-Op Diagnosis Codes:     * Retention of urine, unspecified [R33 9]    Procedure(s):  PART 1  INCISION FOR IMPLANTATION OF NEUROSTIMULATOR  ELECTRONIC ANALYSIS NEUROSTIMULATOR  STAGE 1    Specimen(s):  * No specimens in log *    Estimated Blood Loss:   Minimal    Drains:  Urethral Catheter Latex; Three way 22 Fr  (Active)   Number of days: 315       Urethral Catheter Straight-tip (Active)   Number of days: 290       Anesthesia Type:   IV Sedation with Anesthesia    Operative Indications:  Retention of urine, unspecified [R33 9]  This 66-year-old male known to me with history of prostate cancer status post palladium seeding years ago recent PSA less than 0 1 underwent TURP for urinary retention few years back with persistent high residual urines going back into retention with now neurogenic bladder not obstruction patient has had 4-week Moreno catheter changes refusing to learn clean intermittent cath at this time discussing Axonics neurostimulator insertion for possible aid in emptying but would need to learn clean intermittent cath if residuals persist patient now states willing to move forward with that aware risk of two-stage procedure anesthesia infection bleeding additionally was procedures    Operative Findings:  Axonics probe placed left  S3 foramen right buttocks cavity excellent buttocks faraz and toe flexion to be seen in the office next week for voiding trial    Complications:   None    Procedure and Technique:  After surgical consent was reviewed with the patient again including all risk benefits and alternatives the patient wished to proceed with placement of Axonics neurostimulator for chronic urinary tension    Consent was signed he was taken into the operating room and placed in the prone position pillows were placed under the lower abdomen and hips to the level and flattening of the sacrum to allow the toes to dangle freely  Intravenous Unasyn was infused at least 30 minutes prior to incision sign for infection  Ground pad was placed on the bottom of the patient's foot and was connected to the clinical programming along with the test stimulator cables  The buttocks was exposed and the draping the patient was draped and prepped in the standard fashion MAC anesthetic was induced a preoperative fluoroscopy was performed to visualize the sacral anatomy  Attention was turned to placement of the lead wire using the [employer] system the level of the S3 and the medial border of the sacral foramen was identified bilaterally using fluoroscopic control in the AP view  After ministration of 10 cc of lidocaine 1% a foramen needle was placed in the right S3 foramen superior medial aspect such that the needle was parallel to the medial border of the foramen  Lateral fluoroscopic image was then obtained to ensure the needle entry point to was approximately 1 cm above the parallel to the fusion plate of right S3  The needle was advanced such that the tip of the foramen needle was placed at the anterior border of the sacrum  The J hook was then placed on the on insulated portion of the foramen needle and stimulation applied to obtain the joint threshold amplitude there was some faraz and slight toe flexion in light of that a foramen needle was placed into the left superior medial aspect of the S3 foramen with excellent positive faraz and toe flexion once the ideal location was confirmed on the left side a small incision was made with a foramen needle to accommodate the lead wire and tunneling tool  The directional guide was placed through the foramen needle and the needle removed    The introducer was placed over the directional guide and advanced under live fluoroscopy such that the radiopaque marker was placed carefully through the sacral plate  The dilator was removed along with directional guide  Using live fluoroscopy the tined lead with the curved stylette was placed through the introducer into the electrode 3 straddled the anterior surface of the sacrum and sure the lead had a gentle downward trajectory  The stimulating clip was then placed on the distal lead and each electrode was tested observing for motor response  After satisfactory lead placement was confirmed on the left side the introducer was retracted over the lead under continuous fluoroscopy deploying the tines into the presacral space stimulation first order established using the least amount of stimulation required to obtain more response  The right foramen needle was removed  The tunneling tool was then placed inserted from the lead wire site subcutaneously to the location of the right buttocks with a 2 and half centimeter INS pocket created with 10 cc of lidocaine placed and blunt dissection total of 2 5 cm  The tunneling tool then passed the lead wire to the lateral pocket board through by the external sheath the lead was cleaned and dried and connected to the LUMOback percutaneous extension with a single screw was which was tightened under the torque wrench  The percutaneous tension was then tunneled to the contralateral left side pulled out 5 cm above the needle site to the skin with 5 cc of lidocaine placed the [place] attention was then connected to the external pulse generator  The incision was irrigated with copious amounts of Keflex antibiotic solution and sterile water and the subcu closed with 2-0 Vicryl suture the skin closed with staples 2-0 Vicryl placed into the skin site to hold in the percutaneous wire sterile dressing applied patient taken to the prone position awakened taken recovery in stable condition will undergo voiding trial in few days     I was present for the entire procedure    Patient Disposition:  PACU         SIGNATURE: Annette Acuna MD  DATE: April 6, 2023  TIME: 9:47 AM

## 2023-04-06 NOTE — PROGRESS NOTES
"Progress Note - Urology      Patient: Salma Joseph   : 1945 Sex: male   MRN: [id number]     CSN: [id number]  Unit/Bed#: OR POOL     SUBJECTIVE:   Patient in surgical preop unit no change to history physical  Aware risk of anesthesia infection bleeding additional urology procedures as well as return to the OR for port to      Objective   Vitals: /74   Pulse 56   Temp 97 7 °F (36 5 °C) (Temporal)   Resp 18   Ht 5' 9\" (1 753 m)   Wt 66 kg (145 lb 9 6 oz)   SpO2 100%   BMI 21 50 kg/m²     No intake/output data recorded        Physical Exam:   General Alert awake   Normocephalic atraumatic PERRLA  Lungs clear bilaterally  Cardiac normal S1 normal S2  Abdomen soft, flank pain  Extremities no edema      Lab Results: CBC:   Lab Results   Component Value Date    WBC 8 60 2023    HGB 12 1 2023    HCT 38 5 2023    MCV 89 2023     2023    MCH 28 0 2023    MCHC 31 4 2023    RDW 14 2 2023    MPV 11 9 2023    NRBC 0 2023     CMP:   Lab Results   Component Value Date     2017     2023     2022    CO2 26 2023    CO2 29 2022    BUN 18 2023    BUN 19 2022    CREATININE 1 09 2023    CREATININE 1 16 2017    CALCIUM 9 1 2023    CALCIUM 9 0 2022    AST 15 2023    AST 15 2022    ALT 10 2023    ALT 10 2022    ALKPHOS 55 2023    ALKPHOS 60 2022    PROT 6 5 2017    BILITOT 0 6 2017    EGFR 65 2023     Urinalysis:   Lab Results   Component Value Date    COLORU Light Yellow 2022    CLARITYU Clear 2022    SPECGRAV 1 010 2022    PHUR 6 0 2022    LEUKOCYTESUR Small (A) 2022    NITRITE Negative 2022    GLUCOSEU Negative 2022    KETONESU Negative 2022    BILIRUBINUR Negative 2022    BLOODU Moderate (A) 2022     Urine Culture: No results found for: URINECX  PSA:   Lab " Results   Component Value Date    PSA <0 1 01/31/2022    PSA <0 1 02/19/2019         Assessment/ Plan:  Part 1 Axonics neurostimulator          Christiano Sampson MD

## 2023-04-06 NOTE — DISCHARGE INSTR - AVS FIRST PAGE
#1 no heavy straining or lifting above 10 pounds for 2 weeks    #2 call office fevers, chills, or worsening blood in the urine      #3                                                                                                                                                                                                                                                                                                                                                                                                                                                                                                                                                                                                                                                                                                                                                                                                                                                                                                                                                                                                                                                                                                                                                                                                                                                                                                                                                                                                                                                                                                                                    #1 no heavy straining or lifting above 10 pounds for 2 weeks    #2 call office fevers, chills, or worsening blood       Patient to come to office Tuesday, April 11 9 AM to have Moreno removed and follow-up in the afternoon to check PVR afternoon 3 PM  Can take shower on Saturday night do not take baths do not remove dressing      Elizabeth English  Vanessa TARIQ  [address] [facility] office  [address] [place], [place] [address]asya [address]  [phone]  8:30 AM to 4:30 PM  Monday through Friday    OS office  [phone]  [address]  [phone]  1:00 to 5:00 PM  Wednesday        Serena TARIQ  [address]  [phone]  8:30 AM to 4:30 PM  Monday through Friday    OS office  [phone]  [address]  [phone]  1:00 to 5:00 PM  Wednesday

## 2023-04-13 ENCOUNTER — ANESTHESIA EVENT (OUTPATIENT)
Dept: PERIOP | Facility: HOSPITAL | Age: 78
End: 2023-04-13

## 2023-04-13 ENCOUNTER — ANESTHESIA (OUTPATIENT)
Dept: PERIOP | Facility: HOSPITAL | Age: 78
End: 2023-04-13

## 2023-04-13 RX ORDER — PROPOFOL 10 MG/ML
INJECTION, EMULSION INTRAVENOUS AS NEEDED
Status: DISCONTINUED | OUTPATIENT
Start: 2023-04-13 | End: 2023-04-13

## 2023-04-13 RX ORDER — SODIUM CHLORIDE, SODIUM LACTATE, POTASSIUM CHLORIDE, CALCIUM CHLORIDE 600; 310; 30; 20 MG/100ML; MG/100ML; MG/100ML; MG/100ML
INJECTION, SOLUTION INTRAVENOUS CONTINUOUS PRN
Status: DISCONTINUED | OUTPATIENT
Start: 2023-04-13 | End: 2023-04-13

## 2023-04-13 RX ORDER — PROPOFOL 10 MG/ML
INJECTION, EMULSION INTRAVENOUS CONTINUOUS PRN
Status: DISCONTINUED | OUTPATIENT
Start: 2023-04-13 | End: 2023-04-13

## 2023-04-13 RX ORDER — MIDAZOLAM HYDROCHLORIDE 2 MG/2ML
INJECTION, SOLUTION INTRAMUSCULAR; INTRAVENOUS AS NEEDED
Status: DISCONTINUED | OUTPATIENT
Start: 2023-04-13 | End: 2023-04-13

## 2023-04-13 RX ORDER — FENTANYL CITRATE 50 UG/ML
INJECTION, SOLUTION INTRAMUSCULAR; INTRAVENOUS AS NEEDED
Status: DISCONTINUED | OUTPATIENT
Start: 2023-04-13 | End: 2023-04-13

## 2023-04-13 RX ADMIN — FENTANYL CITRATE 25 MCG: 50 INJECTION, SOLUTION INTRAMUSCULAR; INTRAVENOUS at 09:03

## 2023-04-13 RX ADMIN — PROPOFOL 50 MCG/KG/MIN: 10 INJECTION, EMULSION INTRAVENOUS at 08:50

## 2023-04-13 RX ADMIN — SODIUM CHLORIDE 3 G: 9 INJECTION, SOLUTION INTRAVENOUS at 08:48

## 2023-04-13 RX ADMIN — FENTANYL CITRATE 25 MCG: 50 INJECTION, SOLUTION INTRAMUSCULAR; INTRAVENOUS at 08:50

## 2023-04-13 RX ADMIN — PROPOFOL 20 MG: 10 INJECTION, EMULSION INTRAVENOUS at 08:50

## 2023-04-13 RX ADMIN — MIDAZOLAM 2 MG: 1 INJECTION INTRAMUSCULAR; INTRAVENOUS at 08:43

## 2023-04-13 RX ADMIN — SODIUM CHLORIDE, SODIUM LACTATE, POTASSIUM CHLORIDE, AND CALCIUM CHLORIDE: .6; .31; .03; .02 INJECTION, SOLUTION INTRAVENOUS at 08:13

## 2023-04-13 RX ADMIN — FENTANYL CITRATE 25 MCG: 50 INJECTION, SOLUTION INTRAMUSCULAR; INTRAVENOUS at 09:15

## 2023-04-13 RX ADMIN — PROPOFOL 30 MG: 10 INJECTION, EMULSION INTRAVENOUS at 09:03

## 2023-04-13 RX ADMIN — FENTANYL CITRATE 25 MCG: 50 INJECTION, SOLUTION INTRAMUSCULAR; INTRAVENOUS at 09:26

## 2023-04-13 NOTE — ANESTHESIA PREPROCEDURE EVALUATION
Procedure:  STAGE 2 AXONIC, INSERTION OF NEUROSTIMULATOR, ELECTRONIC ANALYSIS OF NEUROSTIMULATOR (Bladder)    Relevant Problems   CARDIO   (+) Hypertension      ENDO   (+) DMII (diabetes mellitus, type 2) (HCC)      /RENAL   (+) Adenocarcinoma of prostate (HCC)   (+) Stage 3a chronic kidney disease (HCC)      HEMATOLOGY   (+) Anemia of chronic disease      NEURO/PSYCH   (+) Personal history of colonic polyps        Physical Exam    Airway    Mallampati score: II  TM Distance: >3 FB  Neck ROM: full     Dental   No notable dental hx     Cardiovascular  Cardiovascular exam normal    Pulmonary  Pulmonary exam normal     Other Findings        Anesthesia Plan  ASA Score- 3     Anesthesia Type- IV sedation with anesthesia with ASA Monitors  Additional Monitors:   Airway Plan:           Plan Factors-Exercise tolerance (METS): >4 METS  Chart reviewed  Imaging results reviewed  Existing labs reviewed  Patient summary reviewed  Patient is not a current smoker  Induction-     Postoperative Plan-     Informed Consent- Anesthetic plan and risks discussed with patient  I personally reviewed this patient with the CRNA  Discussed and agreed on the Anesthesia Plan with the CRNA  Jessica Mcdaniels

## 2023-04-13 NOTE — ANESTHESIA POSTPROCEDURE EVALUATION
Post-Op Assessment Note    CV Status:  Stable  Pain Score: 0    Pain management: adequate     Mental Status:  Sleepy and arousable   Hydration Status:  Stable   PONV Controlled:  None   Airway Patency:  Patent and adequate      Post Op Vitals Reviewed: Yes      Staff: CRNA         No notable events documented      BP     Temp      Pulse     Resp      SpO2

## 2023-04-28 ENCOUNTER — OFFICE VISIT (OUTPATIENT)
Dept: GASTROENTEROLOGY | Facility: CLINIC | Age: 78
End: 2023-04-28

## 2023-04-28 VITALS
WEIGHT: 145 LBS | DIASTOLIC BLOOD PRESSURE: 80 MMHG | HEIGHT: 69 IN | SYSTOLIC BLOOD PRESSURE: 140 MMHG | BODY MASS INDEX: 21.48 KG/M2

## 2023-04-28 DIAGNOSIS — D64.9 ANEMIA, UNSPECIFIED TYPE: Primary | ICD-10-CM

## 2023-04-28 DIAGNOSIS — K51.40 PSEUDOPOLYPOSIS OF COLON WITHOUT COMPLICATION, UNSPECIFIED PART OF COLON (HCC): ICD-10-CM

## 2023-04-28 DIAGNOSIS — Z86.010 PERSONAL HISTORY OF COLONIC POLYPS: ICD-10-CM

## 2023-04-28 DIAGNOSIS — K22.70 BARRETT'S ESOPHAGUS WITHOUT DYSPLASIA: ICD-10-CM

## 2023-04-28 DIAGNOSIS — K59.1 FUNCTIONAL DIARRHEA: ICD-10-CM

## 2023-04-28 DIAGNOSIS — K55.21 AVM (ARTERIOVENOUS MALFORMATION) OF COLON WITH HEMORRHAGE: ICD-10-CM

## 2023-04-28 RX ORDER — OMEPRAZOLE 20 MG/1
20 CAPSULE, DELAYED RELEASE ORAL DAILY
Qty: 90 CAPSULE | Refills: 3 | Status: SHIPPED | OUTPATIENT
Start: 2023-04-28

## 2023-04-28 NOTE — PROGRESS NOTES
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 noted on EGD in October 2022  On omeprazole 20 mg once daily  Plan for repeat in 5 years  - omeprazole (PriLOSEC) 20 mg delayed release capsule; Take 1 capsule (20 mg total) by mouth daily  Dispense: 90 capsule; Refill: 3    3  Personal history of colonic polyps  10/19/2022 colonoscopy with total of 6 subcentimeter polyps and one 11 mm polyp removed  6 tubular adenomas   One inflammatory type polyp in the sigmoid   Repeat recommended in 3 years          4  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  Most recent colonoscopy in 2022 without signs of radiation proctitis    5  Functional diarrhea  Currently controlled on intermittent cholestyramine use    6  Pseudopolyposis of colon without complication, unspecified part of colon ([address] )  10/19/2022 colonoscopy with total of 6 subcentimeter polyps and one 11 mm polyp removed  6 tubular adenomas   One inflammatory type polyp in the sigmoid   Repeat recommended in 3 years  Follow up appointment: 1 year  ______________________________________________________________________    Chief Complaint   Patient presents with   • Follow-up     No issues     HPI:   26-year-old past medical history of iron deficiency anemia, 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, personal history of colon polyps, Dickson's esophagus, GERD, functional diarrhea who is presenting for follow-up      Last seen in the office in December 2022  Mostly sent lab work with a hemoglobin of 12 1 anemia now resolved  Patient denies any lightheadedness dizziness nausea vomiting abdominal pain dysphagia heartburn change of bowel habits or GI bleeding  No unintentional weight loss  Historical Information   Past Medical History:   Diagnosis Date   • Acute pain of right shoulder 03/21/2022   • Adenocarcinoma of prostate ([address] [facility] )     BCC   • Anemia    • Cancer ([place] [address] )    • Chronic kidney disease    • Colon polyp    • Diabetes mellitus ([place] [address] )    • History of Lyme disease    • Hyperlipidemia    • Hypertension    • Proctitis    • Prostate cancer ([place] [address] )     Treated with radiation     Past Surgical History:   Procedure Laterality Date   • COLONOSCOPY  01/10/2018   • COLONOSCOPY  01/10/2018     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, grade 2 internal hemorrhoids, 1 adenomatous polyp   • GA INSERTION/RPLCMT PERIPHERAL/GASTRIC NPGR N/A 4/13/2023    Procedure: STAGE 2 AXONIC, INSERTION OF NEUROSTIMULATOR, ELECTRONIC ANALYSIS OF NEUROSTIMULATOR;  Surgeon: Matteo Thornton MD;  Location: [address] [place] [address];  Service: Urology   • GA SPLIT AGRFT T/A/L 1ST 100 CM/&/1% BDY INFT/CHLD Left 6/22/2017    Procedure: ANTERIOR SHOULDER EXCISION BCC- COMPLEX CLOSURE vs FLAP vs STSG;  Surgeon: Guille Jorge MD;  Location: [facility];  Service: Plastics   • GA TRANSURETHRAL INCISION PROSTATE N/A 5/26/2022    Procedure: CYSTOSCOPY, TRANSURETHRAL INCISION OF PROSTATE (TUIP);   Surgeon: Matteo Thornton MD;  Location: [address] [place] [address];  Service: Urology   • SACRAL NERVE STIMULATOR PLACEMENT N/A 4/6/2023    Procedure: PART 1, INCISION FOR IMPLANTATION OF NEUROSTIMULATOR, ELECTRONIC ANALYSIS NEUROSTIMULATOR, STAGE 1;  Surgeon: Matteo Thornton MD;  Location: [address] [place] [address];  Service: Urology   • SKIN LESION EXCISION N/A 6/16/2022    Procedure: EXCISION WIDE LESION TRUNK/ABDOMEN/BACK;  Surgeon: Guille Jorge MD;  Location[facility];  Service: Plastics     Social History "    Substance and Sexual Activity   Alcohol Use No     Social History     Substance and Sexual Activity   Drug Use No     Social History     Tobacco Use   Smoking Status Former   • Packs/day: 0 10   • Years: 2 00   • Pack years: 0 20   • Types: Cigarettes   • Start date: 1975   • Quit date: 1977   • Years since quittin 8   Smokeless Tobacco Never   Tobacco Comments    never a smoker noted in \"allscripts\" - Denied: History of tobacco use noted in \"allscripts\"      Family History   Problem Relation Age of Onset   • Heart disease Mother    • Diabetes Brother         mellitus    • Colon polyps Brother    • Throat cancer Sister    • Colon cancer Neg Hx          Current Outpatient Medications:   •  aspirin 81 MG tablet  •  atorvastatin (LIPITOR) 10 mg tablet  •  cholestyramine (QUESTRAN) 4 g packet  •  Empagliflozin (JARDIANCE) 10 MG TABS tablet  •  glipiZIDE (GLUCOTROL XL) 5 mg 24 hr tablet  •  metFORMIN (GLUCOPHAGE) 1000 MG tablet  •  mupirocin (BACTROBAN) 2 % ointment  •  omeprazole (PriLOSEC) 20 mg delayed release capsule  •  Silodosin 8 MG CAPS  •  tamsulosin (FLOMAX) 0 4 mg  •  lisinopril (ZESTRIL) 5 mg tablet  No Known Allergies  Reviewed medications and allergies and updated as indicated    PHYSICAL EXAM:    Blood pressure 140/80, height 5' 9\" (1 753 m), weight 65 8 kg (145 lb)  Body mass index is 21 41 kg/m²  General Appearance: NAD, cooperative, alert  Eyes: Anicteric  GI:  Soft, non-tender, non-distended; normal bowel sounds; no masses, no organomegaly   Rectal: Deferred  Musculoskeletal: No edema    Skin:  No jaundice    Lab Results:   Lab Results   Component Value Date    WBC 8 60 2023    WBC 5 86 2022    WBC 5 3 2022    HGB 12 1 2023    HGB 9 7 (L) 2022    HGB 9 3 (L) 2022    MCV 89 2023     2023     2022     2022     Lab Results   Component Value Date     2017    K 5 0 2023     2023 " CO2 26 03/27/2023    BUN 18 03/27/2023    CREATININE 1 09 03/27/2023    GLUF 112 (H) 03/27/2023    CALCIUM 9 1 03/27/2023    AST 15 03/27/2023    AST 15 08/02/2022    AST 15 01/31/2022    ALT 10 03/27/2023    ALT 10 08/02/2022    ALT 9 01/31/2022    ALKPHOS 55 03/27/2023    ALKPHOS 60 08/02/2022    ALKPHOS 48 01/31/2022    PROT 6 5 12/18/2017    BILITOT 0 6 12/18/2017    BILITOT 0 5 06/16/2017    BILITOT 0 5 12/15/2016    EGFR 65 03/27/2023     Lab Results   Component Value Date    IRON 52 (L) 03/27/2023    TIBC 307 03/27/2023    FERRITIN 36 03/27/2023     No results found for: LIPASE    Radiology Results:   XR sacrum and coccyx    Result Date: 4/10/2023  Narrative: C-ARM - INDICATION: R33 9: Retention of urine, unspecified  Procedure guidance  COMPARISON:  01/28/2011 TECHNIQUE: FLUOROSCOPY TIME:   45 7 seconds 2 FLUOROSCOPIC IMAGES FINDINGS: Fluoroscopic guidance provided for procedure guidance  A neurostimulator is inserted  Osseous and soft tissue detail limited by technique  Impression: Fluoroscopic guidance provided for procedure guidance  Please refer to the separate procedure notes for additional details   Workstation performed: [id number]

## 2023-04-29 ENCOUNTER — HOSPITAL ENCOUNTER (EMERGENCY)
Facility: HOSPITAL | Age: 78
Discharge: HOME | End: 2023-04-29
Attending: EMERGENCY MEDICINE | Admitting: EMERGENCY MEDICINE

## 2023-04-29 VITALS
DIASTOLIC BLOOD PRESSURE: 63 MMHG | RESPIRATION RATE: 20 BRPM | OXYGEN SATURATION: 99 % | HEART RATE: 56 BPM | TEMPERATURE: 98 F | SYSTOLIC BLOOD PRESSURE: 137 MMHG

## 2023-04-29 DIAGNOSIS — R33.9 URINARY RETENTION: Primary | ICD-10-CM

## 2023-04-29 LAB
ANION GAP SERPL CALCULATED.3IONS-SCNC: 10 MMOL/L (ref 4–13)
BASOPHILS # BLD AUTO: 0.05 THOUSANDS/ΜL (ref 0–0.1)
BASOPHILS NFR BLD AUTO: 1 % (ref 0–1)
BUN SERPL-MCNC: 16 MG/DL (ref 5–25)
CALCIUM SERPL-MCNC: 8.7 MG/DL (ref 8.4–10.2)
CHLORIDE SERPL-SCNC: 105 MMOL/L (ref 96–108)
CO2 SERPL-SCNC: 23 MMOL/L (ref 21–32)
CREAT SERPL-MCNC: 1.07 MG/DL (ref 0.6–1.3)
EOSINOPHIL # BLD AUTO: 0.1 THOUSAND/ΜL (ref 0–0.61)
EOSINOPHIL NFR BLD AUTO: 1 % (ref 0–6)
ERYTHROCYTE [DISTWIDTH] IN BLOOD BY AUTOMATED COUNT: 14.1 % (ref 11.6–15.1)
GFR SERPL CREATININE-BSD FRML MDRD: 66 ML/MIN/1.73SQ M
GLUCOSE SERPL-MCNC: 156 MG/DL (ref 65–140)
HCT VFR BLD AUTO: 38.1 % (ref 36.5–49.3)
HGB BLD-MCNC: 12.4 G/DL (ref 12–17)
IMM GRANULOCYTES # BLD AUTO: 0.03 THOUSAND/UL (ref 0–0.2)
IMM GRANULOCYTES NFR BLD AUTO: 0 % (ref 0–2)
LYMPHOCYTES # BLD AUTO: 1.35 THOUSANDS/ΜL (ref 0.6–4.47)
LYMPHOCYTES NFR BLD AUTO: 15 % (ref 14–44)
MCH RBC QN AUTO: 28.6 PG (ref 26.8–34.3)
MCHC RBC AUTO-ENTMCNC: 32.5 G/DL (ref 31.4–37.4)
MCV RBC AUTO: 88 FL (ref 82–98)
MONOCYTES # BLD AUTO: 0.5 THOUSAND/ΜL (ref 0.17–1.22)
MONOCYTES NFR BLD AUTO: 6 % (ref 4–12)
NEUTROPHILS # BLD AUTO: 7.03 THOUSANDS/ΜL (ref 1.85–7.62)
NEUTS SEG NFR BLD AUTO: 77 % (ref 43–75)
NRBC BLD AUTO-RTO: 0 /100 WBCS
PLATELET # BLD AUTO: 243 THOUSANDS/UL (ref 149–390)
PMV BLD AUTO: 11.1 FL (ref 8.9–12.7)
POTASSIUM SERPL-SCNC: 4.2 MMOL/L (ref 3.5–5.3)
RBC # BLD AUTO: 4.34 MILLION/UL (ref 3.88–5.62)
SODIUM SERPL-SCNC: 138 MMOL/L (ref 135–147)
WBC # BLD AUTO: 9.06 THOUSAND/UL (ref 4.31–10.16)

## 2023-04-29 RX ORDER — LIDOCAINE HYDROCHLORIDE 20 MG/ML
1 JELLY TOPICAL ONCE
Status: COMPLETED | OUTPATIENT
Start: 2023-04-29 | End: 2023-04-29

## 2023-04-29 RX ORDER — ONDANSETRON 2 MG/ML
4 INJECTION INTRAMUSCULAR; INTRAVENOUS ONCE
Status: COMPLETED | OUTPATIENT
Start: 2023-04-29 | End: 2023-04-29

## 2023-04-29 RX ORDER — SULFAMETHOXAZOLE AND TRIMETHOPRIM 800; 160 MG/1; MG/1
1 TABLET ORAL EVERY 12 HOURS SCHEDULED
Qty: 6 TABLET | Refills: 0 | Status: SHIPPED | OUTPATIENT
Start: 2023-04-29 | End: 2023-05-02

## 2023-04-29 RX ORDER — HYDROMORPHONE HCL/PF 1 MG/ML
1 SYRINGE (ML) INJECTION ONCE
Status: COMPLETED | OUTPATIENT
Start: 2023-04-29 | End: 2023-04-29

## 2023-04-29 RX ORDER — LIDOCAINE HYDROCHLORIDE 20 MG/ML
JELLY TOPICAL
Status: COMPLETED
Start: 2023-04-29 | End: 2023-04-29

## 2023-04-29 RX ORDER — LIDOCAINE HYDROCHLORIDE 20 MG/ML
1 JELLY TOPICAL ONCE
Status: DISCONTINUED | OUTPATIENT
Start: 2023-04-29 | End: 2023-04-29

## 2023-04-29 RX ORDER — CEFTRIAXONE 1 G/50ML
1000 INJECTION, SOLUTION INTRAVENOUS ONCE
Status: COMPLETED | OUTPATIENT
Start: 2023-04-29 | End: 2023-04-29

## 2023-04-29 RX ORDER — MORPHINE SULFATE 4 MG/ML
4 INJECTION, SOLUTION INTRAMUSCULAR; INTRAVENOUS ONCE
Status: COMPLETED | OUTPATIENT
Start: 2023-04-29 | End: 2023-04-29

## 2023-04-29 RX ADMIN — ONDANSETRON 4 MG: 2 INJECTION INTRAMUSCULAR; INTRAVENOUS at 14:09

## 2023-04-29 RX ADMIN — CEFTRIAXONE 1000 MG: 1 INJECTION, SOLUTION INTRAVENOUS at 15:35

## 2023-04-29 RX ADMIN — LIDOCAINE HYDROCHLORIDE 1 APPLICATION.: 20 JELLY TOPICAL at 14:10

## 2023-04-29 RX ADMIN — LIDOCAINE HYDROCHLORIDE 5 APPLICATION.: 20 JELLY TOPICAL at 14:09

## 2023-04-29 RX ADMIN — HYDROMORPHONE HYDROCHLORIDE 1 MG: 1 INJECTION, SOLUTION INTRAMUSCULAR; INTRAVENOUS; SUBCUTANEOUS at 15:14

## 2023-04-29 RX ADMIN — MORPHINE SULFATE 4 MG: 4 INJECTION INTRAVENOUS at 14:08

## 2023-04-29 NOTE — ED PROVIDER NOTES
History  Chief Complaint   Patient presents with    Difficulty Urinating     RECENT PROCEDURE WITH DR HERNANDEZ ,UNABLE TO URINATE TODAY, VERY DIFFICULT TO UNDERSTAND PT     77yoM recent implantation of bladder stimulatory by Dr Dianelys Garcia presents with inability to urinate x 24 hrs  No fever/chills or back pain  Prior to Admission Medications   Prescriptions Last Dose Informant Patient Reported? Taking?    Empagliflozin (JARDIANCE) 10 MG TABS tablet   No No   Sig: Take 1 tablet (10 mg total) by mouth daily   Silodosin 8 MG CAPS   Yes No   aspirin 81 MG tablet   Yes No   Sig: Take 81 mg by mouth daily Last dose  3/29/23   atorvastatin (LIPITOR) 10 mg tablet   No No   Sig: Take 1 tablet (10 mg total) by mouth daily   cholestyramine (QUESTRAN) 4 g packet   No No   Sig: Take 1 packet (4 g total) by mouth 2 (two) times a day with meals lunch, dinner   glipiZIDE (GLUCOTROL XL) 5 mg 24 hr tablet   No No   Sig: Take 1 tablet by mouth once daily   lisinopril (ZESTRIL) 5 mg tablet   No No   Sig: Take 1 tablet by mouth once daily   Patient taking differently: 10 mg   metFORMIN (GLUCOPHAGE) 1000 MG tablet   No No   Sig: Take 1 tablet (1,000 mg total) by mouth 2 (two) times a day   mupirocin (BACTROBAN) 2 % ointment   No No   Sig: Apply topically 3 (three) times a day   omeprazole (PriLOSEC) 20 mg delayed release capsule   No No   Sig: Take 1 capsule (20 mg total) by mouth daily   tamsulosin (FLOMAX) 0 4 mg   Yes No      Facility-Administered Medications: None       Past Medical History:   Diagnosis Date    Acute pain of right shoulder 03/21/2022    [facility]en[facility] )     BCC    Anemia     [facility]     Chronic kidney disease     Colon polyp     Diabetes mellitus ([facility] )     History of Lyme disease     Hyperlipidemia     Hypertension     Proctitis     Prostate cancer ([facility] )     Treated with radiation       Past Surgical History:   Procedure Laterality Date    COLONOSCOPY  01/10/2018   Taylor Elias "COLONOSCOPY  01/10/2018     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, grade 2 internal hemorrhoids, 1 adenomatous polyp    LA INSERTION/RPLCMT PERIPHERAL/GASTRIC NPGR N/A 2023    Procedure: STAGE 2 AXONIC, INSERTION OF NEUROSTIMULATOR, ELECTRONIC ANALYSIS OF NEUROSTIMULATOR;  Surgeon: Anastacia Koch MD;  Location: [address] [place] [address];  Service: Urology    LA SPLIT AGRFT T/A/L 1ST 100 CM/&/1% BDY INFT/CHLD Left 2017    Procedure: ANTERIOR SHOULDER EXCISION BCC- COMPLEX CLOSURE vs FLAP vs STSG;  Surgeon: Estevan Shultz MD;  Location: [facility];  Service: Plastics    LA TRANSURETHRAL INCISION PROSTATE N/A 2022    Procedure: CYSTOSCOPY, TRANSURETHRAL INCISION OF PROSTATE (TUIP); Surgeon: Anastacia Koch MD;  Location: [address] [place] [address];  Service: Urology    SACRAL NERVE STIMULATOR PLACEMENT N/A 2023    Procedure: PART 1, INCISION FOR IMPLANTATION OF NEUROSTIMULATOR, ELECTRONIC ANALYSIS NEUROSTIMULATOR, STAGE 1;  Surgeon: Anastacia Koch MD;  Location: [address] [place] [address];  Service: Urology    SKIN LESION EXCISION N/A 2022    Procedure: EXCISION WIDE LESION TRUNK/ABDOMEN/BACK;  Surgeon: Estevan Shultz MD;  Location: [facility];  Service: Plastics       Family History   Problem Relation Age of Onset    Heart disease Mother     Diabetes Brother         mellitus     Colon polyps Brother     Throat cancer Sister     Colon cancer Neg Hx      I have reviewed and agree with the history as documented      E-Cigarette/Vaping    E-Cigarette Use Never User      E-Cigarette/Vaping Substances    Nicotine No     THC No     CBD No     Flavoring No     Other No     Unknown No      Social History     Tobacco Use    Smoking status: Former     Packs/day: 0 10     Years: 2 00     Pack years: 0 20     Types: Cigarettes     Start date: 1975     Quit date: 1977     Years since quittin 8    Smokeless tobacco: Never    Tobacco comments:     never a smoker noted in \"allscripts\" - Denied: History of " "tobacco use noted in \"allscripts\"    Vaping Use    Vaping Use: Never used   Substance Use Topics    Alcohol use: No    Drug use: No       Review of Systems   Constitutional: Negative for fever  Gastrointestinal: Positive for abdominal distention and abdominal pain  Physical Exam  Physical Exam  Vitals reviewed  Constitutional:       General: He is in acute distress (mild painful)  Appearance: He is well-developed  HENT:      Head: Normocephalic and atraumatic  Right Ear: External ear normal       Left Ear: External ear normal       Nose: Nose normal  No rhinorrhea  Mouth/Throat:      Mouth: Mucous membranes are moist    Eyes:      Conjunctiva/sclera: Conjunctivae normal    Cardiovascular:      Rate and Rhythm: Normal rate and regular rhythm  Pulmonary:      Effort: Pulmonary effort is normal       Breath sounds: Normal breath sounds  No wheezing or rales  Abdominal:      General: There is distension  Palpations: Abdomen is soft  Tenderness: There is abdominal tenderness  Musculoskeletal:      Cervical back: Neck supple  Right lower leg: No edema  Left lower leg: No edema  Skin:     General: Skin is warm and dry  Neurological:      Mental Status: He is alert and oriented to person, place, and time  Psychiatric:         Mood and Affect: Mood normal          Vital Signs  ED Triage Vitals [04/29/23 1252]   Temperature Pulse Respirations Blood Pressure SpO2   98 °F (36 7 °C) 65 (!) 24 (!) 215/87 99 %      Temp Source Heart Rate Source Patient Position - Orthostatic VS BP Location FiO2 (%)   Tympanic Monitor Sitting Right arm --      Pain Score       10 - Worst Possible Pain           Vitals:    04/29/23 1252 04/29/23 1537   BP: (!) 215/87 136/63   Pulse: 65 56   Patient Position - Orthostatic VS: Sitting Lying         Visual Acuity      ED Medications  Medications   lidocaine (URO-JET) 2 % urethral/mucosal gel **ADS Override Pull** (5 application    Given " 4/29/23 1409)   morphine injection 4 mg (4 mg Intravenous Given 4/29/23 1408)   ondansetron (ZOFRAN) injection 4 mg (4 mg Intravenous Given 4/29/23 1409)   lidocaine (XYLOCAINE) 2 % topical gel 1 application  (1 application   Urethral Given 4/29/23 1410)   HYDROmorphone (DILAUDID) injection 1 mg (1 mg Intravenous Given 4/29/23 1514)   cefTRIAXone (ROCEPHIN) IVPB (premix in dextrose) 1,000 mg 50 mL (0 mg Intravenous Stopped 4/29/23 1626)       Diagnostic Studies  Results Reviewed     Procedure Component Value Units Date/Time    Basic metabolic panel [[id number]]  (Abnormal) Collected: 04/29/23 1424    Lab Status: Final result Specimen: Blood from Arm, Left Updated: 04/29/23 1450     Sodium 138 mmol/L      Potassium 4 2 mmol/L      Chloride 105 mmol/L      CO2 23 mmol/L      ANION GAP 10 mmol/L      BUN 16 mg/dL      Creatinine 1 07 mg/dL      Glucose 156 mg/dL      Calcium 8 7 mg/dL      eGFR 66 ml/min/1 73sq m     Narrative:      [place] guidelines for Chronic Kidney Disease (CKD):     Stage 1 with normal or high GFR (GFR > 90 mL/min/1 73 square meters)    Stage 2 Mild CKD (GFR = 60-89 mL/min/1 73 square meters)    Stage 3A Moderate CKD (GFR = 45-59 mL/min/1 73 square meters)    Stage 3B Moderate CKD (GFR = 30-44 mL/min/1 73 square meters)    Stage 4 Severe CKD (GFR = 15-29 mL/min/1 73 square meters)    Stage 5 End Stage CKD (GFR <15 mL/min/1 73 square meters)  Note: GFR calculation is accurate only with a steady state creatinine    CBC and differential [[id number]]  (Abnormal) Collected: 04/29/23 1424    Lab Status: Final result Specimen: Blood from Arm, Left Updated: 04/29/23 1429     WBC 9 06 Thousand/uL      RBC 4 34 Million/uL      Hemoglobin 12 4 g/dL      Hematocrit 38 1 %      MCV 88 fL      MCH 28 6 pg      MCHC 32 5 g/dL      RDW 14 1 %      MPV 11 1 fL      Platelets 036 Thousands/uL      nRBC 0 /100 WBCs      Neutrophils Relative 77 %      Immat GRANS % 0 %      Lymphocytes Relative 15 %      Monocytes Relative 6 %      Eosinophils Relative 1 %      Basophils Relative 1 %      Neutrophils Absolute 7 03 Thousands/µL      Immature Grans Absolute 0 03 Thousand/uL      Lymphocytes Absolute 1 35 Thousands/µL      Monocytes Absolute 0 50 Thousand/µL      Eosinophils Absolute 0 10 Thousand/µL      Basophils Absolute 0 05 Thousands/µL                  No orders to display              Procedures  Procedures         ED Course                                             Medical Decision Making  RN and myself unable to pass boyce  Dr Milo Walker consulted and dilated a urethral stricture under cysto guidance, placing a new boyce  Pt given bactrim x 3 days after 1 dose IV ceftriaxone here  Will fu with urology 1 wk  Amount and/or Complexity of Data Reviewed  Labs: ordered  Risk  Prescription drug management  Disposition  Final diagnoses:   Urinary retention     Time reflects when diagnosis was documented in both MDM as applicable and the Disposition within this note     Time User Action Codes Description Comment    4/29/2023  3:25 PM Dotty Karen Shakira [R33 9] Urinary retention       ED Disposition     ED Disposition   Discharge    Condition   Stable    Date/Time   Sat Apr 29, 2023  3:25 PM    Comment   Martell Santana discharge to home/self care  Follow-up Information     Follow up With Specialties Details Why Contact Info    Denise Goddard MD Urology In 1 week  [address]  [phone]            Patient's Medications   Discharge Prescriptions    SULFAMETHOXAZOLE-TRIMETHOPRIM (BACTRIM DS) 800-160 MG PER TABLET    Take 1 tablet by mouth every 12 (twelve) hours for 3 days       Start Date: 4/29/2023 End Date: 5/2/2023       Order Dose: 1 tablet       Quantity: 6 tablet    Refills: 0       No discharge procedures on file      PDMP Review     None          ED Provider  Electronically Signed by           Rebel Jacobson DO  04/29/23 1647

## 2023-04-29 NOTE — ED NOTES
Attempted to place a Moreno catheter but had a lot of resistance when  hitting the prostate  Then  attempted  to place a 16 Malay  coude cath  and again hit resistance  Dr Monica Stewart came to bedside  We used a uro jet and Dr Navarro attempted to place a new #16  coude cath and had no success  Will call urology        Medardo Koo RN  04/29/23 0432

## 2023-04-29 NOTE — CONSULTS
H&P Exam - Urology       Patient: Rosa Isela Walton   :  Sex: male   MRN: [id number]     CSN: [id number]      History of Present Illness   HPI:  Rosa Isela Walton is a 68 y o  male well-known to me history of prostate cancer status post palladium seeding urinary retention status post channeling TURP recent chronic coudé 14 [place] catheter changed at 4-week intervals undergoing Axonics neurostimulator insertion for urinary retention having his Moreno catheter removed this past Wednesday,  seen in the office voiding 4 or 5 times with postvoid residuals less than 100 cc presenting to [address] [place] [address] ER today stating he has been able to void for the last 8 hours attempts by staff to replace catheter unsuccessful post void residual greater then 1000 cc attempt by myself to place [address] coudé unsuccessful        Review of Systems:   Constitutional:  Negative for activity change, fever, chills and diaphoresis  HENT: Negative for hearing loss and trouble swallowing  Eyes: Negative for itching and visual disturbance  Respiratory: Negative for chest tightness and shortness of breath  Cardiovascular: Negative for chest pain, edema  Gastrointestinal: Negative for abdominal distention, na abdominal pain, constipation, diarrhea, Nausea and vomiting  Genitourinary: Negative for decreased urine volume, difficulty urinating, dysuria, enuresis, frequency, hematuria and urgency  Musculoskeletal: Negative for gait problem and myalgias  Neurological: Negative for dizziness and headaches  Hematological: Does not bruise/bleed easily         Historical Information   Past Medical History:   Diagnosis Date    Acute pain of right shoulder 2022    Adenocarcinoma of prostate ([facility] )     BCC    Anemia     Cancer ([place] [address] )     Chronic kidney disease     Colon polyp     Diabetes mellitus ([facility] )     History of Lyme disease     Hyperlipidemia     Hypertension     Proctitis     Prostate cancer ([place] [address] ) "   Treated with radiation     Past Surgical History:   Procedure Laterality Date    COLONOSCOPY  01/10/2018    COLONOSCOPY  01/10/2018     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, grade 2 internal hemorrhoids, 1 adenomatous polyp    [place] INSERTION/RPLCMT PERIPHERAL/GASTRIC NPGR N/A 2023    Procedure: STAGE 2 AXONIC, INSERTION OF NEUROSTIMULATOR, ELECTRONIC ANALYSIS OF NEUROSTIMULATOR;  Surgeon: Slick Yuen MD;  Location: [address] [place] [address];  Service: Urology    [place] SPLIT AGRFT T/A/L 1ST 100 CM/&/1% BDY INFT/CHLD Left 2017    Procedure: ANTERIOR SHOULDER EXCISION BCC- COMPLEX CLOSURE vs FLAP vs STSG;  Surgeon: Sherwin Mosquera MD;  Location:  MAIN OR;  Service: Plastics    [place] TRANSURETHRAL INCISION PROSTATE N/A 2022    Procedure: CYSTOSCOPY, TRANSURETHRAL INCISION OF PROSTATE (TUIP);   Surgeon: Slick Yuen MD;  Location: [address] [place] [address];  Service: Urology    Daphnie Yee [address] N/A 2023    Procedure: PART 1, INCISION FOR IMPLANTATION OF NEUROSTIMULATOR, ELECTRONIC ANALYSIS NEUROSTIMULATOR, STAGE 1;  Surgeon: Slick Yuen MD;  Location: [address] [place] [address];  Service: Urology    SKIN LESION EXCISION N/A 2022    Procedure: EXCISION WIDE LESION TRUNK/ABDOMEN/BACK;  Surgeon: Sherwin Mosquera MD;  Location: [facility];  Service: Plastics     Social History   Social History     Substance and Sexual Activity   Alcohol Use No     Social History     Substance and Sexual Activity   Drug Use No     Social History     Tobacco Use   Smoking Status Former    Packs/day: 0 10    Years: 2 00    Pack years: 0 20    Types: Cigarettes    Start date: 1975   [facility] Quit date: 1977    Years since quittin 8   Smokeless Tobacco Never   Tobacco Comments    never a smoker noted in \"allscripts\" - Denied: History of tobacco use noted in \"allscripts\"      Family History:   Family History   Problem Relation Age of Onset    Heart disease Mother     Diabetes Brother         mellitus     Colon " polyps Brother     Throat cancer Sister     Colon cancer Neg Hx        Meds/Allergies   (Not in a hospital admission)    No Known Allergies    Objective   Vitals: BP (!) 215/87 (BP Location: Right arm)   Pulse 65   Temp 98 °F (36 7 °C) (Tympanic)   Resp (!) 24   SpO2 99%     Physical Exam:  General Alert awake   Normocephalic atraumatic PERRLA  Lungs clear bilaterally  Cardiac normal S1 normal S2  Abdomen soft, flank pain  Distended bladder  Extremities no edema    No intake/output data recorded  Invasive Devices     Peripheral Intravenous Line  Duration           Peripheral IV 04/29/23 Left;Ventral (anterior) Forearm <1 day          Drain  Duration           Urethral Catheter Latex; Three way 22 Fr   337 days    Urethral Catheter Straight-tip 312 days                    Lab Results: CBC:   Lab Results   Component Value Date    WBC 9 06 04/29/2023    HGB 12 4 04/29/2023    HCT 38 1 04/29/2023    MCV 88 04/29/2023     04/29/2023    MCH 28 6 04/29/2023    MCHC 32 5 04/29/2023    RDW 14 1 04/29/2023    MPV 11 1 04/29/2023    NRBC 0 04/29/2023     CMP:   Lab Results   Component Value Date     12/18/2017     04/29/2023     08/02/2022    CO2 23 04/29/2023    CO2 29 08/02/2022    BUN 16 04/29/2023    BUN 19 08/02/2022    CREATININE 1 07 04/29/2023    CREATININE 1 16 12/18/2017    CALCIUM 8 7 04/29/2023    CALCIUM 9 0 08/02/2022    AST 15 03/27/2023    AST 15 08/02/2022    ALT 10 03/27/2023    ALT 10 08/02/2022    ALKPHOS 55 03/27/2023    ALKPHOS 60 08/02/2022    PROT 6 5 12/18/2017    BILITOT 0 6 12/18/2017    EGFR 66 04/29/2023     Urinalysis:   Lab Results   Component Value Date    COLORU Light Yellow 06/20/2022    CLARITYU Clear 06/20/2022    SPECGRAV 1 010 06/20/2022    PHUR 6 0 06/20/2022    LEUKOCYTESUR Small (A) 06/20/2022    NITRITE Negative 06/20/2022    GLUCOSEU Negative 06/20/2022    KETONESU Negative 06/20/2022    BILIRUBINUR Negative 06/20/2022    BLOODU Moderate (A) 06/20/2022 Urine Culture: No results found for: URINECX  PSA:   Lab Results   Component Value Date    PSA <0 1 01/31/2022    PSA <0 1 02/19/2019   Initial flex cystoscopy done in the ER confirming proximal bulbar stricture wire passed through unable to pass [facility] over wire  Filiform follower dilation from 12 [facility] to [address]  Repeat flex cystoscopy confirms patent open proximal urethra posterior urethra wire passed through 12 [facility] past left the bag drainage with 700 cc urine drained  Patient to be discharged home with Moreno  Will need formal cystoscopy DVIU to open stricture in light of attempting voiding trial again          Assessment/ Plan:  Urinary retention  Urethral stricture      Edith Garduno MD

## 2023-04-29 NOTE — DISCHARGE INSTRUCTIONS
Dr Abad Hopkins had to dilate your urethra due to a stricture (scar tissue) to get a new catheter in  Keep in place until you see him in the office

## 2023-05-04 ENCOUNTER — HOSPITAL ENCOUNTER (EMERGENCY)
Facility: HOSPITAL | Age: 78
Discharge: HOME | End: 2023-05-04
Attending: EMERGENCY MEDICINE

## 2023-05-04 VITALS
WEIGHT: 138.6 LBS | TEMPERATURE: 97.2 F | DIASTOLIC BLOOD PRESSURE: 67 MMHG | BODY MASS INDEX: 20.47 KG/M2 | OXYGEN SATURATION: 100 % | SYSTOLIC BLOOD PRESSURE: 160 MMHG | HEART RATE: 60 BPM | RESPIRATION RATE: 18 BRPM

## 2023-05-04 DIAGNOSIS — K59.00 CONSTIPATION: Primary | ICD-10-CM

## 2023-05-04 LAB — GLUCOSE SERPL-MCNC: 120 MG/DL (ref 65–140)

## 2023-05-04 RX ORDER — DOCUSATE SODIUM 100 MG/1
100 CAPSULE, LIQUID FILLED ORAL 2 TIMES DAILY
Qty: 20 CAPSULE | Refills: 0 | Status: SHIPPED | OUTPATIENT
Start: 2023-05-04 | End: 2023-05-14

## 2023-05-04 NOTE — ED PROVIDER NOTES
History  Chief Complaint   Patient presents with    Abdominal Pain     States he awoke at 0530 with pain in his abdomen  States nausea, no vomiting  States had not had a BM for a few days  States while waiting in waiting room he had a large BM  States no pain at present  Patient walked to ED  Patient has 4 layers of jackets  Denies being homeless  States his doctor started him on a new pill for his diabetes  FSBS 120 in triage      26-year-old male presenting today for evaluation of constipation of the past 2 to 3 days  Woke up this morning with worsening pain  Came to the emergency department to be evaluated and while in the waiting room was able to have a bowel movement successfully and had complete relief of his pain  States that this typically happens on occasion  Has not taken any medication for his symptoms  He is passing gas  No longer has pain  Eating and drinking well go to the bathroom otherwise regularly states that his Moreno catheter continues to drain without difficulty  Denies nausea, vomiting, fevers, flank pain  Differential includes but is not limited to constipation, viral illness, colitis  Prior to Admission Medications   Prescriptions Last Dose Informant Patient Reported? Taking?    Empagliflozin (JARDIANCE) 10 MG TABS tablet   No No   Sig: Take 1 tablet (10 mg total) by mouth daily   Silodosin 8 MG CAPS   Yes No   aspirin 81 MG tablet   Yes No   Sig: Take 81 mg by mouth daily Last dose  3/29/23   atorvastatin (LIPITOR) 10 mg tablet   No No   Sig: Take 1 tablet (10 mg total) by mouth daily   cholestyramine (QUESTRAN) 4 g packet   No No   Sig: Take 1 packet (4 g total) by mouth 2 (two) times a day with meals lunch, dinner   glipiZIDE (GLUCOTROL XL) 5 mg 24 hr tablet   No No   Sig: Take 1 tablet by mouth once daily   lisinopril (ZESTRIL) 5 mg tablet   No No   Sig: Take 1 tablet by mouth once daily   Patient taking differently: 10 mg   metFORMIN (GLUCOPHAGE) 1000 MG tablet   No No Sig: Take 1 tablet (1,000 mg total) by mouth 2 (two) times a day   mupirocin (BACTROBAN) 2 % ointment   No No   Sig: Apply topically 3 (three) times a day   omeprazole (PriLOSEC) 20 mg delayed release capsule   No No   Sig: Take 1 capsule (20 mg total) by mouth daily   tamsulosin (FLOMAX) 0 4 mg   Yes No      Facility-Administered Medications: None       Past Medical History:   Diagnosis Date    Acute pain of right shoulder 03/21/2022    [facility]enocarcinoma of prostate ([facility] )     BCC    Anemia     Cancer ([facility] )     Chronic kidney disease     Colon polyp     Diabetes mellitus ([place] [address] )     History of Lyme disease     Hyperlipidemia     Hypertension     Proctitis     Prostate cancer ([place] [address] )     Treated with radiation       Past Surgical History:   Procedure Laterality Date    COLONOSCOPY  01/10/2018    COLONOSCOPY  01/10/2018     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, grade 2 internal hemorrhoids, 1 adenomatous polyp    [place] INSERTION/RPLCMT PERIPHERAL/GASTRIC NPGR N/A 4/13/2023    Procedure: STAGE 2 AXONIC, INSERTION OF NEUROSTIMULATOR, ELECTRONIC ANALYSIS OF NEUROSTIMULATOR;  Surgeon: John Calero MD;  Location: [address] [place] [address];  Service: Urology    [place] SPLIT AGRFT T/A/L 1ST 100 CM/&/1% BDY INFT/CHLD Left 6/22/2017    Procedure: ANTERIOR SHOULDER EXCISION BCC- COMPLEX CLOSURE vs FLAP vs STSG;  Surgeon: Sarah Wild MD;  Location: [facility];  Service: Plastics    [place] TRANSURETHRAL INCISION PROSTATE N/A 5/26/2022    Procedure: CYSTOSCOPY, TRANSURETHRAL INCISION OF PROSTATE (TUIP);   Surgeon: John Calero MD;  Location: [address] [place] [address];  Service: Urology    Daphnie Yee 121 N/A 4/6/2023    Procedure: PART 1, INCISION FOR IMPLANTATION OF NEUROSTIMULATOR, ELECTRONIC ANALYSIS NEUROSTIMULATOR, STAGE 1;  Surgeon: John Calero MD;  Location: [address] [place] [address];  Service: Urology    SKIN LESION EXCISION N/A 6/16/2022    Procedure: EXCISION WIDE LESION TRUNK/ABDOMEN/BACK;  Surgeon: Sarah Wild "MD;  Location: [facility];  Service: Plastics       Family History   Problem Relation Age of Onset    Heart disease Mother     Diabetes Brother         mellitus     Colon polyps Brother     Throat cancer Sister     Colon cancer Neg Hx      I have reviewed and agree with the history as documented  E-Cigarette/Vaping    E-Cigarette Use Never User      E-Cigarette/Vaping Substances    Nicotine No     THC No     CBD No     Flavoring No     Other No     Unknown No      Social History     Tobacco Use    Smoking status: Former     Packs/day: 0 10     Years: 2 00     Pack years: 0 20     Types: Cigarettes     Start date: 1975     Quit date: 1977     Years since quittin 8    Smokeless tobacco: Never    Tobacco comments:     never a smoker noted in \"allscripts\" - Denied: History of tobacco use noted in \"allscripts\"    Vaping Use    Vaping Use: Never used   Substance Use Topics    Alcohol use: No    Drug use: No       Review of Systems   Constitutional: Negative  Negative for chills, fatigue and fever  HENT: Negative  Negative for congestion, postnasal drip, rhinorrhea and sore throat  Eyes: Negative  Respiratory: Negative  Negative for cough, shortness of breath and wheezing  Cardiovascular: Negative  Gastrointestinal: Positive for abdominal pain and constipation  Negative for diarrhea, nausea and vomiting  Endocrine: Negative  Genitourinary: Negative  Musculoskeletal: Negative  Skin: Negative  Neurological: Negative  Hematological: Negative  Psychiatric/Behavioral: Negative  All other systems reviewed and are negative  Physical Exam  Physical Exam  Vitals and nursing note reviewed  Constitutional:       General: He is not in acute distress  Appearance: He is well-developed  He is not diaphoretic  HENT:      Head: Normocephalic and atraumatic        Right Ear: External ear normal       Left Ear: External ear normal       Nose: Nose normal      " Mouth/Throat:      Pharynx: No oropharyngeal exudate  Eyes:      General: No scleral icterus  Right eye: No discharge  Left eye: No discharge  Conjunctiva/sclera: Conjunctivae normal       Pupils: Pupils are equal, round, and reactive to light  Cardiovascular:      Rate and Rhythm: Normal rate and regular rhythm  Pulses: Normal pulses  Heart sounds: Normal heart sounds  No murmur heard  No friction rub  No gallop  Pulmonary:      Effort: Pulmonary effort is normal  No respiratory distress  Breath sounds: Normal breath sounds  No stridor  No wheezing, rhonchi or rales  Chest:      Chest wall: No tenderness  Abdominal:      General: Abdomen is flat  Bowel sounds are normal  There is no distension  Palpations: Abdomen is soft  There is no mass  Tenderness: There is no abdominal tenderness  There is no guarding or rebound  Hernia: No hernia is present  Musculoskeletal:      Cervical back: Normal range of motion and neck supple  Lymphadenopathy:      Cervical: No cervical adenopathy  Skin:     General: Skin is warm and dry  Capillary Refill: Capillary refill takes less than 2 seconds  Coloration: Skin is not pale  Findings: No erythema or rash  Neurological:      General: No focal deficit present  Mental Status: He is alert and oriented to person, place, and time  Mental status is at baseline           Vital Signs  ED Triage Vitals   Temperature Pulse Respirations Blood Pressure SpO2   05/04/23 1132 05/04/23 1133 05/04/23 1133 05/04/23 1133 05/04/23 1133   (!) 97 2 °F (36 2 °C) 60 18 160/67 100 %      Temp Source Heart Rate Source Patient Position - Orthostatic VS BP Location FiO2 (%)   05/04/23 1132 05/04/23 1133 05/04/23 1133 05/04/23 1133 --   Tympanic Monitor Sitting Left arm       Pain Score       05/04/23 1133       No Pain           Vitals:    05/04/23 1133   BP: 160/67   Pulse: 60   Patient Position - Orthostatic VS: Sitting         Visual Acuity      ED Medications  Medications - No data to display    Diagnostic Studies  Results Reviewed     Procedure Component Value Units Date/Time    Fingerstick Glucose (POCT) [[id number]]  (Normal) Collected: 05/04/23 1140    Lab Status: Final result Updated: 05/04/23 1145     POC Glucose 120 mg/dl                  No orders to display              Procedures  Procedures         ED Course                               SBIRT 20yo+    Flowsheet Row Most Recent Value   Initial Alcohol Screen: US AUDIT-C     1  How often do you have a drink containing alcohol? 0 Filed at: 05/04/2023 1135   Audit-C Score 0 Filed at: 05/04/2023 1135   CATHERINE: How many times in the past year have you    Used an illegal drug or used a prescription medication for non-medical reasons? Never Filed at: 05/04/2023 1135                    Medical Decision Making  Patient has not had fevers or changes in urine output, Moreno actively draining without any sediment or cloudiness  Patient had a bowel movement and now has complete relief of pain  We will treat for constipation  Patient deferred work-up at this point in time feeling much better  Patient is informed to return to the emergency department for worsening of symptoms and was given proper education regarding their diagnosis and symptoms  Otherwise the patient is informed to follow up with their primary care doctor for re-evaluation  The patient verbalizes understanding and agrees with above assessment and plan  All questions were answered  Constipation: acute illness or injury  Risk  OTC drugs  Prescription drug management            Disposition  Final diagnoses:   Constipation     Time reflects when diagnosis was documented in both MDM as applicable and the Disposition within this note     Time User Action Codes Description Comment    5/4/2023  1:37 PM Ronel Arceo Add [K59 00] Constipation       ED Disposition     ED Disposition   Discharge Condition   Stable    Date/Time   Thu May 4, 2023  1:37 PM    Comment   Nany Santana discharge to home/self care                 Follow-up Information     Follow up With Specialties Details Why [address] Emergency Department Emergency Medicine Go to  If symptoms worsen, otherwise please follow up with your family doctor [address] [address] [address] [place] [address] Emergency Department, [place], [place], [address]          Discharge Medication List as of 5/4/2023  1:37 PM      START taking these medications    Details   docusate sodium (COLACE) 100 mg capsule Take 1 capsule (100 mg total) by mouth 2 (two) times a day for 10 days, Starting Thu 5/4/2023, Until Sun 5/14/2023, Normal         CONTINUE these medications which have NOT CHANGED    Details   aspirin 81 MG tablet Take 81 mg by mouth daily Last dose  3/29/23, Historical Med      atorvastatin (LIPITOR) 10 mg tablet Take 1 tablet (10 mg total) by mouth daily, Starting Thu 3/9/2023, Normal      cholestyramine (QUESTRAN) 4 g packet Take 1 packet (4 g total) by mouth 2 (two) times a day with meals lunch, dinner, Starting Tue 1/24/2023, Normal      Empagliflozin (JARDIANCE) 10 MG TABS tablet Take 1 tablet (10 mg total) by mouth daily, Starting Tue 4/11/2023, Until Sun 10/8/2023, Normal      glipiZIDE (GLUCOTROL XL) 5 mg 24 hr tablet Take 1 tablet by mouth once daily, Normal      lisinopril (ZESTRIL) 5 mg tablet Take 1 tablet by mouth once daily, Normal      metFORMIN (GLUCOPHAGE) 1000 MG tablet Take 1 tablet (1,000 mg total) by mouth 2 (two) times a day, Starting Thu 2/16/2023, Normal      mupirocin (BACTROBAN) 2 % ointment Apply topically 3 (three) times a day, Starting Mon 3/27/2023, Normal      omeprazole (PriLOSEC) 20 mg delayed release capsule Take 1 capsule (20 mg total) by mouth daily, Starting Fri 4/28/2023, Normal      Silodosin 8 MG CAPS Starting Mon 4/27/2020, Historical Med      tamsulosin (FLOMAX) 0 4 mg Starting Mon 2/17/2020, Historical Med             No discharge procedures on file      PDMP Review     None          ED Provider  Electronically Signed by           Zia Stone PA-C  05/04/23 3342

## 2023-05-08 NOTE — PRE-PROCEDURE INSTRUCTIONS
My Surgical Experience    The following information was developed to assist you to prepare for your operation  What do I need to do before coming to the hospital?  • Arrange for a responsible person to drive you to and from the hospital   • Arrange care for your children at home  Children are not allowed in the recovery areas of the hospital  • Plan to wear clothing that is easy to put on and take off  If you are having shoulder surgery, wear a shirt that buttons or zippers in the front  Bathing  o Shower the evening before and the morning of your surgery with an antibacterial soap  Please refer to the Pre Op Showering Instructions for Surgery Patients Sheet   o Remove nail polish and all body piercing jewelry  o Do not shave any body part for at least 24 hours before surgery-this includes face, arms, legs and upper body  Food  o Nothing to eat or drink after midnight the night before your surgery  This includes candy and chewing gum  o Exception: If your surgery is after 12:00pm (noon), you may have clear liquids such as 7-Up®, ginger ale, apple or cranberry juice, Jell-O®, water, or clear broth until 8:00 am  o Do not drink milk or juice with pulp on the morning before surgery  o Do not drink alcohol 24 hours before surgery  Medicine  o Follow instructions you received from your surgeon about which medicines you may take on the day of surgery  o If instructed to take medicine on the morning of surgery, take pills with just a small sip of water  Call your prescribing doctor for specific infroamtion on what to do if you take insulin    What should I bring to the hospital?    Bring:  • Crutches or a walker, if you have them, for foot or knee surgery  • A list of the daily medicines, vitamins, minerals, herbals and nutritional supplements you take   Include the dosages of medicines and the time you take them each day  • Glasses, dentures or hearing aids  • Minimal clothing; you will be wearing hospital sleepwear  • Photo ID; required to verify your identity  • If you have a Living Will or Power of , bring a copy of the documents  • If you have an ostomy, bring an extra pouch and any supplies you use    Do not bring  • Medicines or inhalers  • Money, valuables or jewelry    What other information should I know about the day of surgery? • Notify your surgeons if you develop a cold, sore throat, cough, fever, rash or any other illness  • Report to the Ambulatory Surgical/Same Day Surgery Unit  • You will be instructed to stop at Registration only if you have not been pre-registered  • Inform your  fi they do not stay that they will be asked by the staff to leave a phone number where they can be reached  • Be available to be reached before surgery  In the event the operating room schedule changes, you may be asked to come in earlier or later than expected    *It is important to tell your doctor and others involved in your health care if you are taking or have been taking any non-prescription drugs, vitamins, minerals, herbals or other nutritional supplements  Any of these may interact with some food or medicines and cause a reaction      Pre-Surgery Instructions:   Medication Instructions   • aspirin 81 MG tablet Stop taking 7 days prior to surgery  • atorvastatin (LIPITOR) 10 mg tablet Take night before surgery   • cholestyramine (QUESTRAN) 4 g packet Hold day of surgery  • docusate sodium (COLACE) 100 mg capsule Hold day of surgery  • Empagliflozin (JARDIANCE) 10 MG TABS tablet Hold day of surgery  • glipiZIDE (GLUCOTROL XL) 5 mg 24 hr tablet Hold day of surgery  • lisinopril (ZESTRIL) 5 mg tablet Hold day of surgery  • metFORMIN (GLUCOPHAGE) 1000 MG tablet Hold day of surgery  • mupirocin (BACTROBAN) 2 % ointment Hold day of surgery  • omeprazole (PriLOSEC) 20 mg delayed release capsule Hold day of surgery  • Silodosin 8 MG CAPS Hold day of surgery     • tamsulosin (FLOMAX) 0 4 mg Hold day of surgery

## 2023-05-17 NOTE — H&P
H&P Exam - Urology       Patient: Harleen Ricketts   : 1945 Sex: male   MRN: [id number]     CSN: [id number]      History of Present Illness   HPI:  Harleen Ricketts is a 68 y o  male who presents with history of prostate cancer status post palladium seeding history of urinary retention status post TURP developing sphincter incompetency stress incontinence as well as neurogenic bladder history of bulbar urethral stricture undergoing previous internal urethrotomy presented to [address] [place] [address] after voiding status post Axonics neurostimulator insertion 2 days earlier neurogenic bladder urinary retention found to have recurrent bulbar stricture requiring ER flex cystoscopy dilation and difficult Moreno catheter insertion patient now to undergo cystoscopy internal urethrotomy Kenalog injection aware increasing stress incontinence which he is already suffering with in light of sphincter incompetency but if able to void into a diaper content to try to get Moreno catheter removed        Review of Systems:   Constitutional:  Negative for activity change, fever, chills and diaphoresis  HENT: Negative for hearing loss and trouble swallowing  Eyes: Negative for itching and visual disturbance  Respiratory: Negative for chest tightness and shortness of breath  Cardiovascular: Negative for chest pain, edema  Gastrointestinal: Negative for abdominal distention, na abdominal pain, constipation, diarrhea, Nausea and vomiting  Genitourinary: Negative for decreased urine volume, difficulty urinating, dysuria, enuresis, frequency, hematuria and urgency  Musculoskeletal: Negative for gait problem and myalgias  Neurological: Negative for dizziness and headaches  Hematological: Does not bruise/bleed easily         Historical Information   Past Medical History:   Diagnosis Date   • Acute pain of right shoulder 2022   • Adenocarcinoma of prostate ([facility] )     BCC   • Anemia    • Cancer ([facility] )    • Chronic kidney "disease    • Colon polyp    • Diabetes mellitus ([facility] )    • History of Lyme disease    • Hyperlipidemia    • Hypertension    • Proctitis    • Prostate cancer ([facility] )     Treated with radiation     Past Surgical History:   Procedure Laterality Date   • COLONOSCOPY  01/10/2018   • COLONOSCOPY  01/10/2018     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, grade 2 internal hemorrhoids, 1 adenomatous polyp   • ME INSERTION/RPLCMT PERIPHERAL/GASTRIC NPGR N/A 2023    Procedure: STAGE 2 AXONIC, INSERTION OF NEUROSTIMULATOR, ELECTRONIC ANALYSIS OF NEUROSTIMULATOR;  Surgeon: Sharyle Bjork, MD;  Location: [address] [place] [address];  Service: Urology   • ME SPLIT AGRFT T/A/L 1ST 100 CM/&/1% BDY INFT/CHLD Left 2017    Procedure: ANTERIOR SHOULDER EXCISION BCC- COMPLEX CLOSURE vs FLAP vs STSG;  Surgeon: Hoang Carlos MD;  Location: [facility];  Service: Plastics   • ME TRANSURETHRAL INCISION PROSTATE N/A 2022    Procedure: CYSTOSCOPY, TRANSURETHRAL INCISION OF PROSTATE (TUIP);   Surgeon: Sharyle Bjork, MD;  Location: [address] [place] [address];  Service: Urology   • SACRAL NERVE STIMULATOR PLACEMENT N/A 2023    Procedure: PART 1, INCISION FOR IMPLANTATION OF NEUROSTIMULATOR, ELECTRONIC ANALYSIS NEUROSTIMULATOR, STAGE 1;  Surgeon: Sharyle Bjork, MD;  Location: [address] [place] [address];  Service: Urology   • SKIN LESION EXCISION N/A 2022    Procedure: EXCISION WIDE LESION TRUNK/ABDOMEN/BACK;  Surgeon: Hoang Carlos MD;  Location:  [facility];  Service: Plastics     Social History   Social History     Substance and Sexual Activity   Alcohol Use No     Social History     Substance and Sexual Activity   Drug Use No     Social History     Tobacco Use   Smoking Status Former   • Packs/day: 0 10   • Years: 2 00   • Pack years: 0 20   • Types: Cigarettes   • Start date: 1975   • Quit date: 1977   • Years since quittin 9   Smokeless Tobacco Never   Tobacco Comments    never a smoker noted in \"allscripts\" - Denied: History of tobacco use " "noted in \"allscripts\"      Family History:   Family History   Problem Relation Age of Onset   • Heart disease Mother    • Diabetes Brother         mellitus    • Colon polyps Brother    • Throat cancer Sister    • Colon cancer Neg Hx        Meds/Allergies   No medications prior to admission  No Known Allergies    Objective   Vitals: There were no vitals taken for this visit  Physical Exam:  General Alert awake   Normocephalic atraumatic PERRLA  Lungs clear bilaterally  Cardiac normal S1 normal S2  Abdomen soft, flank pain  Extremities no edema    No intake/output data recorded      Invasive Devices     Drain  Duration           Urethral Catheter Double-lumen 18 days                    Lab Results: CBC:   Lab Results   Component Value Date    WBC 9 06 04/29/2023    HGB 12 4 04/29/2023    HCT 38 1 04/29/2023    MCV 88 04/29/2023     04/29/2023    MCH 28 6 04/29/2023    MCHC 32 5 04/29/2023    RDW 14 1 04/29/2023    MPV 11 1 04/29/2023    NRBC 0 04/29/2023     CMP:   Lab Results   Component Value Date     12/18/2017     04/29/2023     08/02/2022    CO2 23 04/29/2023    CO2 29 08/02/2022    BUN 16 04/29/2023    BUN 19 08/02/2022    CREATININE 1 07 04/29/2023    CREATININE 1 16 12/18/2017    CALCIUM 8 7 04/29/2023    CALCIUM 9 0 08/02/2022    AST 15 03/27/2023    AST 15 08/02/2022    ALT 10 03/27/2023    ALT 10 08/02/2022    ALKPHOS 55 03/27/2023    ALKPHOS 60 08/02/2022    PROT 6 5 12/18/2017    BILITOT 0 6 12/18/2017    EGFR 66 04/29/2023     Urinalysis:   Lab Results   Component Value Date    COLORU Light Yellow 06/20/2022    CLARITYU Clear 06/20/2022    SPECGRAV 1 010 06/20/2022    PHUR 6 0 06/20/2022    LEUKOCYTESUR Small (A) 06/20/2022    NITRITE Negative 06/20/2022    GLUCOSEU Negative 06/20/2022    KETONESU Negative 06/20/2022    BILIRUBINUR Negative 06/20/2022    BLOODU Moderate (A) 06/20/2022     Urine Culture: No results found for: URINECX  PSA:   Lab Results   Component Value " Date    PSA <0 1 01/31/2022    PSA <0 1 02/19/2019           Assessment/ Plan:  Cystoscopy internal urethrotomy Kenalog injection      Jessica Longoria MD

## 2023-05-18 ENCOUNTER — HOSPITAL ENCOUNTER (OUTPATIENT)
Facility: HOSPITAL | Age: 78
Setting detail: OUTPATIENT SURGERY
Discharge: HOME | End: 2023-05-18
Attending: SPECIALIST | Admitting: SPECIALIST

## 2023-05-18 ENCOUNTER — ANESTHESIA (OUTPATIENT)
Dept: PERIOP | Facility: HOSPITAL | Age: 78
End: 2023-05-18

## 2023-05-18 ENCOUNTER — ANESTHESIA EVENT (OUTPATIENT)
Dept: PERIOP | Facility: HOSPITAL | Age: 78
End: 2023-05-18

## 2023-05-18 VITALS
OXYGEN SATURATION: 100 % | TEMPERATURE: 97.3 F | DIASTOLIC BLOOD PRESSURE: 86 MMHG | RESPIRATION RATE: 18 BRPM | HEART RATE: 61 BPM | SYSTOLIC BLOOD PRESSURE: 164 MMHG

## 2023-05-18 DIAGNOSIS — N35.111: ICD-10-CM

## 2023-05-18 LAB — GLUCOSE SERPL-MCNC: 80 MG/DL (ref 65–140)

## 2023-05-18 RX ORDER — LIDOCAINE HYDROCHLORIDE 10 MG/ML
INJECTION, SOLUTION EPIDURAL; INFILTRATION; INTRACAUDAL; PERINEURAL AS NEEDED
Status: DISCONTINUED | OUTPATIENT
Start: 2023-05-18 | End: 2023-05-18

## 2023-05-18 RX ORDER — PROPOFOL 10 MG/ML
INJECTION, EMULSION INTRAVENOUS AS NEEDED
Status: DISCONTINUED | OUTPATIENT
Start: 2023-05-18 | End: 2023-05-18

## 2023-05-18 RX ORDER — SODIUM CHLORIDE, SODIUM LACTATE, POTASSIUM CHLORIDE, CALCIUM CHLORIDE 600; 310; 30; 20 MG/100ML; MG/100ML; MG/100ML; MG/100ML
75 INJECTION, SOLUTION INTRAVENOUS CONTINUOUS
Status: DISCONTINUED | OUTPATIENT
Start: 2023-05-18 | End: 2023-05-18 | Stop reason: HOSPADM

## 2023-05-18 RX ORDER — PROPOFOL 10 MG/ML
INJECTION, EMULSION INTRAVENOUS CONTINUOUS PRN
Status: DISCONTINUED | OUTPATIENT
Start: 2023-05-18 | End: 2023-05-18

## 2023-05-18 RX ORDER — FENTANYL CITRATE/PF 50 MCG/ML
25 SYRINGE (ML) INJECTION
Status: DISCONTINUED | OUTPATIENT
Start: 2023-05-18 | End: 2023-05-18 | Stop reason: HOSPADM

## 2023-05-18 RX ORDER — FENTANYL CITRATE 50 UG/ML
INJECTION, SOLUTION INTRAMUSCULAR; INTRAVENOUS AS NEEDED
Status: DISCONTINUED | OUTPATIENT
Start: 2023-05-18 | End: 2023-05-18

## 2023-05-18 RX ORDER — CEFAZOLIN SODIUM 1 G/50ML
2000 SOLUTION INTRAVENOUS ONCE
Status: COMPLETED | OUTPATIENT
Start: 2023-05-18 | End: 2023-05-18

## 2023-05-18 RX ORDER — MIDAZOLAM HYDROCHLORIDE 2 MG/2ML
INJECTION, SOLUTION INTRAMUSCULAR; INTRAVENOUS AS NEEDED
Status: DISCONTINUED | OUTPATIENT
Start: 2023-05-18 | End: 2023-05-18

## 2023-05-18 RX ORDER — TRIAMCINOLONE ACETONIDE 40 MG/ML
INJECTION, SUSPENSION INTRA-ARTICULAR; INTRAMUSCULAR AS NEEDED
Status: DISCONTINUED | OUTPATIENT
Start: 2023-05-18 | End: 2023-05-18 | Stop reason: HOSPADM

## 2023-05-18 RX ORDER — MAGNESIUM HYDROXIDE 1200 MG/15ML
LIQUID ORAL AS NEEDED
Status: DISCONTINUED | OUTPATIENT
Start: 2023-05-18 | End: 2023-05-18 | Stop reason: HOSPADM

## 2023-05-18 RX ORDER — GLYCINE 1.5 G/100ML
SOLUTION IRRIGATION AS NEEDED
Status: DISCONTINUED | OUTPATIENT
Start: 2023-05-18 | End: 2023-05-18 | Stop reason: HOSPADM

## 2023-05-18 RX ORDER — SODIUM CHLORIDE, SODIUM LACTATE, POTASSIUM CHLORIDE, CALCIUM CHLORIDE 600; 310; 30; 20 MG/100ML; MG/100ML; MG/100ML; MG/100ML
125 INJECTION, SOLUTION INTRAVENOUS CONTINUOUS
Status: DISCONTINUED | OUTPATIENT
Start: 2023-05-18 | End: 2023-05-18

## 2023-05-18 RX ADMIN — CEFAZOLIN SODIUM 2000 MG: 1 SOLUTION INTRAVENOUS at 13:02

## 2023-05-18 RX ADMIN — FENTANYL CITRATE 25 MCG: 50 INJECTION, SOLUTION INTRAMUSCULAR; INTRAVENOUS at 13:04

## 2023-05-18 RX ADMIN — SODIUM CHLORIDE, SODIUM LACTATE, POTASSIUM CHLORIDE, AND CALCIUM CHLORIDE 125 ML/HR: .6; .31; .03; .02 INJECTION, SOLUTION INTRAVENOUS at 11:36

## 2023-05-18 RX ADMIN — LIDOCAINE HYDROCHLORIDE 50 MG: 10 INJECTION, SOLUTION EPIDURAL; INFILTRATION; INTRACAUDAL; PERINEURAL at 13:04

## 2023-05-18 RX ADMIN — FENTANYL CITRATE 25 MCG: 50 INJECTION, SOLUTION INTRAMUSCULAR; INTRAVENOUS at 13:27

## 2023-05-18 RX ADMIN — PROPOFOL 20 MG: 10 INJECTION, EMULSION INTRAVENOUS at 13:17

## 2023-05-18 RX ADMIN — PROPOFOL 50 MG: 10 INJECTION, EMULSION INTRAVENOUS at 13:04

## 2023-05-18 RX ADMIN — MIDAZOLAM 1 MG: 1 INJECTION INTRAMUSCULAR; INTRAVENOUS at 13:08

## 2023-05-18 RX ADMIN — MIDAZOLAM 1 MG: 1 INJECTION INTRAMUSCULAR; INTRAVENOUS at 12:59

## 2023-05-18 RX ADMIN — FENTANYL CITRATE 25 MCG: 50 INJECTION, SOLUTION INTRAMUSCULAR; INTRAVENOUS at 13:21

## 2023-05-18 RX ADMIN — PROPOFOL 50 MCG/KG/MIN: 10 INJECTION, EMULSION INTRAVENOUS at 13:04

## 2023-05-18 RX ADMIN — FENTANYL CITRATE 25 MCG: 50 INJECTION, SOLUTION INTRAMUSCULAR; INTRAVENOUS at 13:12

## 2023-05-18 NOTE — ANESTHESIA PREPROCEDURE EVALUATION
Procedure:  CYSTOSCOPY, DIRECT VISUAL INTERAL URETHROTOMY (DVIU), KENALOG (Urethra)    Relevant Problems   CARDIO   (+) Hypertension      ENDO   (+) DMII (diabetes mellitus, type 2) (HCC)      /RENAL   (+) Adenocarcinoma of prostate (HCC)   (+) Stage 3a chronic kidney disease (HCC)      HEMATOLOGY   (+) Anemia of chronic disease      NEURO/PSYCH   (+) Personal history of colonic polyps        Physical Exam    Airway    Mallampati score: II  TM Distance: >3 FB  Neck ROM: full     Dental   No notable dental hx     Cardiovascular  Cardiovascular exam normal    Pulmonary  Pulmonary exam normal     Other Findings        Anesthesia Plan  ASA Score- 2     Anesthesia Type- IV sedation with anesthesia with ASA Monitors  Additional Monitors:   Airway Plan:           Plan Factors-Exercise tolerance (METS): >4 METS  Chart reviewed  Imaging results reviewed  Existing labs reviewed  Patient summary reviewed  Patient is not a current smoker  Induction-     Postoperative Plan-     Informed Consent- Anesthetic plan and risks discussed with patient  I personally reviewed this patient with the CRNA  Discussed and agreed on the Anesthesia Plan with the CRNA  Krishna Albarran

## 2023-05-18 NOTE — PERIOPERATIVE NURSING NOTE
Patient discharged to home  Discharge instructions were given and reviewed with patient  All questions answered  IV was removed per policy and procedure  Pt tolerated this well without complaint  Occlusive dressing was applied to the site  Patient left the unit with all belongings and a firm understanding of discharge instructions

## 2023-05-18 NOTE — PERIOPERATIVE NURSING NOTE
Received patient from [facility], vitals stable  patient denies pain or discomfort  Patient was given ginger ale and tolerated sips well  patient boyce (leg bad) patent and intact draining appropriately  Patient verbalizes understanding on boyce care and education regarding emptying the boyce  Pt ready for dc

## 2023-05-18 NOTE — OP NOTE
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 111]      Post-Op Diagnosis Codes:     * Postinfective urethral stricture, not elsewhere classified, male, meatal [N35 111]    Procedure(s):  CYSTOSCOPY  DIRECT VISUAL INTERAL URETHROTOMY (DVIU)  KENALOG INJECTION  BLADDER BIOPSY WITH FULGERATION    Specimen(s):  ID Type Source Tests Collected by Time Destination   1 : Right Superior Wall Bladder Bx Tissue Urinary Bladder TISSUE EXAM Sharyle Bjork, MD 2023 1321        Estimated Blood Loss:   Minimal    Drains:  Urethral Catheter Latex 22 Fr   (Active)   Number of days: 0       Anesthesia Type:   IV Sedation with Anesthesia    Operative Indications:  Postinfective urethral stricture, not elsewhere classified, male, meatal [N35 111]  This 71-year-old male with history of prostate cancer status post palladium seeding status post channeling TURP for retention with sphincter incontinence recently undergoing Axonics neurostimulator insertion for neurogenic bladder underwent voiding trial emptying adequately presenting to the ER at [address] [place] [address] 3 days after Moreno removal found to have worsening recurrent bulbar stricture requiring bedside flex cystoscopy filiform follower dilation and 18 [facility] catheter placement by myself patient now to undergo exam under anesthesia possible DVIU Kenalog injection for recurrent stricture disease aware that sphincter incompetency will cause worsening stress incontinence but already wears an adult diaper hopefully emptying voiding catheter    Operative Findings:  Proximal bulbar external sphincter stricture opened at 12:00 analog injection circumferentially  Open prostatic urethra  Suspicious bladder lesion right superior anterior bladder wall possible bullous edema from previous catheter? in light of radiation therapy for prostate cancer  22 [facility]    Complications:   None    Procedure and Technique:  Patient identified in the [facility] consent signed placed operative suite anesthesia was induced placed in lithotomy position draped and prepped in sterile fashion timeout performed a 22 Malay cystoscope passed through the anterior urethra 0 038 wire passed through the mild stricture into the posterior urethra the scope was then followed in to the posterior urethra noting open prostatic urethra on view of the bladder with 30 and 7 degree lens there was a suspicious lesion right posterior wall x2 possible papilloma versus bullous edema from previous catheter biopsy forceps placed taking 2 biopsies fulguration with Bugbee the cystoscope was then removed leaving the wire in the urethrotome was then placed with the stricture opened at 12:00 the cystoscope was then replaced and 10 cc of Kenalog injected circumferentially in the bulbar urethra the scope was removed over the wire a 22 Malay [place]ill catheter was placed with 10 cc in the balloon left the bag drainage patient of procedure awakened taken recovery in stable condition   I was present for the entire procedure      Patient Disposition:  PACU         SIGNATURE: Luzmaria Claudio MD  DATE: May 18, 2023  TIME: 1:47 PM

## 2023-05-18 NOTE — PROGRESS NOTES
Progress Note - Urology      Patient: Tiffanie Brown   : 1945 Sex: male   MRN: [id number]     CSN: [id number]  Unit/Bed#: OR POOL     SUBJECTIVE:   Patient surgical preop unit  No change to history and physical aware risk of worsening sphincter incompetency incontinence anesthetic risk bleeding infection recurrent urinary retention      Objective   Vitals: There were no vitals taken for this visit  No intake/output data recorded        Physical Exam:   General Alert awake   Normocephalic atraumatic PERRLA  Lungs clear bilaterally  Cardiac normal S1 normal S2  Abdomen soft, flank pain  Extremities no edema      Lab Results: CBC:   Lab Results   Component Value Date    WBC 9 06 2023    HGB 12 4 2023    HCT 38 1 2023    MCV 88 2023     2023    MCH 28 6 2023    MCHC 32 5 2023    RDW 14 1 2023    MPV 11 1 2023    NRBC 0 2023     CMP:   Lab Results   Component Value Date     2017     2023     2022    CO2 23 2023    CO2 29 2022    BUN 16 2023    BUN 19 2022    CREATININE 1 07 2023    CREATININE 1 16 2017    CALCIUM 8 7 2023    CALCIUM 9 0 2022    AST 15 2023    AST 15 2022    ALT 10 2023    ALT 10 2022    ALKPHOS 55 2023    ALKPHOS 60 2022    PROT 6 5 2017    BILITOT 0 6 2017    EGFR 66 2023     Urinalysis:   Lab Results   Component Value Date    COLORU Light Yellow 2022    CLARITYU Clear 2022    SPECGRAV 1 010 2022    PHUR 6 0 2022    LEUKOCYTESUR Small (A) 2022    NITRITE Negative 2022    GLUCOSEU Negative 2022    KETONESU Negative 2022    BILIRUBINUR Negative 2022    BLOODU Moderate (A) 2022     Urine Culture: No results found for: URINECX  PSA:   Lab Results   Component Value Date    PSA <0 1 2022    PSA <0 1 2019         Assessment/ Plan:  Cystoscopy internal urethrotomy Kenalog injection          Lorenza Brian MD

## 2023-05-18 NOTE — DISCHARGE INSTR - AVS FIRST PAGE
#1 no heavy straining or lifting above 10 pounds for 2 weeks    #2 call office fevers, chills, or worsening blood in the urine  #3 patient has follow-up Dr Roseanne Castorena next Wednesday, May 24 at 9:30 AM to remove Morenoclaudio Morenoshyam TARIQ  [facility] office  [address] [place] [address]  [phone]  8:30 AM to 4:30 PM  Monday through Friday    [place] office  [phone]  [place] [address]  [place], [address] [place] [address]  [phone]  1:00 to 5:00 PM  Wednesday

## 2023-05-19 LAB
ATRIAL RATE: 49 BPM
ATRIAL RATE: 50 BPM
GLUCOSE SERPL-MCNC: 95 MG/DL (ref 65–140)
P AXIS: 49 DEGREES
P AXIS: 60 DEGREES
PR INTERVAL: 134 MS
PR INTERVAL: 152 MS
QRS AXIS: -4 DEGREES
QRS AXIS: -5 DEGREES
QRSD INTERVAL: 92 MS
QRSD INTERVAL: 98 MS
QT INTERVAL: 470 MS
QT INTERVAL: 474 MS
QTC INTERVAL: 428 MS
QTC INTERVAL: 428 MS
T WAVE AXIS: 55 DEGREES
T WAVE AXIS: 57 DEGREES
VENTRICULAR RATE: 49 BPM
VENTRICULAR RATE: 50 BPM

## 2023-06-05 DIAGNOSIS — E11.9 TYPE 2 DIABETES MELLITUS WITHOUT COMPLICATION, WITHOUT LONG-TERM CURRENT USE OF INSULIN (HCC): ICD-10-CM

## 2023-06-20 DIAGNOSIS — E11.9 TYPE 2 DIABETES MELLITUS WITHOUT COMPLICATION, UNSPECIFIED WHETHER LONG TERM INSULIN USE (HCC): ICD-10-CM

## 2023-06-20 RX ORDER — GLIPIZIDE 5 MG/1
5 TABLET, FILM COATED, EXTENDED RELEASE ORAL DAILY
Qty: 90 TABLET | Refills: 0 | Status: SHIPPED | OUTPATIENT
Start: 2023-06-20

## 2023-06-20 RX ORDER — GLIPIZIDE 5 MG/1
TABLET, FILM COATED, EXTENDED RELEASE ORAL
Qty: 90 TABLET | Refills: 0 | Status: SHIPPED | OUTPATIENT
Start: 2023-06-20 | End: 2023-06-29 | Stop reason: SDUPTHER

## 2023-06-22 ENCOUNTER — RA CDI HCC (OUTPATIENT)
Dept: OTHER | Facility: HOSPITAL | Age: 78
End: 2023-06-22

## 2023-06-22 NOTE — PROGRESS NOTES
[facility]  coding opportunities          Chart Reviewed number of suggestions sent to Provider: 5  E11 22  E11 65  E11 29  [id number]  E11 36     Patients Insurance     Medicare Insurance: [employer]

## 2023-06-29 ENCOUNTER — OFFICE VISIT (OUTPATIENT)
Dept: FAMILY MEDICINE CLINIC | Facility: CLINIC | Age: 78
End: 2023-06-29
Payer: MEDICARE

## 2023-06-29 VITALS
WEIGHT: 134 LBS | BODY MASS INDEX: 19.79 KG/M2 | TEMPERATURE: 97.6 F | HEART RATE: 60 BPM | DIASTOLIC BLOOD PRESSURE: 62 MMHG | RESPIRATION RATE: 18 BRPM | SYSTOLIC BLOOD PRESSURE: 116 MMHG

## 2023-06-29 DIAGNOSIS — E78.5 DYSLIPIDEMIA: ICD-10-CM

## 2023-06-29 DIAGNOSIS — E11.36 TYPE 2 DIABETES MELLITUS WITH DIABETIC CATARACT, UNSPECIFIED WHETHER LONG TERM INSULIN USE (HCC): Primary | ICD-10-CM

## 2023-06-29 DIAGNOSIS — C61 ADENOCARCINOMA OF PROSTATE (HCC): ICD-10-CM

## 2023-06-29 LAB — SL AMB POCT HEMOGLOBIN AIC: 7.5 (ref ?–6.5)

## 2023-06-29 PROCEDURE — 83036 HEMOGLOBIN GLYCOSYLATED A1C: CPT | Performed by: FAMILY MEDICINE

## 2023-06-29 PROCEDURE — 99214 OFFICE O/P EST MOD 30 MIN: CPT | Performed by: FAMILY MEDICINE

## 2023-06-29 NOTE — PROGRESS NOTES
Name: Feli Hernandez      :       MRN: [id number]  Encounter Provider: Ciro Baum MD  Encounter Date: 2023   Encounter department: [address] [place] [address]     1  Type 2 diabetes mellitus with diabetic cataract, unspecified whether long term insulin use ([place] [id number] )  Comments:  A1c stable at 7 5  Counseled on diet/exercise  Orders:  -     POCT hemoglobin A1c  -     Comprehensive metabolic panel; Future; Expected date: 2023  -     Hemoglobin A1C; Future; Expected date: 2023  -     Lipid Panel with Direct LDL reflex; Future; Expected date: 2023    2  Adenocarcinoma of prostate ([place] [address] )  Comments:  Stable  Managed by urology  3  Dyslipidemia  Comments:  Continue statin  Orders:  -     Comprehensive metabolic panel; Future; Expected date: 2023  -     Hemoglobin A1C; Future; Expected date: 2023  -     Lipid Panel with Direct LDL reflex; Future; Expected date: 2023           Subjective      HPI   He is here today for routine follow up visit  No acute issues or concerns  Review of Systems   Constitutional: Negative  HENT: Negative  Eyes: Negative  Respiratory: Negative  Cardiovascular: Negative  Gastrointestinal: Negative  Endocrine: Negative  Genitourinary: Negative  Musculoskeletal: Negative  Neurological: Negative  Hematological: Negative  Psychiatric/Behavioral: Negative          Current Outpatient Medications on File Prior to Visit   Medication Sig   • aspirin 81 MG tablet Take 81 mg by mouth daily Last dose  5/10/23   • atorvastatin (LIPITOR) 10 mg tablet Take 1 tablet (10 mg total) by mouth daily   • cholestyramine (QUESTRAN) 4 g packet Take 1 packet (4 g total) by mouth 2 (two) times a day with meals lunch, dinner   • glipiZIDE (GLUCOTROL XL) 5 mg 24 hr tablet Take 1 tablet (5 mg total) by mouth daily   • lisinopril (ZESTRIL) 5 mg tablet Take 1 tablet by mouth once daily (Patient taking differently: 10 mg)   • metFORMIN (GLUCOPHAGE) 1000 MG tablet Take 1 tablet (1,000 mg total) by mouth 2 (two) times a day   • docusate sodium (COLACE) 100 mg capsule Take 1 capsule (100 mg total) by mouth 2 (two) times a day for 10 days   • Empagliflozin (JARDIANCE) 10 MG TABS tablet Take 1 tablet (10 mg total) by mouth daily (Patient not taking: Reported on 6/29/2023)   • omeprazole (PriLOSEC) 20 mg delayed release capsule Take 1 capsule (20 mg total) by mouth daily (Patient not taking: Reported on 6/29/2023)   • Silodosin 8 MG CAPS  (Patient not taking: Reported on 6/29/2023)   • tamsulosin (FLOMAX) 0 4 mg  (Patient not taking: Reported on 6/29/2023)   • [DISCONTINUED] glipiZIDE (GLUCOTROL XL) 5 mg 24 hr tablet Take 1 tablet by mouth once daily (Patient not taking: Reported on 6/29/2023)   • [DISCONTINUED] mupirocin (BACTROBAN) 2 % ointment Apply topically 3 (three) times a day (Patient not taking: Reported on 6/29/2023)   • [DISCONTINUED] polyethylene glycol (GOLYTELY) 4000 mL solution Take 4,000 mL by mouth once for 1 dose       Objective     /62   Pulse 60   Temp 97 6 °F (36 4 °C)   Resp 18   Wt 60 8 kg (134 lb)   BMI 19 79 kg/m²     Physical Exam  Constitutional:       General: He is not in acute distress  Appearance: He is well-developed  He is not diaphoretic  HENT:      Head: Normocephalic and atraumatic  Eyes:      General: No scleral icterus  Right eye: No discharge  Left eye: No discharge  Conjunctiva/sclera: Conjunctivae normal    Cardiovascular:      Rate and Rhythm: Normal rate and regular rhythm  Pulses: Normal pulses  Pulmonary:      Effort: Pulmonary effort is normal  No respiratory distress  Breath sounds: Normal breath sounds  No stridor  No wheezing, rhonchi or rales  Chest:      Chest wall: No tenderness  Musculoskeletal:         General: Normal range of motion  Cervical back: Normal range of motion  Skin:     General: Skin is warm     Neurological: Mental Status: He is alert and oriented to person, place, and time  Psychiatric:         Mood and Affect: Mood normal          Behavior: Behavior normal          Thought Content:  Thought content normal          Judgment: Judgment normal        Patrice Mar MD

## 2023-07-10 DIAGNOSIS — E11.9 TYPE 2 DIABETES MELLITUS WITHOUT COMPLICATION, WITHOUT LONG-TERM CURRENT USE OF INSULIN (HCC): ICD-10-CM

## 2023-07-10 RX ORDER — EMPAGLIFLOZIN 10 MG/1
TABLET, FILM COATED ORAL
Qty: 30 TABLET | Refills: 0 | Status: SHIPPED | OUTPATIENT
Start: 2023-07-10

## 2023-08-05 ENCOUNTER — OFFICE VISIT (OUTPATIENT)
Dept: FAMILY MEDICINE CLINIC | Facility: CLINIC | Age: 78
End: 2023-08-05
Payer: MEDICARE

## 2023-08-05 VITALS
SYSTOLIC BLOOD PRESSURE: 98 MMHG | RESPIRATION RATE: 16 BRPM | HEART RATE: 57 BPM | OXYGEN SATURATION: 98 % | DIASTOLIC BLOOD PRESSURE: 56 MMHG | WEIGHT: 134 LBS | BODY MASS INDEX: 19.18 KG/M2 | HEIGHT: 70 IN | TEMPERATURE: 97.7 F

## 2023-08-05 DIAGNOSIS — I10 PRIMARY HYPERTENSION: ICD-10-CM

## 2023-08-05 DIAGNOSIS — S00.31XA ABRASION OF NOSE, INITIAL ENCOUNTER: ICD-10-CM

## 2023-08-05 DIAGNOSIS — S01.80XA OPEN WOUND OF FACE, INITIAL ENCOUNTER: Primary | ICD-10-CM

## 2023-08-05 DIAGNOSIS — E11.36 TYPE 2 DIABETES MELLITUS WITH DIABETIC CATARACT, UNSPECIFIED WHETHER LONG TERM INSULIN USE (HCC): ICD-10-CM

## 2023-08-05 PROCEDURE — 90471 IMMUNIZATION ADMIN: CPT | Performed by: FAMILY MEDICINE

## 2023-08-05 PROCEDURE — 90715 TDAP VACCINE 7 YRS/> IM: CPT | Performed by: FAMILY MEDICINE

## 2023-08-05 PROCEDURE — 99214 OFFICE O/P EST MOD 30 MIN: CPT | Performed by: FAMILY MEDICINE

## 2023-08-05 RX ORDER — CEPHALEXIN 500 MG/1
500 CAPSULE ORAL 2 TIMES DAILY
Qty: 10 CAPSULE | Refills: 0 | Status: SHIPPED | OUTPATIENT
Start: 2023-08-05 | End: 2023-08-10

## 2023-08-05 NOTE — PROGRESS NOTES
Assessment/Plan:    No problem-specific Assessment & Plan notes found for this encounter. Laceration nose, superficial  Local care advised  No septal hematoma or crepitus/misalignment of nasion  adacel  Cephalexin for prevention of infection given    DM2 stable, last a1c 7.5  He is aware of his increased risk for infection and reason for keflex course    htn stable    Fall prevention discussed and avoiding visual obstruction    Please keep the area clean, wash it with soap and water gently 1-2x/d, apply bacitracin ointment twice a day without a band aid for the next 7 days and take the antibiotic pill called cephalexin, as a precaution, to prevent infection for the next 5 days. Diagnoses and all orders for this visit:    Open wound of face, initial encounter  -     TDAP VACCINE GREATER THAN OR EQUAL TO 6YO IM  -     cephalexin (KEFLEX) 500 mg capsule; Take 1 capsule (500 mg total) by mouth 2 (two) times a day for 5 days    Abrasion of nose, initial encounter  -     TDAP VACCINE GREATER THAN OR EQUAL TO 6YO IM    Primary hypertension    Type 2 diabetes mellitus with diabetic cataract, unspecified whether long term insulin use ([address])        Return if symptoms worsen or fail to improve. Subjective:      Patient ID: Janee Dee is a 68 y.o. male. Chief Complaint   Patient presents with   • nose injury from fall     Estil ELENI Fermin        HPI  Tripped over something at home yesterday  Hit face on ground outdoor  No LOC  Holding books so could not stop himself  [place] some  Takes asa  Not dizzy  No headache  No vision change or diplopia  Not tx yet  No fever  No nosebleed   No congestion    The following portions of the patient's history were reviewed and updated as appropriate: allergies, current medications, past family history, past medical history, past social history, past surgical history and problem list.    Review of Systems   Constitutional: Negative for chills and fever.    Neurological: Negative for dizziness, syncope, weakness and headaches. Current Outpatient Medications   Medication Sig Dispense Refill   • aspirin 81 MG tablet Take 81 mg by mouth as needed Last dose  5/10/23     • atorvastatin (LIPITOR) 10 mg tablet Take 1 tablet (10 mg total) by mouth daily 30 tablet 1   • cephalexin (KEFLEX) 500 mg capsule Take 1 capsule (500 mg total) by mouth 2 (two) times a day for 5 days 10 capsule 0   • cholestyramine (QUESTRAN) 4 g packet Take 1 packet (4 g total) by mouth 2 (two) times a day with meals lunch, dinner 60 packet 1   • glipiZIDE (GLUCOTROL XL) 5 mg 24 hr tablet Take 1 tablet (5 mg total) by mouth daily 90 tablet 0   • Jardiance 10 MG TABS tablet Take 1 tablet by mouth once daily 30 tablet 0   • lisinopril (ZESTRIL) 5 mg tablet Take 1 tablet by mouth once daily (Patient taking differently: 10 mg) 90 tablet 0   • metFORMIN (GLUCOPHAGE) 1000 MG tablet Take 1 tablet (1,000 mg total) by mouth 2 (two) times a day 180 tablet 1   • docusate sodium (COLACE) 100 mg capsule Take 1 capsule (100 mg total) by mouth 2 (two) times a day for 10 days (Patient not taking: Reported on 8/5/2023) 20 capsule 0     No current facility-administered medications for this visit. Objective:    BP 98/56   Pulse 57   Temp 97.7 °F (36.5 °C)   Resp 16   Ht 5' 9.5" (1.765 m)   Wt 60.8 kg (134 lb)   SpO2 98%   BMI 19.50 kg/m²        Physical Exam  Vitals and nursing note reviewed. Constitutional:       General: He is not in acute distress. Appearance: He is well-developed. He is not ill-appearing. HENT:      Head: Normocephalic. Right Ear: Tympanic membrane normal.      Left Ear: Tympanic membrane normal.      Nose: No congestion. Comments: No septal hematoma  Eyes:      General: No scleral icterus. Conjunctiva/sclera: Conjunctivae normal.   Cardiovascular:      Rate and Rhythm: Normal rate and regular rhythm. Heart sounds: No murmur heard.   Pulmonary:      Effort: Pulmonary effort is normal. No respiratory distress. Breath sounds: No wheezing. Abdominal:      Palpations: Abdomen is soft. Musculoskeletal:         General: No deformity. Cervical back: Neck supple. Skin:     General: Skin is warm and dry. Coloration: Skin is not pale. Comments: Superficial laceration nose  Redness on edges  No crepitus   Neurological:      Mental Status: He is alert. Motor: No weakness. Gait: Gait normal.   Psychiatric:         Mood and Affect: Mood normal.         Behavior: Behavior normal.         Thought Content:  Thought content normal.                León Moeller,

## 2023-08-05 NOTE — PATIENT INSTRUCTIONS
Please keep the area clean, wash it with soap and water gently 1-2x/d, apply bacitracin ointment twice a day without a band aid for the next 7 days and take the antibiotic pill called cephalexin, as a precaution, to prevent infection for the next 5 days.

## 2023-08-08 DIAGNOSIS — E11.9 TYPE 2 DIABETES MELLITUS WITHOUT COMPLICATION, WITHOUT LONG-TERM CURRENT USE OF INSULIN (HCC): ICD-10-CM

## 2023-08-08 RX ORDER — EMPAGLIFLOZIN 10 MG/1
TABLET, FILM COATED ORAL
Qty: 30 TABLET | Refills: 0 | Status: SHIPPED | OUTPATIENT
Start: 2023-08-08

## 2023-08-21 ENCOUNTER — APPOINTMENT (OUTPATIENT)
Dept: LAB | Facility: HOSPITAL | Age: 78
End: 2023-08-21
Attending: FAMILY MEDICINE
Payer: MEDICARE

## 2023-08-21 DIAGNOSIS — E11.36 TYPE 2 DIABETES MELLITUS WITH DIABETIC CATARACT, UNSPECIFIED WHETHER LONG TERM INSULIN USE (HCC): ICD-10-CM

## 2023-08-21 DIAGNOSIS — E78.5 DYSLIPIDEMIA: ICD-10-CM

## 2023-08-22 ENCOUNTER — APPOINTMENT (OUTPATIENT)
Dept: LAB | Facility: HOSPITAL | Age: 78
End: 2023-08-22
Attending: FAMILY MEDICINE
Payer: MEDICARE

## 2023-08-22 LAB
ALBUMIN SERPL BCP-MCNC: 3.8 G/DL (ref 3.5–5)
ALP SERPL-CCNC: 54 U/L (ref 34–104)
ALT SERPL W P-5'-P-CCNC: 10 U/L (ref 7–52)
ANION GAP SERPL CALCULATED.3IONS-SCNC: 5 MMOL/L
AST SERPL W P-5'-P-CCNC: 13 U/L (ref 13–39)
BILIRUB SERPL-MCNC: 0.56 MG/DL (ref 0.2–1)
BUN SERPL-MCNC: 18 MG/DL (ref 5–25)
CALCIUM SERPL-MCNC: 9 MG/DL (ref 8.4–10.2)
CHLORIDE SERPL-SCNC: 107 MMOL/L (ref 96–108)
CHOLEST SERPL-MCNC: 164 MG/DL
CO2 SERPL-SCNC: 27 MMOL/L (ref 21–32)
CREAT SERPL-MCNC: 1.14 MG/DL (ref 0.6–1.3)
EST. AVERAGE GLUCOSE BLD GHB EST-MCNC: 177 MG/DL
GFR SERPL CREATININE-BSD FRML MDRD: 61 ML/MIN/1.73SQ M
GLUCOSE P FAST SERPL-MCNC: 136 MG/DL (ref 65–99)
HBA1C MFR BLD: 7.8 %
HDLC SERPL-MCNC: 47 MG/DL
LDLC SERPL CALC-MCNC: 101 MG/DL (ref 0–100)
POTASSIUM SERPL-SCNC: 4.3 MMOL/L (ref 3.5–5.3)
PROT SERPL-MCNC: 6 G/DL (ref 6.4–8.4)
SODIUM SERPL-SCNC: 139 MMOL/L (ref 135–147)
TRIGL SERPL-MCNC: 81 MG/DL

## 2023-08-22 PROCEDURE — 80053 COMPREHEN METABOLIC PANEL: CPT

## 2023-08-22 PROCEDURE — 80061 LIPID PANEL: CPT

## 2023-08-22 PROCEDURE — 83036 HEMOGLOBIN GLYCOSYLATED A1C: CPT

## 2023-08-22 PROCEDURE — 36415 COLL VENOUS BLD VENIPUNCTURE: CPT

## 2023-08-24 ENCOUNTER — TELEPHONE (OUTPATIENT)
Dept: FAMILY MEDICINE CLINIC | Facility: CLINIC | Age: 78
End: 2023-08-24

## 2023-08-24 DIAGNOSIS — E11.36 TYPE 2 DIABETES MELLITUS WITH DIABETIC CATARACT, UNSPECIFIED WHETHER LONG TERM INSULIN USE (HCC): Primary | ICD-10-CM

## 2023-08-24 RX ORDER — GLIPIZIDE 10 MG/1
10 TABLET, FILM COATED, EXTENDED RELEASE ORAL DAILY
Qty: 30 TABLET | Refills: 2 | Status: SHIPPED | OUTPATIENT
Start: 2023-08-24

## 2023-08-24 NOTE — TELEPHONE ENCOUNTER
----- Message from Dyan Brandt MD sent at 8/24/2023 10:18 AM EDT -----  Can we please let Mr. James Rosales know that his hemoglobin A1c for diabetes has worsened on recent blood work. I would like to increase the dose of his glipizide from 5mg to 10mg. I sent a new script to his pharmacy, but if he has the 5mg tablets, he can   take 2 until he is out. Rest of blood work is good.

## 2023-08-24 NOTE — TELEPHONE ENCOUNTER
Lmom for patients sister Slime Banegas to have Quinn Thrasher return my call. Lexy Agee phone is not working at the moment.   Surinder/GERRY

## 2023-08-25 NOTE — TELEPHONE ENCOUNTER
Sister Elton Jefferson called back and I gave Dr. Hernesto Reid message regarding increase to 10mg and repeat bloodwork. I did ask to have patient call us back so we know he got message from his sister.

## 2023-08-25 NOTE — TELEPHONE ENCOUNTER
Left msg on machine for a call back.  [address] [facility], [facility]

## 2023-09-03 DIAGNOSIS — E11.9 TYPE 2 DIABETES MELLITUS WITHOUT COMPLICATION, WITHOUT LONG-TERM CURRENT USE OF INSULIN (HCC): ICD-10-CM

## 2023-09-05 RX ORDER — EMPAGLIFLOZIN 10 MG/1
TABLET, FILM COATED ORAL
Qty: 30 TABLET | Refills: 0 | Status: SHIPPED | OUTPATIENT
Start: 2023-09-05 | End: 2023-09-12 | Stop reason: SDUPTHER

## 2023-09-12 DIAGNOSIS — E11.9 TYPE 2 DIABETES MELLITUS WITHOUT COMPLICATION, WITHOUT LONG-TERM CURRENT USE OF INSULIN (HCC): ICD-10-CM

## 2023-09-18 RX ORDER — GLIPIZIDE 5 MG/1
TABLET, FILM COATED, EXTENDED RELEASE ORAL
Qty: 90 TABLET | Refills: 0 | OUTPATIENT
Start: 2023-09-18

## 2023-09-29 ENCOUNTER — OFFICE VISIT (OUTPATIENT)
Dept: FAMILY MEDICINE CLINIC | Facility: CLINIC | Age: 78
End: 2023-09-29
Payer: MEDICARE

## 2023-09-29 VITALS
TEMPERATURE: 98.4 F | HEART RATE: 52 BPM | HEIGHT: 67 IN | RESPIRATION RATE: 18 BRPM | WEIGHT: 129 LBS | BODY MASS INDEX: 20.25 KG/M2 | SYSTOLIC BLOOD PRESSURE: 130 MMHG | DIASTOLIC BLOOD PRESSURE: 70 MMHG

## 2023-09-29 DIAGNOSIS — E46 PROTEIN-CALORIE MALNUTRITION, UNSPECIFIED SEVERITY (HCC): ICD-10-CM

## 2023-09-29 DIAGNOSIS — Z00.00 MEDICARE ANNUAL WELLNESS VISIT, SUBSEQUENT: Primary | ICD-10-CM

## 2023-09-29 PROCEDURE — G0439 PPPS, SUBSEQ VISIT: HCPCS | Performed by: FAMILY MEDICINE

## 2023-09-29 NOTE — PROGRESS NOTES
Assessment and Plan:     Problem List Items Addressed This Visit        Other    Medicare annual wellness visit, subsequent - Primary    Protein-calorie malnutrition, unspecified severity ([address])       Depression Screening and Follow-up Plan: Patient was screened for depression during today's encounter. They screened negative with a PHQ-2 score of 1. Falls Plan of Care: balance, strength, and gait training instructions were provided. Preventive health issues were discussed with patient, and age appropriate screening tests were ordered as noted in patient's After Visit Summary. Personalized health advice and appropriate referrals for health education or preventive services given if needed, as noted in patient's After Visit Summary. History of Present Illness:     Patient presents for a Medicare Wellness Visit    HPI   Patient Care Team:  Diallo La MD as PCP - General (Family Medicine)  Claudell Musca, PA-C     Review of Systems:     Review of Systems   Constitutional: Negative. HENT: Negative. Eyes: Negative. Respiratory: Negative. Cardiovascular: Negative. Gastrointestinal: Negative. Endocrine: Negative. Genitourinary: Negative. Musculoskeletal: Negative. Neurological: Negative. Hematological: Negative. Psychiatric/Behavioral: Negative.          Problem List:     Patient Active Problem List   Diagnosis   • Actinic keratosis   • Adenocarcinoma of prostate ([address])   • Anemia of chronic disease   • Carcinoma, basal cell, skin   • Type 2 diabetes mellitus with diabetic cataract, unspecified whether long term insulin use ([address])   • Dyslipidemia   • Hypertension   • Radiation proctitis   • Hypertrophic toenail   • Onychomycosis   • Personal history of colonic polyps   • Family history of colonic polyps   • Orthostatic dizziness   • 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   • Medicare annual wellness visit, subsequent   • Functional diarrhea   • Stage 3a chronic kidney disease ([address])   • Pseudopolyposis of colon without complication, unspecified part of colon ([address])   • Open wound of face   • Abrasion of nose   • Protein-calorie malnutrition, unspecified severity ([address])      Past Medical and Surgical History:     Past Medical History:   Diagnosis Date   • Acute pain of right shoulder 03/21/2022   • Adenocarcinoma of prostate ([address])     BCC   • Anemia    • Cancer ([address])    • Chronic kidney disease    • Colon polyp    • Diabetes mellitus ([address])    • History of Lyme disease    • Hyperlipidemia    • Hypertension    • Proctitis    • Prostate cancer ([address])     Treated with radiation     Past Surgical History:   Procedure Laterality Date   • COLONOSCOPY  01/10/2018   • COLONOSCOPY  01/10/2018     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, grade 2 internal hemorrhoids, 1 adenomatous polyp   • RI CYSTOURETHROSCOPY W/INTERNAL URETHROTOMY N/A 5/18/2023    Procedure: CYSTOSCOPY, DIRECT VISUAL INTERAL URETHROTOMY (DVIU), KENALOG INJECTION, BLADDER BIOPSY WITH Charlotte Connor;  Surgeon: Kacie Roe MD;  Location: [facility];  Service: Urology   • RI INSERTION/RPLCMT PERIPHERAL/GASTRIC NPGR N/A 4/13/2023    Procedure: STAGE 2 AXONIC, INSERTION OF NEUROSTIMULATOR, ELECTRONIC ANALYSIS OF NEUROSTIMULATOR;  Surgeon: Kacie Roe MD;  Location: [facility];  Service: Urology   • RI SPLIT AGRFT T/A/L 1ST 100 CM/&/1% BDY INFT/CHLD Left 6/22/2017    Procedure: ANTERIOR SHOULDER EXCISION BCC- COMPLEX CLOSURE vs FLAP vs STSG;  Surgeon: Benjamin Gutierrez MD;  Location: [facility];  Service: Plastics   • RI TRANSURETHRAL INCISION PROSTATE N/A 5/26/2022    Procedure: CYSTOSCOPY, TRANSURETHRAL INCISION OF PROSTATE (TUIP);   Surgeon: Kacie Roe MD;  Location: [facility];  Service: Urology   • SACRAL NERVE STIMULATOR PLACEMENT N/A 4/6/2023    Procedure: PART 1, INCISION FOR IMPLANTATION OF NEUROSTIMULATOR, ELECTRONIC ANALYSIS NEUROSTIMULATOR, STAGE 1;  Surgeon: Kacie Roe MD;  Location: [facility];  Service: Urology   • SKIN LESION EXCISION N/A 2022    Procedure: EXCISION WIDE LESION TRUNK/ABDOMEN/BACK;  Surgeon: Joan Kelley MD;  Location: [facility];  Service: Plastics      Family History:     Family History   Problem Relation Age of Onset   • Heart disease Mother    • Diabetes Brother         mellitus    • Colon polyps Brother    • Throat cancer Sister    • Colon cancer Neg Hx       Social History:     Social History     Socioeconomic History   • Marital status: Single     Spouse name: None   • Number of children: None   • Years of education: None   • Highest education level: None   Occupational History   • None   Tobacco Use   • Smoking status: Former     Packs/day: 0.10     Years: 2.00     Total pack years: 0.20     Types: Cigarettes     Start date: 1975     Quit date: 1977     Years since quittin.3   • Smokeless tobacco: Never   • Tobacco comments:     never a smoker noted in "allscripts" - Denied: History of tobacco use noted in "allscripts"    Vaping Use   • Vaping Use: Never used   Substance and Sexual Activity   • Alcohol use: No   • Drug use: No   • Sexual activity: None   Other Topics Concern   • None   Social History Narrative   • None     Social Determinants of Health     Financial Resource Strain: High Risk (2023)    Overall Financial Resource Strain (CARDIA)    • Difficulty of Paying Living Expenses: Hard   Food Insecurity: Not on file   Transportation Needs: No Transportation Needs (2023)    PRAPARE - Transportation    • Lack of Transportation (Medical): No    • Lack of Transportation (Non-Medical):  No   Physical Activity: Not on file   Stress: Not on file   Social Connections: Not on file   Intimate Partner Violence: Not on file   Housing Stability: Not on file      Medications and Allergies:     Current Outpatient Medications   Medication Sig Dispense Refill   • aspirin 81 MG tablet Take 81 mg by mouth as needed Last dose  5/10/23     • atorvastatin (LIPITOR) 10 mg tablet Take 1 tablet (10 mg total) by mouth daily 30 tablet 1   • cholestyramine (QUESTRAN) 4 g packet Take 1 packet (4 g total) by mouth 2 (two) times a day with meals lunch, dinner 60 packet 1   • Empagliflozin (Jardiance) 10 MG TABS tablet Take 1 tablet (10 mg total) by mouth daily 30 tablet 0   • glipiZIDE (GLUCOTROL XL) 10 mg 24 hr tablet Take 1 tablet (10 mg total) by mouth daily 30 tablet 2   • lisinopril (ZESTRIL) 5 mg tablet Take 1 tablet by mouth once daily (Patient taking differently: 10 mg) 90 tablet 0   • metFORMIN (GLUCOPHAGE) 1000 MG tablet Take 1 tablet (1,000 mg total) by mouth 2 (two) times a day 180 tablet 1   • docusate sodium (COLACE) 100 mg capsule Take 1 capsule (100 mg total) by mouth 2 (two) times a day for 10 days (Patient not taking: Reported on 8/5/2023) 20 capsule 0     No current facility-administered medications for this visit. No Known Allergies   Immunizations:     Immunization History   Administered Date(s) Administered   • COVID-19 MODERNA VACC 0.5 ML IM 02/12/2021, 03/11/2021   • INFLUENZA 11/26/2013, 12/02/2014   • Influenza Quadrivalent Preservative Free 3 years and older IM 12/15/2016   • Influenza Quadrivalent, 6-35 Months IM 12/03/2015   • Influenza Split High Dose Preservative Free IM 12/18/2017   • Influenza, high dose seasonal 0.7 mL 12/17/2018, 12/10/2019, 12/11/2020, 01/31/2022   • Pneumococcal Conjugate 13-Valent 12/18/2017   • Pneumococcal Polysaccharide PPV23 05/29/2014   • Tdap 05/29/2014, 08/05/2023      Health Maintenance:         Topic Date Due   • Colorectal Cancer Screening  10/18/2025   • Hepatitis C Screening  Completed     There are no preventive care reminders to display for this patient. Medicare Screening Tests and Risk Assessments:     Trev Del Real is here for his Subsequent Wellness visit. Health Risk Assessment:   Patient rates overall health as good. Patient feels that their physical health rating is same.  Patient is satisfied with their life. Eyesight was rated as same. Hearing was rated as same. Patient feels that their emotional and mental health rating is same. Patients states they are never, rarely angry. Patient states they are often unusually tired/fatigued. Pain experienced in the last 7 days has been none. Patient states that he has experienced no weight loss or gain in last 6 months. Depression Screening:   PHQ-2 Score: 1      Fall Risk Screening: In the past year, patient has experienced: history of falling in past year    Number of falls: 2 or more  Injured during fall?: Yes    Feels unsteady when standing or walking?: Yes    Worried about falling?: Yes      Home Safety:  Patient has trouble with stairs inside or outside of their home. Patient has working smoke alarms and has working carbon monoxide detector. Home safety hazards include: none. Nutrition:   Current diet is Diabetic. Medications:   Patient is currently taking over-the-counter supplements. OTC medications include: see medication list. Patient is able to manage medications. Activities of Daily Living (ADLs)/Instrumental Activities of Daily Living (IADLs):   Walk and transfer into and out of bed and chair?: Yes  Dress and groom yourself?: Yes    Bathe or shower yourself?: Yes    Feed yourself?  Yes  Do your laundry/housekeeping?: Yes  Manage your money, pay your bills and track your expenses?: Yes  Make your own meals?: Yes    Do your own shopping?: Yes    Previous Hospitalizations:   Any hospitalizations or ED visits within the last 12 months?: Yes    How many hospitalizations have you had in the last year?: 1-2    Advance Care Planning:   Living will: No    Durable POA for healthcare: No    Advanced directive: No      PREVENTIVE SCREENINGS      Cardiovascular Screening:    General: Screening Current      Diabetes Screening:     General: Screening Not Indicated and History Diabetes      Colorectal Cancer Screening:     General: Screening Current      Prostate Cancer Screening:    General: History Prostate Cancer and Screening Not Indicated      Osteoporosis Screening:    General: Screening Not Indicated      Abdominal Aortic Aneurysm (AAA) Screening:    Risk factors include: tobacco use        General: Screening Not Indicated      Lung Cancer Screening:     General: Screening Not Indicated      Hepatitis C Screening:    General: Screening Current    Screening, Brief Intervention, and Referral to Treatment (SBIRT)    Screening  Typical number of drinks in a day: 0  Typical number of drinks in a week: 0  Interpretation: Low risk drinking behavior. AUDIT-C Screenin) How often did you have a drink containing alcohol in the past year? never  2) How many drinks did you have on a typical day when you were drinking in the past year? 0  3) How often did you have 6 or more drinks on one occasion in the past year? never    AUDIT-C Score: 0  Interpretation: Score 0-3 (male): Negative screen for alcohol misuse    Single Item Drug Screening:  How often have you used an illegal drug (including marijuana) or a prescription medication for non-medical reasons in the past year? never    Single Item Drug Screen Score: 0  Interpretation: Negative screen for possible drug use disorder    Brief Intervention  Alcohol & drug use screenings were reviewed. No concerns regarding substance use disorder identified. No results found. Physical Exam:     /70   Pulse (!) 52   Temp 98.4 °F (36.9 °C)   Resp 18   Ht 5' 7" (1.702 m)   Wt 58.5 kg (129 lb)   BMI 20.20 kg/m²     Physical Exam  Constitutional:       General: He is not in acute distress. Appearance: Normal appearance. He is normal weight. He is not ill-appearing, toxic-appearing or diaphoretic. HENT:      Head: Normocephalic and atraumatic. Right Ear: Tympanic membrane, ear canal and external ear normal. There is no impacted cerumen.       Left Ear: Tympanic membrane, ear canal and external ear normal. There is no impacted cerumen. Nose: Nose normal.      Mouth/Throat:      Mouth: Mucous membranes are moist.      Pharynx: Oropharynx is clear. No oropharyngeal exudate or posterior oropharyngeal erythema. Eyes:      General: No scleral icterus. Right eye: No discharge. Left eye: No discharge. Extraocular Movements: Extraocular movements intact. Conjunctiva/sclera: Conjunctivae normal.      Pupils: Pupils are equal, round, and reactive to light. Cardiovascular:      Rate and Rhythm: Normal rate and regular rhythm. Pulses: Normal pulses. Heart sounds: Normal heart sounds. No murmur heard. No friction rub. No gallop. Pulmonary:      Effort: Pulmonary effort is normal. No respiratory distress. Breath sounds: Normal breath sounds. No stridor. No wheezing, rhonchi or rales. Chest:      Chest wall: No tenderness. Abdominal:      General: Abdomen is flat. Bowel sounds are normal. There is no distension. Palpations: Abdomen is soft. There is no mass. Tenderness: There is no abdominal tenderness. There is no guarding or rebound. Hernia: No hernia is present. Musculoskeletal:         General: Normal range of motion. Skin:     General: Skin is warm and dry. Neurological:      General: No focal deficit present. Mental Status: He is alert and oriented to person, place, and time. Psychiatric:         Mood and Affect: Mood normal.         Behavior: Behavior normal.         Thought Content:  Thought content normal.         Judgment: Judgment normal.          Dyan Brandt MD

## 2023-09-29 NOTE — PATIENT INSTRUCTIONS
Medicare Preventive Visit Patient Instructions  Thank you for completing your Welcome to Medicare Visit or Medicare Annual Wellness Visit today. Your next wellness visit will be due in one year (9/29/2024). The screening/preventive services that you may require over the next 5-10 years are detailed below. Some tests may not apply to you based off risk factors and/or age. Screening tests ordered at today's visit but not completed yet may show as past due. Also, please note that scanned in results may not display below. Preventive Screenings:  Service Recommendations Previous Testing/Comments   Colorectal Cancer Screening  · Colonoscopy    · Fecal Occult Blood Test (FOBT)/Fecal Immunochemical Test (FIT)  · Fecal DNA/Cologuard Test  · Flexible Sigmoidoscopy Age: 43-73 years old   Colonoscopy: every 10 years (May be performed more frequently if at higher risk)  OR  FOBT/FIT: every 1 year  OR  Cologuard: every 3 years  OR  Sigmoidoscopy: every 5 years  Screening may be recommended earlier than age 39 if at higher risk for colorectal cancer. Also, an individualized decision between you and your healthcare provider will decide whether screening between the ages of 77-80 would be appropriate.  Colonoscopy: 10/19/2022  FOBT/FIT: Not on file  Cologuard: Not on file  Sigmoidoscopy: 04/03/2020    Screening Current     Prostate Cancer Screening Individualized decision between patient and health care provider in men between ages of 53-66   Medicare will cover every 12 months beginning on the day after your 50th birthday PSA: <0.1 ng/mL     History Prostate Cancer  Screening Not Indicated     Hepatitis C Screening Once for adults born between 1945 and 1965  More frequently in patients at high risk for Hepatitis C Hep C Antibody: 06/21/2018    Screening Current   Diabetes Screening 1-2 times per year if you're at risk for diabetes or have pre-diabetes Fasting glucose: 136 mg/dL (8/22/2023)  A1C: 7.8 % (8/22/2023)  Screening Not Indicated  History Diabetes   Cholesterol Screening Once every 5 years if you don't have a lipid disorder. May order more often based on risk factors. Lipid panel: 08/22/2023  Screening Current      Other Preventive Screenings Covered by Medicare:  1. Abdominal Aortic Aneurysm (AAA) Screening: covered once if your at risk. You're considered to be at risk if you have a family history of AAA or a male between the age of 70-76 who smoking at least 100 cigarettes in your lifetime. 2. Lung Cancer Screening: covers low dose CT scan once per year if you meet all of the following conditions: (1) Age 48-67; (2) No signs or symptoms of lung cancer; (3) Current smoker or have quit smoking within the last 15 years; (4) You have a tobacco smoking history of at least 20 pack years (packs per day x number of years you smoked); (5) You get a written order from a healthcare provider. 3. Glaucoma Screening: covered annually if you're considered high risk: (1) You have diabetes OR (2) Family history of glaucoma OR (3)  aged 48 and older OR (3)  American aged 72 and older  3. Osteoporosis Screening: covered every 2 years if you meet one of the following conditions: (1) Have a vertebral abnormality; (2) On glucocorticoid therapy for more than 3 months; (3) Have primary hyperparathyroidism; (4) On osteoporosis medications and need to assess response to drug therapy. 5. HIV Screening: covered annually if you're between the age of 14-79. Also covered annually if you are younger than 13 and older than 72 with risk factors for HIV infection. For pregnant patients, it is covered up to 3 times per pregnancy.     Immunizations:  Immunization Recommendations   Influenza Vaccine Annual influenza vaccination during flu season is recommended for all persons aged >= 6 months who do not have contraindications   Pneumococcal Vaccine   * Pneumococcal conjugate vaccine = PCV13 (Prevnar 13), PCV15 (Vaxneuvance), PCV20 (Prevnar 20)  * Pneumococcal polysaccharide vaccine = PPSV23 (Pneumovax) Adults 2364 years old: 1-3 doses may be recommended based on certain risk factors  Adults 72 years old: 1-2 doses may be recommended based off what pneumonia vaccine you previously received   Hepatitis B Vaccine 3 dose series if at intermediate or high risk (ex: diabetes, end stage renal disease, liver disease)   Tetanus (Td) Vaccine - COST NOT COVERED BY MEDICARE PART B Following completion of primary series, a booster dose should be given every 10 years to maintain immunity against tetanus. Td may also be given as tetanus wound prophylaxis. Tdap Vaccine - COST NOT COVERED BY MEDICARE PART B Recommended at least once for all adults. For pregnant patients, recommended with each pregnancy. Shingles Vaccine (Shingrix) - COST NOT COVERED BY MEDICARE PART B  2 shot series recommended in those aged 48 and above     Health Maintenance Due:      Topic Date Due   • Colorectal Cancer Screening  10/18/2025   • Hepatitis C Screening  Completed     Immunizations Due:      Topic Date Due   • COVID-19 Vaccine (3 - Moderna series) 05/06/2021   • Influenza Vaccine (1) 09/01/2023     Advance Directives   What are advance directives? Advance directives are legal documents that state your wishes and plans for medical care. These plans are made ahead of time in case you lose your ability to make decisions for yourself. Advance directives can apply to any medical decision, such as the treatments you want, and if you want to donate organs. What are the types of advance directives? There are many types of advance directives, and each state has rules about how to use them. You may choose a combination of any of the following:  · Living will: This is a written record of the treatment you want. You can also choose which treatments you do not want, which to limit, and which to stop at a certain time. This includes surgery, medicine, IV fluid, and tube feedings. · Durable power of  for healthcare [facility] [place]): This is a written record that states who you want to make healthcare choices for you when you are unable to make them for yourself. This person, called a proxy, is usually a family member or a friend. You may choose more than 1 proxy. · Do not resuscitate (DNR) order:  A DNR order is used in case your heart stops beating or you stop breathing. It is a request not to have certain forms of treatment, such as CPR. A DNR order may be included in other types of advance directives. · Medical directive: This covers the care that you want if you are in a coma, near death, or unable to make decisions for yourself. You can list the treatments you want for each condition. Treatment may include pain medicine, surgery, blood transfusions, dialysis, IV or tube feedings, and a ventilator (breathing machine). · Values history: This document has questions about your views, beliefs, and how you feel and think about life. This information can help others choose the care that you would choose. Why are advance directives important? An advance directive helps you control your care. Although spoken wishes may be used, it is better to have your wishes written down. Spoken wishes can be misunderstood, or not followed. Treatments may be given even if you do not want them. An advance directive may make it easier for your family to make difficult choices about your care. Fall Prevention    Fall prevention  includes ways to make your home and other areas safer. It also includes ways you can move more carefully to prevent a fall. Health conditions that cause changes in your blood pressure, vision, or muscle strength and coordination may increase your risk for falls. Medicines may also increase your risk for falls if they make you dizzy, weak, or sleepy. Fall prevention tips:   · Stand or sit up slowly. · Use assistive devices as directed.     · Wear shoes that fit well and have soles that . · Wear a personal alarm. · Stay active. · Manage your medical conditions. Home Safety Tips:  · Add items to prevent falls in the bathroom. · Keep paths clear. · Install bright lights in your home. · Keep items you use often on shelves within reach. · Paint or place reflective tape on the edges of your stairs. © Copyright [employer] 2018 Information is for End User's use only and may not be sold, redistributed or otherwise used for commercial purposes.  All illustrations and images included in CareNotes® are the copyrighted property of [employer] Inc[employer] or [address] [place] [address]

## 2023-10-10 ENCOUNTER — OFFICE VISIT (OUTPATIENT)
Dept: FAMILY MEDICINE CLINIC | Facility: CLINIC | Age: 78
End: 2023-10-10
Payer: MEDICARE

## 2023-10-10 VITALS
BODY MASS INDEX: 21.03 KG/M2 | SYSTOLIC BLOOD PRESSURE: 120 MMHG | DIASTOLIC BLOOD PRESSURE: 70 MMHG | HEART RATE: 67 BPM | OXYGEN SATURATION: 98 % | RESPIRATION RATE: 18 BRPM | HEIGHT: 67 IN | WEIGHT: 134 LBS | TEMPERATURE: 97 F

## 2023-10-10 DIAGNOSIS — E11.9 TYPE 2 DIABETES MELLITUS WITHOUT COMPLICATION, WITHOUT LONG-TERM CURRENT USE OF INSULIN (HCC): ICD-10-CM

## 2023-10-10 DIAGNOSIS — E78.5 DYSLIPIDEMIA: ICD-10-CM

## 2023-10-10 DIAGNOSIS — E11.36 TYPE 2 DIABETES MELLITUS WITH DIABETIC CATARACT, UNSPECIFIED WHETHER LONG TERM INSULIN USE (HCC): Primary | ICD-10-CM

## 2023-10-10 DIAGNOSIS — I10 ESSENTIAL HYPERTENSION: ICD-10-CM

## 2023-10-10 PROCEDURE — 99214 OFFICE O/P EST MOD 30 MIN: CPT | Performed by: FAMILY MEDICINE

## 2023-10-10 RX ORDER — ATORVASTATIN CALCIUM 10 MG/1
10 TABLET, FILM COATED ORAL DAILY
Qty: 90 TABLET | Refills: 1 | Status: SHIPPED | OUTPATIENT
Start: 2023-10-10

## 2023-10-10 RX ORDER — LISINOPRIL 5 MG/1
5 TABLET ORAL DAILY
Qty: 90 TABLET | Refills: 1 | Status: SHIPPED | OUTPATIENT
Start: 2023-10-10

## 2023-10-10 RX ORDER — EMPAGLIFLOZIN 10 MG/1
10 TABLET, FILM COATED ORAL DAILY
Qty: 30 TABLET | Refills: 0 | Status: SHIPPED | OUTPATIENT
Start: 2023-10-10

## 2023-10-10 NOTE — ASSESSMENT & PLAN NOTE
Raul Yu is here today to review his diabetes medications. Given that his A1c has recently been worsening, will increase the dose of his glipizide from 5mg to 10mg daily. Continue metformin, jardiance at current doses.    Lab Results   Component Value Date    HGBA1C 7.8 (H) 08/22/2023

## 2023-10-10 NOTE — PROGRESS NOTES
Name: Redd Whalen      :       MRN: [id number]  Encounter Provider: Dieudonne Lockett MD  Encounter Date: 10/10/2023   Encounter department: [address] [place] [address]     1. Type 2 diabetes mellitus with diabetic cataract, unspecified whether long term insulin use ([address])  Assessment & Plan:  Asya Horner is here today to review his diabetes medications. Given that his A1c has recently been worsening, will increase the dose of his glipizide from 5mg to 10mg daily. Continue metformin, jardiance at current doses. Lab Results   Component Value Date    HGBA1C 7.8 (H) 2023         2. Essential hypertension  Comments:  Controlled continue lisinopril. Orders:  -     lisinopril (ZESTRIL) 5 mg tablet; Take 1 tablet (5 mg total) by mouth daily    3. Dyslipidemia  Comments:  Controlled. Continue statin. Orders:  -     atorvastatin (LIPITOR) 10 mg tablet; Take 1 tablet (10 mg total) by mouth daily         Subjective      HPI   He is here today for routine follow up and med check. No acute issues/concerns. Review of Systems   Constitutional: Negative. HENT: Negative. Eyes: Negative. Respiratory: Negative. Cardiovascular: Negative. Gastrointestinal: Negative. Endocrine: Negative. Genitourinary: Negative. Musculoskeletal: Negative. Neurological: Negative. Hematological: Negative. Psychiatric/Behavioral: Negative.         Current Outpatient Medications on File Prior to Visit   Medication Sig   • aspirin 81 MG tablet Take 81 mg by mouth as needed Last dose  5/10/23   • glipiZIDE (GLUCOTROL XL) 10 mg 24 hr tablet Take 1 tablet (10 mg total) by mouth daily   • metFORMIN (GLUCOPHAGE) 1000 MG tablet Take 1 tablet (1,000 mg total) by mouth 2 (two) times a day   • [DISCONTINUED] atorvastatin (LIPITOR) 10 mg tablet Take 1 tablet (10 mg total) by mouth daily (Patient not taking: Reported on 10/10/2023)   • [DISCONTINUED] cholestyramine (QUESTRAN) 4 g packet Take 1 packet (4 g total) by mouth 2 (two) times a day with meals lunch, dinner (Patient not taking: Reported on 10/10/2023)   • [DISCONTINUED] docusate sodium (COLACE) 100 mg capsule Take 1 capsule (100 mg total) by mouth 2 (two) times a day for 10 days (Patient not taking: Reported on 8/5/2023)   • [DISCONTINUED] Empagliflozin (Jardiance) 10 MG TABS tablet Take 1 tablet (10 mg total) by mouth daily   • [DISCONTINUED] lisinopril (ZESTRIL) 5 mg tablet Take 1 tablet by mouth once daily (Patient not taking: Reported on 10/10/2023)   • [DISCONTINUED] polyethylene glycol (GOLYTELY) 4000 mL solution Take 4,000 mL by mouth once for 1 dose       Objective     /70   Pulse 67   Temp (!) 97 °F (36.1 °C)   Resp 18   Ht 5' 7" (1.702 m)   Wt 60.8 kg (134 lb)   SpO2 98%   BMI 20.99 kg/m²     Physical Exam  Constitutional:       General: He is not in acute distress. Appearance: He is well-developed. He is not diaphoretic. HENT:      Head: Normocephalic and atraumatic. Eyes:      General: No scleral icterus. Right eye: No discharge. Left eye: No discharge. Conjunctiva/sclera: Conjunctivae normal.   Cardiovascular:      Rate and Rhythm: Normal rate and regular rhythm. Pulses: Normal pulses. Pulmonary:      Effort: Pulmonary effort is normal. No respiratory distress. Breath sounds: Normal breath sounds. No stridor. No wheezing, rhonchi or rales. Chest:      Chest wall: No tenderness. Musculoskeletal:         General: Normal range of motion. Cervical back: Normal range of motion. Skin:     General: Skin is warm. Neurological:      Mental Status: He is alert and oriented to person, place, and time. Psychiatric:         Mood and Affect: Mood normal.         Behavior: Behavior normal.         Thought Content:  Thought content normal.         Judgment: Judgment normal.       Yasmin Arriaza MD

## 2023-11-13 DIAGNOSIS — E11.9 TYPE 2 DIABETES MELLITUS WITHOUT COMPLICATION, WITHOUT LONG-TERM CURRENT USE OF INSULIN (HCC): ICD-10-CM

## 2023-11-13 RX ORDER — EMPAGLIFLOZIN 10 MG/1
10 TABLET, FILM COATED ORAL DAILY
Qty: 30 TABLET | Refills: 0 | Status: SHIPPED | OUTPATIENT
Start: 2023-11-13

## 2023-12-01 ENCOUNTER — TELEPHONE (OUTPATIENT)
Dept: GASTROENTEROLOGY | Facility: CLINIC | Age: 78
End: 2023-12-01

## 2023-12-18 DIAGNOSIS — E11.9 TYPE 2 DIABETES MELLITUS WITHOUT COMPLICATION, WITHOUT LONG-TERM CURRENT USE OF INSULIN (HCC): ICD-10-CM

## 2023-12-18 DIAGNOSIS — E78.5 DYSLIPIDEMIA: ICD-10-CM

## 2023-12-18 RX ORDER — ATORVASTATIN CALCIUM 10 MG/1
10 TABLET, FILM COATED ORAL DAILY
Qty: 90 TABLET | Refills: 1 | Status: SHIPPED | OUTPATIENT
Start: 2023-12-18

## 2023-12-18 RX ORDER — EMPAGLIFLOZIN 10 MG/1
10 TABLET, FILM COATED ORAL DAILY
Qty: 30 TABLET | Refills: 5 | Status: SHIPPED | OUTPATIENT
Start: 2023-12-18

## 2024-01-12 ENCOUNTER — OFFICE VISIT (OUTPATIENT)
Dept: FAMILY MEDICINE CLINIC | Facility: CLINIC | Age: 79
End: 2024-01-12
Payer: MEDICARE

## 2024-01-12 VITALS
RESPIRATION RATE: 17 BRPM | WEIGHT: 138.4 LBS | SYSTOLIC BLOOD PRESSURE: 136 MMHG | DIASTOLIC BLOOD PRESSURE: 74 MMHG | BODY MASS INDEX: 21.72 KG/M2 | HEIGHT: 67 IN | HEART RATE: 66 BPM | TEMPERATURE: 97.4 F

## 2024-01-12 DIAGNOSIS — E11.36 TYPE 2 DIABETES MELLITUS WITH DIABETIC CATARACT, UNSPECIFIED WHETHER LONG TERM INSULIN USE (HCC): Primary | ICD-10-CM

## 2024-01-12 DIAGNOSIS — K51.40 PSEUDOPOLYPOSIS OF COLON WITHOUT COMPLICATION, UNSPECIFIED PART OF COLON (HCC): ICD-10-CM

## 2024-01-12 DIAGNOSIS — N18.31 STAGE 3A CHRONIC KIDNEY DISEASE (HCC): ICD-10-CM

## 2024-01-12 DIAGNOSIS — E46 PROTEIN-CALORIE MALNUTRITION, UNSPECIFIED SEVERITY (HCC): ICD-10-CM

## 2024-01-12 DIAGNOSIS — E78.5 DYSLIPIDEMIA: ICD-10-CM

## 2024-01-12 DIAGNOSIS — C61 ADENOCARCINOMA OF PROSTATE (HCC): ICD-10-CM

## 2024-01-12 LAB — SL AMB POCT HEMOGLOBIN AIC: 7.1 (ref ?–6.5)

## 2024-01-12 PROCEDURE — 83036 HEMOGLOBIN GLYCOSYLATED A1C: CPT | Performed by: FAMILY MEDICINE

## 2024-01-12 PROCEDURE — 99214 OFFICE O/P EST MOD 30 MIN: CPT | Performed by: FAMILY MEDICINE

## 2024-01-12 NOTE — PROGRESS NOTES
Name: Artur Santana      : 1945      MRN: [id number]  Encounter Provider: Erica Latham MD  Encounter Date: 2024   Encounter department: [facility]    Assessment & Plan     1. Type 2 diabetes mellitus with diabetic cataract, unspecified whether long term insulin use (HCC)  Assessment & Plan:  Stable, continue Jardiance, Metformin and Glipizide.   Lab Results   Component Value Date    HGBA1C 7.1 (A) 2024       Orders:  -     POCT hemoglobin A1c  -     Hemoglobin A1C; Future; Expected date: 2024  -     Albumin / creatinine urine ratio; Future; Expected date: 2024    2. Protein-calorie malnutrition, unspecified severity (HCC)  Comments:  Counseled on healthy diet.    3. Pseudopolyposis of colon without complication, unspecified part of colon (HCC)  Comments:  Up to date with colonoscopy- follows GI    4. Adenocarcinoma of prostate (HCC)  Assessment & Plan:  History of prostate cancer many years ago- pt reports he has not needed any further follow up with specialist.     Orders:  -     PSA Total, Diagnostic; Future    5. Stage 3a chronic kidney disease (HCC)  Comments:  Stable.    6. Dyslipidemia  Comments:  Continue statin.  Orders:  -     Comprehensive metabolic panel; Future; Expected date: 2024  -     Lipid Panel with Direct LDL reflex; Future; Expected date: 2024           Subjective      HPI  Artur is here today for routine follow up visit.   He reports no acute issues or concerns.     Review of Systems   Constitutional: Negative.    HENT: Negative.     Eyes: Negative.    Respiratory: Negative.     Cardiovascular: Negative.    Gastrointestinal: Negative.    Endocrine: Negative.    Genitourinary: Negative.    Musculoskeletal: Negative.    Neurological: Negative.    Hematological: Negative.    Psychiatric/Behavioral: Negative.         Current Outpatient Medications on File Prior to Visit   Medication Sig    aspirin 81 MG tablet Take 81 mg by mouth as  "needed Last dose  5/10/23    atorvastatin (LIPITOR) 10 mg tablet Take 1 tablet (10 mg total) by mouth daily    Empagliflozin (Jardiance) 10 MG TABS tablet Take 1 tablet by mouth once daily    glipiZIDE (GLUCOTROL XL) 10 mg 24 hr tablet Take 1 tablet (10 mg total) by mouth daily    lisinopril (ZESTRIL) 5 mg tablet Take 1 tablet (5 mg total) by mouth daily    metFORMIN (GLUCOPHAGE) 1000 MG tablet Take 1 tablet (1,000 mg total) by mouth 2 (two) times a day    [DISCONTINUED] polyethylene glycol (GOLYTELY) 4000 mL solution Take 4,000 mL by mouth once for 1 dose       Objective     /74   Pulse 66   Temp (!) 97.4 °F (36.3 °C)   Resp 17   Ht 5' 7\" (1.702 m)   Wt 62.8 kg (138 lb 6.4 oz)   BMI 21.68 kg/m²     Physical Exam  Constitutional:       General: He is not in acute distress.     Appearance: He is well-developed. He is not diaphoretic.   HENT:      Head: Normocephalic and atraumatic.   Eyes:      General: No scleral icterus.        Right eye: No discharge.         Left eye: No discharge.      Conjunctiva/sclera: Conjunctivae normal.   Cardiovascular:      Rate and Rhythm: Normal rate and regular rhythm.      Pulses: Normal pulses.   Pulmonary:      Effort: Pulmonary effort is normal. No respiratory distress.      Breath sounds: Normal breath sounds. No stridor. No wheezing, rhonchi or rales.   Chest:      Chest wall: No tenderness.   Musculoskeletal:         General: Normal range of motion.      Cervical back: Normal range of motion.   Skin:     General: Skin is warm.   Neurological:      Mental Status: He is alert and oriented to person, place, and time.   Psychiatric:         Mood and Affect: Mood normal.         Behavior: Behavior normal.         Thought Content: Thought content normal.         Judgment: Judgment normal.       Erica Latham MD    "

## 2024-01-12 NOTE — ASSESSMENT & PLAN NOTE
History of prostate cancer many years ago- pt reports he has not needed any further follow up with specialist.

## 2024-01-12 NOTE — ASSESSMENT & PLAN NOTE
Stable, continue Jardiance, Metformin and Glipizide.   Lab Results   Component Value Date    HGBA1C 7.1 (A) 01/12/2024

## 2024-01-15 ENCOUNTER — OFFICE VISIT (OUTPATIENT)
Dept: FAMILY MEDICINE CLINIC | Facility: CLINIC | Age: 79
End: 2024-01-15
Payer: MEDICARE

## 2024-01-15 VITALS
SYSTOLIC BLOOD PRESSURE: 108 MMHG | BODY MASS INDEX: 21.5 KG/M2 | HEIGHT: 67 IN | RESPIRATION RATE: 16 BRPM | TEMPERATURE: 97.9 F | DIASTOLIC BLOOD PRESSURE: 60 MMHG | OXYGEN SATURATION: 99 % | HEART RATE: 63 BPM | WEIGHT: 137 LBS

## 2024-01-15 DIAGNOSIS — E11.36 TYPE 2 DIABETES MELLITUS WITH DIABETIC CATARACT, UNSPECIFIED WHETHER LONG TERM INSULIN USE (HCC): Primary | ICD-10-CM

## 2024-01-15 DIAGNOSIS — L60.2 HYPERTROPHIC TOENAIL: ICD-10-CM

## 2024-01-15 DIAGNOSIS — B35.1 ONYCHOMYCOSIS: ICD-10-CM

## 2024-01-15 DIAGNOSIS — M79.674 PAIN AROUND TOENAIL, RIGHT FOOT: ICD-10-CM

## 2024-01-15 PROCEDURE — 99214 OFFICE O/P EST MOD 30 MIN: CPT | Performed by: FAMILY MEDICINE

## 2024-01-15 NOTE — PROGRESS NOTES
Name: Artur Santana      : 1945      MRN: [id number]  Encounter Provider: Erica Latham MD  Encounter Date: 1/15/2024   Encounter department: [facility]    Assessment & Plan     1. Type 2 diabetes mellitus with diabetic cataract, unspecified whether long term insulin use (HCC)  -     Ambulatory Referral to Podiatry; Future    2. Pain around toenail, right foot  -     Ambulatory Referral to Podiatry; Future    3. Onychomycosis  -     Ambulatory Referral to Podiatry; Future    4. Hypertrophic toenail  -     Ambulatory Referral to Podiatry; Future           Subjective      HPI  Mr. Santana is a 77 yo male with a history of type II DM, hypertension, prostate cancer, basal cell carcinoma of skin who is here today for evaluation of pain on his right great toenail. He does have a history of hypertrophic toenail and onychomycosis- thinks he stubbed his toe earlier today and since then his right great toenail is a little loose and painful. It did not lift up, but looks like it is about to. No bleeding, swelling, redness, warmth to touch, discharge.     Review of Systems   Constitutional: Negative.    HENT: Negative.     Eyes: Negative.    Respiratory: Negative.     Cardiovascular: Negative.    Gastrointestinal: Negative.    Endocrine: Negative.    Genitourinary: Negative.    Musculoskeletal: Negative.    Skin:         Right great toenail pain   Neurological: Negative.    Hematological: Negative.    Psychiatric/Behavioral: Negative.         Current Outpatient Medications on File Prior to Visit   Medication Sig    aspirin 81 MG tablet Take 81 mg by mouth as needed Last dose  5/10/23    atorvastatin (LIPITOR) 10 mg tablet Take 1 tablet (10 mg total) by mouth daily    Empagliflozin (Jardiance) 10 MG TABS tablet Take 1 tablet by mouth once daily    glipiZIDE (GLUCOTROL XL) 10 mg 24 hr tablet Take 1 tablet (10 mg total) by mouth daily    lisinopril (ZESTRIL) 5 mg tablet Take 1 tablet (5 mg total) by mouth  "daily    metFORMIN (GLUCOPHAGE) 1000 MG tablet Take 1 tablet (1,000 mg total) by mouth 2 (two) times a day    [DISCONTINUED] polyethylene glycol (GOLYTELY) 4000 mL solution Take 4,000 mL by mouth once for 1 dose       Objective     /60   Pulse 63   Temp 97.9 °F (36.6 °C)   Resp 16   Ht 5' 7\" (1.702 m)   Wt 62.1 kg (137 lb)   SpO2 99%   BMI 21.46 kg/m²     Physical Exam  Constitutional:       General: He is not in acute distress.     Appearance: He is well-developed. He is not diaphoretic.   HENT:      Head: Normocephalic and atraumatic.   Eyes:      General: No scleral icterus.        Right eye: No discharge.         Left eye: No discharge.      Conjunctiva/sclera: Conjunctivae normal.   Pulmonary:      Effort: Pulmonary effort is normal.   Musculoskeletal:      Cervical back: Normal range of motion.      Right foot: Normal range of motion.      Left foot: Normal range of motion. Deformity (right great toenail slightly lifting) present.   Feet:      Right foot:      Toenail Condition: Right toenails are abnormally thick and long. Fungal disease present.     Left foot:      Toenail Condition: Left toenails are abnormally thick and long. Fungal disease present.  Skin:     General: Skin is warm.   Neurological:      Mental Status: He is alert and oriented to person, place, and time.   Psychiatric:         Behavior: Behavior normal.         Thought Content: Thought content normal.         Judgment: Judgment normal.       Erica Latham MD    "

## 2024-01-23 ENCOUNTER — OFFICE VISIT (OUTPATIENT)
Dept: GASTROENTEROLOGY | Facility: CLINIC | Age: 79
End: 2024-01-23
Payer: MEDICARE

## 2024-01-23 VITALS
SYSTOLIC BLOOD PRESSURE: 140 MMHG | WEIGHT: 147 LBS | HEIGHT: 67 IN | DIASTOLIC BLOOD PRESSURE: 90 MMHG | BODY MASS INDEX: 23.07 KG/M2

## 2024-01-23 DIAGNOSIS — K55.21 AVM (ARTERIOVENOUS MALFORMATION) OF COLON WITH HEMORRHAGE: ICD-10-CM

## 2024-01-23 DIAGNOSIS — K22.70 BARRETT'S ESOPHAGUS WITHOUT DYSPLASIA: Primary | ICD-10-CM

## 2024-01-23 DIAGNOSIS — K59.00 CONSTIPATION, UNSPECIFIED CONSTIPATION TYPE: ICD-10-CM

## 2024-01-23 DIAGNOSIS — Z86.010 PERSONAL HISTORY OF COLONIC POLYPS: ICD-10-CM

## 2024-01-23 PROCEDURE — 99214 OFFICE O/P EST MOD 30 MIN: CPT | Performed by: INTERNAL MEDICINE

## 2024-01-23 RX ORDER — POLYETHYLENE GLYCOL 3350 17 G/17G
17 POWDER, FOR SOLUTION ORAL DAILY
Qty: 850 G | Refills: 3 | Status: SHIPPED | OUTPATIENT
Start: 2024-01-23

## 2024-01-23 NOTE — PROGRESS NOTES
[facility] - Outpatient Follow-up Note  Artur Santana 78 y.o. male MRN: [id number]  Encounter: [id number]    ASSESSMENT AND PLAN:    1. 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 Personal history of colonic polyps  Assessment & Plan:  His next colonoscopy will be in 2025.      3. 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 Constipation, unspecified constipation type  -     polyethylene glycol (GLYCOLAX) 17 GM/SCOOP powder; Take 17 g by mouth daily    1.  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  Plan for repeat in 5 years.    2.  Personal history of colonic polyps.  10/19/2022 colonoscopy with total of 6 subcentimeter polyps and one 11 mm polyp removed. 6 tubular adenomas.  One inflammatory type polyp in the sigmoid.  Repeat recommended in 3 years.       3.  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  Most recent colonoscopy in 2022 without signs of radiation proctitis    4.  Constipation  We will plan on treating symptomatically with MiraLAX.    ---I have spent 31 minutes today on the date of visit which was spent on one or more of the following: obtaining and reviewing separately obtained history, performing a medically appropriate examination and evaluation, counseling and educating the patient, ordering medications, tests, and procedures, documenting clinical information in the electronic or other health record, and care coordination.    Chief Complaint   Patient presents with   • Follow-up     No issues       HPI:   Artur Santana is a 78 y.o. year old male who presents for a follow-up visit.     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 The most recent colonoscopy, conducted in 2022, showed no signs of radiation proctitis. He is  managing functional diarrhea with intermittent cholestyramine. His anemia has resolved, with the latest hemoglobin level recorded at 12.4 g/dL.    He denies experiencing nausea, vomiting, dysphagia, and abdominal pain. He occasionally experiences heartburn, which is dependent on his dietary intake. He reports having a bowel movement once a week and denies any instances of hematochezia. He is not currently on cholestyramine powder but has previously used MiraLAX.      LAB/RADIOLOGY/ENDOSCOPY RESULTS:   Patient underwent lab work on 08/22/2023, revealing CMP and liver function tests within normal limits. A CBC was conducted on 04/29/2023, indicating a hemoglobin level of 12.4 g/dL. His last EGD was performed on 10/2022 by [facility], revealing an esophagus without dysplasia, C0 M2. A repeat EGD is planned in 5 years. He has a personal history of colon polyps. The last colonoscopy, performed on 10/19/2022, identified 6 subcentimeter polyps and one 11 mm tubular adenoma. A follow-up colonoscopy is recommended in 3 years.     Historical Information   Past Medical History:   Diagnosis Date   • Acute pain of right shoulder 03/21/2022   • Adenocarcinoma of prostate (HCC)     BCC   • Anemia    • Cancer (HCC)    • Chronic kidney disease    • Colon polyp    • Diabetes mellitus (HCC)    • History of Lyme disease    • Hyperlipidemia    • Hypertension    • Proctitis    • Prostate cancer (HCC)     Treated with radiation     Past Surgical History:   Procedure Laterality Date   • COLONOSCOPY  01/10/2018   • COLONOSCOPY  01/10/2018     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, grade 2 internal hemorrhoids, 1 adenomatous polyp   • MN CYSTOURETHROSCOPY W/INTERNAL URETHROTOMY N/A 5/18/2023    Procedure: CYSTOSCOPY, DIRECT VISUAL INTERAL URETHROTOMY (DVIU), KENALOG INJECTION, BLADDER BIOPSY WITH FULGERATION;  Surgeon: Dimas Mendez MD;  Location: [facility];  Service: Urology   • MN INS/RPLC PERPH SAC/GSTRC NPG/RCVR PCKT CRTJ&CONN  "N/A 2023    Procedure: STAGE 2 AXONIC, INSERTION OF NEUROSTIMULATOR, ELECTRONIC ANALYSIS OF NEUROSTIMULATOR;  Surgeon: Dimas Mendez MD;  Location: [facility];  Service: Urology   • TX SPLIT AGRFT T/A/L 1ST 100 CM/&/1% BDY INFT/CHLD Left 2017    Procedure: ANTERIOR SHOULDER EXCISION BCC- COMPLEX CLOSURE vs FLAP vs STSG;  Surgeon: Lester Craig MD;  Location:  MAIN OR;  Service: Plastics   • TX TRANSURETHRAL INCISION PROSTATE N/A 2022    Procedure: CYSTOSCOPY, TRANSURETHRAL INCISION OF PROSTATE (TUIP);  Surgeon: Dimas Mendez MD;  Location: [facility];  Service: Urology   • SACRAL NERVE STIMULATOR PLACEMENT N/A 2023    Procedure: PART 1, INCISION FOR IMPLANTATION OF NEUROSTIMULATOR, ELECTRONIC ANALYSIS NEUROSTIMULATOR, STAGE 1;  Surgeon: Dimas Mendez MD;  Location: [facility];  Service: Urology   • SKIN LESION EXCISION N/A 2022    Procedure: EXCISION WIDE LESION TRUNK/ABDOMEN/BACK;  Surgeon: Lester Craig MD;  Location: [facility];  Service: Plastics     Social History     Substance and Sexual Activity   Alcohol Use No     Social History     Substance and Sexual Activity   Drug Use No     Social History     Tobacco Use   Smoking Status Former   • Current packs/day: 0.00   • Average packs/day: 0.1 packs/day for 2.5 years (0.2 ttl pk-yrs)   • Types: Cigarettes   • Start date: 1975   • Quit date: 1977   • Years since quittin.6   Smokeless Tobacco Never   Tobacco Comments    never a smoker noted in \"allscripts\" - Denied: History of tobacco use noted in \"allscripts\"      Family History   Problem Relation Age of Onset   • Heart disease Mother    • Diabetes Brother         mellitus    • Colon polyps Brother    • Throat cancer Sister    • Colon cancer Neg Hx        Meds/Allergies     Current Outpatient Medications:   •  aspirin 81 MG tablet  •  atorvastatin (LIPITOR) 10 mg tablet  •  Empagliflozin (Jardiance) 10 MG TABS tablet  •  glipiZIDE (GLUCOTROL XL) 10 mg 24 hr " "tablet  •  lisinopril (ZESTRIL) 5 mg tablet  •  metFORMIN (GLUCOPHAGE) 1000 MG tablet  •  polyethylene glycol (GLYCOLAX) 17 GM/SCOOP powder  No Known Allergies    PHYSICAL EXAM:    Blood pressure 140/90, height 5' 7\" (1.702 m), weight 66.7 kg (147 lb). Body mass index is 23.02 kg/m².  General Appearance: No apparent distress, cooperative, alert.  Eyes: Anicteric.  Gastrointestinal: Soft, non-tender, non-distended; normal bowel sounds; no masses, no organomegaly.  Rectal: Deferred.  Musculoskeletal: No edema.  Skin: No jaundice.    OTHER LAB RESULTS:   Lab Results   Component Value Date    WBC 9.06 04/29/2023    WBC 8.60 03/27/2023    WBC 5.86 09/16/2022    HGB 12.4 04/29/2023    HGB 12.1 03/27/2023    HGB 9.7 (L) 09/16/2022    MCV 88 04/29/2023     04/29/2023     03/27/2023     09/16/2022     Lab Results   Component Value Date     12/18/2017    K 4.3 08/22/2023     08/22/2023    CO2 27 08/22/2023    BUN 18 08/22/2023    CREATININE 1.14 08/22/2023    GLUF 136 (H) 08/22/2023    CALCIUM 9.0 08/22/2023    AST 13 08/22/2023    AST 15 03/27/2023    AST 15 08/02/2022    ALT 10 08/22/2023    ALT 10 03/27/2023    ALT 10 08/02/2022    ALKPHOS 54 08/22/2023    ALKPHOS 55 03/27/2023    ALKPHOS 60 08/02/2022    PROT 6.5 12/18/2017    BILITOT 0.6 12/18/2017    BILITOT 0.5 06/16/2017    BILITOT 0.5 12/15/2016    EGFR 61 08/22/2023     Lab Results   Component Value Date    IRON 52 (L) 03/27/2023    TIBC 307 03/27/2023    FERRITIN 36 03/27/2023     No results found for: \"LIPASE\"    OTHER RADIOLOGY RESULTS:   No results found.    Transcribed for Sebastian Downs MD, by Fern Crabtree on 01/23/24 at 3:08 PM. Powered by Dragon [employer].  "

## 2024-02-14 DIAGNOSIS — E11.36 TYPE 2 DIABETES MELLITUS WITH DIABETIC CATARACT, UNSPECIFIED WHETHER LONG TERM INSULIN USE (HCC): ICD-10-CM

## 2024-02-14 RX ORDER — GLIPIZIDE 10 MG/1
10 TABLET, FILM COATED, EXTENDED RELEASE ORAL DAILY
Qty: 30 TABLET | Refills: 5 | Status: SHIPPED | OUTPATIENT
Start: 2024-02-14

## 2024-02-21 PROBLEM — Z00.00 MEDICARE ANNUAL WELLNESS VISIT, SUBSEQUENT: Status: RESOLVED | Noted: 2020-06-18 | Resolved: 2024-02-21

## 2024-03-12 LAB
LEFT EYE DIABETIC RETINOPATHY: NORMAL
RIGHT EYE DIABETIC RETINOPATHY: POSITIVE
SEVERITY (EYE EXAM): NORMAL

## 2024-04-24 ENCOUNTER — TELEPHONE (OUTPATIENT)
Dept: FAMILY MEDICINE CLINIC | Facility: CLINIC | Age: 79
End: 2024-04-24

## 2024-04-24 DIAGNOSIS — I10 ESSENTIAL HYPERTENSION: ICD-10-CM

## 2024-04-24 RX ORDER — LISINOPRIL 5 MG/1
5 TABLET ORAL DAILY
Qty: 90 TABLET | Refills: 0 | OUTPATIENT
Start: 2024-04-24

## 2024-04-24 RX ORDER — LISINOPRIL 5 MG/1
5 TABLET ORAL DAILY
Qty: 90 TABLET | Refills: 1 | Status: SHIPPED | OUTPATIENT
Start: 2024-04-24

## 2024-04-24 NOTE — TELEPHONE ENCOUNTER
Needs refill for Lisinopril 5mg. Please send to [facility] pharmacy in [place].

## 2024-05-06 ENCOUNTER — OFFICE VISIT (OUTPATIENT)
Dept: URGENT CARE | Facility: CLINIC | Age: 79
End: 2024-05-06
Payer: MEDICARE

## 2024-05-06 VITALS
OXYGEN SATURATION: 100 % | HEIGHT: 68 IN | HEART RATE: 55 BPM | RESPIRATION RATE: 20 BRPM | WEIGHT: 136 LBS | BODY MASS INDEX: 20.61 KG/M2 | TEMPERATURE: 97.4 F

## 2024-05-06 DIAGNOSIS — T14.8XXA SKIN ABRASION: Primary | ICD-10-CM

## 2024-05-06 PROCEDURE — 99213 OFFICE O/P EST LOW 20 MIN: CPT | Performed by: PHYSICIAN ASSISTANT

## 2024-05-06 NOTE — PATIENT INSTRUCTIONS
Skin Tear   WHAT YOU NEED TO KNOW:   A skin tear occurs when the layers of weakened skin split open from an injury. It is important to treat and prevent skin tears to prevent infection.  DISCHARGE INSTRUCTIONS:   Call your doctor if:   You have a fever or chills.    Blood soaks through your bandage.    You have redness, swelling, pus, or a bad odor coming from your wound.    You have severe pain.    Your wound tears open again.    You have questions or concerns about your condition or care.    Medicines:   Medicines  may be given to decrease pain or treat a bacterial infection.    Take your medicine as directed.  Contact your healthcare provider if you think your medicine is not helping or if you have side effects. Tell your provider if you are allergic to any medicine. Keep a list of the medicines, vitamins, and herbs you take. Include the amounts, and when and why you take them. Bring the list or the pill bottles to follow-up visits. Carry your medicine list with you in case of an emergency.    Prevent a skin tear:   Clean, moisturize, and protect your skin.  Baths, hot showers, and soap can dry your skin and increase your risk for skin tears. Take lukewarm showers, use mild soap as directed, and gently pat your skin dry. Use lotion to keep your skin moist after you shower. Wear long sleeves, pants, and protective footwear.    Move carefully.  Ask for help if you cannot lift yourself. Do not drag your skin when you move.    Keep your home safe.  Cover sharp corners, keep your pathways clear, and turn on lights so you can see clearly. Ask for more information if you have questions about home safety.    Drink liquids as directed.  Ask your provider how much liquid to drink each day and which liquids are best for you. Liquids will help keep your skin moist and protected from another skin tear.    Eat high-protein foods  to help with wound healing. Examples are lean meats, fish, low-fat dairy products, and  beans.    Follow up with your doctor as directed:  Write down your questions so you remember to ask them during your visits.  © Copyright [employer] 2023 Information is for End User's use only and may not be sold, redistributed or otherwise used for commercial purposes.  The above information is an  only. It is not intended as medical advice for individual conditions or treatments. Talk to your doctor, nurse or pharmacist before following any medical regimen to see if it is safe and effective for you.

## 2024-05-06 NOTE — PROGRESS NOTES
[facility]        NAME: Artur Santana is a 78 y.o. male  : 1945    MRN: [id number]  DATE: May 6, 2024  TIME: 1:08 PM    Assessment and Plan   Skin abrasion [T14.8XXA]  1. Skin abrasion  mupirocin (BACTROBAN) 2 % ointment        Discussed wound care. Will cover with mupirocin ointment since he has a hx of DM2.     Patient Instructions     Patient Instructions   Skin Tear   WHAT YOU NEED TO KNOW:   A skin tear occurs when the layers of weakened skin split open from an injury. It is important to treat and prevent skin tears to prevent infection.  DISCHARGE INSTRUCTIONS:   Call your doctor if:   You have a fever or chills.    Blood soaks through your bandage.    You have redness, swelling, pus, or a bad odor coming from your wound.    You have severe pain.    Your wound tears open again.    You have questions or concerns about your condition or care.    Medicines:   Medicines  may be given to decrease pain or treat a bacterial infection.    Take your medicine as directed.  Contact your healthcare provider if you think your medicine is not helping or if you have side effects. Tell your provider if you are allergic to any medicine. Keep a list of the medicines, vitamins, and herbs you take. Include the amounts, and when and why you take them. Bring the list or the pill bottles to follow-up visits. Carry your medicine list with you in case of an emergency.    Prevent a skin tear:   Clean, moisturize, and protect your skin.  Baths, hot showers, and soap can dry your skin and increase your risk for skin tears. Take lukewarm showers, use mild soap as directed, and gently pat your skin dry. Use lotion to keep your skin moist after you shower. Wear long sleeves, pants, and protective footwear.    Move carefully.  Ask for help if you cannot lift yourself. Do not drag your skin when you move.    Keep your home safe.  Cover sharp corners, keep your pathways clear, and turn on lights so you can see clearly. Ask  for more information if you have questions about home safety.    Drink liquids as directed.  Ask your provider how much liquid to drink each day and which liquids are best for you. Liquids will help keep your skin moist and protected from another skin tear.    Eat high-protein foods  to help with wound healing. Examples are lean meats, fish, low-fat dairy products, and beans.    Follow up with your doctor as directed:  Write down your questions so you remember to ask them during your visits.  © Copyright [employer] 2023 Information is for End User's use only and may not be sold, redistributed or otherwise used for commercial purposes.  The above information is an  only. It is not intended as medical advice for individual conditions or treatments. Talk to your doctor, nurse or pharmacist before following any medical regimen to see if it is safe and effective for you.        Follow up with PCP in 3-5 days.  Proceed to  ER if symptoms worsen.    Chief Complaint     Chief Complaint   Patient presents with    Hand Injury     Has injury contusion with a skin tear to top of rt hand.         History of Present Illness       The patient is a 78-year-old male presenting today for a skin tear to the top of his right hand.  He was carrying a microwave and hit his hand against a table.  Admits that he frequently gets skin tears and bruising of the skin.  Denies any fevers or chills.  Does have a history of diabetes.        Review of Systems   Review of Systems   Skin:  Positive for color change and wound.         Current Medications       Current Outpatient Medications:     atorvastatin (LIPITOR) 10 mg tablet, Take 1 tablet (10 mg total) by mouth daily, Disp: 90 tablet, Rfl: 1    Empagliflozin (Jardiance) 10 MG TABS tablet, Take 1 tablet by mouth once daily, Disp: 30 tablet, Rfl: 5    glipiZIDE (GLUCOTROL XL) 10 mg 24 hr tablet, Take 1 tablet by mouth once daily, Disp: 30 tablet, Rfl: 5    lisinopril (ZESTRIL) 5 mg  tablet, Take 1 tablet (5 mg total) by mouth daily, Disp: 90 tablet, Rfl: 1    metFORMIN (GLUCOPHAGE) 1000 MG tablet, Take 1 tablet (1,000 mg total) by mouth 2 (two) times a day, Disp: 180 tablet, Rfl: 1    mupirocin (BACTROBAN) 2 % ointment, Apply topically 2 (two) times a day for 3 days, Disp: 22 g, Rfl: 0    aspirin 81 MG tablet, Take 81 mg by mouth as needed Last dose  5/10/23 (Patient not taking: Reported on 5/6/2024), Disp: , Rfl:     polyethylene glycol (GLYCOLAX) 17 GM/SCOOP powder, Take 17 g by mouth daily (Patient not taking: Reported on 5/6/2024), Disp: 850 g, Rfl: 3    Current Allergies     Allergies as of 05/06/2024    (No Known Allergies)            The following portions of the patient's history were reviewed and updated as appropriate: allergies, current medications, past family history, past medical history, past social history, past surgical history and problem list.     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, grade 2 internal hemorrhoids, 1 adenomatous polyp    IL CYSTOURETHROSCOPY W/INTERNAL URETHROTOMY N/A 5/18/2023    Procedure: CYSTOSCOPY, DIRECT VISUAL INTERAL URETHROTOMY (DVIU), KENALOG INJECTION, BLADDER BIOPSY WITH FULGERATION;  Surgeon: Dimas Mendez MD;  Location: [facility];  Service: Urology    IL INS/RPLC PERPH SAC/GSTRC NPG/RCVR PCKT CRTJ&CONN N/A 4/13/2023    Procedure: STAGE 2 AXONIC, INSERTION OF NEUROSTIMULATOR, ELECTRONIC ANALYSIS OF NEUROSTIMULATOR;  Surgeon: Dimas Mendez MD;  Location: [facility];   "Service: Urology    MS SPLIT AGRFT T/A/L 1ST 100 CM/&/1% BDY INFT/CHLD Left 6/22/2017    Procedure: ANTERIOR SHOULDER EXCISION BCC- COMPLEX CLOSURE vs FLAP vs STSG;  Surgeon: Lester Craig MD;  Location:  MAIN OR;  Service: Plastics    MS TRANSURETHRAL INCISION PROSTATE N/A 5/26/2022    Procedure: CYSTOSCOPY, TRANSURETHRAL INCISION OF PROSTATE (TUIP);  Surgeon: Dimas Mendez MD;  Location: [facility];  Service: Urology    SACRAL NERVE STIMULATOR PLACEMENT N/A 4/6/2023    Procedure: PART 1, INCISION FOR IMPLANTATION OF NEUROSTIMULATOR, ELECTRONIC ANALYSIS NEUROSTIMULATOR, STAGE 1;  Surgeon: Dimas Mendez MD;  Location: [facility];  Service: Urology    SKIN LESION EXCISION N/A 6/16/2022    Procedure: EXCISION WIDE LESION TRUNK/ABDOMEN/BACK;  Surgeon: Lester Craig MD;  Location:  [facility];  Service: Plastics       Family History   Problem Relation Age of Onset    Heart disease Mother     Diabetes Brother         mellitus     Colon polyps Brother     Throat cancer Sister     Colon cancer Neg Hx          Medications have been verified.        Objective   Pulse 55   Temp (!) 97.4 °F (36.3 °C)   Resp 20   Ht 5' 8\" (1.727 m)   Wt 61.7 kg (136 lb)   SpO2 100%   BMI 20.68 kg/m²        Physical Exam     Physical Exam  Vitals and nursing note reviewed.   Constitutional:       General: He is not in acute distress.     Appearance: Normal appearance. He is normal weight. He is not ill-appearing, toxic-appearing or diaphoretic.   Cardiovascular:      Rate and Rhythm: Normal rate.   Pulmonary:      Effort: Pulmonary effort is normal.   Musculoskeletal:         General: Normal range of motion.   Skin:     General: Skin is warm.      Findings: Bruising present.      Comments: 1 cm healing skin tear top of R hand with surrounding ecchymosis. Hand is not TTP. Full ROM and sensation.    Neurological:      Mental Status: He is alert.                   "

## 2024-06-14 ENCOUNTER — TELEPHONE (OUTPATIENT)
Age: 79
End: 2024-06-14

## 2024-06-14 NOTE — TELEPHONE ENCOUNTER
Pt and his sister Carmencita called to confirm when was the pts last appt with Dr Latham was. Confirmed it was 1/15/24 and the pts next 6 month f/u appt is 7/15/24. No further action needed.

## 2024-06-27 ENCOUNTER — APPOINTMENT (EMERGENCY)
Dept: RADIOLOGY | Facility: HOSPITAL | Age: 79
End: 2024-06-27
Payer: MEDICARE

## 2024-06-27 ENCOUNTER — HOSPITAL ENCOUNTER (OUTPATIENT)
Facility: HOSPITAL | Age: 79
Setting detail: OBSERVATION
Discharge: HOME | End: 2024-06-28
Attending: EMERGENCY MEDICINE | Admitting: STUDENT IN AN ORGANIZED HEALTH CARE EDUCATION/TRAINING PROGRAM
Payer: MEDICARE

## 2024-06-27 DIAGNOSIS — R42 DIZZINESS: Primary | ICD-10-CM

## 2024-06-27 PROBLEM — R29.90 STROKE-LIKE SYMPTOMS: Status: ACTIVE | Noted: 2024-06-27

## 2024-06-27 LAB
2HR DELTA HS TROPONIN: 0 NG/L
4HR DELTA HS TROPONIN: 1 NG/L
ALBUMIN SERPL BCG-MCNC: 3.9 G/DL (ref 3.5–5)
ALP SERPL-CCNC: 57 U/L (ref 34–104)
ALT SERPL W P-5'-P-CCNC: 11 U/L (ref 7–52)
ANION GAP SERPL CALCULATED.3IONS-SCNC: 13 MMOL/L (ref 4–13)
APTT PPP: 29 SECONDS (ref 23–37)
AST SERPL W P-5'-P-CCNC: 14 U/L (ref 13–39)
ATRIAL RATE: 57 BPM
BASOPHILS # BLD AUTO: 0.03 THOUSANDS/ÂΜL (ref 0–0.1)
BASOPHILS NFR BLD AUTO: 0 % (ref 0–1)
BILIRUB SERPL-MCNC: 0.53 MG/DL (ref 0.2–1)
BILIRUB UR QL STRIP: NEGATIVE
BUN SERPL-MCNC: 21 MG/DL (ref 5–25)
CALCIUM SERPL-MCNC: 9.3 MG/DL (ref 8.4–10.2)
CARDIAC TROPONIN I PNL SERPL HS: 3 NG/L
CARDIAC TROPONIN I PNL SERPL HS: 3 NG/L
CARDIAC TROPONIN I PNL SERPL HS: 4 NG/L
CHLORIDE SERPL-SCNC: 102 MMOL/L (ref 96–108)
CLARITY UR: CLEAR
CO2 SERPL-SCNC: 27 MMOL/L (ref 21–32)
COLOR UR: ABNORMAL
CREAT SERPL-MCNC: 1.12 MG/DL (ref 0.6–1.3)
EOSINOPHIL # BLD AUTO: 0.06 THOUSAND/ÂΜL (ref 0–0.61)
EOSINOPHIL NFR BLD AUTO: 1 % (ref 0–6)
ERYTHROCYTE [DISTWIDTH] IN BLOOD BY AUTOMATED COUNT: 13.7 % (ref 11.6–15.1)
GFR SERPL CREATININE-BSD FRML MDRD: 62 ML/MIN/1.73SQ M
GLUCOSE SERPL-MCNC: 133 MG/DL (ref 65–140)
GLUCOSE SERPL-MCNC: 138 MG/DL (ref 65–140)
GLUCOSE SERPL-MCNC: 233 MG/DL (ref 65–140)
GLUCOSE SERPL-MCNC: 70 MG/DL (ref 65–140)
GLUCOSE UR STRIP-MCNC: ABNORMAL MG/DL
HCT VFR BLD AUTO: 36.8 % (ref 36.5–49.3)
HGB BLD-MCNC: 12 G/DL (ref 12–17)
HGB UR QL STRIP.AUTO: NEGATIVE
IMM GRANULOCYTES # BLD AUTO: 0.01 THOUSAND/UL (ref 0–0.2)
IMM GRANULOCYTES NFR BLD AUTO: 0 % (ref 0–2)
INR PPP: 0.97 (ref 0.84–1.19)
KETONES UR STRIP-MCNC: NEGATIVE MG/DL
LEUKOCYTE ESTERASE UR QL STRIP: NEGATIVE
LYMPHOCYTES # BLD AUTO: 1.2 THOUSANDS/ÂΜL (ref 0.6–4.47)
LYMPHOCYTES NFR BLD AUTO: 17 % (ref 14–44)
MAGNESIUM SERPL-MCNC: 2 MG/DL (ref 1.9–2.7)
MCH RBC QN AUTO: 29 PG (ref 26.8–34.3)
MCHC RBC AUTO-ENTMCNC: 32.6 G/DL (ref 31.4–37.4)
MCV RBC AUTO: 89 FL (ref 82–98)
MONOCYTES # BLD AUTO: 0.62 THOUSAND/ÂΜL (ref 0.17–1.22)
MONOCYTES NFR BLD AUTO: 9 % (ref 4–12)
NEUTROPHILS # BLD AUTO: 5.21 THOUSANDS/ÂΜL (ref 1.85–7.62)
NEUTS SEG NFR BLD AUTO: 73 % (ref 43–75)
NITRITE UR QL STRIP: NEGATIVE
NRBC BLD AUTO-RTO: 0 /100 WBCS
P AXIS: 2 DEGREES
PH UR STRIP.AUTO: 6.5 [PH]
PLATELET # BLD AUTO: 191 THOUSANDS/UL (ref 149–390)
PMV BLD AUTO: 11.3 FL (ref 8.9–12.7)
POTASSIUM SERPL-SCNC: 4.4 MMOL/L (ref 3.5–5.3)
PR INTERVAL: 148 MS
PROT SERPL-MCNC: 6.4 G/DL (ref 6.4–8.4)
PROT UR STRIP-MCNC: NEGATIVE MG/DL
PROTHROMBIN TIME: 13.1 SECONDS (ref 11.6–14.5)
QRS AXIS: -35 DEGREES
QRSD INTERVAL: 96 MS
QT INTERVAL: 464 MS
QTC INTERVAL: 451 MS
RBC # BLD AUTO: 4.14 MILLION/UL (ref 3.88–5.62)
SODIUM SERPL-SCNC: 142 MMOL/L (ref 135–147)
SP GR UR STRIP.AUTO: 1.01 (ref 1–1.03)
T WAVE AXIS: 44 DEGREES
UROBILINOGEN UR STRIP-ACNC: <2 MG/DL
VENTRICULAR RATE: 57 BPM
WBC # BLD AUTO: 7.13 THOUSAND/UL (ref 4.31–10.16)

## 2024-06-27 PROCEDURE — 82948 REAGENT STRIP/BLOOD GLUCOSE: CPT

## 2024-06-27 PROCEDURE — 71045 X-RAY EXAM CHEST 1 VIEW: CPT

## 2024-06-27 PROCEDURE — 80053 COMPREHEN METABOLIC PANEL: CPT | Performed by: EMERGENCY MEDICINE

## 2024-06-27 PROCEDURE — 99285 EMERGENCY DEPT VISIT HI MDM: CPT | Performed by: EMERGENCY MEDICINE

## 2024-06-27 PROCEDURE — 85025 COMPLETE CBC W/AUTO DIFF WBC: CPT | Performed by: EMERGENCY MEDICINE

## 2024-06-27 PROCEDURE — 84484 ASSAY OF TROPONIN QUANT: CPT | Performed by: EMERGENCY MEDICINE

## 2024-06-27 PROCEDURE — 36415 COLL VENOUS BLD VENIPUNCTURE: CPT | Performed by: EMERGENCY MEDICINE

## 2024-06-27 PROCEDURE — 83735 ASSAY OF MAGNESIUM: CPT | Performed by: EMERGENCY MEDICINE

## 2024-06-27 PROCEDURE — 70498 CT ANGIOGRAPHY NECK: CPT

## 2024-06-27 PROCEDURE — 93005 ELECTROCARDIOGRAM TRACING: CPT

## 2024-06-27 PROCEDURE — 99223 1ST HOSP IP/OBS HIGH 75: CPT

## 2024-06-27 PROCEDURE — 85730 THROMBOPLASTIN TIME PARTIAL: CPT | Performed by: EMERGENCY MEDICINE

## 2024-06-27 PROCEDURE — 93010 ELECTROCARDIOGRAM REPORT: CPT | Performed by: INTERNAL MEDICINE

## 2024-06-27 PROCEDURE — 85610 PROTHROMBIN TIME: CPT | Performed by: EMERGENCY MEDICINE

## 2024-06-27 PROCEDURE — 70496 CT ANGIOGRAPHY HEAD: CPT

## 2024-06-27 PROCEDURE — 99285 EMERGENCY DEPT VISIT HI MDM: CPT

## 2024-06-27 RX ORDER — ASPIRIN 81 MG/1
81 TABLET, CHEWABLE ORAL DAILY
Status: DISCONTINUED | OUTPATIENT
Start: 2024-06-27 | End: 2024-06-28 | Stop reason: HOSPADM

## 2024-06-27 RX ORDER — ATORVASTATIN CALCIUM 40 MG/1
40 TABLET, FILM COATED ORAL EVERY EVENING
Status: DISCONTINUED | OUTPATIENT
Start: 2024-06-27 | End: 2024-06-28 | Stop reason: HOSPADM

## 2024-06-27 RX ORDER — HYDRALAZINE HYDROCHLORIDE 20 MG/ML
5 INJECTION INTRAMUSCULAR; INTRAVENOUS EVERY 6 HOURS PRN
Status: DISCONTINUED | OUTPATIENT
Start: 2024-06-27 | End: 2024-06-28 | Stop reason: HOSPADM

## 2024-06-27 RX ORDER — INSULIN LISPRO 100 [IU]/ML
1-5 INJECTION, SOLUTION INTRAVENOUS; SUBCUTANEOUS
Status: DISCONTINUED | OUTPATIENT
Start: 2024-06-27 | End: 2024-06-28 | Stop reason: HOSPADM

## 2024-06-27 RX ORDER — HEPARIN SODIUM 5000 [USP'U]/ML
5000 INJECTION, SOLUTION INTRAVENOUS; SUBCUTANEOUS EVERY 8 HOURS SCHEDULED
Status: DISCONTINUED | OUTPATIENT
Start: 2024-06-27 | End: 2024-06-28 | Stop reason: HOSPADM

## 2024-06-27 RX ADMIN — ATORVASTATIN CALCIUM 40 MG: 40 TABLET, FILM COATED ORAL at 18:38

## 2024-06-27 RX ADMIN — INSULIN LISPRO 2 UNITS: 100 INJECTION, SOLUTION INTRAVENOUS; SUBCUTANEOUS at 21:26

## 2024-06-27 RX ADMIN — HEPARIN SODIUM 5000 UNITS: 5000 INJECTION, SOLUTION INTRAVENOUS; SUBCUTANEOUS at 22:39

## 2024-06-27 RX ADMIN — ASPIRIN 81 MG CHEWABLE TABLET 81 MG: 81 TABLET CHEWABLE at 16:29

## 2024-06-27 RX ADMIN — IOHEXOL 85 ML: 350 INJECTION, SOLUTION INTRAVENOUS at 12:35

## 2024-06-27 RX ADMIN — HEPARIN SODIUM 5000 UNITS: 5000 INJECTION, SOLUTION INTRAVENOUS; SUBCUTANEOUS at 16:29

## 2024-06-27 NOTE — Clinical Note
Case was discussed with  and the patient's admission status was agreed to be Admission Status: observation status to the service of   .

## 2024-06-27 NOTE — H&P
[facility]  H&P  Name: Artur Santana 78 y.o. male I MRN: [id number]  Unit/Bed#: [facility] I Date of Admission: 6/27/2024   Date of Service: 6/27/2024 I Hospital Day: 0      Assessment & Plan   * Dizziness  Assessment & Plan  Patient presented to ED with dizziness that began this morning around 1 AM while patient was lying in bed.  CTA head/neck: No acute intracranial abnormality. Moderate ventriculomegaly, out of proportion to the central and cortical volume loss which can be seen with exaggerated ex vacuo dilatation related to volume loss versus normal pressure hydrocephalus in the   appropriate clinical setting. Negative for large vessel intracranial occlusion or hemodynamically significant stenosis. No extracranial carotid stenosis.  The cervical vertebral arteries are patent.  MRI brain  ECHO with bubble study  Update A1c and lipid panel  Neuro checks  Monitor on telemetry  PT/OT/speech eval  Neurology consult  Continue aspirin and statin    Stage 3a chronic kidney disease (HCC)  Assessment & Plan  Lab Results   Component Value Date    EGFR 62 06/27/2024    EGFR 61 08/22/2023    EGFR 66 04/29/2023    CREATININE 1.12 06/27/2024    CREATININE 1.14 08/22/2023    CREATININE 1.07 04/29/2023   Cr at baseline  Daily bmp    Hypertension  Assessment & Plan  Hold lisinopril to allow permissive hypertension    Dyslipidemia  Assessment & Plan  Continue Lipitor    Type 2 diabetes mellitus with diabetic cataract, unspecified whether long term insulin use (HCC)  Assessment & Plan  Lab Results   Component Value Date    HGBA1C 7.1 (A) 01/12/2024       Recent Labs     06/27/24  1147   POCGLU 138       Blood Sugar Average: Last 72 hrs:  (P) 138  Hold home Jardiance, glipizide, and metformin  SSI  Diabetic diet    Adenocarcinoma of prostate (HCC)  Assessment & Plan  Hx prostate cancer s/p palladium seeding         VTE Pharmacologic Prophylaxis: VTE Score: 3 Moderate Risk (Score 3-4) - Pharmacological DVT  Prophylaxis Ordered: heparin.  Code Status: No Order   Discussion with family: patient reports sister was at bedside, would like her updated tomorrow    Anticipated Length of Stay: Patient will be admitted on an observation basis with an anticipated length of stay of less than 2 midnights secondary to dizziness, stroke pathway.    Total Time Spent on Date of Encounter in care of patient: 45 mins. This time was spent on one or more of the following: performing physical exam; counseling and coordination of care; obtaining or reviewing history; documenting in the medical record; reviewing/ordering tests, medications or procedures; communicating with other healthcare professionals and discussing with patient's family/caregivers.    Chief Complaint: dizziness    History of Present Illness:  Artur Santana is a 78 y.o. male with a PMH of T2DM, hx prostate cancer, HTN, HLD, CKD, basal cell carcinoma, Dickson's esophagus who presents with dizziness. Patient reports he woke up around 1 am this morning with dizziness while he was lying in bed. He describes it as the room was spinning which was worse with movement and ambulation. He denies similar symptoms in the past. Per ED provider, sister stated patient's speech and gait are at baseline. Patient denies fever, chills, cough, vision changes, chest pain, shortness of breath, nausea, vomiting, abdominal pain, weakness, numbness, tingling, difficulty walking, dysphagia, or dysarthria.    At time of evaluation in ED, patient reports he is not currently dizzy and denies any new symptoms. Asking for food.    Review of Systems:  Review of Systems   Constitutional:  Negative for chills and fever.   HENT:  Negative for ear pain and sore throat.    Eyes:  Negative for pain and visual disturbance.   Respiratory:  Negative for cough and shortness of breath.    Cardiovascular:  Negative for chest pain and palpitations.   Gastrointestinal:  Negative for abdominal pain and vomiting.    Genitourinary:  Negative for dysuria and hematuria.   Musculoskeletal:  Negative for arthralgias and back pain.   Skin:  Negative for color change and rash.   Neurological:  Positive for dizziness. Negative for seizures and syncope.   All other systems reviewed and are negative.       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, grade 2 internal hemorrhoids, 1 adenomatous polyp    KS CYSTOURETHROSCOPY W/INTERNAL URETHROTOMY N/A 5/18/2023    Procedure: CYSTOSCOPY, DIRECT VISUAL INTERAL URETHROTOMY (DVIU), KENALOG INJECTION, BLADDER BIOPSY WITH FULGERATION;  Surgeon: Dimas Mendez MD;  [facility]: [facility];  Service: Urology    KS INS/RPLC PERPH SAC/GSTRC NPG/RCVR PCKT CRTJ&CONN N/A 4/13/2023    Procedure: STAGE 2 AXONIC, INSERTION OF NEUROSTIMULATOR, ELECTRONIC ANALYSIS OF NEUROSTIMULATOR;  Surgeon: Dimas Mendez MD;  [facility]: [facility];  Service: Urology    KS SPLIT AGRFT T/A/L 1ST 100 CM/&/1% BDY INFT/CHLD Left 6/22/2017    Procedure: ANTERIOR SHOULDER EXCISION BCC- COMPLEX CLOSURE vs FLAP vs STSG;  Surgeon: Lester Craig MD;  Location:  MAIN OR;  Service: Plastics    KS TRANSURETHRAL INCISION PROSTATE N/A 5/26/2022    Procedure: CYSTOSCOPY, TRANSURETHRAL INCISION OF PROSTATE (TUIP);  Surgeon: Dimas Mendez MD;  [facility]: [facility];  Service: Urology    SACRAL NERVE STIMULATOR PLACEMENT N/A 4/6/2023    Procedure: PART 1, INCISION FOR IMPLANTATION OF NEUROSTIMULATOR, ELECTRONIC ANALYSIS  NEUROSTIMULATOR, STAGE 1;  Surgeon: Dimas Mendez MD;  Location: [facility];  Service: Urology    SKIN LESION EXCISION N/A 6/16/2022    Procedure: EXCISION WIDE LESION TRUNK/ABDOMEN/BACK;  Surgeon: Lester Craig MD;  Location: [facility];  Service: Plastics       Meds/Allergies:  Prior to Admission medications    Medication Sig Start Date End Date Taking? Authorizing Provider   atorvastatin (LIPITOR) 10 mg tablet Take 1 tablet (10 mg total) by mouth daily 12/18/23  Yes Erica Latham MD   Empagliflozin (Jardiance) 10 MG TABS tablet Take 1 tablet by mouth once daily 12/18/23  Yes Erica Latham MD   glipiZIDE (GLUCOTROL XL) 10 mg 24 hr tablet Take 1 tablet by mouth once daily 2/14/24  Yes Erica Latham MD   lisinopril (ZESTRIL) 5 mg tablet Take 1 tablet (5 mg total) by mouth daily 4/24/24  Yes Erica Latham MD   metFORMIN (GLUCOPHAGE) 1000 MG tablet Take 1 tablet (1,000 mg total) by mouth 2 (two) times a day 6/5/23  Yes Erica Latham MD   aspirin 81 MG tablet Take 81 mg by mouth as needed Last dose  5/10/23  Patient not taking: Reported on 5/6/2024    Historical Provider, MD   mupirocin (BACTROBAN) 2 % ointment Apply topically 2 (two) times a day for 3 days 5/6/24 5/9/24  Sissy Goldstein PA-C   polyethylene glycol (GLYCOLAX) 17 GM/SCOOP powder Take 17 g by mouth daily  Patient not taking: Reported on 5/6/2024 1/23/24   Sebastian Downs MD   polyethylene glycol (GOLYTELY) 4000 mL solution Take 4,000 mL by mouth once for 1 dose 9/8/22 10/19/22  CHAYA Dickens     I have reviewed home medications with patient personally.    Allergies: No Known Allergies    Social History:  Marital Status: Single   Occupation:   Patient Pre-hospital Living Situation: Home  Patient Pre-hospital Level of Mobility: walks  Patient Pre-hospital Diet Restrictions: none  Substance Use History:   Social History     Substance and Sexual Activity   Alcohol Use No     Social History     Tobacco Use   Smoking Status Former    Current  "packs/day: 0.00    Average packs/day: 0.1 packs/day for 2.5 years (0.2 ttl pk-yrs)    Types: Cigarettes    Start date: 1975    Quit date: 1977    Years since quittin.0   Smokeless Tobacco Never   Tobacco Comments    never a smoker noted in \"allscripts\" - Denied: History of tobacco use noted in \"allscripts\"      Social History     Substance and Sexual Activity   Drug Use No       Family History:  Family History   Problem Relation Age of Onset    Heart disease Mother     Diabetes Brother         mellitus     Colon polyps Brother     Throat cancer Sister     Colon cancer Neg Hx        Physical Exam:     Vitals:   Blood Pressure: 164/73 (24 1519)  Pulse: (!) 42 (24 1519)  Temperature: 97.7 °F (36.5 °C) (24 151)  Temp Source: Oral (24 1519)  Respirations: (!) 24 (24 1519)  Height: 5' 9\" (175.3 cm) (24 1124)  Weight - Scale: 65.8 kg (145 lb) (24 1124)  SpO2: 99 % (24 1519)    Physical Exam  Vitals and nursing note reviewed.   Constitutional:       General: He is not in acute distress.     Appearance: He is well-developed.   Cardiovascular:      Rate and Rhythm: Normal rate and regular rhythm.   Pulmonary:      Effort: Pulmonary effort is normal. No respiratory distress.      Breath sounds: Normal breath sounds.   Abdominal:      Palpations: Abdomen is soft.      Tenderness: There is no abdominal tenderness.   Musculoskeletal:         General: No swelling.   Skin:     General: Skin is warm and dry.   Neurological:      General: No focal deficit present.      Mental Status: He is alert.      Cranial Nerves: Cranial nerves 2-12 are intact.   Psychiatric:         Mood and Affect: Mood normal.          Additional Data:     Lab Results:  Results from last 7 days   Lab Units 24  1144   WBC Thousand/uL 7.13   HEMOGLOBIN g/dL 12.0   HEMATOCRIT % 36.8   PLATELETS Thousands/uL 191   SEGS PCT % 73   LYMPHO PCT % 17   MONO PCT % 9   EOS PCT % 1     Results from last " 7 days   Lab Units 06/27/24  1144   SODIUM mmol/L 142   POTASSIUM mmol/L 4.4   CHLORIDE mmol/L 102   CO2 mmol/L 27   BUN mg/dL 21   CREATININE mg/dL 1.12   ANION GAP mmol/L 13   CALCIUM mg/dL 9.3   ALBUMIN g/dL 3.9   TOTAL BILIRUBIN mg/dL 0.53   ALK PHOS U/L 57   ALT U/L 11   AST U/L 14   GLUCOSE RANDOM mg/dL 133     Results from last 7 days   Lab Units 06/27/24  1144   INR  0.97     Results from last 7 days   Lab Units 06/27/24  1147   POC GLUCOSE mg/dl 138     Lab Results   Component Value Date    HGBA1C 7.1 (A) 01/12/2024    HGBA1C 7.8 (H) 08/22/2023    HGBA1C 7.5 (A) 06/29/2023           Lines/Drains:  Invasive Devices       Peripheral Intravenous Line  Duration             Peripheral IV 06/27/24 Left Antecubital <1 day              Drain  Duration             Urethral Catheter Latex 22 Fr. 406 days                    Imaging: Reviewed radiology reports from this admission including: CT head  CTA head and neck with and without contrast   Final Result by Sai Cano MD (06/27 1333)      CT brain:  No acute intracranial abnormality. Moderate ventriculomegaly, out of proportion to the central and cortical volume loss which can be seen with exaggerated ex vacuo dilatation related to volume loss versus normal pressure hydrocephalus in the    appropriate clinical setting. Clinical correlation is recommended.      CTA head: Negative for large vessel intracranial occlusion or hemodynamically significant stenosis.      CTA neck:  No extracranial carotid stenosis.  The cervical vertebral arteries are patent.      The study was marked in EPIC for immediate notification.                  Workstation performed: [id number]         XR chest 1 view portable    (Results Pending)       EKG and Other Studies Reviewed on Admission:   EKG: Sinus Bradycardia. HR 57.    ** Please Note: This note has been constructed using a voice recognition system. **

## 2024-06-27 NOTE — ED PROVIDER NOTES
"History  Chief Complaint   Patient presents with    Dizziness     Pt reports during the night while in bed felt sudden onset of dizziness described as room spinning, worse with head movement. Had a HA fro a few moments which resolved. Dizziness resolved but \"I just don't feel great\" pt denies chest sob, visual disturbances n/v. Ambulatory with steady gait to room, denies any pain no drifts or limb ataxia, speech deficit at baseline     Patient is a 78-year-old male with a history of prostate CA, diabetes, hypertension, hyperlipidemia, CKD that presents to the emergency department with complaint of dizziness that awakened him at approximately 1 AM today.  Patient waited until later in the morning, drove to his sister's house, who then brought him to the ED.  He denies nausea, vomiting, headache.  He denies any history of similar symptoms.  Patient is able to ambulate with a steady gait during my initial evaluation.  Patient's sister arrived shortly after my initial evaluation, states that his speech and his gait is at baseline.      History provided by:  Patient   used: No        Prior to Admission Medications   Prescriptions Last Dose Informant Patient Reported? Taking?   Empagliflozin (Jardiance) 10 MG TABS tablet 6/27/2024 Self No Yes   Sig: Take 1 tablet by mouth once daily   aspirin 81 MG tablet Not Taking Self Yes No   Sig: Take 81 mg by mouth as needed Last dose  5/10/23   Patient not taking: Reported on 5/6/2024   atorvastatin (LIPITOR) 10 mg tablet 6/27/2024 Self No Yes   Sig: Take 1 tablet (10 mg total) by mouth daily   glipiZIDE (GLUCOTROL XL) 10 mg 24 hr tablet 6/27/2024  No Yes   Sig: Take 1 tablet by mouth once daily   lisinopril (ZESTRIL) 5 mg tablet 6/27/2024  No Yes   Sig: Take 1 tablet (5 mg total) by mouth daily   metFORMIN (GLUCOPHAGE) 1000 MG tablet 6/27/2024 Self No Yes   Sig: Take 1 tablet (1,000 mg total) by mouth 2 (two) times a day   mupirocin (BACTROBAN) 2 % ointment   " No No   Sig: Apply topically 2 (two) times a day for 3 days   polyethylene glycol (GLYCOLAX) 17 GM/SCOOP powder Not Taking  No No   Sig: Take 17 g by mouth daily   Patient not taking: Reported on 5/6/2024      Facility-Administered Medications: None       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, grade 2 internal hemorrhoids, 1 adenomatous polyp    PA CYSTOURETHROSCOPY W/INTERNAL URETHROTOMY N/A 5/18/2023    Procedure: CYSTOSCOPY, DIRECT VISUAL INTERAL URETHROTOMY (DVIU), KENALOG INJECTION, BLADDER BIOPSY WITH FULGERATION;  Surgeon: Dimas Mendez MD;  Location: [facility];  Service: Urology    PA INS/RPLC PERPH SAC/GSTRC NPG/RCVR PCKT CRTJ&CONN N/A 4/13/2023    Procedure: STAGE 2 AXONIC, INSERTION OF NEUROSTIMULATOR, ELECTRONIC ANALYSIS OF NEUROSTIMULATOR;  Surgeon: Dimas Mendez MD;  Location: [facility];  Service: Urology    PA SPLIT AGRFT T/A/L 1ST 100 CM/&/1% BDY INFT/CHLD Left 6/22/2017    Procedure: ANTERIOR SHOULDER EXCISION BCC- COMPLEX CLOSURE vs FLAP vs STSG;  Surgeon: Lester Craig MD;  Location:  MAIN OR;  Service: Plastics    PA TRANSURETHRAL INCISION PROSTATE N/A 5/26/2022    Procedure: CYSTOSCOPY, TRANSURETHRAL INCISION OF PROSTATE (TUIP);  Surgeon: Dimas Mendez MD;  Location: [facility];  Service: Urology    SACRAL NERVE STIMULATOR PLACEMENT N/A 4/6/2023    Procedure: PART 1, INCISION FOR IMPLANTATION OF NEUROSTIMULATOR, ELECTRONIC ANALYSIS NEUROSTIMULATOR, STAGE 1;  Surgeon: Dimas Mendez MD;  Location: [facility];   "Service: Urology    SKIN LESION EXCISION N/A 2022    Procedure: EXCISION WIDE LESION TRUNK/ABDOMEN/BACK;  Surgeon: Lester Craig MD;  Location: [facility];  Service: Plastics       Family History   Problem Relation Age of Onset    Heart disease Mother     Diabetes Brother         mellitus     Colon polyps Brother     Throat cancer Sister     Colon cancer Neg Hx      I have reviewed and agree with the history as documented.    E-Cigarette/Vaping    E-Cigarette Use Never User      E-Cigarette/Vaping Substances    Nicotine No     THC No     CBD No     Flavoring No     Other No     Unknown No      Social History     Tobacco Use    Smoking status: Former     Current packs/day: 0.00     Average packs/day: 0.1 packs/day for 2.5 years (0.2 ttl pk-yrs)     Types: Cigarettes     Start date: 1975     Quit date: 1977     Years since quittin.0    Smokeless tobacco: Never    Tobacco comments:     never a smoker noted in \"allscripts\" - Denied: History of tobacco use noted in \"allscripts\"    Vaping Use    Vaping status: Never Used   Substance Use Topics    Alcohol use: Not Currently    Drug use: No       Review of Systems   Constitutional:  Negative for chills and fever.   Respiratory:  Negative for cough, shortness of breath and wheezing.    Cardiovascular:  Negative for chest pain and palpitations.   Gastrointestinal:  Negative for abdominal pain, constipation, diarrhea, nausea and vomiting.   Genitourinary:  Negative for dysuria, flank pain, hematuria and urgency.   Musculoskeletal:  Negative for back pain.   Skin:  Negative for color change and rash.   Neurological:  Positive for dizziness.   All other systems reviewed and are negative.      Physical Exam  Physical Exam  Vitals and nursing note reviewed.   Constitutional:       Appearance: He is well-developed.   HENT:      Head: Normocephalic and atraumatic.   Eyes:      Pupils: Pupils are equal, round, and reactive to light.   Cardiovascular:      Rate " and Rhythm: Normal rate and regular rhythm.      Heart sounds: Normal heart sounds.   Pulmonary:      Effort: Pulmonary effort is normal.      Breath sounds: Normal breath sounds.   Abdominal:      General: Bowel sounds are normal. There is no distension.      Palpations: Abdomen is soft. There is no mass.      Tenderness: There is no abdominal tenderness. There is no guarding or rebound.   Musculoskeletal:      Cervical back: Normal range of motion and neck supple.   Skin:     General: Skin is warm and dry.      Capillary Refill: Capillary refill takes less than 2 seconds.   Neurological:      General: No focal deficit present.      Mental Status: He is alert and oriented to person, place, and time.      GCS: GCS eye subscore is 4. GCS verbal subscore is 5. GCS motor subscore is 6.   Psychiatric:         Behavior: Behavior normal.         Thought Content: Thought content normal.         Judgment: Judgment normal.         Vital Signs  ED Triage Vitals   Temperature Pulse Respirations Blood Pressure SpO2   06/27/24 1124 06/27/24 1124 06/27/24 1124 06/27/24 1124 06/27/24 1124   97.8 °F (36.6 °C) (!) 48 18 (!) 193/92 100 %      Temp Source Heart Rate Source Patient Position - Orthostatic VS BP Location FiO2 (%)   06/27/24 1124 06/27/24 1124 06/27/24 1124 06/27/24 1124 --   Oral Monitor Lying Right arm       Pain Score       06/27/24 1540       No Pain           Vitals:    06/27/24 1640 06/27/24 1740 06/27/24 1840 06/27/24 1849   BP: 121/69 127/75 132/61 130/60   Pulse: 58 55 (!) 51 (!) 48   Patient Position - Orthostatic VS:             Visual Acuity  Visual Acuity      Flowsheet Row Most Recent Value   L Pupil Size (mm) 2   R Pupil Size (mm) 2   L Pupil Shape Round   R Pupil Shape Round            ED Medications  Medications   atorvastatin (LIPITOR) tablet 40 mg (40 mg Oral Given 6/27/24 1838)   aspirin chewable tablet 81 mg (81 mg Oral Given 6/27/24 1629)   heparin (porcine) subcutaneous injection 5,000 Units (5,000  Units Subcutaneous Given 6/27/24 1629)   hydrALAZINE (APRESOLINE) injection 5 mg (has no administration in time range)   insulin lispro (HumALOG/ADMELOG) 100 units/mL subcutaneous injection 1-5 Units (0 Units Subcutaneous Hold 6/27/24 1638)   insulin lispro (HumALOG/ADMELOG) 100 units/mL subcutaneous injection 1-5 Units (has no administration in time range)   iohexol (OMNIPAQUE) 350 MG/ML injection (MULTI-DOSE) 85 mL (85 mL Intravenous Given 6/27/24 1235)       Diagnostic Studies  Results Reviewed       Procedure Component Value Units Date/Time    HS Troponin I 4hr [[id number]]  (Normal) Collected: 06/27/24 1702    Lab Status: Final result Specimen: Blood from Arm, Left Updated: 06/27/24 1743     hs TnI 4hr 4 ng/L      Delta 4hr hsTnI 1 ng/L     HS Troponin I 2hr [[id number]]  (Normal) Collected: 06/27/24 1348    Lab Status: Final result Specimen: Blood from Arm, Left Updated: 06/27/24 1654     hs TnI 2hr 3 ng/L      Delta 2hr hsTnI 0 ng/L     UA w Reflex to Microscopic w Reflex to Culture [[id number]]  (Abnormal) Collected: 06/27/24 1313    Lab Status: Final result Specimen: Urine, Clean Catch Updated: 06/27/24 1319     Color, UA Light Yellow     Clarity, UA Clear     Specific Gravity, UA 1.010     pH, UA 6.5     Leukocytes, UA Negative     Nitrite, UA Negative     Protein, UA Negative mg/dl      Glucose, UA 1000 (1%) mg/dl      Ketones, UA Negative mg/dl      Urobilinogen, UA <2.0 mg/dl      Bilirubin, UA Negative     Occult Blood, UA Negative    Protime-INR [[id number]]  (Normal) Collected: 06/27/24 1144    Lab Status: Final result Specimen: Blood from Arm, Left Updated: 06/27/24 1225     Protime 13.1 seconds      INR 0.97    APTT [[id number]]  (Normal) Collected: 06/27/24 1144    Lab Status: Final result Specimen: Blood from Arm, Left Updated: 06/27/24 1225     PTT 29 seconds     HS Troponin 0hr (reflex protocol) [[id number]]  (Normal) Collected: 06/27/24 1144    Lab Status: Final result Specimen: Blood from Arm, Left  Updated: 06/27/24 1211     hs TnI 0hr 3 ng/L     Comprehensive metabolic panel [[id number]] Collected: 06/27/24 1144    Lab Status: Final result Specimen: Blood from Arm, Left Updated: 06/27/24 1210     Sodium 142 mmol/L      Potassium 4.4 mmol/L      Chloride 102 mmol/L      CO2 27 mmol/L      ANION GAP 13 mmol/L      BUN 21 mg/dL      Creatinine 1.12 mg/dL      Glucose 133 mg/dL      Calcium 9.3 mg/dL      AST 14 U/L      ALT 11 U/L      Alkaline Phosphatase 57 U/L      Total Protein 6.4 g/dL      Albumin 3.9 g/dL      Total Bilirubin 0.53 mg/dL      eGFR 62 ml/min/1.73sq m     Narrative:      [employer] guidelines for Chronic Kidney Disease (CKD):     Stage 1 with normal or high GFR (GFR > 90 mL/min/1.73 square meters)    Stage 2 Mild CKD (GFR = 60-89 mL/min/1.73 square meters)    Stage 3A Moderate CKD (GFR = 45-59 mL/min/1.73 square meters)    Stage 3B Moderate CKD (GFR = 30-44 mL/min/1.73 square meters)    Stage 4 Severe CKD (GFR = 15-29 mL/min/1.73 square meters)    Stage 5 End Stage CKD (GFR <15 mL/min/1.73 square meters)  Note: GFR calculation is accurate only with a steady state creatinine    Magnesium [[id number]]  (Normal) Collected: 06/27/24 1144    Lab Status: Final result Specimen: Blood from Arm, Left Updated: 06/27/24 1210     Magnesium 2.0 mg/dL     CBC and differential [[id number]] Collected: 06/27/24 1144    Lab Status: Final result Specimen: Blood from Arm, Left Updated: 06/27/24 1151     WBC 7.13 Thousand/uL      RBC 4.14 Million/uL      Hemoglobin 12.0 g/dL      Hematocrit 36.8 %      MCV 89 fL      MCH 29.0 pg      MCHC 32.6 g/dL      RDW 13.7 %      MPV 11.3 fL      Platelets 191 Thousands/uL      nRBC 0 /100 WBCs      Segmented % 73 %      Immature Grans % 0 %      Lymphocytes % 17 %      Monocytes % 9 %      Eosinophils Relative 1 %      Basophils Relative 0 %      Absolute Neutrophils 5.21 Thousands/µL      Absolute Immature Grans 0.01 Thousand/uL      Absolute  Lymphocytes 1.20 Thousands/µL      Absolute Monocytes 0.62 Thousand/µL      Eosinophils Absolute 0.06 Thousand/µL      Basophils Absolute 0.03 Thousands/µL     Fingerstick Glucose (POCT) [[id number]]  (Normal) Collected: 06/27/24 1147    Lab Status: Final result Specimen: Blood Updated: 06/27/24 1148     POC Glucose 138 mg/dl                    CTA head and neck with and without contrast   Final Result by Sai Cano MD (06/27 1333)      CT brain:  No acute intracranial abnormality. Moderate ventriculomegaly, out of proportion to the central and cortical volume loss which can be seen with exaggerated ex vacuo dilatation related to volume loss versus normal pressure hydrocephalus in the    appropriate clinical setting. Clinical correlation is recommended.      CTA head: Negative for large vessel intracranial occlusion or hemodynamically significant stenosis.      CTA neck:  No extracranial carotid stenosis.  The cervical vertebral arteries are patent.      The study was marked in EPIC for immediate notification.                  Workstation performed: [id number]         XR chest 1 view portable   Final Result by Oriana Gonzales MD (06/27 9)      No acute cardiopulmonary disease.            Workstation performed: [id number]         MRI Inpatient Order    (Results Pending)              Procedures  ECG 12 Lead Documentation Only    Date/Time: 6/27/2024 11:35 AM    Performed by: Effie Kramer DO  Authorized by: Effie Kramer DO    Indications / Diagnosis:  Dizziness  ECG reviewed by me, the ED Provider: yes    Patient location:  ED  Previous ECG:     Previous ECG:  Unavailable    Comparison to cardiac monitor: Yes    Interpretation:     Interpretation: non-specific    Rate:     ECG rate:  57bpm    ECG rate assessment: bradycardic    Rhythm:     Rhythm: sinus bradycardia    Ectopy:     Ectopy: PAC    QRS:     QRS axis:  Left  Conduction:     Conduction: normal    ST segments:     ST  segments:  Normal  T waves:     T waves: normal             ED Course                               SBIRT 20yo+      Flowsheet Row Most Recent Value   Initial Alcohol Screen: US AUDIT-C     1. How often do you have a drink containing alcohol? 0 Filed at: 06/27/2024 1130   2. How many drinks containing alcohol do you have on a typical day you are drinking?  0 Filed at: 06/27/2024 1130   3a. Male UNDER 65: How often do you have five or more drinks on one occasion? 0 Filed at: 06/27/2024 1130   Audit-C Score 0 Filed at: 06/27/2024 1130   CATHERINE: How many times in the past year have you...    Used an illegal drug or used a prescription medication for non-medical reasons? Never Filed at: 06/27/2024 1130                      Medical Decision Making  Amount and/or Complexity of Data Reviewed  Labs: ordered.  Radiology: ordered.    Risk  Prescription drug management.  Decision regarding hospitalization.             Disposition  Final diagnoses:   Dizziness     Time reflects when diagnosis was documented in both MDM as applicable and the Disposition within this note       Time User Action Codes Description Comment    6/27/2024  2:24 PM Effie Kramer Add [R42] Dizziness           ED Disposition       ED Disposition   Admit    Condition   Stable    Date/Time   Thu Jun 27, 2024 2254    Comment   Case was discussed with Dr Cheney and the patient's admission status was agreed to be Admission Status: observation status to the service of Dr. Cheney.               Follow-up Information    None         Current Discharge Medication List        CONTINUE these medications which have NOT CHANGED    Details   atorvastatin (LIPITOR) 10 mg tablet Take 1 tablet (10 mg total) by mouth daily  Qty: 90 tablet, Refills: 1    Associated Diagnoses: Dyslipidemia      Empagliflozin (Jardiance) 10 MG TABS tablet Take 1 tablet by mouth once daily  Qty: 30 tablet, Refills: 5    Associated Diagnoses: Type 2 diabetes mellitus without complication,  without long-term current use of insulin ([facility])      glipiZIDE (GLUCOTROL XL) 10 mg 24 hr tablet Take 1 tablet by mouth once daily  Qty: 30 tablet, Refills: 5    Associated Diagnoses: Type 2 diabetes mellitus with diabetic cataract, unspecified whether long term insulin use ([facility])      lisinopril (ZESTRIL) 5 mg tablet Take 1 tablet (5 mg total) by mouth daily  Qty: 90 tablet, Refills: 1    Associated Diagnoses: Essential hypertension      metFORMIN (GLUCOPHAGE) 1000 MG tablet Take 1 tablet (1,000 mg total) by mouth 2 (two) times a day  Qty: 180 tablet, Refills: 1    Associated Diagnoses: Type 2 diabetes mellitus without complication, without long-term current use of insulin ([facility])      aspirin 81 MG tablet Take 81 mg by mouth as needed Last dose  5/10/23      mupirocin (BACTROBAN) 2 % ointment Apply topically 2 (two) times a day for 3 days  Qty: 22 g, Refills: 0    Associated Diagnoses: Skin abrasion      polyethylene glycol (GLYCOLAX) 17 GM/SCOOP powder Take 17 g by mouth daily  Qty: 850 g, Refills: 3    Associated Diagnoses: Constipation, unspecified constipation type             No discharge procedures on file.    PDMP Review       None            ED Provider  Electronically Signed by             Effie Kramer DO  06/27/24 2040

## 2024-06-28 ENCOUNTER — APPOINTMENT (OUTPATIENT)
Dept: NON INVASIVE DIAGNOSTICS | Facility: HOSPITAL | Age: 79
End: 2024-06-28
Payer: MEDICARE

## 2024-06-28 ENCOUNTER — APPOINTMENT (OUTPATIENT)
Dept: RADIOLOGY | Facility: HOSPITAL | Age: 79
End: 2024-06-28
Payer: MEDICARE

## 2024-06-28 VITALS
RESPIRATION RATE: 18 BRPM | BODY MASS INDEX: 21.55 KG/M2 | HEART RATE: 52 BPM | OXYGEN SATURATION: 100 % | WEIGHT: 145.5 LBS | HEIGHT: 69 IN | DIASTOLIC BLOOD PRESSURE: 64 MMHG | TEMPERATURE: 97.5 F | SYSTOLIC BLOOD PRESSURE: 143 MMHG

## 2024-06-28 LAB
AORTIC ROOT: 3.7 CM
APICAL FOUR CHAMBER EJECTION FRACTION: 74 %
BSA FOR ECHO PROCEDURE: 1.8 M2
CHOLEST SERPL-MCNC: 126 MG/DL
E WAVE DECELERATION TIME: 245 MS
E/A RATIO: 1.14
EST. AVERAGE GLUCOSE BLD GHB EST-MCNC: 177 MG/DL
FRACTIONAL SHORTENING: 40 (ref 28–44)
GLUCOSE SERPL-MCNC: 144 MG/DL (ref 65–140)
GLUCOSE SERPL-MCNC: 237 MG/DL (ref 65–140)
GLUCOSE SERPL-MCNC: 91 MG/DL (ref 65–140)
HBA1C MFR BLD: 7.8 %
HDLC SERPL-MCNC: 53 MG/DL
INTERVENTRICULAR SEPTUM IN DIASTOLE (PARASTERNAL SHORT AXIS VIEW): 0.7 CM
INTERVENTRICULAR SEPTUM: 0.7 CM (ref 0.6–1.1)
LAAS-AP2: 23.8 CM2
LAAS-AP4: 18.3 CM2
LDLC SERPL CALC-MCNC: 65 MG/DL (ref 0–100)
LEFT ATRIUM SIZE: 4.1 CM
LEFT ATRIUM VOLUME (MOD BIPLANE): 64 ML
LEFT ATRIUM VOLUME INDEX (MOD BIPLANE): 35.6 ML/M2
LEFT INTERNAL DIMENSION IN SYSTOLE: 2.8 CM (ref 2.1–4)
LEFT VENTRICULAR INTERNAL DIMENSION IN DIASTOLE: 4.7 CM (ref 3.5–6)
LEFT VENTRICULAR POSTERIOR WALL IN END DIASTOLE: 1 CM
LEFT VENTRICULAR STROKE VOLUME: 74 ML
LVSV (TEICH): 74 ML
MV E'TISSUE VEL-LAT: 14 CM/S
MV E'TISSUE VEL-SEP: 11 CM/S
MV PEAK A VEL: 0.77 M/S
MV PEAK E VEL: 88 CM/S
MV STENOSIS PRESSURE HALF TIME: 71 MS
MV VALVE AREA P 1/2 METHOD: 3.1
RIGHT ATRIUM AREA SYSTOLE A4C: 16.8 CM2
RIGHT VENTRICLE ID DIMENSION: 3.8 CM
SL CV LEFT ATRIUM LENGTH A2C: 6.1 CM
SL CV LV EF: 65
SL CV PED ECHO LEFT VENTRICLE DIASTOLIC VOLUME (MOD BIPLANE) 2D: 104 ML
SL CV PED ECHO LEFT VENTRICLE SYSTOLIC VOLUME (MOD BIPLANE) 2D: 29 ML
TR MAX PG: 19 MMHG
TR PEAK VELOCITY: 2.2 M/S
TRICUSPID ANNULAR PLANE SYSTOLIC EXCURSION: 1.8 CM
TRICUSPID VALVE PEAK REGURGITATION VELOCITY: 2.2 M/S
TRIGL SERPL-MCNC: 38 MG/DL

## 2024-06-28 PROCEDURE — 97530 THERAPEUTIC ACTIVITIES: CPT

## 2024-06-28 PROCEDURE — 93306 TTE W/DOPPLER COMPLETE: CPT

## 2024-06-28 PROCEDURE — 83036 HEMOGLOBIN GLYCOSYLATED A1C: CPT

## 2024-06-28 PROCEDURE — 82948 REAGENT STRIP/BLOOD GLUCOSE: CPT

## 2024-06-28 PROCEDURE — 97166 OT EVAL MOD COMPLEX 45 MIN: CPT

## 2024-06-28 PROCEDURE — 99239 HOSP IP/OBS DSCHRG MGMT >30: CPT | Performed by: NURSE PRACTITIONER

## 2024-06-28 PROCEDURE — 97162 PT EVAL MOD COMPLEX 30 MIN: CPT

## 2024-06-28 PROCEDURE — 92522 EVALUATE SPEECH PRODUCTION: CPT

## 2024-06-28 PROCEDURE — 99205 OFFICE O/P NEW HI 60 MIN: CPT | Performed by: PSYCHIATRY & NEUROLOGY

## 2024-06-28 PROCEDURE — 80061 LIPID PANEL: CPT

## 2024-06-28 PROCEDURE — 93306 TTE W/DOPPLER COMPLETE: CPT | Performed by: INTERNAL MEDICINE

## 2024-06-28 RX ORDER — MECLIZINE HCL 12.5 MG/1
12.5 TABLET ORAL EVERY 8 HOURS PRN
Qty: 30 TABLET | Refills: 0 | Status: SHIPPED | OUTPATIENT
Start: 2024-06-28

## 2024-06-28 RX ORDER — ATORVASTATIN CALCIUM 40 MG/1
40 TABLET, FILM COATED ORAL EVERY EVENING
Qty: 30 TABLET | Refills: 0 | Status: SHIPPED | OUTPATIENT
Start: 2024-06-28 | End: 2024-07-28

## 2024-06-28 RX ADMIN — ATORVASTATIN CALCIUM 40 MG: 40 TABLET, FILM COATED ORAL at 16:39

## 2024-06-28 RX ADMIN — HEPARIN SODIUM 5000 UNITS: 5000 INJECTION, SOLUTION INTRAVENOUS; SUBCUTANEOUS at 13:56

## 2024-06-28 RX ADMIN — ASPIRIN 81 MG CHEWABLE TABLET 81 MG: 81 TABLET CHEWABLE at 09:39

## 2024-06-28 RX ADMIN — HEPARIN SODIUM 5000 UNITS: 5000 INJECTION, SOLUTION INTRAVENOUS; SUBCUTANEOUS at 05:05

## 2024-06-28 RX ADMIN — INSULIN LISPRO 2 UNITS: 100 INJECTION, SOLUTION INTRAVENOUS; SUBCUTANEOUS at 11:05

## 2024-06-28 NOTE — DISCHARGE SUMMARY
[facility]  Discharge- Artur Santana 1945, 78 y.o. male MRN: [id number]  Unit/Bed#: [address] [place] [address] Encounter: [id number]  Primary Care Provider: Erica Latham MD   Date and time admitted to hospital: 6/27/2024 11:19 AM    * Vertigo  Assessment & Plan  Patient presented to ED with dizziness that began this morning around 1 AM while patient was lying in bed.  CTA head/neck: No acute intracranial abnormality. Moderate ventriculomegaly, out of proportion to the central and cortical volume loss which can be seen with exaggerated ex vacuo dilatation related to volume loss versus normal pressure hydrocephalus in the   appropriate clinical setting. Negative for large vessel intracranial occlusion or hemodynamically significant stenosis. No extracranial carotid stenosis.  The cervical vertebral arteries are patent.  MRI brain unable to perform as patient has b bladder stimulator, does not have remote for it here.  Discussed with attending and neurology, okay for patient to follow-up outpatient neurology  ECHO with bubble study performed.  Update A1c 7.8 and lipid panel shows cholesterol 126, HDL 53 and LDL 65 continue statin  Neuro checks, no focal deficits, at baseline with gait and speech  Monitor on telemetry, no events noted overnight  PT/OT/speech eval, no needs identified  Neurology consult  Continue aspirin and statin    Hypertension  Assessment & Plan  Resume lisinopril  Follow-up PCP 1 to 2 weeks post discharge    Type 2 diabetes mellitus with diabetic cataract, unspecified whether long term insulin use (HCC)  Assessment & Plan  Lab Results   Component Value Date    HGBA1C 7.8 (H) 06/28/2024       Recent Labs     06/27/24  1638 06/27/24  2032 06/28/24  0723 06/28/24  1047   POCGLU 70 233* 91 237*         Blood Sugar Average: Last 72 hrs:  (P) 153.8  Continue home Jardiance, glipizide, and metformin  Diabetic diet    Dyslipidemia  Assessment & Plan  Continue Lipitor  Heart healthy  diet    Stage 3a chronic kidney disease (HCC)  Assessment & Plan  Lab Results   Component Value Date    EGFR 62 06/27/2024    EGFR 61 08/22/2023    EGFR 66 04/29/2023    CREATININE 1.12 06/27/2024    CREATININE 1.14 08/22/2023    CREATININE 1.07 04/29/2023   Cr at baseline  Follow-up outpatient PCP    Adenocarcinoma of prostate (HCC)  Assessment & Plan  Hx prostate cancer s/p palladium seeding        Medical Problems       Resolved Problems  Date Reviewed: 6/28/2024   None       Discharging Physician / Practitioner: CHAYA Delgado  PCP: Erica Latham MD  Admission Date:   Admission Orders (From admission, onward)       Ordered        06/27/24 1424  Place in Observation  Once                          Discharge Date: 06/28/24    Consultations During Hospital Stay:  Neurology.  Speech.  PT/OT    Procedures Performed:   2D echo    Significant Findings / Test Results:   Chest x-ray performed 6/27/2024 shows no acute cardiopulmonary disease.  CT of the brain was performed showing no acute intracranial abnormality, moderate ventriculomegaly out of portion to the central and cortical volume loss which can be seen with exaggerated ex-vacuo dilatation related to volume loss versus normal pressure hydrocephalus in the appropriate clinical setting.  Clinical correlation is recommended.  CTA head negative for large vessel intracranial occlusion or hemodynamically significant stenosis.  CTA neck no extracranial carotid sclerosis, cervical vertebral arteries are patent as per radiology report.    Incidental Findings:   See above  I reviewed the above mentioned incidental findings with the patient and/or family and they expressed understanding.    Test Results Pending at Discharge (will require follow up):   None     Outpatient Tests Requested:  None    Complications: None    Reason for Admission: Woke up dizzy at 1 AM    Hospital Course:   Artur Santana is a 78 y.o. male patient PMH of type 2 diabetes, history of  prostate cancer, HTN, HLD, CKD, basal cell carcinoma and Dickson's esophagus who originally presented to the hospital on 6/27/2024 due to dizziness.  Patient reports he woke up around 1 AM on morning of admission with dizziness when he was laying in bed.  He described it as the room spinning which was worse with movement and ambulation.  Denied any similar symptoms in the past.  Per ED provider sister stated patient's speech and gait are at baseline.  Patient was admitted under stroke pathway.  CT head negative for acute intracranial abnormality, moderate ventriculomegaly out of proportion to the central and cortical volume loss which can be seen with exaggerated ex-vacuo dilatation related to volume loss versus normal pressure hydrocephalus as per radiology report.  CTA head and neck negative as per radiology.  Neurology was consulted, patient unfortunately has a bladder stimulator and unable to obtain his remote control, hence unable to perform MRI at this time.  Discussed with neurology and attending, okay for patient to follow-up outpatient neurology.  PT/OT evaluation and treatment performed, no needs identified.  Patient will be discharged to home today and to follow-up with his outpatient PCP 1 to 2 weeks postdischarge.  As needed meclizine ordered for patient in the event that he develops dizziness once again.  Patient requested that we call his sister to provide her with an update and his pending discharge, voicemail was left for sister.  Patient is discharged to home today.            Please see above list of diagnoses and related plan for additional information.     Condition at Discharge: good    Discharge Day Visit / Exam:   Subjective:    Patient seen sitting up in chair resting comfortably.  Reports he does not have any further dizziness has not had any since he has been here.  Good appetite no nausea or vomiting.  Requested to be able to go to the bathroom.  Patient was able to ambulate himself  "without any difficulty or assistive aids into the bathroom and able to seat himself without difficulty.  Reports that he is hopeful that he will be going home today.    Vitals: Blood Pressure: 143/64 (06/28/24 1517)  Pulse: (!) 52 (06/28/24 1517)  Temperature: 97.5 °F (36.4 °C) (06/28/24 1103)  Temp Source: Oral (06/28/24 0822)  Respirations: 18 (06/28/24 1103)  Height: 5' 9\" (175.3 cm) (06/28/24 0822)  Weight - Scale: 66 kg (145 lb 8.1 oz) (06/28/24 0822)  SpO2: 100 % (06/28/24 1517)    Exam:   Physical Exam  Vitals and nursing note reviewed.   Constitutional:       General: He is not in acute distress.  HENT:      Head: Normocephalic.      Nose: Nose normal.      Mouth/Throat:      Mouth: Mucous membranes are moist.   Eyes:      Extraocular Movements: Extraocular movements intact.      Conjunctiva/sclera: Conjunctivae normal.   Cardiovascular:      Rate and Rhythm: Normal rate and regular rhythm.      Pulses: Normal pulses.      Heart sounds: Normal heart sounds.   Pulmonary:      Effort: Pulmonary effort is normal.      Breath sounds: Normal breath sounds.   Abdominal:      General: Bowel sounds are normal.      Palpations: Abdomen is soft.   Genitourinary:     Comments: Voiding spontaneously  Musculoskeletal:      Cervical back: Normal range of motion.      Right lower leg: No edema.      Left lower leg: No edema.      Comments: Gait slightly off balance however patient reports he normally walks like this.  Was able to ambulate to bathroom without using cane or assistance.   Skin:     General: Skin is warm and dry.      Capillary Refill: Capillary refill takes less than 2 seconds.   Neurological:      Mental Status: He is alert and oriented to person, place, and time.      Comments: Speech slightly slurred, at his baseline.  Denies any dizziness.  Has not had dizziness since he has been in the hospital.   Psychiatric:         Mood and Affect: Mood normal.         Behavior: Behavior normal.         Thought " Content: Thought content normal.         Judgment: Judgment normal.          Discussion with Family: Attempted to update  (sister) via phone. Left voicemail.     Discharge instructions/Information to patient and family:   See after visit summary for information provided to patient and family.      Provisions for Follow-Up Care:  See after visit summary for information related to follow-up care and any pertinent home health orders.      Mobility at time of Discharge:   Basic Mobility Inpatient Raw Score: 21  JH-HLM Goal: 6: Walk 10 steps or more  JH-HLM Achieved: 8: Walk 250 feet ot more  HLM Goal achieved. Continue to encourage appropriate mobility.     Disposition:   Home    Planned Readmission: No     Discharge Statement:  I spent greater than 45 minutes discharging the patient. This time was spent on the day of discharge. I had direct contact with the patient on the day of discharge. Greater than 50% of the total time was spent examining patient, answering all patient questions, arranging and discussing plan of care with patient as well as directly providing post-discharge instructions.  Additional time then spent on discharge activities.    Discharge Medications:  See after visit summary for reconciled discharge medications provided to patient and/or family.      **Please Note: This note may have been constructed using a voice recognition system**

## 2024-06-28 NOTE — PLAN OF CARE
Problem: Prexisting or High Potential for Compromised Skin Integrity  Goal: Skin integrity is maintained or improved  Description: INTERVENTIONS:  - Identify patients at risk for skin breakdown  - Assess and monitor skin integrity  - Assess and monitor nutrition and hydration status  - Monitor labs   - Assess for incontinence   - Turn and reposition patient  - Assist with mobility/ambulation  - Relieve pressure over bony prominences  - Avoid friction and shearing  - Provide appropriate hygiene as needed including keeping skin clean and dry  - Evaluate need for skin moisturizer/barrier cream  - Collaborate with interdisciplinary team   - Patient/family teaching  - Consider wound care consult   Outcome: Progressing     Problem: PAIN - ADULT  Goal: Verbalizes/displays adequate comfort level or baseline comfort level  Description: Interventions:  - Encourage patient to monitor pain and request assistance  - Assess pain using appropriate pain scale  - Administer analgesics based on type and severity of pain and evaluate response  - Implement non-pharmacological measures as appropriate and evaluate response  - Consider cultural and social influences on pain and pain management  - Notify physician/advanced practitioner if interventions unsuccessful or patient reports new pain  Outcome: Progressing     Problem: INFECTION - ADULT  Goal: Absence or prevention of progression during hospitalization  Description: INTERVENTIONS:  - Assess and monitor for signs and symptoms of infection  - Monitor lab/diagnostic results  - Monitor all insertion sites, i.e. indwelling lines  - Administer medications as ordered  - Instruct and encourage patient and family to use good hand hygiene technique    Outcome: Progressing     Problem: SAFETY ADULT  Goal: Patient will remain free of falls  Description: INTERVENTIONS:  - Educate patient/family on patient safety including physical limitations  - Instruct patient to call for assistance with  activity   - Consult OT/PT to assist with strengthening/mobility   - Keep Call bell within reach  - Keep bed low and locked with side rails adjusted as appropriate  - Keep care items and personal belongings within reach  - Initiate and maintain comfort rounds  - Make Fall Risk Sign visible to staff  - Offer Toileting every 2 Hours, in advance of need  - Initiate/Maintain bed alarm    - Apply yellow socks and bracelet for high fall risk patients  - Consider moving patient to room near nurses station  Outcome: Progressing     Problem: DISCHARGE PLANNING  Goal: Discharge to home or other facility with appropriate resources  Description: INTERVENTIONS:  - Identify barriers to discharge w/patient and caregiver  - Arrange for needed discharge resources and transportation as appropriate  - Identify discharge learning needs (meds,, etc.)  - Arrange for interpretive services to assist at discharge as needed  - Refer to Case Management Department for coordinating discharge planning if the patient needs post-hospital services based on physician/advanced practitioner order or complex needs related to functional status, cognitive ability, or social support system  Outcome: Progressing     Problem: Knowledge Deficit  Goal: Patient/family/caregiver demonstrates understanding of disease process, treatment plan, medications, and discharge instructions  Description: Complete learning assessment and assess knowledge base.  Interventions:  - Provide teaching at level of understanding  - Provide teaching via preferred learning methods  Outcome: Progressing     Problem: Neurological Deficit  Goal: Neurological status is stable or improving  Description: Interventions:  - Monitor and assess patient's level of consciousness, motor function, sensory function, and level of assistance needed for ADLs.   - Monitor and report changes from baseline. Collaborate with interdisciplinary team to initiate plan and implement interventions as ordered.    - Provide and maintain a safe environment.    - Consider fall precautions.    - Consider bleeding precautions.  Outcome: Progressing

## 2024-06-28 NOTE — INCIDENTAL FINDINGS
The following findings require follow up:  Radiographic finding   Finding: CT brain shows no acute intracranial abnormality, moderate ventriculomegaly a out of portion to the central cortical volume loss which can be seen with exaggerated ex-vacuo dilatation related to volume loss versus normal pressure hydrocephalus in the appropriate clinical setting.  Medical correlation is recommended.   Follow up required: Neurology   Follow up should be done within 1 month(s)    Please notify the following clinician to assist with the follow up:   Dr. Stewart    Incidental finding results were discussed with the Patient by CHAYA Delgado on 06/28/24.   They expressed understanding and all questions answered.

## 2024-06-28 NOTE — PHYSICAL THERAPY NOTE
"       PHYSICAL THERAPY EVALUATION/TREATMENT         06/28/24 1053   PT Last Visit   PT Visit Date 06/28/24   Note Type   Note type Evaluation   Pain Assessment   Pain Assessment Tool 0-10   Pain Score No Pain   Restrictions/Precautions   Other Precautions Chair Alarm;Bed Alarm;Fall Risk   Home Living   Type of Home House   Home Layout One level  (4-5 JENNY)   Home Equipment Cane  (Pt ambulates without an AD)   Prior Function   Level of [place] Independent with ADLs;Independent with functional mobility   Lives With Alone   IADLs Independent with driving   Falls in the last 6 months 1 to 4  (Pt reports 2 falls)   General   Additional Pertinent History Pt presented to ED with dizziness. Pt reports he woke up at 1 am with dizziness while laying in bed. He describes the dizziness as the room is spinning and worse with movement and ambulation.   Cognition   Overall Cognitive Status Impaired   Arousal/Participation Cooperative   Following Commands Follows one step commands with increased time or repetition   Subjective   Subjective \"I'm a little dizzy\"   RLE Assessment   RLE Assessment   (ROM WFL. Strength hip 3+/5, knee 3+/5, ankle 3+/5)   LLE Assessment   LLE Assessment   (ROM WFL. Strength hip 3+/5, knee 3+/5, ankle 3+/5)   Coordination   Heel to Shin Intact   Rapid Alternating Movements Other (Comment):  (Difficulty following directions)   Bed Mobility   Additional Comments Pt greeted sitting in bedside chair upon therapist arrival   Transfers   Sit to Stand 5  Supervision   Additional items Armrests   Stand to Sit 5  Supervision   Additional items Armrests   Ambulation/Elevation   Gait pattern Decreased foot clearance   Gait Assistance 5  Supervision  (min A progressing to supervision)   Assistive Device None   Distance 100 ft   Balance   Static Sitting Good   Static Standing Fair   Ambulatory Fair   Activity Tolerance   Activity Tolerance Patient tolerated treatment well   Assessment   Prognosis Good   Problem " "List Decreased strength;Decreased mobility   Assessment Pt is 78 y.o. male seen for PT evaluation s/p admit to [facility] on 6/27/2024 w/ Vertigo. PT consulted to assess pt's functional mobility and d/c needs. Order placed for PT eval and tx, w/ up and OOB as tolerated order.  Co-morbidities affecting patient's physical performance include: vertigo, adenocarcinoma of prostate, type II DM, HTN, basal cell carcinoma, orthostatic dizziness.  Personal factors affecting patient at time of initial evaluation include: stairs to enter home and positive fall history. Prior to admission, patient was independent with functional mobility without assistive device, independent with ADLS, ambulating household distance, and ambulating community distances.  Upon evaluation: Pt ambulated a total of 350 ft with supervision. Pt without fatigue or dizziness throughout ambulation.     Please find objective findings from Physical Therapy assessment regarding body systems outlined above with impairments and limitations including weakness and fall risk.The Barthel Index was used as a functional outcome tool presenting with a score of Barthel Index Score: 70 today indicating moderate limitations of functional mobility and ADLS.  Patient's clinical presentation is currently evolving as seen in patient's presentation of increased fall risk. Pt would benefit from continued Physical Therapy treatment to address deficits as defined above and maximize level of functional mobility.     As demonstrated by objective findings, the assigned level of complexity for this evaluation is moderate.The patient's -[facility] Basic Mobility Inpatient Short Form Raw Score is 21. A Raw score of greater than 16 suggests the patient may benefit from discharge to home. Please also refer to the recommendation of the Physical Therapist for safe discharge planning.   Goals   Patient Goals \"I want to sit outside\"   [facility] Expiration Date 07/05/24   Short Term Goal #1 1. " Pt will be independent with bed mobility,   2. Pt will be independent with transfers,   3. Pt will ambulate at least 150 ft without an AD independently on indoor surfaces,   4. Pt will ascend/descend at least 5 steps independently in order to safely enter and exit his home   LTG Expiration Date 07/12/24   Long Term Goal #1 1. Pt will improve AM-PAC score to 24/24,   2. Pt will ambulate at least 300 ft without an AD independently on indoor surfaces,   3. Pt will improve ambulatory balance to at least fair+ to decrease risk of falls   Plan   Treatment/Interventions Functional transfer training;LE strengthening/ROM;Elevations;Therapeutic exercise;Endurance training;Bed mobility;Gait training   PT Frequency 3-5x/wk   Discharge Recommendation   Rehab Resource Intensity Level, PT No post-acute rehabilitation needs   AM-PAC Basic Mobility Inpatient   Turning in Flat Bed Without Bedrails 4   Lying on Back to Sitting on Edge of Flat Bed Without Bedrails 4   Moving Bed to Chair 3   Standing Up From Chair Using Arms 4   Walk in Room 3   Climb 3-5 Stairs With Railing 3   Basic Mobility Inpatient Raw Score 21   Basic Mobility Standardized Score 45.55   [facility] Highest Level Of Mobility   -HL Goal 6: Walk 10 steps or more   -HLM Achieved 8: Walk 250 feet ot more   Modified Damon Scale   Modified Damon Scale 1   Barthel Index   Feeding 10   Bathing 0   Grooming Score 5   Dressing Score 5   Bladder Score 10   Bowels Score 10   Toilet Use Score 5   Transfers (Bed/Chair) Score 10   Mobility (Level Surface) Score 10   Stairs Score 5   Barthel Index Score 70   Additional Treatment Session   Start Time 1043   End Time 1053   Treatment Assessment S: Pt agreeable to take an additional walk.   O: Pt ambulated an additional 250 ft without an AD with supervision.   A: Pt ambulated without fatigue or dizziness throughout.   P: Continue to ambulate to improve mobility   End of Consult   Patient Position at End of Consult Bedside  chair;Bed/Chair alarm activated;All needs within reach   Licensure   [place] License Number  Analy Mittal, SPT

## 2024-06-28 NOTE — CASE MANAGEMENT
Case Management Assessment & Discharge Planning Note    Patient name Artur Santana  Location [address] [address]/[address] [address]-* MRN [id number]  : 1945 Date 2024       Current Admission Date: 2024  Current Admission Diagnosis:Vertigo   Patient Active Problem List    Diagnosis Date Noted Date Diagnosed    Vertigo 2024     Protein-calorie malnutrition, unspecified severity ([facility]) 2023     Open wound of face 2023     Abrasion of nose 2023     Pseudopolyposis of colon without complication, unspecified part of colon (HCC) 2023     Stage 3a chronic kidney disease ([facility]) 2022     Functional diarrhea 12/15/2020     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     Orthostatic dizziness 2020     Personal history of colonic polyps 2020     Family history of colonic polyps 2020     Hypertrophic toenail 2019     Onychomycosis 2019     Type 2 diabetes mellitus with diabetic cataract, unspecified whether long term insulin use ([facility])      Anemia of chronic disease 2016     Dyslipidemia 2015     Radiation proctitis 2014     Actinic keratosis 2014     Adenocarcinoma of prostate ([facility]) 2013     Carcinoma, basal cell, skin 2013     Hypertension 2012       LOS (days): 0  Geometric Mean LOS (GMLOS) (days):   Days to GMLOS:     OBJECTIVE:      Current admission status: Observation  Referral Reason: Stroke    Preferred Pharmacy:   [facility] [address] [place] [address] - [address]  [address]  [facility] [address]  Phone: [phone] Fax: [phone]    Primary Care Provider: Erica Latham MD    Primary Insurance: MEDICARE  Secondary Insurance: COMMERCIAL MISCELLANEOUS    ASSESSMENT:  Active Health Care Proxies    There are no active Health Care Proxies on file.    Readmission Root Cause  30 Day Readmission: No    Patient Information  Admitted from:: Home  Mental Status: Alert  During  Assessment patient was accompanied by: Not accompanied during assessment  Assessment information provided by:: Patient  Primary Caregiver: Self  Support Systems: Family members  County of Residence: [place]  What city do you live in?: [place], [place]  Home entry access options. Select all that apply.: Stairs  Number of steps to enter home.: 4  Type of Current Residence: Other (Comment) (1 story)  Living Arrangements: Lives Alone (sister lives below him)    Activities of Daily Living Prior to Admission  Functional Status: Independent  Completes ADLs independently?: Yes  Ambulates independently?: Yes  Does patient use assisted devices?: No  Does patient currently own DME?: Yes  What DME does the patient currently own?: [employer]     Patient Information Continued  Income Source: SSI/SSD  Does patient have prescription coverage?: Yes  Does patient receive dialysis treatments?: No     Means of Transportation  Means of Transport to Appts:: Drives Self    Social Determinants of Health (SDOH)      Flowsheet Row Most Recent Value   Housing Stability    In the last 12 months, was there a time when you were not able to pay the mortgage or rent on time? N   In the past 12 months, how many times have you moved where you were living? 2   At any time in the past 12 months, were you homeless or living in a shelter (including now)? N   Transportation Needs    In the past 12 months, has lack of transportation kept you from medical appointments or from getting medications? no   In the past 12 months, has lack of transportation kept you from meetings, work, or from getting things needed for daily living? No   Food Insecurity    Within the past 12 months, you worried that your food would run out before you got the money to buy more. Never true   Within the past 12 months, the food you bought just didn't last and you didn't have money to get more. Never true   Utilities    In the past 12 months has the electric, gas, oil, or water  company threatened to shut off services in your home? No          DISCHARGE DETAILS:    Discharge planning discussed with:: Patient  Freedom of Choice: Yes  Comments - Freedom of Choice: SW met bedside with patient to introduce role, complete assessment, and discuss discharge planning needs. SW reviewed evaluation by therapy. Patient states that his choice is to return home at discharge as recommended by therapy. He denies any questions, concerns, or anticipated discharge needs at this time.  CM contacted family/caregiver?: No- see comments (Patient declined call to contact. Patient states he is in communication with sister and will provide information for any discharge needs)  Were Treatment Team discharge recommendations reviewed with patient/caregiver?: Yes  Did patient/caregiver verbalize understanding of patient care needs?: Yes  Were patient/caregiver advised of the risks associated with not following Treatment Team discharge recommendations?: Yes    Contacts  Patient Contacts: Carmencita Galeana (sister)  Relationship to Patient:: Family  Contact Method: Phone  Phone Number: [phone]  Reason/Outcome: Emergency Contact    Requested Home Health Care         Is the patient interested in HHC at discharge?: No    DME Referral Provided  Referral made for DME?: No    Other Referral/Resources/Interventions Provided:  Interventions: None Indicated     Treatment Team Recommendation: Home  Discharge Destination Plan:: Home  Transport at Discharge : Family

## 2024-06-28 NOTE — TELEMEDICINE
TeleConsultation - Neurology   Artur Santana 78 y.o. male MRN: [id number]  Unit/Bed#: [address] [place] [address] Encounter: [id number]      VIRTUAL CARE DOCUMENTATION:     1. This service was provided via Telemedicine using [employer]     2. Parties in the room with patient during teleconsult Patient only    3. Confidentiality My office door was closed     4. Participants No one else was in the room    5. Patient acknowledged consent and understanding of privacy and security of the  Telemedicine consult. I informed the patient that I have reviewed their record in Epic and presented the opportunity for them to ask any questions regarding the visit today.  The patient agreed to participate.    6. Time spent        Assessment & Plan     * Vertigo  Assessment & Plan  Vertigo, suspect vestibular given the rather short duration of symptoms and lack of any clear associated focal symptoms.  -Low clinical suspicion for TIA or stroke.  - Okay to discontinue MRI, unclear if patient is able to obtain anyway given stimulator  - Continue aspirin 81 mg and Lipitor 10 mg daily for now (LDL 65)  - PT/OT eval and treat  - No additional neurologic recommendations at this time.  - Okay to discharge from neurologic standpoint.  - Office will call patient to arrange outpatient follow-up        Patient will need follow-up with general neurology, attending in 6 weeks.  No additional studies needed    History of Present Illness     Reason for Consult / Principal Problem: Dizziness  Hx and PE limited by:   HPI: Artur Santana is a 78 y.o. right handed male with hyperlipidemia, hypertension, diabetes, diabetic nephropathy/CKD, prostate cancer, with sacral nerve stimulator placement, maintained on aspirin 81 mg and Lipitor 10 mg who presents with dizziness.  - Patient awoke on Wednesday a.m. with dizziness he describes as rotational consistent with vertigo.  He says the symptoms lasted several minutes and then resolved and have not come  back.  He denies any associated numbness, tingling, heaviness, clumsiness of an arm or leg, change in vision, speech, or hearing.  Denies headaches.      - Systolics elevated in the 190s and 80s on arrival but have since normalized.  - Patient is persistently been bradycardic with heart rates in the 40s and 50s.  No beta-blocker mentioned in home meds.  -CT head without any clear acute pathology. (Noted moderate ventriculomegaly out of proportion to atrophy, question NPH)    - CTA head and neck was without any clear hemodynamically significant stenosis.    -CMP, troponins, CBC, PT/INR, UA, FLP, LDL 65 were unremarkable.  - A1c elevated at 7.8  - FLP: 126/38/53/65          Inpatient consult to Neurology  Consult performed by: Ward Ohara DO  Consult ordered by: Eliza Guillory PA-C           Review of Systems   Neurological:  Negative for dizziness, tremors, speech difficulty, weakness, light-headedness, numbness and headaches.       Historical Information   Past Medical History:   Diagnosis Date    Acute pain of right shoulder 03/21/2022    Adenocarcinoma of prostate (HCC)     BCC    Anemia     Cancer (HCC)     Chronic kidney disease     Colon polyp     Diabetes mellitus (HCC)     History of Lyme disease     Hyperlipidemia     Hypertension     Proctitis     Prostate cancer (HCC)     Treated with radiation     Past Surgical History:   Procedure Laterality Date    COLONOSCOPY  01/10/2018    COLONOSCOPY  01/10/2018     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, grade 2 internal hemorrhoids, 1 adenomatous polyp    OK CYSTOURETHROSCOPY W/INTERNAL URETHROTOMY N/A 5/18/2023    Procedure: CYSTOSCOPY, DIRECT VISUAL INTERAL URETHROTOMY (DVIU), KENALOG INJECTION, BLADDER BIOPSY WITH FULGERATION;  Surgeon: Dimas Mendez MD;  Location: [facility];  Service: Urology    OK INS/RPLC PERPH SAC/GSTRC NPG/RCVR PCKT CRTJ&CONN N/A 4/13/2023    Procedure: STAGE 2 AXONIC, INSERTION OF NEUROSTIMULATOR, ELECTRONIC ANALYSIS OF  "NEUROSTIMULATOR;  Surgeon: Dimas Mendez MD;  Location: [facility];  Service: Urology    NM SPLIT AGRFT T/A/L 1ST 100 CM/&/1% BDY INFT/CHLD Left 2017    Procedure: ANTERIOR SHOULDER EXCISION BCC- COMPLEX CLOSURE vs FLAP vs STSG;  Surgeon: Lester Craig MD;  Location:  MAIN OR;  Service: Plastics    NM TRANSURETHRAL INCISION PROSTATE N/A 2022    Procedure: CYSTOSCOPY, TRANSURETHRAL INCISION OF PROSTATE (TUIP);  Surgeon: Dimas Mendez MD;  Location: [facility];  Service: Urology    SACRAL NERVE STIMULATOR PLACEMENT N/A 2023    Procedure: PART 1, INCISION FOR IMPLANTATION OF NEUROSTIMULATOR, ELECTRONIC ANALYSIS NEUROSTIMULATOR, STAGE 1;  Surgeon: Dimas Mendez MD;  Location: [facility];  Service: Urology    SKIN LESION EXCISION N/A 2022    Procedure: EXCISION WIDE LESION TRUNK/ABDOMEN/BACK;  Surgeon: Lester Craig MD;  Location:  END[facility];  Service: Plastics     Social History   Social History     Substance and Sexual Activity   Alcohol Use Not Currently     Social History     Substance and Sexual Activity   Drug Use No     E-Cigarette/Vaping    E-Cigarette Use Never User      E-Cigarette/Vaping Substances    Nicotine No     THC No     CBD No     Flavoring No     Other No     Unknown No      Social History     Tobacco Use   Smoking Status Former    Current packs/day: 0.00    Average packs/day: 0.1 packs/day for 2.5 years (0.2 ttl pk-yrs)    Types: Cigarettes    Start date: 1975    Quit date: 1977    Years since quittin.0   Smokeless Tobacco Never   Tobacco Comments    never a smoker noted in \"allscripts\" - Denied: History of tobacco use noted in \"allscripts\"      Family History:   Family History   Problem Relation Age of Onset    Heart disease Mother     Diabetes Brother         mellitus     Colon polyps Brother     Throat cancer Sister     Colon cancer Neg Hx        Review of previous medical records was  completed.     Meds/Allergies   all current active meds have been " "reviewed    No Known Allergies    Objective   Vitals:Blood pressure 145/68, pulse 56, temperature 97.5 °F (36.4 °C), resp. rate 18, height 5' 9\" (1.753 m), weight 66 kg (145 lb 8.1 oz), SpO2 100%.,Body mass index is 21.49 kg/m².    Intake/Output Summary (Last 24 hours) at 6/28/2024 1321  Last data filed at 6/28/2024 0945  Gross per 24 hour   Intake 20 ml   Output 450 ml   Net -430 ml       Invasive Devices:   Invasive Devices       Peripheral Intravenous Line  Duration             Peripheral IV 06/27/24 Left Antecubital 1 day                    Physical Exam  Constitutional:       General: He is not in acute distress.     Appearance: Normal appearance. He is well-developed. He is not ill-appearing or toxic-appearing.   HENT:      Head: Normocephalic and atraumatic.      Right Ear: External ear normal.      Left Ear: External ear normal.      Nose: Nose normal.   Eyes:      General: No scleral icterus.     Extraocular Movements: Extraocular movements intact.      Conjunctiva/sclera: Conjunctivae normal.      Pupils: Pupils are equal, round, and reactive to light.   Pulmonary:      Effort: Pulmonary effort is normal. No respiratory distress.      Breath sounds: Normal breath sounds. No stridor. No wheezing or rhonchi.   Abdominal:      General: Abdomen is flat.      Tenderness: There is no guarding.   Musculoskeletal:         General: No swelling or deformity. Normal range of motion.   Skin:     Coloration: Skin is not jaundiced or pale.      Findings: No erythema or rash.   Neurological:      General: No focal deficit present.      Mental Status: He is alert and oriented to person, place, and time.      Cranial Nerves: No cranial nerve deficit.      Sensory: No sensory deficit.      Motor: No weakness.      Coordination: Coordination normal.   Psychiatric:         Mood and Affect: Mood normal.         Behavior: Behavior normal.         Thought Content: Thought content normal.         Judgment: Judgment normal. "       Neurologic Exam     Mental Status   Oriented to person, place, and time.   Level of consciousness:  - awake and alert    Language:  -fluent, comprehension intact      Visual-spatial:  - no clear signs of hemineglect.  -follow commands equally on both sides.  -     Cranial Nerves     CN III, IV, VI   Pupils are equal, round, and reactive to light.  Pupils are equal and round  Extraocular muscles intact, gaze conjugate.  Face appears symmetric with respect to motor.  Tongue is midline.  Shoulder shrug is symmetric.       Motor Exam Patient moves all 4 extremities equally without drift.  Bulk appears normal     Gait, Coordination, and Reflexes  no gross ataxia in any limb.  Finger-to-nose was intact and symmetric  Gait was deferred       Lab Results: I have personally reviewed pertinent reports.    Imaging Studies: I have personally reviewed pertinent reports.   and I have personally reviewed pertinent films in PACS  EKG, Pathology, and Other Studies: I have personally reviewed pertinent reports.    VTE Prophylaxis: Sequential compression device (Venodyne)     Code Status: Level 1 - Full Code  Advance Directive and Living Will:      Power of :    POLST:      Counseling / Coordination of Care  N/A

## 2024-06-28 NOTE — ASSESSMENT & PLAN NOTE
Continue Lipitor  
Continue Lipitor  Heart healthy diet  
Hold lisinopril to allow permissive hypertension  
Hx prostate cancer s/p palladium seeding  
Hx prostate cancer s/p palladium seeding  
Lab Results   Component Value Date    EGFR 62 06/27/2024    EGFR 61 08/22/2023    EGFR 66 04/29/2023    CREATININE 1.12 06/27/2024    CREATININE 1.14 08/22/2023    CREATININE 1.07 04/29/2023   Cr at baseline  Daily bmp  
Lab Results   Component Value Date    EGFR 62 06/27/2024    EGFR 61 08/22/2023    EGFR 66 04/29/2023    CREATININE 1.12 06/27/2024    CREATININE 1.14 08/22/2023    CREATININE 1.07 04/29/2023   Cr at baseline  Follow-up outpatient PCP  
Lab Results   Component Value Date    HGBA1C 7.1 (A) 01/12/2024       Recent Labs     06/27/24  1147   POCGLU 138       Blood Sugar Average: Last 72 hrs:  (P) 138  Hold home Jardiance, glipizide, and metformin  SSI  Diabetic diet  
Lab Results   Component Value Date    HGBA1C 7.8 (H) 06/28/2024       Recent Labs     06/27/24  1638 06/27/24 2032 06/28/24  0723 06/28/24  1047   POCGLU 70 233* 91 237*         Blood Sugar Average: Last 72 hrs:  (P) 153.8  Continue home Jardiance, glipizide, and metformin  Diabetic diet  
Patient presented to ED with dizziness that began this morning around 1 AM while patient was lying in bed.  CTA head/neck: No acute intracranial abnormality. Moderate ventriculomegaly, out of proportion to the central and cortical volume loss which can be seen with exaggerated ex vacuo dilatation related to volume loss versus normal pressure hydrocephalus in the   appropriate clinical setting. Negative for large vessel intracranial occlusion or hemodynamically significant stenosis. No extracranial carotid stenosis.  The cervical vertebral arteries are patent.  MRI brain  ECHO with bubble study  Update A1c and lipid panel  Neuro checks  Monitor on telemetry  PT/OT/speech eval  Neurology consult  Continue aspirin and statin  
Patient presented to ED with dizziness that began this morning around 1 AM while patient was lying in bed.  CTA head/neck: No acute intracranial abnormality. Moderate ventriculomegaly, out of proportion to the central and cortical volume loss which can be seen with exaggerated ex vacuo dilatation related to volume loss versus normal pressure hydrocephalus in the   appropriate clinical setting. Negative for large vessel intracranial occlusion or hemodynamically significant stenosis. No extracranial carotid stenosis.  The cervical vertebral arteries are patent.  MRI brain unable to perform as patient has b bladder stimulator, does not have remote for it here.  Discussed with attending and neurology, okay for patient to follow-up outpatient neurology  ECHO with bubble study performed.  Update A1c 7.8 and lipid panel shows cholesterol 126, HDL 53 and LDL 65 continue statin  Neuro checks, no focal deficits, at baseline with gait and speech  Monitor on telemetry, no events noted overnight  PT/OT/speech eval, no needs identified  Neurology consult  Continue aspirin and statin  
Resume lisinopril  Follow-up PCP 1 to 2 weeks post discharge  
Vertigo, suspect vestibular given the rather short duration of symptoms and lack of any clear associated focal symptoms.  -Low clinical suspicion for TIA or stroke.  - Okay to discontinue MRI, unclear if patient is able to obtain anyway given stimulator  - Continue aspirin 81 mg and Lipitor 10 mg daily for now (LDL 65)  - PT/OT eval and treat  - No additional neurologic recommendations at this time.  - Okay to discharge from neurologic standpoint.  - Office will call patient to arrange outpatient follow-up  
No significant past surgical history

## 2024-06-28 NOTE — SPEECH THERAPY NOTE
"SLP Motor Speech Evaluation      Patient Name: Artur Santana    Today's Date: 6/28/2024     Problem List  Principal Problem:    Dizziness  Active Problems:    Adenocarcinoma of prostate (HCC)    Type 2 diabetes mellitus with diabetic cataract, unspecified whether long term insulin use (HCC)    Dyslipidemia    Hypertension    Stage 3a chronic kidney disease (HCC)      Past Medical History  Past Medical History:   Diagnosis Date    Acute pain of right shoulder 03/21/2022    Adenocarcinoma of prostate (HCC)     BCC    Anemia     Cancer (HCC)     Chronic kidney disease     Colon polyp     Diabetes mellitus (HCC)     History of Lyme disease     Hyperlipidemia     Hypertension     Proctitis     Prostate cancer (HCC)     Treated with radiation       Impressions:  Mr. Santana presented with moderate motor speech disorder which she reports is baseline.  He presents with limited lingual range of motion with articulatory errors that appear to be most consistent with \"tongue tie\" and developmental errors.  He presented with no symptoms of dysphonia or dysphagia.     Evaluation only;  no therapy indicated.       HISTORY AND PHYSICAL:     Artur Santana is a 78 y.o. male with a PMH of T2DM, hx prostate cancer, HTN, HLD, CKD, basal cell carcinoma, Dickson's esophagus who presents with dizziness. Patient reports he woke up around 1 am this morning with dizziness while he was lying in bed   DX:  * Dizziness  CTA head/neck: No acute intracranial abnormality. Moderate ventriculomegaly, out of proportion to the central and cortical volume loss which can be seen with exaggerated ex vacuo dilatation related to volume loss versus normal pressure hydrocephalus in the   appropriate clinical setting. Negative for large vessel intracranial occlusion or hemodynamically significant stenosis. No extracranial carotid stenosis.  The cervical vertebral arteries are patent.  MRI brain  ECHO with bubble study  Update A1c and lipid panel  Neuro " "checks  Monitor on telemetry  PT/OT/speech eval  Neurology consult  Continue aspirin and statin     Stage 3a chronic kidney disease (HCC)   Hypertension  Dyslipidemia  Type 2 diabetes mellitus with diabetic cataract, unspecified whether long term insulin use (HCC)  Adenocarcinoma of prostate (HCC)    Cognitive/Language Status:   Patient was fully alert with good sustained alertness and attention.  He was oriented to self, hospital (knew he was in [place] but reported to state as [place]), \"23\" as the year and uncertain of month, day, date (reported he would normally look at the calendar or watches television so he would know day of the week).  He comprehended simple questions and commands.  He expressed needs with no symptoms of aphasia but motor speech impairment was evident.    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 (will assess when lighting available)  Mandible: adequate ROM  Dentition: No upper dentition and no upper plate.  Incomplete lower dentition  Respiratory Support: on RA/no support       Respiration and Phonation  Maximum Phonation Time  (Normal 15-20 seconds for healthy older adult with standard deviation of 5.5-6): Mild to moderately reduced  Sustains phonation across words, phrases, short sentences    Vocal Quality:  clear/adequate     Articulation:  Patient presented with articulatory imprecision and sound substitutions characteristic of both \"tongue-tie\" and developmental errors.    Resonation: Mild to moderate abnormality with resonance present     S/S Oral apraxia:  None    S/S Verbal Apraxia:  None      INTELLIGIBILITY at the WORD/PHRASE LEVEL: Mild to moderately reduced    INTELLIGIBILITY at the SENTENCE LEVEL and CONVERSATIONAL SPEECH: Moderately " reduced.  Careful listening required    SWALLOWING: Patient was screened with bites of egg, toast, banana and thin liquid.  No symptoms of oral/pharyngeal dysphagia noted this a.m.    Thank you for this referral.  Please do not hesitate to contact me with any questions or concerns.    Ciera oCx MS CCC-SLP  [place] License [id number]  [place] License [id number]

## 2024-06-28 NOTE — PLAN OF CARE
Problem: PHYSICAL THERAPY ADULT  Goal: Performs mobility at highest level of function for planned discharge setting.  See evaluation for individualized goals.  Description: Treatment/Interventions: Functional transfer training, LE strengthening/ROM, Elevations, Therapeutic exercise, Endurance training, Bed mobility, Gait training          See flowsheet documentation for full assessment, interventions and recommendations.  Note: Prognosis: Good  Problem List: Decreased strength, Decreased mobility  Assessment: Pt is 78 y.o. male seen for PT evaluation s/p admit to [facility] on 6/27/2024 w/ Vertigo. PT consulted to assess pt's functional mobility and d/c needs. Order placed for PT eval and tx, w/ up and OOB as tolerated order.  Co-morbidities affecting patient's physical performance include: vertigo, adenocarcinoma of prostate, type II DM, HTN, basal cell carcinoma, orthostatic dizziness.  Personal factors affecting patient at time of initial evaluation include: stairs to enter home and positive fall history. Prior to admission, patient was independent with functional mobility without assistive device, independent with ADLS, ambulating household distance, and ambulating community distances.  Upon evaluation: Pt ambulated a total of 350 ft with supervision. Pt without fatigue or dizziness throughout ambulation.     Please find objective findings from Physical Therapy assessment regarding body systems outlined above with impairments and limitations including weakness and fall risk.The Barthel Index was used as a functional outcome tool presenting with a score of Barthel Index Score: 70 today indicating moderate limitations of functional mobility and ADLS.  Patient's clinical presentation is currently evolving as seen in patient's presentation of increased fall risk. Pt would benefit from continued Physical Therapy treatment to address deficits as defined above and maximize level of functional mobility.     As  demonstrated by objective findings, the assigned level of complexity for this evaluation is moderate.The patient's AM-PAC Basic Mobility Inpatient Short Form Raw Score is 21. A Raw score of greater than 16 suggests the patient may benefit from discharge to home. Please also refer to the recommendation of the Physical Therapist for safe discharge planning.        Rehab Resource Intensity Level, PT: No post-acute rehabilitation needs    See flowsheet documentation for full assessment.

## 2024-06-28 NOTE — OCCUPATIONAL THERAPY NOTE
OT EVALUATION       06/28/24 1140   OT Last Visit   OT Visit Date 06/28/24   Note Type   Note type Evaluation   Pain Assessment   Pain Assessment Tool 0-10   Pain Score No Pain   Restrictions/Precautions   Other Precautions Chair Alarm;Bed Alarm;Fall Risk   Home Living   Type of Home Mobile home   Home Layout One level  (5 JENNY)   Bathroom Shower/Tub Tub/shower unit   Bathroom Toilet Standard   Bathroom Equipment Grab bars in shower   Home Equipment Cane   Prior Function   Level of [place] Independent with ADLs;Independent with functional mobility;Independent with IADLS   Lives With Alone   Receives Help From Family  (sister)   IADLs Independent with driving;Independent with meal prep;Independent with medication management   Falls in the last 6 months 1 to 4   Comments Patient admitted with dizziness, pt denies dizziness currently   Lifestyle   Reciprocal Relationships pt reports his sister will come when he needs her   Intrinsic Gratification bird watching   ADL   Eating Assistance 7  Independent   Grooming Assistance 5  Supervision/Setup   Grooming Deficit Wash/dry hands  (standing at sink)   UB Bathing Assistance 7  Independent   LB Bathing Assistance 5  Supervision/Setup   UB Dressing Assistance 7  Independent   LB Dressing Assistance 5  Supervision/Setup   Toileting Assistance  5  Supervision/Setup   Transfers   Sit to Stand 5  Supervision   Stand to Sit 5  Supervision   Functional Mobility   Functional Mobility 4  Minimal assistance   Additional Comments progressing to supervision to and from bathroom   Balance   Static Sitting Good   Dynamic Sitting Good   Static Standing Fair   Dynamic Standing Fair   Activity Tolerance   Activity Tolerance Patient tolerated treatment well   RUE Assessment   RUE Assessment WFL   LUE Assessment   LUE Assessment WFL   Hand Function   Gross Motor Coordination Functional   Fine Motor Coordination Functional   Sensation   Light Touch No apparent deficits   Vision - Complex  Assessment   Acuity Able to read employee name badge without difficulty  (able to state letters, difficulty reading occupational)   Cognition   Overall Cognitive Status Impaired   Arousal/Participation Cooperative   Attention Within functional limits   Orientation Level Oriented to person;Oriented to place;Oriented to situation   Following Commands Follows one step commands with increased time or repetition   Assessment   Limitation Decreased ADL status;Decreased Safe judgement during ADL;Decreased endurance;Decreased cognition;Decreased high-level ADLs;Decreased self-care trans  (decreased balance and mobility)   Prognosis Good   Assessment Patient evaluated by Occupational Therapy.  Patient admitted with Vertigo.  The patients occupational profile, medical and therapy history includes a extensive additional review of physical, cognitive, or psychosocial history related to current functional performance.  Comorbidities affecting functional mobility and ADLS include: anemia, cancer, CKD, diabetes, and hypertension.  Prior to admission, patient was independent with functional mobility without assistive device, independent with ADLS, and independent with IADLS.  The evaluation identifies the following performance deficits: weakness, impaired balance, decreased endurance, increased fall risk, new onset of impairment of functional mobility, decreased ADLS, decreased IADLS, decreased activity tolerance, decreased safety awareness, impaired judgement, and decreased strength, that result in activity limitations and/or participation restrictions. This evaluation requires clinical decision making of moderate complexity, because the patient may present with comorbidities that affect occupational performance and required minimal or moderate modification of tasks or assistance with the consideration of several treatment options.  The Barthel Index was used as a functional outcome tool presenting with a score of Barthel Index  Score: 70, indicating moderate limitations of functional mobility and ADLS.  The patient's raw score on the AM-PAC Daily Activity Inpatient Short Form is 21. A raw score of greater than or equal to 19 suggests the patient may benefit from discharge to home. Please refer to the recommendation of the Occupational Therapist for safe discharge planning.  Patient will benefit from skilled Occupational Therapy services to address above deficits and facilitate a safe return to prior level of function.   Goals   Patient Goals to go home   STG Time Frame   (1-7 days)   Short Term Goal  Goals established to promote Patient Goals: to go home:  Grooming: independent standing at sink; Bathing: independent; Lower Body Dressing: independent; Toileting: independent; Patient will increase ambulatory standard toilet transfer to independent with no assistive device to increase performance and safety with ADLS and functional mobility; Patient will increase standing tolerance to 5 minutes during ADL task to decrease assistance level and decrease fall risk; Patient will increase functional mobility to and from bathroom with no assistive device with supervision to increase performance with ADLS and to use a toilet; Patient will tolerate 5 minutes of UE ROM/strengthening to increase general activity tolerance and performance in ADLS/IADLS; Patient will improve functional activity tolerance to 10 minutes of sustained functional tasks to increase participation in basic self-care and decrease assistance level;  Patient will increase dynamic standing balance to fair+ to improve postural stability and decrease fall risk during standing ADLS and transfers.   LTG Time Frame   (8-14 days)   Long Term Goal Patient will increase standing tolerance to 10 minutes during ADL task to decrease assistance level and decrease fall risk; Patient will tolerate 10 minutes of UE ROM/strengthening to increase general activity tolerance and performance in  ADLS/IADLS; Patient will improve functional activity tolerance to 20 minutes of sustained functional tasks to increase participation in basic self-care and decrease assistance level;  Patient will increase dynamic standing balance to good to improve postural stability and decrease fall risk during standing ADLS and transfers.   Pt will score >/= 24/24 on AM-PAC Daily Activity Inpatient scale to promote safe independence with ADLs and functional mobility; Pt will score >/= 100/100 on Barthel Index in order to decrease caregiver assistance needed and increase ability to perform ADLs and functional mobility.   Plan   Treatment Interventions ADL retraining;Functional transfer training;UE strengthening/ROM;Endurance training;Patient/family training;Cognitive reorientation;Activityengagement;UE splinting   Goal Expiration Date 07/12/24   OT Frequency 3-5x/wk   Discharge Recommendation   Rehab Resource Intensity Level, OT III (Minimum Resource Intensity)   AM-PAC Daily Activity Inpatient   Lower Body Dressing 3   Bathing 3   Toileting 3   Upper Body Dressing 4   Grooming 4   Eating 4   Daily Activity Raw Score 21   Daily Activity Standardized Score (Calc for Raw Score >=11) 44.27   AM-PAC Applied Cognition Inpatient   Following a Speech/Presentation 3   Understanding Ordinary Conversation 4   Taking Medications 3   Remembering Where Things Are Placed or Put Away 3   Remembering List of 4-5 Errands 3   Taking Care of Complicated Tasks 2   Applied Cognition Raw Score 18   Applied Cognition Standardized Score 38.07   Barthel Index   Feeding 10   Bathing 0   Grooming Score 5   Dressing Score 5   Bladder Score 10   Bowels Score 10   Toilet Use Score 5   Transfers (Bed/Chair) Score 10   Mobility (Level Surface) Score 10   Stairs Score 5   Barthel Index Score 70   Modified [place] Scale   Modified [place] Scale 1   Licensure   [place] License Number  Nadira Saavedra MS OTR/L [id number]

## 2024-07-05 DIAGNOSIS — E11.9 TYPE 2 DIABETES MELLITUS WITHOUT COMPLICATION, WITHOUT LONG-TERM CURRENT USE OF INSULIN (HCC): ICD-10-CM

## 2024-07-09 ENCOUNTER — TELEPHONE (OUTPATIENT)
Age: 79
End: 2024-07-09

## 2024-07-09 NOTE — TELEPHONE ENCOUNTER
Patient sister called to set up a HFU for the patient       Patient sisterCarmencita is not on the Medical Communication Consent Form    Advised the sister to have the patient call us back to give a verbal or we can schedule with them when they call back     HFU/SLW/6/27 to 6/28/2024/Dc to Home self care         Neurology Consult Note:  Patient will need follow-up with general neurology, attending in 6 weeks. No additional studies needed     Awaiting patient to call back to schedule

## 2024-07-09 NOTE — TELEPHONE ENCOUNTER
Patient called back along with sister Carmencita Galeana. Please assist with a sooner appointment to adhere to the DC recommendations.     Thank you       Scheduled 9/17/24 11 AM [facility]    VASYLU/[place]/ Vertigo/ E-MEDICARE/MEDICARE A AND B   DC-Home-6/28/24      Neurology Consult Note:  Patient will need follow-up with general neurology, attending in 6 weeks. No additional studies needed

## 2024-07-15 ENCOUNTER — OFFICE VISIT (OUTPATIENT)
Dept: FAMILY MEDICINE CLINIC | Facility: CLINIC | Age: 79
End: 2024-07-15
Payer: MEDICARE

## 2024-07-15 VITALS
SYSTOLIC BLOOD PRESSURE: 126 MMHG | RESPIRATION RATE: 18 BRPM | TEMPERATURE: 97.6 F | DIASTOLIC BLOOD PRESSURE: 60 MMHG | HEART RATE: 63 BPM | BODY MASS INDEX: 19.4 KG/M2 | WEIGHT: 131 LBS | OXYGEN SATURATION: 98 % | HEIGHT: 69 IN

## 2024-07-15 DIAGNOSIS — N18.31 STAGE 3A CHRONIC KIDNEY DISEASE (HCC): ICD-10-CM

## 2024-07-15 DIAGNOSIS — E78.5 DYSLIPIDEMIA: ICD-10-CM

## 2024-07-15 DIAGNOSIS — I10 PRIMARY HYPERTENSION: ICD-10-CM

## 2024-07-15 DIAGNOSIS — E11.36 TYPE 2 DIABETES MELLITUS WITH DIABETIC CATARACT, UNSPECIFIED WHETHER LONG TERM INSULIN USE (HCC): Primary | ICD-10-CM

## 2024-07-15 PROCEDURE — 99214 OFFICE O/P EST MOD 30 MIN: CPT | Performed by: FAMILY MEDICINE

## 2024-07-15 PROCEDURE — G2211 COMPLEX E/M VISIT ADD ON: HCPCS | Performed by: FAMILY MEDICINE

## 2024-07-15 NOTE — ASSESSMENT & PLAN NOTE
A1C has gone up from prior. Will increase dose of jardiance from 10mg to 25mg daily. Continue metformin, glipizde.   Lab Results   Component Value Date    HGBA1C 7.8 (H) 06/28/2024

## 2024-07-15 NOTE — ASSESSMENT & PLAN NOTE
Controlled on lipitor. Continue.   Lab Results   Component Value Date    CHOLESTEROL 126 06/28/2024    CHOLESTEROL 164 08/22/2023    CHOLESTEROL 102 03/27/2023     Lab Results   Component Value Date    HDL 53 06/28/2024    HDL 47 08/22/2023    HDL 46 03/27/2023     Lab Results   Component Value Date    TRIG 38 06/28/2024    TRIG 81 08/22/2023    TRIG 55 03/27/2023     Lab Results   Component Value Date    NONHDLC 90 06/22/2021    NONHDLC 128 06/21/2018    NONHDLC 112 06/16/2017

## 2024-07-15 NOTE — ASSESSMENT & PLAN NOTE
Lab Results   Component Value Date    EGFR 62 06/27/2024    EGFR 61 08/22/2023    EGFR 66 04/29/2023    CREATININE 1.12 06/27/2024    CREATININE 1.14 08/22/2023    CREATININE 1.07 04/29/2023   Stable.

## 2024-07-15 NOTE — PROGRESS NOTES
Ambulatory Visit  Name: Artur Santana      : 1945      MRN: [id number]  Encounter Provider: Erica Latham MD  Encounter Date: 7/15/2024   Encounter department: [facility]    Assessment & Plan   1. Type 2 diabetes mellitus with diabetic cataract, unspecified whether long term insulin use (HCC)  Assessment & Plan:  A1C has gone up from prior. Will increase dose of jardiance from 10mg to 25mg daily. Continue metformin, glipizde.   Lab Results   Component Value Date    HGBA1C 7.8 (H) 2024     Orders:  -     Empagliflozin 25 MG TABS; Take 1 tablet (25 mg total) by mouth daily  2. Primary hypertension  Assessment & Plan:  Controlled on lisinopril.   3. Dyslipidemia  Assessment & Plan:  Controlled on lipitor. Continue.   Lab Results   Component Value Date    CHOLESTEROL 126 2024    CHOLESTEROL 164 2023    CHOLESTEROL 102 2023     Lab Results   Component Value Date    HDL 53 2024    HDL 47 2023    HDL 46 2023     Lab Results   Component Value Date    TRIG 38 2024    TRIG 81 2023    TRIG 55 2023     Lab Results   Component Value Date    NONHDLC 90 2021    NONHDLC 128 2018    NONHDLC 112 2017      4. Stage 3a chronic kidney disease (HCC)  Assessment & Plan:  Lab Results   Component Value Date    EGFR 62 2024    EGFR 61 2023    EGFR 66 2023    CREATININE 1.12 2024    CREATININE 1.14 2023    CREATININE 1.07 2023   Stable.        History of Present Illness     HPI  Artur Santana is a 78 y.o. male who is here today for routine follow up visit.   Reports no acute issues/concerns in office today.   Has been compliant with current medications.    He was recently hospitalized for vertigo but this has since resolved.   Review of Systems   Constitutional: Negative.    HENT: Negative.     Eyes: Negative.    Respiratory: Negative.     Cardiovascular: Negative.    Gastrointestinal: Negative.   "  Endocrine: Negative.    Genitourinary: Negative.    Musculoskeletal: Negative.    Neurological: Negative.    Hematological: Negative.    Psychiatric/Behavioral: Negative.         Objective     /60   Pulse 63   Temp 97.6 °F (36.4 °C)   Resp 18   Ht 5' 9\" (1.753 m)   Wt 59.4 kg (131 lb)   SpO2 98%   BMI 19.35 kg/m²     Physical Exam  Vitals and nursing note reviewed.   Constitutional:       General: He is not in acute distress.     Appearance: Normal appearance. He is well-developed.   HENT:      Head: Normocephalic and atraumatic.   Eyes:      Conjunctiva/sclera: Conjunctivae normal.   Cardiovascular:      Rate and Rhythm: Normal rate and regular rhythm.      Pulses: Normal pulses.      Heart sounds: Normal heart sounds. No murmur heard.  Pulmonary:      Effort: Pulmonary effort is normal. No respiratory distress.      Breath sounds: Normal breath sounds.   Musculoskeletal:         General: No swelling.      Cervical back: Neck supple.   Skin:     General: Skin is warm and dry.      Capillary Refill: Capillary refill takes less than 2 seconds.   Neurological:      Mental Status: He is alert.   Psychiatric:         Mood and Affect: Mood normal.       Administrative Statements     "

## 2024-07-16 ENCOUNTER — TELEPHONE (OUTPATIENT)
Dept: ADMINISTRATIVE | Facility: OTHER | Age: 79
End: 2024-07-16

## 2024-07-16 NOTE — TELEPHONE ENCOUNTER
----- Message from Erica Latham MD sent at 7/15/2024 12:20 PM EDT -----  07/15/24 12:20 PM    Hello, our patient Artur Santana. has had Diabetic Eye Exam completed/performed. Please assist in updating the patient chart by pulling the document from the Media Tab. The date of service is March 2024.     Thank you,  Erica Latham  [facility], [facility]

## 2024-07-17 NOTE — TELEPHONE ENCOUNTER
Upon review of the In Basket request we were able to locate, review, and update the patient chart as requested for Diabetic Eye Exam.    Any additional questions or concerns should be emailed to the Practice Liaisons via the appropriate education email address, please do not reply via In Basket.    Thank you  Bryanna Molina MA   [facility]

## 2024-07-21 DIAGNOSIS — I10 ESSENTIAL HYPERTENSION: ICD-10-CM

## 2024-07-21 RX ORDER — LISINOPRIL 5 MG/1
5 TABLET ORAL DAILY
Qty: 90 TABLET | Refills: 0 | Status: SHIPPED | OUTPATIENT
Start: 2024-07-21

## 2024-09-04 DIAGNOSIS — E11.36 TYPE 2 DIABETES MELLITUS WITH DIABETIC CATARACT, UNSPECIFIED WHETHER LONG TERM INSULIN USE (HCC): ICD-10-CM

## 2024-09-04 RX ORDER — GLIPIZIDE 10 MG/1
10 TABLET, FILM COATED, EXTENDED RELEASE ORAL DAILY
Qty: 30 TABLET | Refills: 5 | Status: SHIPPED | OUTPATIENT
Start: 2024-09-04

## 2024-10-11 ENCOUNTER — TELEPHONE (OUTPATIENT)
Dept: FAMILY MEDICINE CLINIC | Facility: CLINIC | Age: 79
End: 2024-10-11

## 2024-10-11 DIAGNOSIS — I10 ESSENTIAL HYPERTENSION: ICD-10-CM

## 2024-10-11 RX ORDER — LISINOPRIL 5 MG/1
5 TABLET ORAL DAILY
Qty: 90 TABLET | Refills: 0 | Status: SHIPPED | OUTPATIENT
Start: 2024-10-11

## 2024-10-15 ENCOUNTER — OFFICE VISIT (OUTPATIENT)
Dept: FAMILY MEDICINE CLINIC | Facility: CLINIC | Age: 79
End: 2024-10-15
Payer: MEDICARE

## 2024-10-15 VITALS
HEART RATE: 56 BPM | SYSTOLIC BLOOD PRESSURE: 120 MMHG | RESPIRATION RATE: 16 BRPM | TEMPERATURE: 96.9 F | DIASTOLIC BLOOD PRESSURE: 74 MMHG | WEIGHT: 130.6 LBS | HEIGHT: 69 IN | BODY MASS INDEX: 19.34 KG/M2

## 2024-10-15 DIAGNOSIS — Z23 ENCOUNTER FOR IMMUNIZATION: ICD-10-CM

## 2024-10-15 DIAGNOSIS — Z00.00 MEDICARE ANNUAL WELLNESS VISIT, SUBSEQUENT: Primary | ICD-10-CM

## 2024-10-15 DIAGNOSIS — E78.5 DYSLIPIDEMIA: ICD-10-CM

## 2024-10-15 DIAGNOSIS — E11.36 TYPE 2 DIABETES MELLITUS WITH DIABETIC CATARACT, UNSPECIFIED WHETHER LONG TERM INSULIN USE (HCC): ICD-10-CM

## 2024-10-15 PROCEDURE — G0008 ADMIN INFLUENZA VIRUS VAC: HCPCS

## 2024-10-15 PROCEDURE — G0439 PPPS, SUBSEQ VISIT: HCPCS | Performed by: FAMILY MEDICINE

## 2024-10-15 PROCEDURE — 90662 IIV NO PRSV INCREASED AG IM: CPT

## 2024-10-15 NOTE — PROGRESS NOTES
Ambulatory Visit  Name: Artur Santana      : 1945      MRN: [id number]  Encounter Provider: Erica Latham MD  Encounter Date: 10/15/2024   Encounter department: [facility]    Assessment & Plan  Medicare annual wellness visit, subsequent         Encounter for immunization    Orders:    influenza vaccine, high-dose, PF 0.5 mL (Fluzone High Dose)    Type 2 diabetes mellitus with diabetic cataract, unspecified whether long term insulin use ([facility])    Lab Results   Component Value Date    HGBA1C 7.8 (H) 2024       Orders:    Hemoglobin A1C; Future    Dyslipidemia    Orders:    Comprehensive metabolic panel; Future    Lipid Panel with Direct LDL reflex; Future      Falls Plan of Care: balance, strength, and gait training instructions were provided.       Preventive health issues were discussed with patient, and age appropriate screening tests were ordered as noted in patient's After Visit Summary. Personalized health advice and appropriate referrals for health education or preventive services given if needed, as noted in patient's After Visit Summary.    History of Present Illness     HPI   Patient Care Team:  Erica Latham MD as PCP - General (Family Medicine)  Janell Red PA-C    Review of Systems  Medical History Reviewed by provider this encounter:  Tobacco  Allergies  Meds  Problems  Med Hx  Surg Hx  Fam Hx       Annual Wellness Visit Questionnaire       Health Risk Assessment:   Patient rates overall health as fair. Patient feels that their physical health rating is same. Patient is very satisfied with their life. Eyesight was rated as same. Hearing was rated as same. Patient feels that their emotional and mental health rating is same. Patients states they are never, rarely angry. Patient states they are sometimes unusually tired/fatigued. Pain experienced in the last 7 days has been none. Patient states that he has experienced no weight loss or gain in last 6 months.     Fall  Risk Screening:   In the past year, patient has experienced: history of falling in past year    Number of falls: 2 or more  Injured during fall?: Yes    Feels unsteady when standing or walking?: Yes    Worried about falling?: Yes      Home Safety:  Patient has trouble with stairs inside or outside of their home. Patient has working smoke alarms and has working carbon monoxide detector.     Medications:   Patient is currently taking over-the-counter supplements. OTC medications include: see medication list. Patient is able to manage medications.     Activities of Daily Living (ADLs)/Instrumental Activities of Daily Living (IADLs):   Walk and transfer into and out of bed and chair?: Yes  Dress and groom yourself?: Yes    Bathe or shower yourself?: Yes    Feed yourself? Yes  Do your laundry/housekeeping?: Yes  Manage your money, pay your bills and track your expenses?: Yes  Make your own meals?: Yes    Do your own shopping?: Yes    Advance Care Planning:   Living will: Yes    Advanced directive: Yes      PREVENTIVE SCREENINGS      Cardiovascular Screening:    General: Screening Current      Diabetes Screening:     General: Screening Not Indicated and History Diabetes      Colorectal Cancer Screening:     General: Screening Current      Prostate Cancer Screening:    General: History Prostate Cancer and Screening Not Indicated      Abdominal Aortic Aneurysm (AAA) Screening:    Risk factors include: tobacco use        Lung Cancer Screening:     General: Screening Not Indicated      Hepatitis C Screening:    General: Screening Current    Screening, Brief Intervention, and Referral to Treatment (SBIRT)    Screening      AUDIT-C Screenin) How often did you have a drink containing alcohol in the past year? never  2) How many drinks did you have on a typical day when you were drinking in the past year? 0  3) How often did you have 6 or more drinks on one occasion in the past year? never    AUDIT-C Score:  "0  Interpretation: Score 0-3 (male): Negative screen for alcohol misuse    Social Determinants of Health     Financial Resource Strain: High Risk (9/29/2023)    Overall Financial Resource Strain (CARDIA)     Difficulty of Paying Living Expenses: Hard   Food Insecurity: No Food Insecurity (10/15/2024)    Hunger Vital Sign     Worried About Running Out of Food in the Last Year: Never true     Ran Out of Food in the Last Year: Never true   Transportation Needs: No Transportation Needs (10/15/2024)    PRAPARE - Transportation     Lack of Transportation (Medical): No     Lack of Transportation (Non-Medical): No   Housing Stability: High Risk (10/15/2024)    Housing Stability Vital Sign     Unable to Pay for Housing in the Last Year: No     Number of Times Moved in the Last Year: 2     Homeless in the Last Year: No   Utilities: Not At Risk (10/15/2024)    [facility] Utilities     Threatened with loss of utilities: No     No results found.    Objective     /74   Pulse 56   Temp (!) 96.9 °F (36.1 °C) (Tympanic)   Resp 16   Ht 5' 9\" (1.753 m)   Wt 59.2 kg (130 lb 9.6 oz)   BMI 19.29 kg/m²     Physical Exam    "

## 2024-10-15 NOTE — PATIENT INSTRUCTIONS
Medicare Preventive Visit Patient Instructions  Thank you for completing your Welcome to Medicare Visit or Medicare Annual Wellness Visit today. Your next wellness visit will be due in one year (10/16/2025).  The screening/preventive services that you may require over the next 5-10 years are detailed below. Some tests may not apply to you based off risk factors and/or age. Screening tests ordered at today's visit but not completed yet may show as past due. Also, please note that scanned in results may not display below.  Preventive Screenings:  Service Recommendations Previous Testing/Comments   Colorectal Cancer Screening  Colonoscopy    Fecal Occult Blood Test (FOBT)/Fecal Immunochemical Test (FIT)  Fecal DNA/Cologuard Test  Flexible Sigmoidoscopy Age: 45-75 years old   Colonoscopy: every 10 years (May be performed more frequently if at higher risk)  OR  FOBT/FIT: every 1 year  OR  Cologuard: every 3 years  OR  Sigmoidoscopy: every 5 years  Screening may be recommended earlier than age 45 if at higher risk for colorectal cancer. Also, an individualized decision between you and your healthcare provider will decide whether screening between the ages of 76-85 would be appropriate. Colonoscopy: 10/19/2022  FOBT/FIT: Not on file  Cologuard: Not on file  Sigmoidoscopy: 04/03/2020    Screening Current  Screening Current     Prostate Cancer Screening Individualized decision between patient and health care provider in men between ages of 55-69   Medicare will cover every 12 months beginning on the day after your 50th birthday PSA: <0.1 ng/mL     History Prostate Cancer  Screening Not Indicated  History Prostate Cancer  Screening Not Indicated     Hepatitis C Screening Once for adults born between 1945 and 1965  More frequently in patients at high risk for Hepatitis C Hep C Antibody: 06/21/2018    Screening Current  Screening Current   Diabetes Screening 1-2 times per year if you're at risk for diabetes or have pre-diabetes  Fasting glucose: 136 mg/dL (8/22/2023)  A1C: 7.8 % (6/28/2024)  Screening Not Indicated  History Diabetes  Screening Not Indicated  History Diabetes   Cholesterol Screening Once every 5 years if you don't have a lipid disorder. May order more often based on risk factors. Lipid panel: 06/28/2024  Screening Current  Screening Current      Other Preventive Screenings Covered by Medicare:  Abdominal Aortic Aneurysm (AAA) Screening: covered once if your at risk. You're considered to be at risk if you have a family history of AAA or a male between the age of 65-75 who smoking at least 100 cigarettes in your lifetime.  Lung Cancer Screening: covers low dose CT scan once per year if you meet all of the following conditions: (1) Age 55-77; (2) No signs or symptoms of lung cancer; (3) Current smoker or have quit smoking within the last 15 years; (4) You have a tobacco smoking history of at least 20 pack years (packs per day x number of years you smoked); (5) You get a written order from a healthcare provider.  Glaucoma Screening: covered annually if you're considered high risk: (1) You have diabetes OR (2) Family history of glaucoma OR (3)  aged 50 and older OR (4)  American aged 65 and older  Osteoporosis Screening: covered every 2 years if you meet one of the following conditions: (1) Have a vertebral abnormality; (2) On glucocorticoid therapy for more than 3 months; (3) Have primary hyperparathyroidism; (4) On osteoporosis medications and need to assess response to drug therapy.  HIV Screening: covered annually if you're between the age of 15-65. Also covered annually if you are younger than 15 and older than 65 with risk factors for HIV infection. For pregnant patients, it is covered up to 3 times per pregnancy.    Immunizations:  Immunization Recommendations   Influenza Vaccine Annual influenza vaccination during flu season is recommended for all persons aged >= 6 months who do not have  contraindications   Pneumococcal Vaccine   * Pneumococcal conjugate vaccine = PCV13 (Prevnar 13), PCV15 (Vaxneuvance), PCV20 (Prevnar 20)  * Pneumococcal polysaccharide vaccine = PPSV23 (Pneumovax) Adults 19-63 yo with certain risk factors or if 65+ yo  If never received any pneumonia vaccine: recommend Prevnar 20 (PCV20)  Give PCV20 if previously received 1 dose of PCV13 or PPSV23   Hepatitis B Vaccine 3 dose series if at intermediate or high risk (ex: diabetes, end stage renal disease, liver disease)   Respiratory syncytial virus (RSV) Vaccine - COVERED BY MEDICARE PART D  * RSVPreF3 (Arexvy) CDC recommends that adults 60 years of age and older may receive a single dose of RSV vaccine using shared clinical decision-making (SCDM)   Tetanus (Td) Vaccine - COST NOT COVERED BY MEDICARE PART B Following completion of primary series, a booster dose should be given every 10 years to maintain immunity against tetanus. Td may also be given as tetanus wound prophylaxis.   Tdap Vaccine - COST NOT COVERED BY MEDICARE PART B Recommended at least once for all adults. For pregnant patients, recommended with each pregnancy.   Shingles Vaccine (Shingrix) - COST NOT COVERED BY MEDICARE PART B  2 shot series recommended in those 19 years and older who have or will have weakened immune systems or those 50 years and older     Health Maintenance Due:      Topic Date Due   • Colorectal Cancer Screening  10/18/2025   • Hepatitis C Screening  Completed     Immunizations Due:  There are no preventive care reminders to display for this patient.    Advance Directives   What are advance directives?  Advance directives are legal documents that state your wishes and plans for medical care. These plans are made ahead of time in case you lose your ability to make decisions for yourself. Advance directives can apply to any medical decision, such as the treatments you want, and if you want to donate organs.   What are the types of advance  directives?  There are many types of advance directives, and each state has rules about how to use them. You may choose a combination of any of the following:  Living will:  This is a written record of the treatment you want. You can also choose which treatments you do not want, which to limit, and which to stop at a certain time. This includes surgery, medicine, IV fluid, and tube feedings.   Durable power of  for healthcare (DPAHC):  This is a written record that states who you want to make healthcare choices for you when you are unable to make them for yourself. This person, called a proxy, is usually a family member or a friend. You may choose more than 1 proxy.  Do not resuscitate (DNR) order:  A DNR order is used in case your heart stops beating or you stop breathing. It is a request not to have certain forms of treatment, such as CPR. A DNR order may be included in other types of advance directives.  Medical directive:  This covers the care that you want if you are in a coma, near death, or unable to make decisions for yourself. You can list the treatments you want for each condition. Treatment may include pain medicine, surgery, blood transfusions, dialysis, IV or tube feedings, and a ventilator (breathing machine).  Values history:  This document has questions about your views, beliefs, and how you feel and think about life. This information can help others choose the care that you would choose.  Why are advance directives important?  An advance directive helps you control your care. Although spoken wishes may be used, it is better to have your wishes written down. Spoken wishes can be misunderstood, or not followed. Treatments may be given even if you do not want them. An advance directive may make it easier for your family to make difficult choices about your care.   Fall Prevention    Fall prevention  includes ways to make your home and other areas safer. It also includes ways you can move more  carefully to prevent a fall. Health conditions that cause changes in your blood pressure, vision, or muscle strength and coordination may increase your risk for falls. Medicines may also increase your risk for falls if they make you dizzy, weak, or sleepy.   Fall prevention tips:   Stand or sit up slowly.    Use assistive devices as directed.    Wear shoes that fit well and have soles that .    Wear a personal alarm.    Stay active.    Manage your medical conditions.    Home Safety Tips:  Add items to prevent falls in the bathroom.    Keep paths clear.    Install bright lights in your home.    Keep items you use often on shelves within reach.    Paint or place reflective tape on the edges of your stairs.       © Copyright [employer] 2018 Information is for End User's use only and may not be sold, redistributed or otherwise used for commercial purposes. All illustrations and images included in CareNotes® are the copyrighted property of [employer] or [employer]    Medicare Preventive Visit Patient Instructions  Thank you for completing your Welcome to Medicare Visit or Medicare Annual Wellness Visit today. Your next wellness visit will be due in one year (10/16/2025).  The screening/preventive services that you may require over the next 5-10 years are detailed below. Some tests may not apply to you based off risk factors and/or age. Screening tests ordered at today's visit but not completed yet may show as past due. Also, please note that scanned in results may not display below.  Preventive Screenings:  Service Recommendations Previous Testing/Comments   Colorectal Cancer Screening  Colonoscopy    Fecal Occult Blood Test (FOBT)/Fecal Immunochemical Test (FIT)  Fecal DNA/Cologuard Test  Flexible Sigmoidoscopy Age: 45-75 years old   Colonoscopy: every 10 years (May be performed more frequently if at higher risk)  OR  FOBT/FIT: every 1 year  OR  Cologuard: every 3 years  OR  Sigmoidoscopy: every 5  years  Screening may be recommended earlier than age 45 if at higher risk for colorectal cancer. Also, an individualized decision between you and your healthcare provider will decide whether screening between the ages of 76-85 would be appropriate. Colonoscopy: 10/19/2022  FOBT/FIT: Not on file  Cologuard: Not on file  Sigmoidoscopy: 04/03/2020    Screening Current  Screening Current     Prostate Cancer Screening Individualized decision between patient and health care provider in men between ages of 55-69   Medicare will cover every 12 months beginning on the day after your 50th birthday PSA: <0.1 ng/mL     History Prostate Cancer  Screening Not Indicated  History Prostate Cancer  Screening Not Indicated     Hepatitis C Screening Once for adults born between 1945 and 1965  More frequently in patients at high risk for Hepatitis C Hep C Antibody: 06/21/2018    Screening Current  Screening Current   Diabetes Screening 1-2 times per year if you're at risk for diabetes or have pre-diabetes Fasting glucose: 136 mg/dL (8/22/2023)  A1C: 7.8 % (6/28/2024)  Screening Not Indicated  History Diabetes  Screening Not Indicated  History Diabetes   Cholesterol Screening Once every 5 years if you don't have a lipid disorder. May order more often based on risk factors. Lipid panel: 06/28/2024  Screening Current  Screening Current      Other Preventive Screenings Covered by Medicare:  Abdominal Aortic Aneurysm (AAA) Screening: covered once if your at risk. You're considered to be at risk if you have a family history of AAA or a male between the age of 65-75 who smoking at least 100 cigarettes in your lifetime.  Lung Cancer Screening: covers low dose CT scan once per year if you meet all of the following conditions: (1) Age 55-77; (2) No signs or symptoms of lung cancer; (3) Current smoker or have quit smoking within the last 15 years; (4) You have a tobacco smoking history of at least 20 pack years (packs per day x number of years you  smoked); (5) You get a written order from a healthcare provider.  Glaucoma Screening: covered annually if you're considered high risk: (1) You have diabetes OR (2) Family history of glaucoma OR (3)  aged 50 and older OR (4)  American aged 65 and older  Osteoporosis Screening: covered every 2 years if you meet one of the following conditions: (1) Have a vertebral abnormality; (2) On glucocorticoid therapy for more than 3 months; (3) Have primary hyperparathyroidism; (4) On osteoporosis medications and need to assess response to drug therapy.  HIV Screening: covered annually if you're between the age of 15-65. Also covered annually if you are younger than 15 and older than 65 with risk factors for HIV infection. For pregnant patients, it is covered up to 3 times per pregnancy.    Immunizations:  Immunization Recommendations   Influenza Vaccine Annual influenza vaccination during flu season is recommended for all persons aged >= 6 months who do not have contraindications   Pneumococcal Vaccine   * Pneumococcal conjugate vaccine = PCV13 (Prevnar 13), PCV15 (Vaxneuvance), PCV20 (Prevnar 20)  * Pneumococcal polysaccharide vaccine = PPSV23 (Pneumovax) Adults 19-63 yo with certain risk factors or if 65+ yo  If never received any pneumonia vaccine: recommend Prevnar 20 (PCV20)  Give PCV20 if previously received 1 dose of PCV13 or PPSV23   Hepatitis B Vaccine 3 dose series if at intermediate or high risk (ex: diabetes, end stage renal disease, liver disease)   Respiratory syncytial virus (RSV) Vaccine - COVERED BY MEDICARE PART D  * RSVPreF3 (Arexvy) CDC recommends that adults 60 years of age and older may receive a single dose of RSV vaccine using shared clinical decision-making (SCDM)   Tetanus (Td) Vaccine - COST NOT COVERED BY MEDICARE PART B Following completion of primary series, a booster dose should be given every 10 years to maintain immunity against tetanus. Td may also be given as tetanus  wound prophylaxis.   Tdap Vaccine - COST NOT COVERED BY MEDICARE PART B Recommended at least once for all adults. For pregnant patients, recommended with each pregnancy.   Shingles Vaccine (Shingrix) - COST NOT COVERED BY MEDICARE PART B  2 shot series recommended in those 19 years and older who have or will have weakened immune systems or those 50 years and older     Health Maintenance Due:      Topic Date Due   • Colorectal Cancer Screening  10/18/2025   • Hepatitis C Screening  Completed     Immunizations Due:  There are no preventive care reminders to display for this patient.  Advance Directives   What are advance directives?  Advance directives are legal documents that state your wishes and plans for medical care. These plans are made ahead of time in case you lose your ability to make decisions for yourself. Advance directives can apply to any medical decision, such as the treatments you want, and if you want to donate organs.   What are the types of advance directives?  There are many types of advance directives, and each state has rules about how to use them. You may choose a combination of any of the following:  Living will:  This is a written record of the treatment you want. You can also choose which treatments you do not want, which to limit, and which to stop at a certain time. This includes surgery, medicine, IV fluid, and tube feedings.   Durable power of  for healthcare (DPAHC):  This is a written record that states who you want to make healthcare choices for you when you are unable to make them for yourself. This person, called a proxy, is usually a family member or a friend. You may choose more than 1 proxy.  Do not resuscitate (DNR) order:  A DNR order is used in case your heart stops beating or you stop breathing. It is a request not to have certain forms of treatment, such as CPR. A DNR order may be included in other types of advance directives.  Medical directive:  This covers the care  that you want if you are in a coma, near death, or unable to make decisions for yourself. You can list the treatments you want for each condition. Treatment may include pain medicine, surgery, blood transfusions, dialysis, IV or tube feedings, and a ventilator (breathing machine).  Values history:  This document has questions about your views, beliefs, and how you feel and think about life. This information can help others choose the care that you would choose.  Why are advance directives important?  An advance directive helps you control your care. Although spoken wishes may be used, it is better to have your wishes written down. Spoken wishes can be misunderstood, or not followed. Treatments may be given even if you do not want them. An advance directive may make it easier for your family to make difficult choices about your care.   Fall Prevention    Fall prevention  includes ways to make your home and other areas safer. It also includes ways you can move more carefully to prevent a fall. Health conditions that cause changes in your blood pressure, vision, or muscle strength and coordination may increase your risk for falls. Medicines may also increase your risk for falls if they make you dizzy, weak, or sleepy.   Fall prevention tips:   Stand or sit up slowly.    Use assistive devices as directed.    Wear shoes that fit well and have soles that .    Wear a personal alarm.    Stay active.    Manage your medical conditions.    Home Safety Tips:  Add items to prevent falls in the bathroom.    Keep paths clear.    Install bright lights in your home.    Keep items you use often on shelves within reach.    Paint or place reflective tape on the edges of your stairs.       © Copyright [employer] 2018 Information is for End User's use only and may not be sold, redistributed or otherwise used for commercial purposes. All illustrations and images included in CareNotes® are the copyrighted property of [employer] or [employer]

## 2024-10-15 NOTE — PROGRESS NOTES
Ambulatory Visit  Name: Artur Santana      : 1945      MRN: [id number]  Encounter Provider: Erica Latham MD  Encounter Date: 10/15/2024   Encounter department: [facility]    Assessment & Plan  Medicare annual wellness visit, subsequent         Encounter for immunization    Orders:    influenza vaccine, high-dose, PF 0.5 mL (Fluzone High Dose)    Type 2 diabetes mellitus with diabetic cataract, unspecified whether long term insulin use ([facility])    Lab Results   Component Value Date    HGBA1C 7.8 (H) 2024       Orders:    Hemoglobin A1C; Future    Dyslipidemia    Orders:    Comprehensive metabolic panel; Future    Lipid Panel with Direct LDL reflex; Future      Falls Plan of Care: balance, strength, and gait training instructions were provided.       Preventive health issues were discussed with patient, and age appropriate screening tests were ordered as noted in patient's After Visit Summary. Personalized health advice and appropriate referrals for health education or preventive services given if needed, as noted in patient's After Visit Summary.    History of Present Illness     HPI   Patient Care Team:  Erica Latham MD as PCP - General (Family Medicine)  Janell Red PA-C    Review of Systems   Constitutional: Negative.    HENT: Negative.     Eyes: Negative.    Respiratory: Negative.     Cardiovascular: Negative.    Gastrointestinal: Negative.    Endocrine: Negative.    Genitourinary: Negative.    Musculoskeletal: Negative.    Neurological: Negative.    Hematological: Negative.    Psychiatric/Behavioral: Negative.       Medical History Reviewed by provider this encounter:  Tobacco  Allergies  Meds  Problems  Med Hx  Surg Hx  Fam Hx       Annual Wellness Visit Questionnaire       Health Risk Assessment:   Patient rates overall health as fair. Patient feels that their physical health rating is same. Patient is very satisfied with their life. Eyesight was rated as same. Hearing  was rated as same. Patient feels that their emotional and mental health rating is same. Patients states they are never, rarely angry. Patient states they are sometimes unusually tired/fatigued. Patient states that he has experienced no weight loss or gain in last 6 months.     Fall Risk Screening:   In the past year, patient has experienced: history of falling in past year    Number of falls: 2 or more  Injured during fall?: Yes    Feels unsteady when standing or walking?: Yes    Worried about falling?: Yes      Home Safety:  Patient does not have trouble with stairs inside or outside of their home. Patient has working smoke alarms and has working carbon monoxide detector.     Medications:   Patient is not currently taking any over-the-counter supplements. Patient is able to manage medications.     Activities of Daily Living (ADLs)/Instrumental Activities of Daily Living (IADLs):   Walk and transfer into and out of bed and chair?: Yes  Dress and groom yourself?: Yes    Bathe or shower yourself?: Yes    Feed yourself? Yes  Do your laundry/housekeeping?: Yes  Manage your money, pay your bills and track your expenses?: Yes  Make your own meals?: Yes    Do your own shopping?: Yes    Advance Care Planning:   Living will: No    Advanced directive: Yes    Advanced directive counseling given: Yes    ACP document given: Yes      PREVENTIVE SCREENINGS      Cardiovascular Screening:    General: Screening Current      Diabetes Screening:     General: Screening Not Indicated and History Diabetes      Colorectal Cancer Screening:     General: Screening Current      Prostate Cancer Screening:    General: History Prostate Cancer and Screening Not Indicated      Osteoporosis Screening:    General: Screening Not Indicated      Abdominal Aortic Aneurysm (AAA) Screening:    Risk factors include: tobacco use        General: Screening Not Indicated      Lung Cancer Screening:     General: Screening Not Indicated      Hepatitis C  "Screening:    General: Screening Current    Screening, Brief Intervention, and Referral to Treatment (SBIRT)    Screening      AUDIT-C Screenin) How often did you have a drink containing alcohol in the past year? never  2) How many drinks did you have on a typical day when you were drinking in the past year? 0  3) How often did you have 6 or more drinks on one occasion in the past year? never    AUDIT-C Score: 0  Interpretation: Score 0-3 (male): Negative screen for alcohol misuse    Brief Intervention  Alcohol & drug use screenings were reviewed. No concerns regarding substance use disorder identified.     Social Determinants of Health     Financial Resource Strain: High Risk (2023)    Overall Financial Resource Strain (CARDIA)     Difficulty of Paying Living Expenses: Hard   Food Insecurity: No Food Insecurity (10/15/2024)    Hunger Vital Sign     Worried About Running Out of Food in the Last Year: Never true     Ran Out of Food in the Last Year: Never true   Transportation Needs: No Transportation Needs (10/15/2024)    PRAPARE - Transportation     Lack of Transportation (Medical): No     Lack of Transportation (Non-Medical): No   Housing Stability: High Risk (10/15/2024)    Housing Stability Vital Sign     Unable to Pay for Housing in the Last Year: No     Number of Times Moved in the Last Year: 2     Homeless in the Last Year: No   Utilities: Not At Risk (10/15/2024)    [facility] Utilities     Threatened with loss of utilities: No     No results found.    Objective     /74   Pulse 56   Temp (!) 96.9 °F (36.1 °C) (Tympanic)   Resp 16   Ht 5' 9\" (1.753 m)   Wt 59.2 kg (130 lb 9.6 oz)   BMI 19.29 kg/m²     Physical Exam  Vitals and nursing note reviewed.   Constitutional:       General: He is not in acute distress.     Appearance: Normal appearance. He is well-developed.   HENT:      Head: Normocephalic and atraumatic.   Eyes:      Conjunctiva/sclera: Conjunctivae normal.   Cardiovascular:      Rate " and Rhythm: Normal rate and regular rhythm.      Pulses: Normal pulses.      Heart sounds: Normal heart sounds. No murmur heard.  Pulmonary:      Effort: Pulmonary effort is normal. No respiratory distress.      Breath sounds: Normal breath sounds.   Musculoskeletal:         General: No swelling.      Cervical back: Neck supple.   Skin:     General: Skin is warm and dry.      Capillary Refill: Capillary refill takes less than 2 seconds.   Neurological:      Mental Status: He is alert.   Psychiatric:         Mood and Affect: Mood normal.

## 2024-10-15 NOTE — ASSESSMENT & PLAN NOTE
Lab Results   Component Value Date    HGBA1C 7.8 (H) 06/28/2024       Orders:    Hemoglobin A1C; Future

## 2024-11-01 ENCOUNTER — OFFICE VISIT (OUTPATIENT)
Dept: FAMILY MEDICINE CLINIC | Facility: CLINIC | Age: 79
End: 2024-11-01
Payer: MEDICARE

## 2024-11-01 ENCOUNTER — APPOINTMENT (OUTPATIENT)
Dept: LAB | Facility: HOSPITAL | Age: 79
End: 2024-11-01
Attending: FAMILY MEDICINE
Payer: MEDICARE

## 2024-11-01 VITALS
HEART RATE: 56 BPM | BODY MASS INDEX: 19.11 KG/M2 | WEIGHT: 129 LBS | RESPIRATION RATE: 20 BRPM | HEIGHT: 69 IN | DIASTOLIC BLOOD PRESSURE: 60 MMHG | SYSTOLIC BLOOD PRESSURE: 104 MMHG | TEMPERATURE: 97 F

## 2024-11-01 DIAGNOSIS — E78.5 DYSLIPIDEMIA: ICD-10-CM

## 2024-11-01 DIAGNOSIS — C61 ADENOCARCINOMA OF PROSTATE (HCC): ICD-10-CM

## 2024-11-01 DIAGNOSIS — E11.36 TYPE 2 DIABETES MELLITUS WITH DIABETIC CATARACT, UNSPECIFIED WHETHER LONG TERM INSULIN USE (HCC): ICD-10-CM

## 2024-11-01 DIAGNOSIS — Z01.818 PRE-OP EXAMINATION: ICD-10-CM

## 2024-11-01 DIAGNOSIS — Z96.82 PRESENCE OF NEUROSTIMULATOR: ICD-10-CM

## 2024-11-01 DIAGNOSIS — H26.9 CATARACT OF BOTH EYES, UNSPECIFIED CATARACT TYPE: Primary | ICD-10-CM

## 2024-11-01 DIAGNOSIS — I10 PRIMARY HYPERTENSION: ICD-10-CM

## 2024-11-01 DIAGNOSIS — R42 ORTHOSTATIC DIZZINESS: ICD-10-CM

## 2024-11-01 LAB
ALBUMIN SERPL BCG-MCNC: 4.3 G/DL (ref 3.5–5)
ALP SERPL-CCNC: 60 U/L (ref 34–104)
ALT SERPL W P-5'-P-CCNC: 10 U/L (ref 7–52)
ANION GAP SERPL CALCULATED.3IONS-SCNC: 6 MMOL/L (ref 4–13)
AST SERPL W P-5'-P-CCNC: 16 U/L (ref 13–39)
BILIRUB SERPL-MCNC: 0.58 MG/DL (ref 0.2–1)
BUN SERPL-MCNC: 21 MG/DL (ref 5–25)
CALCIUM SERPL-MCNC: 9.6 MG/DL (ref 8.4–10.2)
CHLORIDE SERPL-SCNC: 103 MMOL/L (ref 96–108)
CHOLEST SERPL-MCNC: 172 MG/DL
CO2 SERPL-SCNC: 28 MMOL/L (ref 21–32)
CREAT SERPL-MCNC: 1.11 MG/DL (ref 0.6–1.3)
CREAT UR-MCNC: 41 MG/DL
EST. AVERAGE GLUCOSE BLD GHB EST-MCNC: 148 MG/DL
GFR SERPL CREATININE-BSD FRML MDRD: 62 ML/MIN/1.73SQ M
GLUCOSE P FAST SERPL-MCNC: 95 MG/DL (ref 65–99)
HBA1C MFR BLD: 6.8 %
HDLC SERPL-MCNC: 56 MG/DL
LDLC SERPL CALC-MCNC: 103 MG/DL (ref 0–100)
MICROALBUMIN UR-MCNC: <7 MG/L
POTASSIUM SERPL-SCNC: 5 MMOL/L (ref 3.5–5.3)
PROT SERPL-MCNC: 7 G/DL (ref 6.4–8.4)
PSA SERPL-MCNC: <0.008 NG/ML (ref 0–4)
SODIUM SERPL-SCNC: 137 MMOL/L (ref 135–147)
TRIGL SERPL-MCNC: 67 MG/DL

## 2024-11-01 PROCEDURE — 36415 COLL VENOUS BLD VENIPUNCTURE: CPT

## 2024-11-01 PROCEDURE — 83036 HEMOGLOBIN GLYCOSYLATED A1C: CPT

## 2024-11-01 PROCEDURE — 99214 OFFICE O/P EST MOD 30 MIN: CPT | Performed by: NURSE PRACTITIONER

## 2024-11-01 PROCEDURE — 80053 COMPREHEN METABOLIC PANEL: CPT

## 2024-11-01 PROCEDURE — 82043 UR ALBUMIN QUANTITATIVE: CPT

## 2024-11-01 PROCEDURE — 84153 ASSAY OF PSA TOTAL: CPT

## 2024-11-01 PROCEDURE — 82570 ASSAY OF URINE CREATININE: CPT

## 2024-11-01 PROCEDURE — 80061 LIPID PANEL: CPT

## 2024-11-01 PROCEDURE — G2211 COMPLEX E/M VISIT ADD ON: HCPCS | Performed by: NURSE PRACTITIONER

## 2024-11-01 RX ORDER — OFLOXACIN 3 MG/ML
SOLUTION/ DROPS OPHTHALMIC
COMMUNITY
Start: 2024-10-09

## 2024-11-01 RX ORDER — KETOROLAC TROMETHAMINE 5 MG/ML
SOLUTION OPHTHALMIC
COMMUNITY
Start: 2024-10-09

## 2024-11-01 RX ORDER — PREDNISOLONE ACETATE 10 MG/ML
SUSPENSION/ DROPS OPHTHALMIC
COMMUNITY
Start: 2024-10-09

## 2024-11-01 NOTE — PATIENT INSTRUCTIONS
Pre-operative Medication Instructions    Avoid herbs or non-directed vitamins one week prior to surgery  Avoid aspirin containing medications or non-steroidal anti-inflammatory drugs one week preceding surgery  May take tylenol for pain up until the night before surgery    ACE Inhibitors or ARBs     Medication Name     lisinopril (ZESTRIL) 5 mg tablet      Continue this medication up to the evening before surgery/procedure, but do not take the morning of the day of surgery.    Cholesterol lowering meds     Medication Name     atorvastatin (LIPITOR) 40 mg tablet      Continue to take this medication on your normal schedule.  If this is an oral medication and you take it in the morning, then you may take this medicine with a sip of water.    Diabetic Medications     Medication Name     Empagliflozin 25 MG TABS     glipiZIDE (GLUCOTROL XL) 10 mg 24 hr tablet     metFORMIN (GLUCOPHAGE) 1000 MG tablet        Medicine Instructions for Adults with Diabetes (NO Bowel Prep)    Follow these instructions when a BOWEL PREP is NOT required for your procedure or surgery!    NOTE:  GLP Agonists taken weekly: do not take in the 7 days before your procedure. **Bariatric surgery: do not take 4 weeks prior to your procedure.    SGLT-2 Inhibitors: do not take in the 4 days before your procedure    On the Day Before Surgery/Procedure  If you are having a procedure (e.g., Colonoscopy) or surgery which DOES NOT require a bowel prep, follow the directions below based on the type of medicine you take for your diabetes.  Type of Medicine You Take Examples What to Do   Pre-Mixed Insulin Intermediate  Dawjhhb76/25, Oviyqzs79/30, Novolog 70/30, Regular Insulin Take 1/2 your regular dose the evening before our procedure.   Rapid/Fast Acting  Insulin and/or Long-Acting Insulin Humalog U200, NovoLog, Apidra,  Lantus, Levemir, Tresiba, Toujeo,  Fias, Basaglar Take your FULL regular dose the day before procedure.   Oral Diabetic Medicines  (sulfonylurea) Glipizide/Glimepiride/  Glucotrol Take your regular dose with dinner the evening before your procedure.   Other Oral Diabetic Medicines Metformin, Glucophage, Glucophage  XR, Riomet, Glumetza, Actose,  Avandia, Gl set, Prandin Take your regular dose with dinner the evening before your procedure   GLP Agonists Adlyxin, Byetta, Bydureon,  Ozempic, Soliqua, Tanzeum,  Trulicity, Victoza, Saxenda,  Rybelsus, Wegovy, Mounjaro, Zepbound If taken daily, take as normal  If taken weekly, do not take this medicine for 7 days before your procedure including the day of the procedure (resume taking after the procedure). **Bariatric surgery: do not take 4 weeks prior to procedure   SGLT-2 Inhibitors Jardiance, Invokana, Farxiga, Steglatro, Brenzavvy, Qtern, Segluromet Glyxambi, Synjardy, Synjardy XR, Invokamet, InvokametXR, Trijary XR, Xigduo X Do not take for 4 days before your procedure including the day of the procedure (resume taking after the procedure)   This educational material has been approved by the Patient Education Advisory Committee.    On the Day of Surgery/Procedure  Follow the directions below based on the type of medicine you take for your diabetes.  Type of Medicine You Take  Examples What to Do   Long-Acting Insulin Lantus, Levemir, Tresiba,  Toujeo, Basaglar, Semglee If you normally take your Long Acting Insulin in the morning, take the full dose as scheduled.   GLP-I Agonists Adlyxin, Byetta, Bydureon,  Ozempic, Soliqua, Tanzeum,  Trulicity, Victoza, Saxenda,  Rybelsus, Mounjaro Do NOT take this medicine on the day of your procedure (resume taking after the procedure)   Except for the morning Long-Acting Insulin, DO NOT take ANY diabetic medicine on the day of your procedure unless you were instructed by the doctor who manages your diabetes medicines.  Continue to check your blood sugars.  If you have an insulin pump, ask your endocrinologist for instructions at least 3 days before your  procedure. NOTE: If you are not able to ask your endocrinologist in advance, on the day of the procedure set your insulin pump to your basal rate only. Bring your insulin pump supplies to the hospital.    If you have any questions about taking your diabetes medicines prior to your procedure, please contact the doctor who manages your diabetes medicines.

## 2024-11-01 NOTE — PROGRESS NOTES
Pre-operative Clearance  Name: Artur Santana      : 1945      MRN: [id number]  Encounter Provider: CHAYA Dominguez  Encounter Date: 2024   Encounter department: [facility]    Assessment & Plan  Cataract of both eyes, unspecified cataract type         Pre-op examination         Primary hypertension  Stable with current regimen         Type 2 diabetes mellitus with diabetic cataract, unspecified whether long term insulin use (HCC)  Complaint with current regimen  Lab Results   Component Value Date    HGBA1C 7.8 (H) 2024            Dyslipidemia  Complain with statin         Orthostatic dizziness  Denies any concerns at this time         Presence of neurostimulator         Pre-operative Clearance:     Revised Cardiac Risk Index:  RCI RISK CLASS I (0 risk factors, risk of major cardiac complications approximately 0.5%)    Clearance:  Patient is medically optimized (CLEARED) for proposed surgery without any additional cardiac testing.      Medication Instructions:   - Avoid herbs or non-directed vitamins one week prior to surgery    - Avoid aspirin containing medications or non-steroidal anti-inflammatory drugs one week preceding surgery    - May take tylenol for pain up until the night before surgery    - ACE Inhibitors or ARBs: Continue this medication up to the evening before surgery/procedure, but do not take the morning of the day of surgery.  - Hyperlipidemia meds: Continue to take this medication on your normal schedule.      Medicine Instructions for Adults with Diabetes (NO Bowel Prep)    Follow these instructions when a BOWEL PREP is NOT required for your procedure or surgery!    NOTE:  GLP Agonists taken weekly: do not take in the 7 days before your procedure. **Bariatric surgery: do not take 4 weeks prior to your procedure.    SGLT-2 Inhibitors: do not take in the 4 days before your procedure    On the Day Before Surgery/Procedure  If you are having a procedure (e.g.,  Colonoscopy) or surgery which DOES NOT require a bowel prep, follow the directions below based on the type of medicine you take for your diabetes.  Type of Medicine You Take Examples What to Do   Pre-Mixed Insulin Intermediate  Zkgcasb01/25, Kopsrvg59/30, Novolog 70/30, Regular Insulin Take 1/2 your regular dose the evening before our procedure.   Rapid/Fast Acting  Insulin and/or Long-Acting Insulin Humalog U200, NovoLog, Apidra,  Lantus, Levemir, Tresiba, Toujeo,  Fias, Basaglar Take your FULL regular dose the day before procedure.   Oral Diabetic Medicines (sulfonylurea) Glipizide/Glimepiride/  Glucotrol Take your regular dose with dinner the evening before your procedure.   Other Oral Diabetic Medicines Metformin, Glucophage, Glucophage  XR, Riomet, Glumetza, Actose,  Avandia, Gl set, Prandin Take your regular dose with dinner the evening before your procedure   GLP Agonists Adlyxin, Byetta, Bydureon,  Ozempic, Soliqua, Tanzeum,  Trulicity, Victoza, Saxenda,  Rybelsus, Wegovy, Mounjaro, Zepbound If taken daily, take as normal  If taken weekly, do not take this medicine for 7 days before your procedure including the day of the procedure (resume taking after the procedure). **Bariatric surgery: do not take 4 weeks prior to procedure   SGLT-2 Inhibitors Jardiance, Invokana, Farxiga, Steglatro, Brenzavvy, Qtern, Segluromet Glyxambi, Synjardy, Synjardy XR, Invokamet, InvokametXR, Trijary XR, Xigduo X Do not take for 4 days before your procedure including the day of the procedure (resume taking after the procedure)   This educational material has been approved by the Patient Education Advisory Committee.    On the Day of Surgery/Procedure  Follow the directions below based on the type of medicine you take for your diabetes.  Type of Medicine You Take  Examples What to Do   Long-Acting Insulin Lantus, Levemir, Tresiba,  Toujeo, Basaglar, Semglee If you normally take your Long Acting Insulin in the morning, take the  full dose as scheduled.   GLP-I Agonists Adlyxin, Byetta, Bydureon,  Ozempic, Soliqua, Tanzeum,  Trulicity, Victoza, Saxenda,  Rybelsus, Mounjaro Do NOT take this medicine on the day of your procedure (resume taking after the procedure)   Except for the morning Long-Acting Insulin, DO NOT take ANY diabetic medicine on the day of your procedure unless you were instructed by the doctor who manages your diabetes medicines.  Continue to check your blood sugars.  If you have an insulin pump, ask your endocrinologist for instructions at least 3 days before your procedure. NOTE: If you are not able to ask your endocrinologist in advance, on the day of the procedure set your insulin pump to your basal rate only. Bring your insulin pump supplies to the hospital.         History of Present Illness     Patient is here to follow up on chronic conditions before upcoming bilateral cataract surgery.  Denies any concerns and complains.  Complaint with medications and tolerating it well.  Had EKG done in June 2024.      Pre-op Exam  Surgery: bilateral cataract extraction wiht IOL  Anticipated Date of Surgery: 11/4/24 and 12/2/24  [facility]: [facility]    Previous history of bleeding disorders or clots?: No  Previous Anesthesia reaction?: No  Prolonged steroid use in the last 6 months?: No    Assessment of Cardiac Risk:   - Unstable or severe angina or MI in the last 6 weeks or history of stent placement in the last year?: No   - Decompensated heart failure (e.g. New onset heart failure, NYHA  Class IV heart failure, or worsening existing heart failure)?: No  - Significant arrhythmias such as high grade AV block, symptomatic ventricular arrhythmia, newly recognized ventricular tachycardia, supraventricular tachycardia with resting heart rate >100, or symptomatic bradycardia?: No  - Severe heart valve disease including aortic stenosis or symptomatic mitral stenosis?: No      Pre-operative Risk  Factors:  Elevated-risk surgery: No    History of cerebrovascular disease: No    History of ischemic heart disease: No  Pre-operative treatment with insulin: No  Pre-operative creatinine >2 mg/dL: No    History of congestive heart failure: No    Review of Systems   HENT: Negative.     Eyes:  Positive for visual disturbance.   Respiratory: Negative.     Cardiovascular: Negative.    Gastrointestinal: Negative.    Neurological: Negative.      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, grade 2 internal hemorrhoids, 1 adenomatous polyp    KY CYSTOURETHROSCOPY W/INTERNAL URETHROTOMY N/A 5/18/2023    Procedure: CYSTOSCOPY, DIRECT VISUAL INTERAL URETHROTOMY (DVIU), KENALOG INJECTION, BLADDER BIOPSY WITH FULGERATION;  Surgeon: Dimas Mendez MD;  Location: [facility];  Service: Urology    KY INS/RPLC PERPH SAC/GSTRC NPG/RCVR PCKT CRTJ&CONN N/A 4/13/2023    Procedure: STAGE 2 AXONIC, INSERTION OF NEUROSTIMULATOR, ELECTRONIC ANALYSIS OF NEUROSTIMULATOR;  Surgeon: Dimas Mendez MD;  Location: [facility];  Service: Urology    KY SPLIT AGRFT T/A/L 1ST 100 CM/&/1% BDY INFT/CHLD Left 6/22/2017    Procedure: ANTERIOR SHOULDER EXCISION BCC- COMPLEX CLOSURE vs FLAP vs STSG;  Surgeon: Lester Craig MD;  Location:  MAIN OR;  Service: Plastics    KY TRANSURETHRAL INCISION PROSTATE N/A 5/26/2022    Procedure: CYSTOSCOPY, TRANSURETHRAL INCISION OF PROSTATE (TUIP);  Surgeon: Dimas Mendez MD;  Location: [facility];  Service: Urology    SACRAL  "NERVE STIMULATOR PLACEMENT N/A 2023    Procedure: PART 1, INCISION FOR IMPLANTATION OF NEUROSTIMULATOR, ELECTRONIC ANALYSIS NEUROSTIMULATOR, STAGE 1;  Surgeon: Dimas Mendez MD;  Location: [facility];  Service: Urology    SKIN LESION EXCISION N/A 2022    Procedure: EXCISION WIDE LESION TRUNK/ABDOMEN/BACK;  Surgeon: Lester Craig MD;  Location: [facility];  Service: Plastics     Family History   Problem Relation Age of Onset    Heart disease Mother     Diabetes Brother         mellitus     Colon polyps Brother     Throat cancer Sister     Colon cancer Neg Hx      Social History     Tobacco Use    Smoking status: Former     Current packs/day: 0.00     Average packs/day: 0.1 packs/day for 2.5 years (0.2 ttl pk-yrs)     Types: Cigarettes     Start date: 1975     Quit date: 1977     Years since quittin.3     Passive exposure: Past    Smokeless tobacco: Never    Tobacco comments:     never a smoker noted in \"allscripts\" - Denied: History of tobacco use noted in \"allscripts\"    Vaping Use    Vaping status: Never Used   Substance and Sexual Activity    Alcohol use: Not Currently    Drug use: No    Sexual activity: Not on file     Current Outpatient Medications on File Prior to Visit   Medication Sig    aspirin 81 MG tablet Take 81 mg by mouth as needed Last dose  5/10/23    atorvastatin (LIPITOR) 40 mg tablet Take 1 tablet (40 mg total) by mouth every evening    Empagliflozin 25 MG TABS Take 1 tablet (25 mg total) by mouth daily    glipiZIDE (GLUCOTROL XL) 10 mg 24 hr tablet Take 1 tablet (10 mg total) by mouth daily    ketorolac (ACULAR) 0.5 % ophthalmic solution Didn't start yet    lisinopril (ZESTRIL) 5 mg tablet Take 1 tablet (5 mg total) by mouth daily    meclizine (ANTIVERT) 12.5 MG tablet Take 1 tablet (12.5 mg total) by mouth every 8 (eight) hours as needed for dizziness    metFORMIN (GLUCOPHAGE) 1000 MG tablet Take 1 tablet (1,000 mg total) by mouth 2 (two) times a day    ofloxacin " "(OCUFLOX) 0.3 % ophthalmic solution Didn't start yet    prednisoLONE acetate (PRED FORTE) 1 % ophthalmic suspension Didn't start yet    [DISCONTINUED] polyethylene glycol (GOLYTELY) 4000 mL solution Take 4,000 mL by mouth once for 1 dose     No Known Allergies  Objective     /60   Pulse 56   Temp (!) 97 °F (36.1 °C)   Resp 20   Ht 5' 9\" (1.753 m)   Wt 58.5 kg (129 lb)   BMI 19.05 kg/m²     Physical Exam  HENT:      Head: Normocephalic.      Right Ear: External ear normal.      Left Ear: External ear normal.      Nose: Nose normal.   Eyes:      Conjunctiva/sclera: Conjunctivae normal.   Cardiovascular:      Rate and Rhythm: Normal rate and regular rhythm.      Heart sounds: Normal heart sounds.   Pulmonary:      Effort: Pulmonary effort is normal.      Breath sounds: Normal breath sounds.   Musculoskeletal:      Cervical back: Normal range of motion and neck supple.   Skin:         Neurological:      Mental Status: He is alert and oriented to person, place, and time.   Psychiatric:         Mood and Affect: Mood normal.         Behavior: Behavior normal.         Thought Content: Thought content normal.         Judgment: Judgment normal.           CHAYA Dominguez  "

## 2024-11-01 NOTE — ASSESSMENT & PLAN NOTE
Complaint with current regimen  Lab Results   Component Value Date    HGBA1C 7.8 (H) 06/28/2024

## 2024-11-20 ENCOUNTER — APPOINTMENT (OUTPATIENT)
Dept: LAB | Facility: HOSPITAL | Age: 79
End: 2024-11-20
Attending: SPECIALIST
Payer: MEDICARE

## 2024-11-20 DIAGNOSIS — C61 MALIGNANT NEOPLASM OF PROSTATE (HCC): ICD-10-CM

## 2024-11-20 LAB — PSA SERPL-MCNC: <0.008 NG/ML (ref 0–4)

## 2024-11-20 PROCEDURE — 36415 COLL VENOUS BLD VENIPUNCTURE: CPT

## 2024-11-20 PROCEDURE — 84153 ASSAY OF PSA TOTAL: CPT

## 2024-12-05 RX ORDER — OFLOXACIN 3 MG/ML
SOLUTION/ DROPS OPHTHALMIC
Qty: 5 ML | OUTPATIENT
Start: 2024-12-05

## 2024-12-05 RX ORDER — KETOROLAC TROMETHAMINE 5 MG/ML
SOLUTION OPHTHALMIC
Qty: 5 ML | OUTPATIENT
Start: 2024-12-05

## 2024-12-05 RX ORDER — PREDNISOLONE ACETATE 10 MG/ML
SUSPENSION/ DROPS OPHTHALMIC
Qty: 5 ML | OUTPATIENT
Start: 2024-12-05

## 2024-12-05 NOTE — TELEPHONE ENCOUNTER
Patient requesting med refills of Ketorolac, Ocuflox and Prednisolone sent to [facility] pharmacy in [place]

## 2024-12-13 ENCOUNTER — OFFICE VISIT (OUTPATIENT)
Dept: URGENT CARE | Facility: CLINIC | Age: 79
End: 2024-12-13
Payer: MEDICARE

## 2024-12-13 VITALS
HEART RATE: 64 BPM | DIASTOLIC BLOOD PRESSURE: 80 MMHG | TEMPERATURE: 97.3 F | OXYGEN SATURATION: 100 % | RESPIRATION RATE: 16 BRPM | SYSTOLIC BLOOD PRESSURE: 142 MMHG

## 2024-12-13 DIAGNOSIS — S50.311A ABRASION OF RIGHT ELBOW, INITIAL ENCOUNTER: Primary | ICD-10-CM

## 2024-12-13 DIAGNOSIS — S60.418A: ICD-10-CM

## 2024-12-13 PROCEDURE — 99213 OFFICE O/P EST LOW 20 MIN: CPT

## 2024-12-13 NOTE — PATIENT INSTRUCTIONS
Keep area clean and dry  The steri-strip will fall off on their own.  Follow up if any signs of infection -- redness, swelling, drainage, increased pain, fever/chills.

## 2024-12-13 NOTE — PROGRESS NOTES
[facility]    NAME: Artur Santana is a 79 y.o. male  : 1945    MRN: [id number]  DATE: 2024  TIME: 1:34 PM    Assessment and Plan   Abrasion of right elbow, initial encounter [S50.311A]  1. Abrasion of right elbow, initial encounter        2. Abrasion of little finger          Applied steri-strip to right elbow, 2, and cut back skin flap. Covered with nonadhesive and rolled gauze. Skin flap is already dying and unable to suture the area.   To left pinkie -- covered in bandaid. Unable to suture or get better approximation of this wound.   Follow up with primary care in 3-5 days.  Go to ER if symptoms get worse.     Patient Instructions       Go see your regular family doctor in 3-5 days.  Go to emergency room (ER) if you are getting worse.     Chief Complaint     Chief Complaint   Patient presents with    Fall     Pt fell yesterday afternoon c/o skin tear to right elbow and left hand. Pt tripped in living room denies hitting head.         History of Present Illness       Presents with laceration to the right elbow and left pinkie finger that happened after a fall today. He tripped from standing height in living room today. He denies hitting his head. He denies pain beyond the two pain sites.         Review of Systems   Review of Systems   Constitutional:  Negative for chills and fever.   HENT:  Negative for ear pain and sore throat.    Eyes:  Negative for visual disturbance.   Respiratory:  Negative for cough.    Cardiovascular:  Negative for chest pain.   Gastrointestinal:  Negative for diarrhea, nausea and vomiting.   Musculoskeletal:  Negative for myalgias.   Skin:  Positive for wound.   Neurological:  Negative for dizziness and headaches.   All other systems reviewed and are negative.        Current Medications       Current Outpatient Medications:     atorvastatin (LIPITOR) 40 mg tablet, Take 1 tablet (40 mg total) by mouth every evening, Disp: 30 tablet, Rfl: 0    Empagliflozin  25 MG TABS, Take 1 tablet (25 mg total) by mouth daily, Disp: 90 tablet, Rfl: 1    glipiZIDE (GLUCOTROL XL) 10 mg 24 hr tablet, Take 1 tablet (10 mg total) by mouth daily, Disp: 30 tablet, Rfl: 5    ketorolac (ACULAR) 0.5 % ophthalmic solution, Didn't start yet, Disp: , Rfl:     lisinopril (ZESTRIL) 5 mg tablet, Take 1 tablet (5 mg total) by mouth daily, Disp: 90 tablet, Rfl: 0    meclizine (ANTIVERT) 12.5 MG tablet, Take 1 tablet (12.5 mg total) by mouth every 8 (eight) hours as needed for dizziness, Disp: 30 tablet, Rfl: 0    metFORMIN (GLUCOPHAGE) 1000 MG tablet, Take 1 tablet (1,000 mg total) by mouth 2 (two) times a day, Disp: 180 tablet, Rfl: 1    ofloxacin (OCUFLOX) 0.3 % ophthalmic solution, Didn't start yet, Disp: , Rfl:     prednisoLONE acetate (PRED FORTE) 1 % ophthalmic suspension, Didn't start yet, Disp: , Rfl:     aspirin 81 MG tablet, Take 81 mg by mouth as needed Last dose  5/10/23 (Patient not taking: Reported on 12/13/2024), Disp: , Rfl:     Current Allergies     Allergies as of 12/13/2024    (No Known Allergies)            The following portions of the patient's history were reviewed and updated as appropriate: allergies, current medications, past family history, past medical history, past social history, past surgical history and problem list.     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, grade 2 internal hemorrhoids, 1 adenomatous polyp    [place] CYSTOURETHROSCOPY W/INTERNAL URETHROTOMY N/A 5/18/2023    Procedure: CYSTOSCOPY, DIRECT  VISUAL INTERAL URETHROTOMY (DVIU), KENALOG INJECTION, BLADDER BIOPSY WITH FULGERATION;  Surgeon: Dimas Mendez MD;  Location: [facility];  Service: Urology    [place] INS/RPLC PERPH SAC/GSTRC NPG/RCVR PCKT CRTJ&CONN N/A 4/13/2023    Procedure: STAGE 2 AXONIC, INSERTION OF NEUROSTIMULATOR, ELECTRONIC ANALYSIS OF NEUROSTIMULATOR;  Surgeon: Dimas Mendez MD;  [facility]: [facility];  Service: Urology    [place] SPLIT AGRFT T/A/L 1ST 100 CM/&/1% BDY INFT/CHLD Left 6/22/2017    Procedure: ANTERIOR SHOULDER EXCISION BCC- COMPLEX CLOSURE vs FLAP vs STSG;  Surgeon: Lester Craig MD;  Location:  MAIN OR;  Service: Plastics    [place] TRANSURETHRAL INCISION PROSTATE N/A 5/26/2022    Procedure: CYSTOSCOPY, TRANSURETHRAL INCISION OF PROSTATE (TUIP);  Surgeon: Dimas Mendez MD;  [facility]: [facility];  Service: Urology    SACRAL NERVE STIMULATOR PLACEMENT N/A 4/6/2023    Procedure: PART 1, INCISION FOR IMPLANTATION OF NEUROSTIMULATOR, ELECTRONIC ANALYSIS NEUROSTIMULATOR, STAGE 1;  Surgeon: Dimas Mendez MD;  [facility]: [facility];  Service: Urology    SKIN LESION EXCISION N/A 6/16/2022    Procedure: EXCISION WIDE LESION TRUNK/ABDOMEN/BACK;  Surgeon: Lester Craig MD;  Location:  [facility];  Service: Plastics       Family History   Problem Relation Age of Onset    Heart disease Mother     Diabetes Brother         mellitus     Colon polyps Brother     Throat cancer Sister     Colon cancer Neg Hx          Medications have been verified.        Objective   /80   Pulse 64   Temp (!) 97.3 °F (36.3 °C) (Temporal)   Resp 16   SpO2 100%        Physical Exam     Physical Exam  Vitals reviewed.   Constitutional:       Appearance: Normal appearance.   Cardiovascular:      Rate and Rhythm: Normal rate and regular rhythm.      Pulses: Normal pulses.   Pulmonary:      Effort: Pulmonary effort is normal. No respiratory distress.      Breath sounds: Normal breath sounds.   Skin:     General: Skin is warm and dry.      Capillary Refill:  Capillary refill takes less than 2 seconds.      Comments: On the distal right upper arm there is a skin tear with skin avulasion present. Bleeding is controlled. No erythema, edema, or drainage.   Left pinkie at knuckle skin tear present. Well approximated. No erythema, edema, or drainage. Bleeding is controlled.    Neurological:      General: No focal deficit present.      Mental Status: He is alert and oriented to person, place, and time.   Psychiatric:         Mood and Affect: Mood normal.         Behavior: Behavior normal.

## 2024-12-16 ENCOUNTER — OFFICE VISIT (OUTPATIENT)
Dept: URGENT CARE | Facility: CLINIC | Age: 79
End: 2024-12-16
Payer: MEDICARE

## 2024-12-16 ENCOUNTER — TELEPHONE (OUTPATIENT)
Dept: FAMILY MEDICINE CLINIC | Facility: CLINIC | Age: 79
End: 2024-12-16

## 2024-12-16 VITALS
WEIGHT: 136 LBS | OXYGEN SATURATION: 100 % | TEMPERATURE: 97 F | BODY MASS INDEX: 20.14 KG/M2 | HEART RATE: 66 BPM | SYSTOLIC BLOOD PRESSURE: 140 MMHG | DIASTOLIC BLOOD PRESSURE: 64 MMHG | RESPIRATION RATE: 20 BRPM | HEIGHT: 69 IN

## 2024-12-16 DIAGNOSIS — I10 ESSENTIAL HYPERTENSION: ICD-10-CM

## 2024-12-16 DIAGNOSIS — S50.311D ABRASION OF RIGHT ELBOW, SUBSEQUENT ENCOUNTER: Primary | ICD-10-CM

## 2024-12-16 PROCEDURE — 99213 OFFICE O/P EST LOW 20 MIN: CPT | Performed by: PHYSICIAN ASSISTANT

## 2024-12-16 RX ORDER — MUPIROCIN 20 MG/G
OINTMENT TOPICAL 2 TIMES DAILY
Qty: 22 G | Refills: 0 | Status: SHIPPED | OUTPATIENT
Start: 2024-12-16

## 2024-12-16 RX ORDER — LISINOPRIL 5 MG/1
5 TABLET ORAL DAILY
Qty: 90 TABLET | Refills: 0 | Status: SHIPPED | OUTPATIENT
Start: 2024-12-16

## 2024-12-16 RX ORDER — CEPHALEXIN 500 MG/1
500 CAPSULE ORAL EVERY 8 HOURS SCHEDULED
Qty: 21 CAPSULE | Refills: 0 | Status: SHIPPED | OUTPATIENT
Start: 2024-12-16 | End: 2024-12-23

## 2024-12-16 RX ORDER — BACITRACIN ZINC 500 [USP'U]/G
OINTMENT TOPICAL ONCE
Status: COMPLETED | OUTPATIENT
Start: 2024-12-16 | End: 2024-12-16

## 2024-12-16 RX ADMIN — BACITRACIN ZINC: 500 OINTMENT TOPICAL at 11:00

## 2024-12-16 NOTE — TELEPHONE ENCOUNTER
Left message on machine for patient to call office back. Okay to inform patient if he returns call.     Lucia Torres LPN

## 2024-12-16 NOTE — PROGRESS NOTES
[facility]        NAME: Artur Santana is a 79 y.o. male  : 1945    MRN: [id number]  DATE: 2024  TIME: 12:59 PM    Assessment and Plan   Abrasion of right elbow, subsequent encounter [S50.311D]  1. Abrasion of right elbow, subsequent encounter  cephalexin (KEFLEX) 500 mg capsule    bacitracin ointment    mupirocin (BACTROBAN) 2 % ointment    CANCELED: XR humerus right        Recommending follow-up his PCP within the next week.  Discussed that he must change the dressing every 12 hours.   Will do Keflex PO and bactroban to the wound.         I cleaned the wound out with wound cleanser.  I then applied bacitracin ointment.  I then used a bordered gauze and wrapped the arm with gauze and Coban.      Patient Instructions     Patient Instructions   Take the dressing off in 24 hours.  Apply the antibiotic ointment twice a day for the next week.  Take the antibiotic as directed.  Follow-up with your family doctor within the next week for a wound check.      Follow up with PCP in 3-5 days.  Proceed to  ER if symptoms worsen.    Chief Complaint     Chief Complaint   Patient presents with    Arm Injury     Fall last week injured rt upper arm          History of Present Illness       Patient is a 79-year-old male presenting today for an abrasion to his right elbow.  This is his second visit for the abrasion.  He was seen here on 2024 and had wound care done.  He has not taken off the dressing.  Admits that the wound was hurting more so he came in.  He is also concerned that his Tdap may not be updated.  Tdap was given within the last 5 years.        Review of Systems   Review of Systems   Skin:  Positive for color change and wound.         Current Medications       Current Outpatient Medications:     cephalexin (KEFLEX) 500 mg capsule, Take 1 capsule (500 mg total) by mouth every 8 (eight) hours for 7 days, Disp: 21 capsule, Rfl: 0    glipiZIDE (GLUCOTROL XL) 10 mg 24 hr tablet, Take 1  tablet (10 mg total) by mouth daily, Disp: 30 tablet, Rfl: 5    ketorolac (ACULAR) 0.5 % ophthalmic solution, Didn't start yet, Disp: , Rfl:     meclizine (ANTIVERT) 12.5 MG tablet, Take 1 tablet (12.5 mg total) by mouth every 8 (eight) hours as needed for dizziness, Disp: 30 tablet, Rfl: 0    metFORMIN (GLUCOPHAGE) 1000 MG tablet, Take 1 tablet (1,000 mg total) by mouth 2 (two) times a day, Disp: 180 tablet, Rfl: 1    mupirocin (BACTROBAN) 2 % ointment, Apply topically 2 (two) times a day, Disp: 22 g, Rfl: 0    ofloxacin (OCUFLOX) 0.3 % ophthalmic solution, Didn't start yet, Disp: , Rfl:     prednisoLONE acetate (PRED FORTE) 1 % ophthalmic suspension, Didn't start yet, Disp: , Rfl:     aspirin 81 MG tablet, Take 81 mg by mouth as needed Last dose  5/10/23 (Patient not taking: Reported on 12/13/2024), Disp: , Rfl:     atorvastatin (LIPITOR) 40 mg tablet, Take 1 tablet (40 mg total) by mouth every evening, Disp: 30 tablet, Rfl: 0    Empagliflozin 25 MG TABS, Take 1 tablet (25 mg total) by mouth daily, Disp: 90 tablet, Rfl: 1    lisinopril (ZESTRIL) 5 mg tablet, Take 1 tablet (5 mg total) by mouth daily, Disp: 90 tablet, Rfl: 0  No current facility-administered medications for this visit.    Current Allergies     Allergies as of 12/16/2024    (No Known Allergies)            The following portions of the patient's history were reviewed and updated as appropriate: allergies, current medications, past family history, past medical history, past social history, past surgical history and problem list.     Past Medical History:   Diagnosis Date    Acute pain of right shoulder 03/21/2022    Adenocarcinoma of prostate (HCC)     BCC    Anemia     Cancer (HCC)     Chronic kidney disease     Colon polyp     Diabetes mellitus (HCC)     History of Lyme disease     Hyperlipidemia     Hypertension     Proctitis     Prostate cancer (HCC)     Treated with radiation       Past Surgical History:   Procedure Laterality Date    COLONOSCOPY  " 01/10/2018    COLONOSCOPY  01/10/2018     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, grade 2 internal hemorrhoids, 1 adenomatous polyp    MD CYSTOURETHROSCOPY W/INTERNAL URETHROTOMY N/A 5/18/2023    Procedure: CYSTOSCOPY, DIRECT VISUAL INTERAL URETHROTOMY (DVIU), KENALOG INJECTION, BLADDER BIOPSY WITH FULGERATION;  Surgeon: Dimas Mendez MD;  Location: [facility];  Service: Urology    MD INS/RPLC PERPH SAC/GSTRC NPG/RCVR PCKT CRTJ&CONN N/A 4/13/2023    Procedure: STAGE 2 AXONIC, INSERTION OF NEUROSTIMULATOR, ELECTRONIC ANALYSIS OF NEUROSTIMULATOR;  Surgeon: Dimas Mendez MD;  Location: [facility];  Service: Urology    MD SPLIT AGRFT T/A/L 1ST 100 CM/&/1% BDY INFT/CHLD Left 6/22/2017    Procedure: ANTERIOR SHOULDER EXCISION BCC- COMPLEX CLOSURE vs FLAP vs STSG;  Surgeon: Lester Craig MD;  Location:  MAIN OR;  Service: Plastics    MD TRANSURETHRAL INCISION PROSTATE N/A 5/26/2022    Procedure: CYSTOSCOPY, TRANSURETHRAL INCISION OF PROSTATE (TUIP);  Surgeon: Dimas Mendez MD;  Location: [facility];  Service: Urology    SACRAL NERVE STIMULATOR PLACEMENT N/A 4/6/2023    Procedure: PART 1, INCISION FOR IMPLANTATION OF NEUROSTIMULATOR, ELECTRONIC ANALYSIS NEUROSTIMULATOR, STAGE 1;  Surgeon: Dimas Mendez MD;  Location: [facility];  Service: Urology    SKIN LESION EXCISION N/A 6/16/2022    Procedure: EXCISION WIDE LESION TRUNK/ABDOMEN/BACK;  Surgeon: Lester Craig MD;  Location: [facility];  Service: Plastics       Family History   Problem Relation Age of Onset    Heart disease Mother     Diabetes Brother         mellitus     Colon polyps Brother     Throat cancer Sister     Colon cancer Neg Hx          Medications have been verified.        Objective   /64   Pulse 66   Temp (!) 97 °F (36.1 °C)   Resp 20   Ht 5' 9\" (1.753 m)   Wt 61.7 kg (136 lb)   SpO2 100%   BMI 20.08 kg/m²        Physical Exam     Physical Exam  Vitals and nursing note reviewed.   Constitutional:       General: He is " not in acute distress.     Appearance: Normal appearance. He is normal weight. He is not ill-appearing, toxic-appearing or diaphoretic.   Cardiovascular:      Rate and Rhythm: Normal rate.   Pulmonary:      Effort: Pulmonary effort is normal.   Skin:     General: Skin is warm.      Comments: Abrasion to the right elbow.  The dressing had slipped down below the abrasion.  Yellow exudate from the abrasion.   Neurological:      Mental Status: He is alert.

## 2024-12-16 NOTE — PATIENT INSTRUCTIONS
Take the dressing off in 24 hours.  Apply the antibiotic ointment twice a day for the next week.  Take the antibiotic as directed.  Follow-up with your family doctor within the next week for a wound check.

## 2024-12-17 ENCOUNTER — DOCUMENTATION (OUTPATIENT)
Dept: ADMINISTRATIVE | Facility: OTHER | Age: 79
End: 2024-12-17

## 2024-12-17 VITALS — DIASTOLIC BLOOD PRESSURE: 64 MMHG | SYSTOLIC BLOOD PRESSURE: 140 MMHG

## 2024-12-17 NOTE — PROGRESS NOTES
12/17/24 8:15 AM    Patient was called after the Urgent Care visit Patient has an upcoming appointment with their primary care provider on 12/23/24 to follow on an elevated Blood pressure.      Thank you.  KENRICK FREED MA  [facility]    Blood pressure elevated  Appointment department: [facility]  Appointment provider: Sissy Goldstein PA-C  Blood pressure   12/16/24 1047 140/64   12/16/24 1042 140/64

## 2024-12-23 ENCOUNTER — OFFICE VISIT (OUTPATIENT)
Dept: FAMILY MEDICINE CLINIC | Facility: CLINIC | Age: 79
End: 2024-12-23
Payer: MEDICARE

## 2024-12-23 VITALS
RESPIRATION RATE: 16 BRPM | BODY MASS INDEX: 22.5 KG/M2 | SYSTOLIC BLOOD PRESSURE: 150 MMHG | HEIGHT: 66 IN | HEART RATE: 61 BPM | TEMPERATURE: 98.2 F | DIASTOLIC BLOOD PRESSURE: 70 MMHG | WEIGHT: 140 LBS

## 2024-12-23 DIAGNOSIS — S50.311S: Primary | ICD-10-CM

## 2024-12-23 PROCEDURE — G2211 COMPLEX E/M VISIT ADD ON: HCPCS | Performed by: FAMILY MEDICINE

## 2024-12-23 PROCEDURE — 99213 OFFICE O/P EST LOW 20 MIN: CPT | Performed by: FAMILY MEDICINE

## 2024-12-23 NOTE — PROGRESS NOTES
"Name: Artur Santana      : 1945      MRN: [id number]  Encounter Provider: Erica Latham MD  Encounter Date: 2024   [facility]  :  Assessment & Plan  Abrasion of right elbow, sequela  Improving. Complete course of keflex. Continue bactroban to the wound and dressing changes every 12 hours. Return if no improvement.               History of Present Illness     HPI  He is here today for wound check of an abrasion on his right elbow on 24. Was seen at urgent care for it on  and 24. Was started on keflex and bacitracin ointment. No sutures required, steri strips were placed.   Looking better today. No drainage, excessive bleeding. Pain improving.     Review of Systems   Constitutional: Negative.    HENT: Negative.     Eyes: Negative.    Respiratory: Negative.     Cardiovascular: Negative.    Gastrointestinal: Negative.    Endocrine: Negative.    Genitourinary: Negative.    Musculoskeletal: Negative.    Neurological: Negative.    Hematological: Negative.    Psychiatric/Behavioral: Negative.         Objective   /70   Pulse 61   Temp 98.2 °F (36.8 °C)   Resp 16   Ht 5' 6\" (1.676 m)   Wt 63.5 kg (140 lb)   BMI 22.60 kg/m²      Physical Exam  Constitutional:       Appearance: Normal appearance.   HENT:      Head: Normocephalic and atraumatic.   Pulmonary:      Effort: Pulmonary effort is normal.   Musculoskeletal:         General: Normal range of motion.   Skin:     Comments: Abrasion on right elbow as noted in image below. No evidence of bleeding or infection.    Neurological:      Mental Status: He is alert.   Psychiatric:         Mood and Affect: Mood normal.         Behavior: Behavior normal.         Thought Content: Thought content normal.         Judgment: Judgment normal.          "

## 2024-12-24 ENCOUNTER — TELEPHONE (OUTPATIENT)
Dept: NEUROLOGY | Facility: CLINIC | Age: 79
End: 2024-12-24

## 2024-12-24 NOTE — TELEPHONE ENCOUNTER
Confirmed PT appointment on 12/30/24 at 3:30pm with Dr. Segundo at the [address] [place] [address] location.

## 2024-12-30 ENCOUNTER — OFFICE VISIT (OUTPATIENT)
Dept: NEUROLOGY | Facility: CLINIC | Age: 79
End: 2024-12-30
Payer: MEDICARE

## 2024-12-30 VITALS
HEART RATE: 71 BPM | WEIGHT: 132.7 LBS | SYSTOLIC BLOOD PRESSURE: 134 MMHG | OXYGEN SATURATION: 99 % | DIASTOLIC BLOOD PRESSURE: 70 MMHG | BODY MASS INDEX: 21.42 KG/M2

## 2024-12-30 DIAGNOSIS — G91.2 NPH (NORMAL PRESSURE HYDROCEPHALUS) (HCC): Primary | ICD-10-CM

## 2024-12-30 PROCEDURE — 99215 OFFICE O/P EST HI 40 MIN: CPT | Performed by: PSYCHIATRY & NEUROLOGY

## 2024-12-30 PROCEDURE — G2211 COMPLEX E/M VISIT ADD ON: HCPCS | Performed by: PSYCHIATRY & NEUROLOGY

## 2024-12-30 NOTE — PROGRESS NOTES
Name: Artur Santana      : 1945      MRN: [id number]  Encounter Provider: Mable Segundo MD  Encounter Date: 2024   Encounter department: [facility]  :  Assessment & Plan  NPH (normal pressure hydrocephalus) ([facility])    Orders:  •  MRI brain NeuroQuant wo contrast; Future      {Ambulatory Patient Instructions (Optional):93572}    History of Present Illness {?Quick Links Encounters * My Last Note * Last Note in Specialty * Snapshot * Since Last Visit * History :43172}  HPI  Review of Systems   Constitutional:  Negative for appetite change, fatigue and fever.   HENT: Negative.  Negative for hearing loss, tinnitus, trouble swallowing and voice change.    Eyes: Negative.  Negative for photophobia, pain and visual disturbance.   Respiratory: Negative.  Negative for shortness of breath.    Cardiovascular: Negative.  Negative for palpitations.   Gastrointestinal: Negative.  Negative for nausea and vomiting.   Endocrine: Negative.  Negative for cold intolerance.   Genitourinary: Negative.  Negative for dysuria, frequency and urgency.   Musculoskeletal:  Negative for back pain, gait problem, myalgias, neck pain and neck stiffness.   Skin: Negative.  Negative for rash.   Allergic/Immunologic: Negative.    Neurological: Negative.  Negative for dizziness, tremors, seizures, syncope, facial asymmetry, speech difficulty, weakness, light-headedness, numbness and headaches.   Hematological: Negative.  Does not bruise/bleed easily.   Psychiatric/Behavioral: Negative.  Negative for confusion, hallucinations and sleep disturbance.    All other systems reviewed and are negative.   I have personally reviewed the MA's review of systems and made changes as necessary.    {Select to insert medical history sections (Optional):10272}     Objective {?Quick Links Trend Vitals * Enter New Vitals * Results Review * Timeline (Adult) * Labs * Imaging * Cardiology * Procedures * Lung Cancer  Screening * Surgical eConsent :21428}  /70 (BP Location: Right arm, Patient Position: Sitting, Cuff Size: Standard)   Pulse 71   Wt 60.2 kg (132 lb 11.2 oz)   SpO2 99%   BMI 21.42 kg/m²     Physical Exam  Neurological Exam    {Radiology Results Review (Optional):10095}    {Administrative / Billing Section (Optional):92614}

## 2024-12-30 NOTE — PROGRESS NOTES
Name: Artur Santana      : 1945      MRN: [id number]  Encounter Provider: Mable Segundo MD  Encounter Date: 2024   Encounter department: [facility]  :  Assessment & Plan  NPH (normal pressure hydrocephalus) (HCC)  Patient is a 79-year-old male with history of prostate adenocarcinoma, anemia of chronic disease, dyslipidemia, hypertension, radiation proctitis, sacral nerve stimulator placement, stage III CKD and type 2 diabetes who presents as a hospital follow-up for dizziness.    Patient reports that his dizziness has resolved.  He only gets orthostatic when he stands up from a seated position.  I reviewed his imaging which showed ventriculomegaly. He reports having one fall.  He has urinary incontinence however does have a history of complex prostate surgery.  As per sister, no concern for changes in cognition.  On exam today, patient with significant dysarthria however that is his baseline. He has a bowlegged gait.     I reviewed imaging with them and discussed my thought process for NPH evaluation. Patient is agreeable for workup. He has an [employer] spinal cord stimulator. Unclear model however he does have a remote to turn off the device. Order placed for MRI NeuroQuant without contrast. Patient to follow up with results.   Orders:  •  MRI brain NeuroQuant wo contrast; Future        History of Present Illness   HPI    Patient is a 79-year-old male with history of prostate adenocarcinoma, anemia of chronic disease, dyslipidemia, hypertension, radiation proctitis, sacral nerve stimulator placement, stage III CKD and type 2 diabetes who presents as a hospital follow-up for dizziness.    Patient was seen by inpatient team through teleconsult on 2024.  2 days prior patient had woken up with dizziness.  He described a room spinning sensation that lasted for several minutes prior to resolving.  Since that his symptoms did not come back.  On arrival to the ED  blood pressure in the 190s/80s.  Patient persistently bradycardic with HR in the 40s-50s.     Workup:  CT brain:  No acute intracranial abnormality. Moderate ventriculomegaly, out of proportion to the central and cortical volume loss which can be seen with exaggerated ex vacuo dilatation related to volume loss versus normal pressure hydrocephalus in the   appropriate clinical setting. Clinical correlation is recommended.     CTA head: Negative for large vessel intracranial occlusion or hemodynamically significant stenosis.     CTA neck:  No extracranial carotid stenosis.  The cervical vertebral arteries are patent.    Interval history:    Gets dizzy when he gets out of bed too fast   Hard of hearing at baseline   Dysarthria at baseline   Fell recently. Was walking the dog and wasn't watching he was going. Tripped over a step.   No problems with his cognition per sister   Urinary incontinence from prostate.   Spinal cord stimulator placed a year ago     Review of Systems   Constitutional:  Negative for appetite change, fatigue and fever.   HENT: Negative.  Negative for hearing loss, tinnitus, trouble swallowing and voice change.    Eyes: Negative.  Negative for photophobia, pain and visual disturbance.   Respiratory: Negative.  Negative for shortness of breath.    Cardiovascular: Negative.  Negative for palpitations.   Gastrointestinal: Negative.  Negative for nausea and vomiting.   Endocrine: Negative.  Negative for cold intolerance.   Genitourinary: Negative.  Negative for dysuria, frequency and urgency.   Musculoskeletal:  Negative for back pain, gait problem, myalgias, neck pain and neck stiffness.   Skin: Negative.  Negative for rash.   Allergic/Immunologic: Negative.    Neurological: Negative.  Negative for dizziness, tremors, seizures, syncope, facial asymmetry, speech difficulty, weakness, light-headedness, numbness and headaches.   Hematological: Negative.  Does not bruise/bleed easily.    Psychiatric/Behavioral: Negative.  Negative for confusion, hallucinations and sleep disturbance.    All other systems reviewed and are negative.     I have personally reviewed the MA's review of systems and made changes as necessary.         Objective   /70 (BP Location: Right arm, Patient Position: Sitting, Cuff Size: Standard)   Pulse 71   Wt 60.2 kg (132 lb 11.2 oz)   SpO2 99%   BMI 21.42 kg/m²     Physical Exam  Constitutional:       General: He is awake.   Eyes:      General: Lids are normal.      Extraocular Movements: Extraocular movements intact.   Neurological:      Cranial Nerves: Dysarthria present.       Neurological Exam  Mental Status  Awake. dysarthria present.    Cranial Nerves  CN III, IV, VI: Extraocular movements intact bilaterally. Normal lids and orbits bilaterally.    Motor  Decreased muscle bulk throughout.    Gait    Wide based gait. [place] legged   Stooped posture.

## 2025-01-08 ENCOUNTER — RA CDI HCC (OUTPATIENT)
Dept: OTHER | Facility: HOSPITAL | Age: 80
End: 2025-01-08

## 2025-01-08 PROBLEM — E11.22 TYPE 2 DIABETES MELLITUS WITH DIABETIC CHRONIC KIDNEY DISEASE (HCC): Status: ACTIVE | Noted: 2025-01-08

## 2025-01-08 PROBLEM — E11.3213 TYPE 2 DIABETES MELLITUS WITH MILD NONPROLIFERATIVE RETINOPATHY OF BOTH EYES AND MACULAR EDEMA (HCC): Status: ACTIVE | Noted: 2025-01-08

## 2025-01-08 NOTE — PROGRESS NOTES
HCC coding opportunities          Chart Reviewed number of suggestions sent to Provider: 2     Patients Insurance     Medicare Insurance: Medicare        E11.22  [id number]

## 2025-01-15 ENCOUNTER — TELEPHONE (OUTPATIENT)
Dept: FAMILY MEDICINE CLINIC | Facility: CLINIC | Age: 80
End: 2025-01-15

## 2025-01-15 DIAGNOSIS — I10 ESSENTIAL HYPERTENSION: ICD-10-CM

## 2025-01-15 RX ORDER — LISINOPRIL 5 MG/1
5 TABLET ORAL DAILY
Qty: 90 TABLET | Refills: 0 | Status: SHIPPED | OUTPATIENT
Start: 2025-01-15

## 2025-02-01 ENCOUNTER — HOSPITAL ENCOUNTER (EMERGENCY)
Facility: HOSPITAL | Age: 80
Discharge: HOME | End: 2025-02-01
Attending: EMERGENCY MEDICINE | Admitting: EMERGENCY MEDICINE
Payer: MEDICARE

## 2025-02-01 VITALS
RESPIRATION RATE: 18 BRPM | HEART RATE: 79 BPM | OXYGEN SATURATION: 98 % | DIASTOLIC BLOOD PRESSURE: 78 MMHG | TEMPERATURE: 97.8 F | SYSTOLIC BLOOD PRESSURE: 169 MMHG

## 2025-02-01 DIAGNOSIS — Z48.89 ENCOUNTER FOR POST SURGICAL WOUND CHECK: Primary | ICD-10-CM

## 2025-02-01 PROCEDURE — 99282 EMERGENCY DEPT VISIT SF MDM: CPT

## 2025-02-01 PROCEDURE — 99284 EMERGENCY DEPT VISIT MOD MDM: CPT | Performed by: EMERGENCY MEDICINE

## 2025-02-01 RX ORDER — LIDOCAINE HYDROCHLORIDE AND EPINEPHRINE 10; 10 MG/ML; UG/ML
1 INJECTION, SOLUTION INFILTRATION; PERINEURAL ONCE
Status: COMPLETED | OUTPATIENT
Start: 2025-02-01 | End: 2025-02-01

## 2025-02-01 RX ADMIN — LIDOCAINE HYDROCHLORIDE,EPINEPHRINE BITARTRATE 1 ML: 10; .01 INJECTION, SOLUTION INFILTRATION; PERINEURAL at 04:11

## 2025-02-01 NOTE — ED PROVIDER NOTES
"Final Diagnosis:  1. Encounter for post surgical wound check        Chief Complaint   Patient presents with    Wound Check     Pt states he had surgery 2 days ago took off the Band-Aid and is now bleeding states he took off a \"lump\"  Bleeding currently controlled       HPI  Pt had a lesion removed from left earlobe  Was doing ok then removed dressing and started bleeding    Here doing ok. Minimal ooze  Scabbing    On asa      EMS additionally reports:     - Previous charting underwent limited review with attention to last ED visits, labs, ekgs, and prior imaging.  Chart review reveals :     Appointment on 11/20/2024   Component Date Value Ref Range Status    PSA, Diagnostic 11/20/2024 <0.008  0.000 - 4.000 ng/mL Final    Zak[employer] Access chemiluminescent immunoassay. Confirm baseline values for patients being serially monitored.       - No language barrier.   - History obtained from patient    - Discuss patient's care, with patient permission or by chart review, with      PMH:   has a past medical history of Acute pain of right shoulder (03/21/2022), Adenocarcinoma of prostate (HCC), Anemia, Cancer (HCC), Chronic kidney disease, Colon polyp, Diabetes mellitus (HCC), History of Lyme disease, Hyperlipidemia, Hypertension, Proctitis, and Prostate cancer (HCC).    PSH:   has a past surgical history that includes pr splt agrft t/a/l 1st 100 sqcm/</1% bdy inft/chld (Left, 6/22/2017); Colonoscopy (01/10/2018); Colonoscopy (01/10/2018); pr transurethral incision prostate (N/A, 5/26/2022); Skin lesion excision (N/A, 6/16/2022); Sacral nerve stimulator placement (N/A, 4/6/2023); pr ins/rplc perph sac/gstrc npg/rcvr pckt crtj&conn (N/A, 4/13/2023); and pr cystourethroscopy w/internal urethrotomy (N/A, 5/18/2023).     Social History:  Tobacco Use: Medium Risk (2/1/2025)    Patient History     Smoking Tobacco Use: Former     Smokeless Tobacco Use: Never     Passive Exposure: Past     Alcohol Use: Not At Risk (10/15/2024) "    AUDIT-C     Frequency of Alcohol Consumption: Never     Average Number of Drinks: Patient does not drink     Frequency of Binge Drinking: Never     No illicit use       ROS:  Pertinent positives/negatives: .     Some ROS may be present in the HPI and would take precedent over these standard questions asked below.   Review of Systems   Skin:  Positive for wound.        CONSTITUTIONAL:  No lethargy. No unexpected weight loss. No change in behavior.  EYES:  No pain, redness, or discharge. No loss of vision. No orbital trauma or pain.   ENT:  No tinnitus or decreased hearing. No epistaxis/purulent rhinorrhea. No voice change, airway closing, trismus.   CARDIOVASCULAR:  No chest pain. No skin mottling or pallor. No change in exertional capacity  RESPIRATORY:  No hemoptysis. No paroxysmal nocturnal dyspnea. No stridor. No apnea or bluing.   GASTROINTESTINAL:  No vomiting, diarrhea. No distension. No melena. No hematochezia.   GENITOURINARY:  No nocturia. No hematuria or foul smelling or cloudy urine. No discharge. No sores/adenopathy.   MUSCULOSKELETAL:  No contracture.  No new deformity.   INTEGUMENTARY:  No swelling. No unexpected contusions. No abrasions. No lymphangitis.  NEUROLOGIC:  No meningismus. No new numbness of the extremities. No new focal weakness. No postural instability  PSYCHIATRIC:  No SI HI AVH  HEMATOLOGICAL:  No bleeding. No petechiae. No bruising.  ALLERGIES:  No urticaria. No sudden abd cramping. No stridor.    PE:     Physical exam highlights:   Physical Exam       Vitals:    02/01/25 0316   BP: 169/78   BP Location: Right arm   Pulse: 79   Resp: 18   Temp: 97.8 °F (36.6 °C)   TempSrc: Oral   SpO2: 98%     Vitals reviewed by me.   Nursing note reviewed  Chaperone present for all sensitive exam.  Const: No acute distress. Alert. Nontoxic. Not diaphoretic.    HEENT: External ears normal. No protrusion drainage swelling. Nose normal. No drainage/traumatic deformity. MM. Mouth with  baseline/symmetric movement. No trismus. Wound, post procedural, on left earlobe   Eyes: No squinting. No icterus. No tearing/swelling/drainage. Tracks through the room with normal EOM.   Neck: ROM normal. No rigidity. No meningismus.  Cards: Rate as per vitals Compared to monitor sinus unless documented. Regular Well perfused.  Pulm: Effort and excursion normal. No distress. No audible wheezing/no stridor. Normal resp rate without retraction or change in work of breathing.  Abd: No distension beyond baseline. No fluctuant wave. Patient without peritoneal pain with shifting/bumping the bed.   MSK: ROM normal baseline. No deformity. No contractures from baseline.   Skin: No new rashes visible. Well perfused. No wounds visualized on exposed skin  Neuro: Nonfocal. Baseline. CN grossly intact. Moving all four with coordination.   Psych: Normal behavior and affect.        A:  - Nursing note reviewed.    Ddx and MDM  Considered diagnoses    Lido/epi  Pressure dressing  Provided dressing and pressure clip for home tomorrow    Can continue asa        Dispo decision       My conversation with consultant reveals:        Decision rules:                           My read of the XR/CT scan reveals:     No orders to display       No orders of the defined types were placed in this encounter.    Labs Reviewed - No data to display    *Each of these labs was reviewed. Particular standout labs will be noted in the ED Course above     Final Diagnosis:  1. Encounter for post surgical wound check          P:  - hospital tx includes   Medications   lidocaine-epinephrine (XYLOCAINE/EPINEPHRINE) 1 %-1:100,000 injection 1 mL (1 mL Infiltration Given 2/1/25 0411)         - disposition  Time reflects when diagnosis was documented in both MDM as applicable and the Disposition within this note       Time User Action Codes Description Comment    2/1/2025  3:29 AM Chad Park Add [Z48.89] Encounter for post surgical wound check           ED  Disposition       ED Disposition   Discharge    Condition   Stable    Date/Time   Sat Feb 1, 2025  3:28 AM    Comment   Artur Santana discharge to home/self care.                   Follow-up Information    None         - patient will call their PCP to let them know they were in the emergency department. We discuss return precautions and patient is agreeable with plan and aformentioned disposition.       - additional treatment intended, if consistent with primary provider:  - patient to follow with :      Discharge Medication List as of 2/1/2025  3:29 AM        CONTINUE these medications which have NOT CHANGED    Details   aspirin 81 MG tablet Take 81 mg by mouth as needed Last dose  5/10/23, Historical Med      atorvastatin (LIPITOR) 40 mg tablet Take 1 tablet (40 mg total) by mouth every evening, Starting Fri 6/28/2024, Until Mon 12/30/2024, Normal      Empagliflozin 25 MG TABS Take 1 tablet (25 mg total) by mouth daily, Starting Mon 7/15/2024, Until Sat 1/11/2025, Normal      glipiZIDE (GLUCOTROL XL) 10 mg 24 hr tablet Take 1 tablet (10 mg total) by mouth daily, Starting Wed 9/4/2024, Normal      ketorolac (ACULAR) 0.5 % ophthalmic solution Didn't start yet, Historical Med      lisinopril (ZESTRIL) 5 mg tablet Take 1 tablet (5 mg total) by mouth daily, Starting Wed 1/15/2025, Normal      meclizine (ANTIVERT) 12.5 MG tablet Take 1 tablet (12.5 mg total) by mouth every 8 (eight) hours as needed for dizziness, Starting Fri 6/28/2024, Normal      metFORMIN (GLUCOPHAGE) 1000 MG tablet Take 1 tablet (1,000 mg total) by mouth 2 (two) times a day, Starting Fri 7/5/2024, Normal      mupirocin (BACTROBAN) 2 % ointment Apply topically 2 (two) times a day, Starting Mon 12/16/2024, Normal      ofloxacin (OCUFLOX) 0.3 % ophthalmic solution Didn't start yet, Historical Med      prednisoLONE acetate (PRED FORTE) 1 % ophthalmic suspension Didn't start yet, Historical Med           No discharge procedures on file.  Prior to  "Admission Medications   Prescriptions Last Dose Informant Patient Reported? Taking?   Empagliflozin 25 MG TABS  Self No No   Sig: Take 1 tablet (25 mg total) by mouth daily   aspirin 81 MG tablet  Self Yes No   Sig: Take 81 mg by mouth as needed Last dose  5/10/23   atorvastatin (LIPITOR) 40 mg tablet  Self No No   Sig: Take 1 tablet (40 mg total) by mouth every evening   glipiZIDE (GLUCOTROL XL) 10 mg 24 hr tablet  Self No No   Sig: Take 1 tablet (10 mg total) by mouth daily   ketorolac (ACULAR) 0.5 % ophthalmic solution  Self Yes No   Sig: Didn't start yet   lisinopril (ZESTRIL) 5 mg tablet   No No   Sig: Take 1 tablet (5 mg total) by mouth daily   meclizine (ANTIVERT) 12.5 MG tablet  Self No No   Sig: Take 1 tablet (12.5 mg total) by mouth every 8 (eight) hours as needed for dizziness   metFORMIN (GLUCOPHAGE) 1000 MG tablet  Self No No   Sig: Take 1 tablet (1,000 mg total) by mouth 2 (two) times a day   mupirocin (BACTROBAN) 2 % ointment  Self No No   Sig: Apply topically 2 (two) times a day   ofloxacin (OCUFLOX) 0.3 % ophthalmic solution  Self Yes No   Sig: Didn't start yet   prednisoLONE acetate (PRED FORTE) 1 % ophthalmic suspension  Self Yes No   Sig: Didn't start yet      Facility-Administered Medications: None       Portions of the record may have been created with voice recognition software. Occasional wrong word or \"sound a like\" substitutions may have occurred due to the inherent limitations of voice recognition software. Read the chart carefully and recognize, using context, where substitutions have occurred.    Electronically signed by:  MD Chad Wright MD  02/04/25 0053    "

## 2025-02-13 DIAGNOSIS — E11.36 TYPE 2 DIABETES MELLITUS WITH DIABETIC CATARACT, UNSPECIFIED WHETHER LONG TERM INSULIN USE (HCC): ICD-10-CM

## 2025-02-13 RX ORDER — EMPAGLIFLOZIN 25 MG/1
25 TABLET, FILM COATED ORAL DAILY
Qty: 90 TABLET | Refills: 0 | Status: SHIPPED | OUTPATIENT
Start: 2025-02-13

## 2025-02-19 ENCOUNTER — TELEPHONE (OUTPATIENT)
Age: 80
End: 2025-02-19

## 2025-02-19 NOTE — TELEPHONE ENCOUNTER
Rec'd call from patient's sister requesting to schedule a consult for SOC on patient's ear and chest. I explained that we are currently not seeing patient's for SOC without a direct referral to one of our doctors. Patient's sister confirmed understanding. She stated that she thought patient's PCP sent a referral but will call to check with them and call back.

## 2025-02-20 ENCOUNTER — TELEPHONE (OUTPATIENT)
Dept: PLASTIC SURGERY | Facility: CLINIC | Age: 80
End: 2025-02-20

## 2025-02-20 NOTE — TELEPHONE ENCOUNTER
Called pt to schedule consult with Dr. Craig from a red from Dr. Karol Bills's office. Patient not available so sister answered the phone. Medical communication consult form is not updated so I was not able to schedule the appointment. Advised the sister to have the patient call back to schedule. Patient's sister verbalized understanding.    Held time with Dr. Craig on 2/28/25 at 8:30 am.

## 2025-02-20 NOTE — TELEPHONE ENCOUNTER
"Rec'd call from Félix with Dr. Karol [facility] office - [facility].    She states that she attempted numerous times to fax to \"back line\" in office - transmission failed.    Checked media tab. We did receive office visit note dated 1/30/2025 from [facility].     Félix questioned why we haven't yet reached out to patient for scheduling? I apologized but asked her how were we to know that patient needed to be seen? She states that fax cover sheet indicated as such AND referral was fax. Again - informed her all that was received was 8 pages 1/30/2025 office visit & path. (She also states that patients sister informed her that's she's attempted to contact our office several times and was never able to get through.... I informed her that we had one documented call from sister.)    Asked Félix to e-mail me ALL pages directly. I would then forward to office to have printed and scanned into patients chart. Apologized for the \"disconnect\". Unsure of what transpired regarding this error.    Documents received via e-mailed. Notified Aurea at [facility].    Forwarded to [facility] () via e-mail.    Patient is a RETURN patient. Last seen by [facility] on 6/30/2022.    TEAM: Please let me know when documents are in patients chart and I'll contact for scheduling follow-up. (Should it be with Dr. Craig OR is a PA appropriate?)  "

## 2025-02-20 NOTE — TELEPHONE ENCOUNTER
Received call from Félix with [facility] office stating they sent a referral over to us to call the patient to [facility].    Félix stated that the patients sister informed them that we have not reached out to her.    I told Félix that the patients sister called yesterday per our notes.    I also told Félix that we have not received the referral yet.    I asked her what number was the referral faxed to?    Félix said she sent it to [phone].    I gave Félix fax number [phone] and asked her to send the referral to this number instead.    Félix verbalized understanding

## 2025-02-21 NOTE — TELEPHONE ENCOUNTER
Pt's sister Alverto Galeana calling back, she stated she makes all her brothers appts, she is listed in chart as contact, so she isnt understanding why we cannot schedule the appt with her    Advised we have a held appt on Feb 28, but she said 830am is too early for pt    Advised I am unsure if [facility] has another option, as I am seeing late March as next avail    Advised I will have Calvin call her back to discuss [phone]

## 2025-02-21 NOTE — TELEPHONE ENCOUNTER
Called sister back. She is in charge of scheduling his appointments. I informed her that the medical communications forms must be filled out at the visit to give us permission to speak with her regarding the patient's care. She verbalized understanding. Patient scheduled. Thank you!

## 2025-03-04 ENCOUNTER — CONSULT (OUTPATIENT)
Age: 80
End: 2025-03-04
Payer: MEDICARE

## 2025-03-04 VITALS — WEIGHT: 132 LBS | BODY MASS INDEX: 21.21 KG/M2 | HEIGHT: 66 IN

## 2025-03-04 DIAGNOSIS — C44.91 BASAL CELL CARCINOMA (BCC), UNSPECIFIED SITE: Primary | ICD-10-CM

## 2025-03-04 PROCEDURE — 99203 OFFICE O/P NEW LOW 30 MIN: CPT | Performed by: PHYSICIAN ASSISTANT

## 2025-03-04 NOTE — PROGRESS NOTES
Assessment/Plan:     Diagnoses and all orders for this visit:    Basal cell carcinoma (BCC), unspecified site  We discussed excision of left ear lobule BCC & right- mid chest BCC, local flap reconstruction vs skin graft. We also discussed risks including bleeding, scarring, infection & need for additional procedures if margins are involved. Pt is agreeable & consent was obtained.         Subjective:      Patient ID: Artur Santana is a 79 y.o. male.    HPI    Pt was referred by Dr. Bills for 2 BCC's, left ear lobule & right mid-chest. He is well known to us given he has a PMH of multiple skin cancers including melanoma, HTN, prostate cancer & diabetes. He is a non-smoker.     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    Functional diarrhea    Stage 3a chronic kidney disease (HCC)    Pseudopolyposis of colon without complication, unspecified part of colon (HCC)    Open wound of face    Abrasion of nose    Protein-calorie malnutrition, unspecified severity (HCC)    Vertigo    Presence of neurostimulator    Type 2 diabetes mellitus with diabetic chronic kidney disease (HCC)    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)     No Known Allergies  Current Outpatient Medications on File Prior to Visit   Medication Sig    aspirin 81 MG tablet Take 81 mg by mouth as needed Last dose  5/10/23    atorvastatin (LIPITOR) 40 mg tablet Take 1 tablet (40 mg total) by mouth every evening    glipiZIDE (GLUCOTROL  XL) 10 mg 24 hr tablet Take 1 tablet (10 mg total) by mouth daily    Jardiance 25 MG TABS Take 1 tablet by mouth once daily    ketorolac (ACULAR) 0.5 % ophthalmic solution Didn't start yet    lisinopril (ZESTRIL) 5 mg tablet Take 1 tablet (5 mg total) by mouth daily    meclizine (ANTIVERT) 12.5 MG tablet Take 1 tablet (12.5 mg total) by mouth every 8 (eight) hours as needed for dizziness    metFORMIN (GLUCOPHAGE) 1000 MG tablet Take 1 tablet (1,000 mg total) by mouth 2 (two) times a day    mupirocin (BACTROBAN) 2 % ointment Apply topically 2 (two) times a day    ofloxacin (OCUFLOX) 0.3 % ophthalmic solution Didn't start yet    prednisoLONE acetate (PRED FORTE) 1 % ophthalmic suspension Didn't start yet    [DISCONTINUED] polyethylene glycol (GOLYTELY) 4000 mL solution Take 4,000 mL by mouth once for 1 dose     No current facility-administered medications on file prior to visit.     Family History   Problem Relation Age of Onset    Heart disease Mother     Diabetes Brother         mellitus     Colon polyps Brother     Throat cancer Sister     Colon cancer Neg Hx      Past Medical History:   Diagnosis Date    Acute pain of right shoulder 2022    Adenocarcinoma of prostate (HCC)     BCC    Anemia     Cancer (HCC)     Chronic kidney disease     Colon polyp     Diabetes mellitus (HCC)     History of Lyme disease     Hyperlipidemia     Hypertension     Proctitis     Prostate cancer (HCC)     Treated with radiation     Social History     Socioeconomic History    Marital status: Single     Spouse name: None    Number of children: None    Years of education: None    Highest education level: None   Occupational History    None   Tobacco Use    Smoking status: Former     Current packs/day: 0.00     Average packs/day: 0.1 packs/day for 2.5 years (0.2 ttl pk-yrs)     Types: Cigarettes     Start date: 1975     Quit date: 1977     Years since quittin.7     Passive exposure: Past    Smokeless tobacco: Never     "Tobacco comments:     never a smoker noted in \"allscripts\" - Denied: History of tobacco use noted in \"allscripts\"    Vaping Use    Vaping status: Never Used   Substance and Sexual Activity    Alcohol use: Not Currently    Drug use: No    Sexual activity: None   Other Topics Concern    None   Social History Narrative    None     Social Drivers of Health     Financial Resource Strain: High Risk (9/29/2023)    Overall Financial Resource Strain (CARDIA)     Difficulty of Paying Living Expenses: Hard   Food Insecurity: No Food Insecurity (10/15/2024)    Nursing - Inadequate Food Risk Classification     Worried About Running Out of Food in the Last Year: Never true     Ran Out of Food in the Last Year: Never true     Ran Out of Food in the Last Year: Not on file   Transportation Needs: No Transportation Needs (10/15/2024)    PRAPARE - Transportation     Lack of Transportation (Medical): No     Lack of Transportation (Non-Medical): No   Physical Activity: Not on file   Stress: Not on file   Social Connections: Not on file   Intimate Partner Violence: Not on file   Housing Stability: High Risk (10/15/2024)    Housing Stability Vital Sign     Unable to Pay for Housing in the Last Year: No     Number of Times Moved in the Last Year: 2     Homeless in the Last Year: No     Past Surgical History:   Procedure Laterality Date    COLONOSCOPY  01/10/2018    COLONOSCOPY  01/10/2018     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, grade 2 internal hemorrhoids, 1 adenomatous polyp    CO CYSTOURETHROSCOPY W/INTERNAL URETHROTOMY N/A 5/18/2023    Procedure: CYSTOSCOPY, DIRECT VISUAL INTERAL URETHROTOMY (DVIU), KENALOG INJECTION, BLADDER BIOPSY WITH FULGERATION;  Surgeon: Dimas Mendez MD;  [facility];  Service: Urology    CO INS/RPLC PERPH SAC/GSTRC NPG/RCVR PCKT CRTJ&CONN N/A 4/13/2023    Procedure: STAGE 2 AXONIC, INSERTION OF NEUROSTIMULATOR, ELECTRONIC ANALYSIS OF NEUROSTIMULATOR;  Surgeon: Dimas Mendez MD;  " "Location: [facility];  Service: Urology    MS SPLT AGRFT T/A/L 1ST 100 SQCM/</1% BDY INFT/CHLD Left 6/22/2017    Procedure: ANTERIOR SHOULDER EXCISION BCC- COMPLEX CLOSURE vs FLAP vs STSG;  Surgeon: Lesetr Craig MD;  Location:  MAIN OR;  Service: Plastics    MS TRANSURETHRAL INCISION PROSTATE N/A 5/26/2022    Procedure: CYSTOSCOPY, TRANSURETHRAL INCISION OF PROSTATE (TUIP);  Surgeon: Dimas Mendez MD;  Location: [facility];  Service: Urology    SACRAL NERVE STIMULATOR PLACEMENT N/A 4/6/2023    Procedure: PART 1, INCISION FOR IMPLANTATION OF NEUROSTIMULATOR, ELECTRONIC ANALYSIS NEUROSTIMULATOR, STAGE 1;  Surgeon: Dimas Mendez MD;  Location: [facility];  Service: Urology    SKIN LESION EXCISION N/A 6/16/2022    Procedure: EXCISION WIDE LESION TRUNK/ABDOMEN/BACK;  Surgeon: Lester Craig MD;  Location: [facility];  Service: Plastics         Review of Systems   All other systems reviewed and are negative.        Objective:      Ht 5' 6\" (1.676 m)   Wt 59.9 kg (132 lb)   BMI 21.31 kg/m²          Physical Exam  Constitutional:       Appearance: Normal appearance. He is well-developed.   HENT:      Head: Normocephalic and atraumatic.      Ears:      Comments: Left ear lobule biopsy site, see photo  Eyes:      Conjunctiva/sclera: Conjunctivae normal.   Cardiovascular:      Rate and Rhythm: Normal rate and regular rhythm.   Pulmonary:      Effort: Pulmonary effort is normal.      Breath sounds: Normal breath sounds.      Comments: Right mid chest biopsy site, see photo.   Musculoskeletal:         General: Normal range of motion.      Cervical back: Normal range of motion.   Skin:     General: Skin is warm and dry.   Neurological:      Mental Status: He is alert and oriented to person, place, and time.   Psychiatric:         Mood and Affect: Mood normal.         Behavior: Behavior normal.         "

## 2025-03-10 DIAGNOSIS — E11.36 TYPE 2 DIABETES MELLITUS WITH DIABETIC CATARACT, UNSPECIFIED WHETHER LONG TERM INSULIN USE (HCC): ICD-10-CM

## 2025-03-11 DIAGNOSIS — E11.36 TYPE 2 DIABETES MELLITUS WITH DIABETIC CATARACT, UNSPECIFIED WHETHER LONG TERM INSULIN USE (HCC): ICD-10-CM

## 2025-03-11 DIAGNOSIS — I10 ESSENTIAL HYPERTENSION: ICD-10-CM

## 2025-03-11 DIAGNOSIS — E11.9 TYPE 2 DIABETES MELLITUS WITHOUT COMPLICATION, WITHOUT LONG-TERM CURRENT USE OF INSULIN (HCC): ICD-10-CM

## 2025-03-11 RX ORDER — LISINOPRIL 5 MG/1
5 TABLET ORAL DAILY
Qty: 90 TABLET | Refills: 0 | Status: SHIPPED | OUTPATIENT
Start: 2025-03-11

## 2025-03-11 RX ORDER — GLIPIZIDE 10 MG/1
10 TABLET, FILM COATED, EXTENDED RELEASE ORAL DAILY
Qty: 30 TABLET | Refills: 5 | Status: SHIPPED | OUTPATIENT
Start: 2025-03-11 | End: 2025-03-11 | Stop reason: SDUPTHER

## 2025-03-11 RX ORDER — GLIPIZIDE 10 MG/1
10 TABLET, FILM COATED, EXTENDED RELEASE ORAL DAILY
Qty: 30 TABLET | Refills: 5 | Status: SHIPPED | OUTPATIENT
Start: 2025-03-11

## 2025-03-18 ENCOUNTER — PREP FOR PROCEDURE (OUTPATIENT)
Dept: PLASTIC SURGERY | Facility: CLINIC | Age: 80
End: 2025-03-18

## 2025-03-18 DIAGNOSIS — C44.219 BASAL CELL CARCINOMA (BCC) OF SKIN OF LEFT EAR: ICD-10-CM

## 2025-03-18 DIAGNOSIS — C44.519 BASAL CELL CARCINOMA (BCC) OF SKIN OF OTHER PART OF TORSO: Primary | ICD-10-CM

## 2025-03-20 ENCOUNTER — TELEPHONE (OUTPATIENT)
Age: 80
End: 2025-03-20

## 2025-03-20 NOTE — TELEPHONE ENCOUNTER
Rec'd call from Millie at Dr. Karol Andrew's office, where patient was also present. Patient concerned as he does not know the date or time for upcoming surgery.    Provided the date of which surgery is scheduled and advised that patient will receive a call from the hospital the day prior between the hrs of 4-7p and they will provide the exact time that he should be expected. Patient and Millie verbalized understanding and advised no further questions or concerns at this time.

## 2025-04-11 ENCOUNTER — RA CDI HCC (OUTPATIENT)
Dept: OTHER | Facility: HOSPITAL | Age: 80
End: 2025-04-11

## 2025-04-11 DIAGNOSIS — I10 ESSENTIAL HYPERTENSION: ICD-10-CM

## 2025-04-11 RX ORDER — LISINOPRIL 5 MG/1
5 TABLET ORAL DAILY
Qty: 90 TABLET | Refills: 0 | Status: SHIPPED | OUTPATIENT
Start: 2025-04-11

## 2025-04-11 NOTE — PROGRESS NOTES
HCC coding opportunities          Chart Reviewed number of suggestions sent to Provider: 2     Patients Insurance     Medicare Insurance: Medicare        E11.22  [id number]

## 2025-04-11 NOTE — TELEPHONE ENCOUNTER
Dr Latham is out of office until next Tuesday - routing to clerical so patient can have medication refilled

## 2025-04-21 ENCOUNTER — ANESTHESIA EVENT (OUTPATIENT)
Dept: PERIOP | Facility: HOSPITAL | Age: 80
End: 2025-04-21
Payer: MEDICARE

## 2025-04-21 ENCOUNTER — APPOINTMENT (OUTPATIENT)
Dept: LAB | Facility: HOSPITAL | Age: 80
End: 2025-04-21
Attending: SURGERY
Payer: MEDICARE

## 2025-04-21 ENCOUNTER — CONSULT (OUTPATIENT)
Dept: FAMILY MEDICINE CLINIC | Facility: CLINIC | Age: 80
End: 2025-04-21
Payer: MEDICARE

## 2025-04-21 VITALS
HEIGHT: 66 IN | TEMPERATURE: 97.1 F | HEART RATE: 78 BPM | SYSTOLIC BLOOD PRESSURE: 134 MMHG | BODY MASS INDEX: 22.5 KG/M2 | DIASTOLIC BLOOD PRESSURE: 68 MMHG | WEIGHT: 140 LBS | RESPIRATION RATE: 16 BRPM

## 2025-04-21 DIAGNOSIS — E11.3213 TYPE 2 DIABETES MELLITUS WITH MILD NONPROLIFERATIVE RETINOPATHY OF BOTH EYES AND MACULAR EDEMA, UNSPECIFIED WHETHER LONG TERM INSULIN USE (HCC): ICD-10-CM

## 2025-04-21 DIAGNOSIS — N18.31 STAGE 3A CHRONIC KIDNEY DISEASE (HCC): ICD-10-CM

## 2025-04-21 DIAGNOSIS — G91.2 NPH (NORMAL PRESSURE HYDROCEPHALUS) (HCC): ICD-10-CM

## 2025-04-21 DIAGNOSIS — E11.36 TYPE 2 DIABETES MELLITUS WITH DIABETIC CATARACT, UNSPECIFIED WHETHER LONG TERM INSULIN USE (HCC): ICD-10-CM

## 2025-04-21 DIAGNOSIS — C44.519 BASAL CELL CARCINOMA (BCC) OF SKIN OF OTHER PART OF TORSO: ICD-10-CM

## 2025-04-21 DIAGNOSIS — Z01.818 PREOP EXAMINATION: Primary | ICD-10-CM

## 2025-04-21 DIAGNOSIS — C61 MALIGNANT NEOPLASM OF PROSTATE (HCC): ICD-10-CM

## 2025-04-21 DIAGNOSIS — C44.219 BASAL CELL CARCINOMA (BCC) OF SKIN OF LEFT EAR: ICD-10-CM

## 2025-04-21 DIAGNOSIS — E78.5 DYSLIPIDEMIA: ICD-10-CM

## 2025-04-21 DIAGNOSIS — K51.40 PSEUDOPOLYPOSIS OF COLON WITHOUT COMPLICATION, UNSPECIFIED PART OF COLON (HCC): ICD-10-CM

## 2025-04-21 LAB
ALBUMIN SERPL BCG-MCNC: 4.1 G/DL (ref 3.5–5)
ALP SERPL-CCNC: 50 U/L (ref 34–104)
ALT SERPL W P-5'-P-CCNC: 12 U/L (ref 7–52)
ANION GAP SERPL CALCULATED.3IONS-SCNC: 9 MMOL/L (ref 4–13)
AST SERPL W P-5'-P-CCNC: 17 U/L (ref 13–39)
BASOPHILS # BLD AUTO: 0.04 THOUSANDS/ÂΜL (ref 0–0.1)
BASOPHILS NFR BLD AUTO: 1 % (ref 0–1)
BILIRUB SERPL-MCNC: 0.57 MG/DL (ref 0.2–1)
BUN SERPL-MCNC: 19 MG/DL (ref 5–25)
CALCIUM SERPL-MCNC: 9.3 MG/DL (ref 8.4–10.2)
CHLORIDE SERPL-SCNC: 106 MMOL/L (ref 96–108)
CHOLEST SERPL-MCNC: 172 MG/DL (ref ?–200)
CO2 SERPL-SCNC: 25 MMOL/L (ref 21–32)
CREAT SERPL-MCNC: 1 MG/DL (ref 0.6–1.3)
EOSINOPHIL # BLD AUTO: 0.09 THOUSAND/ÂΜL (ref 0–0.61)
EOSINOPHIL NFR BLD AUTO: 1 % (ref 0–6)
ERYTHROCYTE [DISTWIDTH] IN BLOOD BY AUTOMATED COUNT: 13.9 % (ref 11.6–15.1)
EST. AVERAGE GLUCOSE BLD GHB EST-MCNC: 174 MG/DL
GFR SERPL CREATININE-BSD FRML MDRD: 71 ML/MIN/1.73SQ M
GLUCOSE P FAST SERPL-MCNC: 112 MG/DL (ref 65–99)
HBA1C MFR BLD: 7.7 %
HCT VFR BLD AUTO: 39 % (ref 36.5–49.3)
HDLC SERPL-MCNC: 59 MG/DL
HGB BLD-MCNC: 12.5 G/DL (ref 12–17)
IMM GRANULOCYTES # BLD AUTO: 0.02 THOUSAND/UL (ref 0–0.2)
IMM GRANULOCYTES NFR BLD AUTO: 0 % (ref 0–2)
LDLC SERPL CALC-MCNC: 96 MG/DL (ref 0–100)
LYMPHOCYTES # BLD AUTO: 1.01 THOUSANDS/ÂΜL (ref 0.6–4.47)
LYMPHOCYTES NFR BLD AUTO: 15 % (ref 14–44)
MCH RBC QN AUTO: 29.1 PG (ref 26.8–34.3)
MCHC RBC AUTO-ENTMCNC: 32.1 G/DL (ref 31.4–37.4)
MCV RBC AUTO: 91 FL (ref 82–98)
MONOCYTES # BLD AUTO: 0.48 THOUSAND/ÂΜL (ref 0.17–1.22)
MONOCYTES NFR BLD AUTO: 7 % (ref 4–12)
NEUTROPHILS # BLD AUTO: 4.91 THOUSANDS/ÂΜL (ref 1.85–7.62)
NEUTS SEG NFR BLD AUTO: 76 % (ref 43–75)
NRBC BLD AUTO-RTO: 0 /100 WBCS
PLATELET # BLD AUTO: 186 THOUSANDS/UL (ref 149–390)
PMV BLD AUTO: 11.3 FL (ref 8.9–12.7)
POTASSIUM SERPL-SCNC: 4.8 MMOL/L (ref 3.5–5.3)
PROT SERPL-MCNC: 6.4 G/DL (ref 6.4–8.4)
RBC # BLD AUTO: 4.3 MILLION/UL (ref 3.88–5.62)
SODIUM SERPL-SCNC: 140 MMOL/L (ref 135–147)
TRIGL SERPL-MCNC: 86 MG/DL (ref ?–150)
WBC # BLD AUTO: 6.55 THOUSAND/UL (ref 4.31–10.16)

## 2025-04-21 PROCEDURE — 36415 COLL VENOUS BLD VENIPUNCTURE: CPT

## 2025-04-21 PROCEDURE — 80053 COMPREHEN METABOLIC PANEL: CPT

## 2025-04-21 PROCEDURE — G2211 COMPLEX E/M VISIT ADD ON: HCPCS | Performed by: FAMILY MEDICINE

## 2025-04-21 PROCEDURE — 99214 OFFICE O/P EST MOD 30 MIN: CPT | Performed by: FAMILY MEDICINE

## 2025-04-21 PROCEDURE — 80061 LIPID PANEL: CPT

## 2025-04-21 PROCEDURE — 83036 HEMOGLOBIN GLYCOSYLATED A1C: CPT

## 2025-04-21 PROCEDURE — 85025 COMPLETE CBC W/AUTO DIFF WBC: CPT

## 2025-04-21 NOTE — ASSESSMENT & PLAN NOTE
Lab Results   Component Value Date    EGFR 71 04/21/2025    EGFR 62 11/01/2024    EGFR 62 06/27/2024    CREATININE 1.00 04/21/2025    CREATININE 1.11 11/01/2024    CREATININE 1.12 06/27/2024

## 2025-04-21 NOTE — ASSESSMENT & PLAN NOTE
He did see neurology for this and was supposed to get an MRI brain, but has not done so yet. He will schedule this after his surgery. I did reach out to his neurologist Dr. Pardo who did state that he can proceed with surgery.

## 2025-04-21 NOTE — PATIENT INSTRUCTIONS
Pre-operative Medication Instructions    Avoid herbs or non-directed vitamins one week prior to surgery  Avoid aspirin containing medications or non-steroidal anti-inflammatory drugs one week preceding surgery  May take tylenol for pain up until the night before surgery    ACE Inhibitors or ARBs     Medication Name     lisinopril (ZESTRIL) 5 mg tablet      Continue this medication up to the evening before surgery/procedure, but do not take the morning of the day of surgery.    Cholesterol lowering meds     Medication Name     atorvastatin (LIPITOR) 40 mg tablet      Continue to take this medication on your normal schedule.  If this is an oral medication and you take it in the morning, then you may take this medicine with a sip of water.    Diabetic Medications     Medication Name     Empagliflozin (Jardiance) 25 MG TABS     glipiZIDE (GLUCOTROL XL) 10 mg 24 hr tablet     metFORMIN (GLUCOPHAGE) 1000 MG tablet        Medicine Instructions for Adults with Diabetes (NO Bowel Prep)    Follow these instructions when a BOWEL PREP is NOT required for your procedure or surgery!    NOTE:  GLP Agonists taken weekly: do not take in the 7 days before your procedure. **Bariatric surgery: do not take 4 weeks prior to your procedure.    SGLT-2 Inhibitors: do not take in the 4 days before your procedure    On the Day Before Surgery/Procedure  If you are having a procedure (e.g., Colonoscopy) or surgery which DOES NOT require a bowel prep, follow the directions below based on the type of medicine you take for your diabetes.  Type of Medicine You Take Examples What to Do   Pre-Mixed Insulin Intermediate  Tdszqyr98/25, Pahwrim14/30, Novolog 70/30, Regular Insulin Take 1/2 your regular dose the evening before our procedure.   Rapid/Fast Acting  Insulin and/or Long-Acting Insulin Humalog U200, NovoLog, Apidra,  Lantus, Levemir, Tresiba, Toujeo,  Fias, Basaglar Take your FULL regular dose the day before procedure.   Oral Diabetic  Medicines (sulfonylurea) Glipizide/Glimepiride/  Glucotrol Take your regular dose with dinner the evening before your procedure.   Other Oral Diabetic Medicines Metformin, Glucophage, Glucophage  XR, Riomet, Glumetza, Actose,  Avandia, Gl set, Prandin Take your regular dose with dinner the evening before your procedure   GLP Agonists Adlyxin, Byetta, Bydureon,  Ozempic, Soliqua, Tanzeum,  Trulicity, Victoza, Saxenda,  Rybelsus, Wegovy, Mounjaro, Zepbound If taken daily, take as normal  If taken weekly, do not take this medicine for 7 days before your procedure including the day of the procedure (resume taking after the procedure). **Bariatric surgery: do not take 4 weeks prior to procedure   SGLT-2 Inhibitors Jardiance, Invokana, Farxiga, Steglatro, Brenzavvy, Qtern, Segluromet Glyxambi, Synjardy, Synjardy XR, Invokamet, InvokametXR, Trijary XR, Xigduo X Do not take for 4 days before your procedure including the day of the procedure (resume taking after the procedure)   This educational material has been approved by the Patient Education Advisory Committee.    On the Day of Surgery/Procedure  Follow the directions below based on the type of medicine you take for your diabetes.  Type of Medicine You Take  Examples What to Do   Long-Acting Insulin Lantus, Levemir, Tresiba,  Toujeo, Basaglar, Semglee If you normally take your Long Acting Insulin in the morning, take the full dose as scheduled.   GLP-I Agonists Adlyxin, Byetta, Bydureon,  Ozempic, Soliqua, Tanzeum,  Trulicity, Victoza, Saxenda,  Rybelsus, Mounjaro Do NOT take this medicine on the day of your procedure (resume taking after the procedure)   Except for the morning Long-Acting Insulin, DO NOT take ANY diabetic medicine on the day of your procedure unless you were instructed by the doctor who manages your diabetes medicines.  Continue to check your blood sugars.  If you have an insulin pump, ask your endocrinologist for instructions at least 3 days before  your procedure. NOTE: If you are not able to ask your endocrinologist in advance, on the day of the procedure set your insulin pump to your basal rate only. Bring your insulin pump supplies to the hospital.    If you have any questions about taking your diabetes medicines prior to your procedure, please contact the doctor who manages your diabetes medicines.

## 2025-04-21 NOTE — PROGRESS NOTES
Pre-operative Clearance  Name: Artur Santana      : 1945      MRN: [id number]  Encounter Provider: Erica Latham MD  Encounter Date: 2025   Encounter department: [facility]    :  Assessment & Plan  Preop examination         Basal cell carcinoma (BCC) of skin of other part of torso    Orders:    Ambulatory referral to Family Practice    Basal cell carcinoma (BCC) of skin of left ear    Orders:    Ambulatory referral to Family Practice    NPH (normal pressure hydrocephalus) ([facility])  He did see neurology for this and was supposed to get an MRI brain, but has not done so yet. He will schedule this after his surgery. I did reach out to his neurologist Dr. Pardo who did state that he can proceed with surgery.        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)    Lab Results   Component Value Date    HGBA1C 7.7 (H) 2025            Pseudopolyposis of colon without complication, unspecified part of colon ([facility])         Malignant neoplasm of prostate ([facility])         Type 2 diabetes mellitus with diabetic cataract, unspecified whether long term insulin use ([facility])    Lab Results   Component Value Date    HGBA1C 7.7 (H) 2025            Stage 3a chronic kidney disease ([facility])  Lab Results   Component Value Date    EGFR 71 2025    EGFR 62 2024    EGFR 62 2024    CREATININE 1.00 2025    CREATININE 1.11 2024    CREATININE 1.12 2024                Pre-operative Clearance:     Clearance:  Patient is medically optimized (CLEARED) for proposed surgery without any additional cardiac testing.      Medication Instructions:   - Avoid herbs or non-directed vitamins one week prior to surgery    - Avoid aspirin containing medications or non-steroidal anti-inflammatory drugs one week preceding surgery    - May take tylenol for pain up until the night before surgery    - ACE  Inhibitors or ARBs: Continue this medication up to the evening before surgery/procedure, but do not take the morning of the day of surgery.      Medicine Instructions for Adults with Diabetes (NO Bowel Prep)    Follow these instructions when a BOWEL PREP is NOT required for your procedure or surgery!    NOTE:  GLP Agonists taken weekly: do not take in the 7 days before your procedure. **Bariatric surgery: do not take 4 weeks prior to your procedure.    SGLT-2 Inhibitors: do not take in the 4 days before your procedure    On the Day Before Surgery/Procedure  If you are having a procedure (e.g., Colonoscopy) or surgery which DOES NOT require a bowel prep, follow the directions below based on the type of medicine you take for your diabetes.  Type of Medicine You Take Examples What to Do   Pre-Mixed Insulin Intermediate  Tezljzi59/25, Shteqfa12/30, Novolog 70/30, Regular Insulin Take 1/2 your regular dose the evening before our procedure.   Rapid/Fast Acting  Insulin and/or Long-Acting Insulin Humalog U200, NovoLog, Apidra,  Lantus, Levemir, Tresiba, Toujeo,  Fias, Basaglar Take your FULL regular dose the day before procedure.   Oral Diabetic Medicines (sulfonylurea) Glipizide/Glimepiride/  Glucotrol Take your regular dose with dinner the evening before your procedure.   Other Oral Diabetic Medicines Metformin, Glucophage, Glucophage  XR, Riomet, Glumetza, Actose,  Avandia, Gl set, Prandin Take your regular dose with dinner the evening before your procedure   GLP Agonists Adlyxin, Byetta, Bydureon,  Ozempic, Soliqua, Tanzeum,  Trulicity, Victoza, Saxenda,  Rybelsus, Wegovy, Mounjaro, Zepbound If taken daily, take as normal  If taken weekly, do not take this medicine for 7 days before your procedure including the day of the procedure (resume taking after the procedure). **Bariatric surgery: do not take 4 weeks prior to procedure   SGLT-2 Inhibitors Jardiance, Invokana, Farxiga, Steglatro, Brenzavvy, Qtern, Segluromet  Glyxambi, Synjardy, Synjardy XR, Invokamet, InvokametXR, Trijary XR, Xigduo X Do not take for 4 days before your procedure including the day of the procedure (resume taking after the procedure)   This educational material has been approved by the Patient Education Advisory Committee.    On the Day of Surgery/Procedure  Follow the directions below based on the type of medicine you take for your diabetes.  Type of Medicine You Take  Examples What to Do   Long-Acting Insulin Lantus, Levemir, Tresiba,  Toujeo, Basaglar, Semglee If you normally take your Long Acting Insulin in the morning, take the full dose as scheduled.   GLP-I Agonists Adlyxin, Byetta, Bydureon,  Ozempic, Soliqua, Tanzeum,  Trulicity, Victoza, Saxenda,  Rybelsus, Mounjaro Do NOT take this medicine on the day of your procedure (resume taking after the procedure)   Except for the morning Long-Acting Insulin, DO NOT take ANY diabetic medicine on the day of your procedure unless you were instructed by the doctor who manages your diabetes medicines.  Continue to check your blood sugars.  If you have an insulin pump, ask your endocrinologist for instructions at least 3 days before your procedure. NOTE: If you are not able to ask your endocrinologist in advance, on the day of the procedure set your insulin pump to your basal rate only. Bring your insulin pump supplies to the hospital.         History of Present Illness     Pre-Op Examination     Surgery: EXCISION OF LEFT EAR LOBULE, LOCAL FLAP VS SKIN GRAFT (Left: Ear)      EXCISION RIGHT MID-BACK BCC, LOCAL FLAP VS SKIN GRAFT (Right: Back)      SKIN GRAFT FULL THICKNESS  (FTSG) TRUNK (Right: Back)      SKIN GRAFT FULL THICKNESS  (FTSG) (Left: Ear)   Anticipated Date of Surgery: 5/5/25   Surgeon: Dr. Craig     Pre-operative Risk Factors:    - History of cerebrovascular disease: No    - History of ischemic heart disease: No    - History of congestive heart failure: No    - Pre-operative treatment with  insulin: No    - Pre-operative creatinine >2 mg/dL: No      Review of Systems   Constitutional: Negative.    HENT: Negative.     Eyes: Negative.    Respiratory: Negative.     Cardiovascular: Negative.    Gastrointestinal: Negative.    Endocrine: Negative.    Genitourinary: Negative.    Musculoskeletal: Negative.    Neurological: Negative.    Hematological: Negative.    Psychiatric/Behavioral: Negative.       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, grade 2 internal hemorrhoids, 1 adenomatous polyp    CA CYSTOURETHROSCOPY W/INTERNAL URETHROTOMY N/A 5/18/2023    Procedure: CYSTOSCOPY, DIRECT VISUAL INTERAL URETHROTOMY (DVIU), KENALOG INJECTION, BLADDER BIOPSY WITH FULGERATION;  Surgeon: Dimas Mendez MD;  Location: [facility];  Service: Urology    CA INS/RPLC PERPH SAC/GSTRC NPG/RCVR PCKT CRTJ&CONN N/A 4/13/2023    Procedure: STAGE 2 AXONIC, INSERTION OF NEUROSTIMULATOR, ELECTRONIC ANALYSIS OF NEUROSTIMULATOR;  Surgeon: Dimas Mendez MD;  Location: [facility];  Service: Urology    CA SPLT AGRFT T/A/L 1ST 100 SQCM/</1% BDY INFT/CHLD Left 6/22/2017    Procedure: ANTERIOR SHOULDER EXCISION BCC- COMPLEX CLOSURE vs FLAP vs STSG;  Surgeon: Lester Craig MD;  Location:  MAIN OR;  Service: Plastics    CA TRANSURETHRAL INCISION PROSTATE N/A 5/26/2022    Procedure: CYSTOSCOPY, TRANSURETHRAL INCISION OF PROSTATE (TUIP);  Surgeon: Dimas Mendez MD;  Location: [facility];  Service: Urology    SACRAL NERVE STIMULATOR PLACEMENT N/A  "2023    Procedure: PART 1, INCISION FOR IMPLANTATION OF NEUROSTIMULATOR, ELECTRONIC ANALYSIS NEUROSTIMULATOR, STAGE 1;  Surgeon: Dimas Mendez MD;  Location: [facility];  Service: Urology    SKIN LESION EXCISION N/A 2022    Procedure: EXCISION WIDE LESION TRUNK/ABDOMEN/BACK;  Surgeon: Lester Craig MD;  Location: [facility];  Service: Plastics     Family History   Problem Relation Age of Onset    Heart disease Mother     Diabetes Brother         mellitus     Colon polyps Brother     Throat cancer Sister     Colon cancer Neg Hx      Social History     Tobacco Use    Smoking status: Former     Current packs/day: 0.00     Average packs/day: 0.1 packs/day for 2.5 years (0.2 ttl pk-yrs)     Types: Cigarettes     Start date: 1975     Quit date: 1977     Years since quittin.8     Passive exposure: Past    Smokeless tobacco: Never    Tobacco comments:     never a smoker noted in \"allscripts\" - Denied: History of tobacco use noted in \"allscripts\"    Vaping Use    Vaping status: Never Used   Substance and Sexual Activity    Alcohol use: Not Currently    Drug use: No    Sexual activity: Not on file     Current Outpatient Medications on File Prior to Visit   Medication Sig    aspirin 81 MG tablet Take 81 mg by mouth as needed Last dose  5/10/23    atorvastatin (LIPITOR) 40 mg tablet Take 1 tablet (40 mg total) by mouth every evening    Empagliflozin (Jardiance) 25 MG TABS Take 1 tablet (25 mg total) by mouth daily    glipiZIDE (GLUCOTROL XL) 10 mg 24 hr tablet Take 1 tablet (10 mg total) by mouth daily    ketorolac (ACULAR) 0.5 % ophthalmic solution Didn't start yet    lisinopril (ZESTRIL) 5 mg tablet Take 1 tablet (5 mg total) by mouth daily    meclizine (ANTIVERT) 12.5 MG tablet Take 1 tablet (12.5 mg total) by mouth every 8 (eight) hours as needed for dizziness    metFORMIN (GLUCOPHAGE) 1000 MG tablet Take 1 tablet (1,000 mg total) by mouth 2 (two) times a day    mupirocin (BACTROBAN) 2 % ointment " "Apply topically 2 (two) times a day    ofloxacin (OCUFLOX) 0.3 % ophthalmic solution Didn't start yet    prednisoLONE acetate (PRED FORTE) 1 % ophthalmic suspension Didn't start yet    [DISCONTINUED] polyethylene glycol (GOLYTELY) 4000 mL solution Take 4,000 mL by mouth once for 1 dose     No Known Allergies  Objective   /68   Pulse 78   Temp (!) 97.1 °F (36.2 °C)   Resp 16   Ht 5' 6\" (1.676 m)   Wt 63.5 kg (140 lb)   BMI 22.60 kg/m²     Physical Exam  Constitutional:       General: He is not in acute distress.     Appearance: He is well-developed. He is not diaphoretic.   HENT:      Head: Normocephalic and atraumatic.      Right Ear: Hearing, tympanic membrane, ear canal and external ear normal.      Left Ear: Hearing, tympanic membrane, ear canal and external ear normal.      Nose: Nose normal.      Mouth/Throat:      Pharynx: No oropharyngeal exudate.   Eyes:      General: No scleral icterus.        Right eye: No discharge.         Left eye: No discharge.      Conjunctiva/sclera: Conjunctivae normal.      Pupils: Pupils are equal, round, and reactive to light.   Neck:      Thyroid: No thyromegaly.      Trachea: No tracheal deviation.   Cardiovascular:      Rate and Rhythm: Normal rate and regular rhythm.      Heart sounds: Normal heart sounds. No murmur heard.     No friction rub. No gallop.   Pulmonary:      Effort: Pulmonary effort is normal. No respiratory distress.      Breath sounds: Normal breath sounds. No wheezing or rales.   Chest:      Chest wall: No tenderness.   Abdominal:      General: Bowel sounds are normal. There is no distension.      Palpations: Abdomen is soft. There is no mass.      Tenderness: There is no abdominal tenderness. There is no guarding or rebound.   Musculoskeletal:         General: No tenderness or deformity. Normal range of motion.      Cervical back: Normal range of motion and neck supple.   Skin:     General: Skin is warm and dry.      Coloration: Skin is not pale. "      Findings: No erythema or rash.   Neurological:      Mental Status: He is alert and oriented to person, place, and time.      Motor: No abnormal muscle tone.      Coordination: Coordination normal.      Deep Tendon Reflexes: Reflexes normal.   Psychiatric:         Behavior: Behavior normal.         Thought Content: Thought content normal.         Judgment: Judgment normal.           Erica Latham MD

## 2025-04-24 NOTE — PRE-PROCEDURE INSTRUCTIONS
Pre-Surgery Instructions:   Medication Instructions    Empagliflozin (Jardiance) 25 MG TABS Stop taking 4 days prior to surgery.ld5/1/25    glipiZIDE (GLUCOTROL XL) 10 mg 24 hr tablet Hold day of surgery.    lisinopril (ZESTRIL) 5 mg tablet Hold day of surgery.    metFORMIN (GLUCOPHAGE) 1000 MG tablet Hold day of surgery.    Instructions reviewed with pt and pt's sister Carmencita. Had pt's sister write all instructions down and read back. Required review of instructions  mulyiple times.   Medication instructions for day of surgery reviewed. Please take all instructed medications with only a sip of water.       You will receive a call one business day prior to surgery with an arrival time and hospital directions. If your surgery is scheduled on a Monday, the hospital will be calling you on the Friday prior to your surgery. If you have not heard from anyone by 8pm, please call the hospital supervisor through the hospital  at [phone]. ([place] [phone] or [place] [phone]).    Do not eat or drink anything after midnight the night before your surgery, including candy, mints, lifesavers, or chewing gum. Do not drink alcohol 24hrs before your surgery. Try not to smoke at least 24hrs before your surgery.       Follow the pre surgery showering instructions as listed in the “My Surgical Experience Booklet” or otherwise provided by your surgeon's office. Do not use a blade to shave the surgical area 1 week before surgery. It is okay to use a clean electric clippers up to 24 hours before surgery. Do not apply any lotions, creams, including makeup, cologne, deodorant, or perfumes after showering on the day of your surgery. Do not use dry shampoo, hair spray, hair gel, or any type of hair products.     No contact lenses, eye make-up, or artificial eyelashes. Remove nail polish, including gel polish, and any artificial, gel, or acrylic nails if possible. Remove all jewelry including rings and body piercing  jewelry.     Wear causal clothing that is easy to take on and off. Consider your type of surgery.    Keep any valuables, jewelry, piercings at home. Please bring any specially ordered equipment (sling, braces) if indicated.    Arrange for a responsible person to drive you to and from the hospital on the day of your surgery. Please confirm the visitor policy for the day of your procedure when you receive your phone call with an arrival time.     Call the surgeon's office with any new illnesses, exposures, or additional questions prior to surgery.    Please reference your “My Surgical Experience Booklet” for additional information to prepare for your upcoming surgery.

## 2025-04-30 NOTE — H&P
Assessment/Plan:     Diagnoses and all orders for this visit:    Basal cell carcinoma (BCC), unspecified site  We discussed excision of left ear lobule BCC & right- mid chest BCC, local flap reconstruction vs skin graft. We also discussed risks including bleeding, scarring, infection & need for additional procedures if margins are involved. Pt is agreeable & consent was obtained.         Subjective:      Patient ID: Artur Santana is a 79 y.o. male.    HPI    Pt was referred by Dr. Bills for 2 BCC's, left ear lobule & right mid-chest. He is well known to us given he has a PMH of multiple skin cancers including melanoma, HTN, prostate cancer & diabetes. He is a non-smoker.     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    Functional diarrhea    Stage 3a chronic kidney disease (HCC)    Pseudopolyposis of colon without complication, unspecified part of colon (HCC)    Open wound of face    Abrasion of nose    Protein-calorie malnutrition, unspecified severity (HCC)    Vertigo    Presence of neurostimulator    Type 2 diabetes mellitus with diabetic chronic kidney disease (HCC)    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     Current Outpatient Medications on File Prior to Encounter   Medication Sig    Empagliflozin (Jardiance) 25 MG TABS Take 1 tablet (25 mg  total) by mouth daily    glipiZIDE (GLUCOTROL XL) 10 mg 24 hr tablet Take 1 tablet (10 mg total) by mouth daily    metFORMIN (GLUCOPHAGE) 1000 MG tablet Take 1 tablet (1,000 mg total) by mouth 2 (two) times a day    aspirin 81 MG tablet Take 81 mg by mouth as needed Last dose  5/10/23    atorvastatin (LIPITOR) 40 mg tablet Take 1 tablet (40 mg total) by mouth every evening    ketorolac (ACULAR) 0.5 % ophthalmic solution Didn't start yet    meclizine (ANTIVERT) 12.5 MG tablet Take 1 tablet (12.5 mg total) by mouth every 8 (eight) hours as needed for dizziness    mupirocin (BACTROBAN) 2 % ointment Apply topically 2 (two) times a day    ofloxacin (OCUFLOX) 0.3 % ophthalmic solution Didn't start yet    prednisoLONE acetate (PRED FORTE) 1 % ophthalmic suspension Didn't start yet    [DISCONTINUED] polyethylene glycol (GOLYTELY) 4000 mL solution Take 4,000 mL by mouth once for 1 dose     Family History   Problem Relation Age of Onset    Heart disease Mother     Diabetes Brother         mellitus     Colon polyps Brother     Throat cancer Sister     Colon cancer Neg Hx      Past Medical History:   Diagnosis Date    Acute pain of right shoulder 2022    Adenocarcinoma of prostate (HCC)     BCC    Anemia     Cancer (HCC)     Chronic kidney disease     Colon polyp     Diabetes mellitus (HCC)     History of Lyme disease     Hyperlipidemia     Hypertension     Proctitis     Prostate cancer (HCC)     Treated with radiation     Social History     Socioeconomic History    Marital status: Single     Spouse name: None    Number of children: None    Years of education: None    Highest education level: None   Occupational History    None   Tobacco Use    Smoking status: Former     Current packs/day: 0.00     Average packs/day: 0.1 packs/day for 2.5 years (0.2 ttl pk-yrs)     Types: Cigarettes     Start date: 1975     Quit date: 1977     Years since quittin.8     Passive exposure: Past    Smokeless tobacco: Never     "Tobacco comments:     never a smoker noted in \"allscripts\" - Denied: History of tobacco use noted in \"allscripts\"    Vaping Use    Vaping status: Never Used   Substance and Sexual Activity    Alcohol use: Not Currently    Drug use: No    Sexual activity: None   Other Topics Concern    None   Social History Narrative    None     Social Drivers of Health     Financial Resource Strain: High Risk (9/29/2023)    Overall Financial Resource Strain (CARDIA)     Difficulty of Paying Living Expenses: Hard   Food Insecurity: No Food Insecurity (10/15/2024)    Nursing - Inadequate Food Risk Classification     Worried About Running Out of Food in the Last Year: Never true     Ran Out of Food in the Last Year: Never true     Ran Out of Food in the Last Year: Not on file   Transportation Needs: No Transportation Needs (10/15/2024)    PRAPARE - Transportation     Lack of Transportation (Medical): No     Lack of Transportation (Non-Medical): No   Physical Activity: Not on file   Stress: Not on file   Social Connections: Not on file   Intimate Partner Violence: Not on file   Housing Stability: High Risk (10/15/2024)    Housing Stability Vital Sign     Unable to Pay for Housing in the Last Year: No     Number of Times Moved in the Last Year: 2     Homeless in the Last Year: No     Past Surgical History:   Procedure Laterality Date    COLONOSCOPY  01/10/2018    COLONOSCOPY  01/10/2018     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, grade 2 internal hemorrhoids, 1 adenomatous polyp    DE CYSTOURETHROSCOPY W/INTERNAL URETHROTOMY N/A 5/18/2023    Procedure: CYSTOSCOPY, DIRECT VISUAL INTERAL URETHROTOMY (DVIU), KENALOG INJECTION, BLADDER BIOPSY WITH FULGERATION;  Surgeon: Dimas Mendez MD;  Location: [facility];  Service: Urology    DE INS/RPLC PERPH SAC/GSTRC NPG/RCVR PCKT CRTJ&CONN N/A 4/13/2023    Procedure: STAGE 2 AXONIC, INSERTION OF NEUROSTIMULATOR, ELECTRONIC ANALYSIS OF NEUROSTIMULATOR;  Surgeon: Dimas Mendez MD;  " "Location: [facility];  Service: Urology    RI SPLT AGRFT T/A/L 1ST 100 SQCM/</1% BDY INFT/CHLD Left 6/22/2017    Procedure: ANTERIOR SHOULDER EXCISION BCC- COMPLEX CLOSURE vs FLAP vs STSG;  Surgeon: Lester Craig MD;  Location:  MAIN OR;  Service: Plastics    RI TRANSURETHRAL INCISION PROSTATE N/A 5/26/2022    Procedure: CYSTOSCOPY, TRANSURETHRAL INCISION OF PROSTATE (TUIP);  Surgeon: Dimas Mendez MD;  Location: [facility];  Service: Urology    SACRAL NERVE STIMULATOR PLACEMENT N/A 4/6/2023    Procedure: PART 1, INCISION FOR IMPLANTATION OF NEUROSTIMULATOR, ELECTRONIC ANALYSIS NEUROSTIMULATOR, STAGE 1;  Surgeon: Dimas Mendez MD;  Location: [facility];  Service: Urology    SKIN LESION EXCISION N/A 6/16/2022    Procedure: EXCISION WIDE LESION TRUNK/ABDOMEN/BACK;  Surgeon: Lester Craig MD;  Location: [facility];  Service: Plastics         Review of Systems   All other systems reviewed and are negative.        Objective:      Ht 5' 6\" (1.676 m)   Wt 63.5 kg (140 lb)   BMI 22.60 kg/m²          Physical Exam  Constitutional:       Appearance: Normal appearance. He is well-developed.   HENT:      Head: Normocephalic and atraumatic.      Ears:      Comments: Left ear lobule biopsy site, see photo  Eyes:      Conjunctiva/sclera: Conjunctivae normal.   Cardiovascular:      Rate and Rhythm: Normal rate and regular rhythm.   Pulmonary:      Effort: Pulmonary effort is normal.      Breath sounds: Normal breath sounds.      Comments: Right mid chest biopsy site, see photo.   Musculoskeletal:         General: Normal range of motion.      Cervical back: Normal range of motion.   Skin:     General: Skin is warm and dry.   Neurological:      Mental Status: He is alert and oriented to person, place, and time.   Psychiatric:         Mood and Affect: Mood normal.         Behavior: Behavior normal.         "

## 2025-05-04 NOTE — ANESTHESIA PREPROCEDURE EVALUATION
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)      /RENAL   (+) Adenocarcinoma of prostate (HCC)   (+) Stage 3a chronic kidney disease (HCC)   (+) Type 2 diabetes mellitus with diabetic chronic kidney disease (HCC)      HEMATOLOGY   (+) Anemia of chronic disease      Neurology/Sleep   (+) NPH (normal pressure hydrocephalus) (HCC)      Eye   (+) Type 2 diabetes mellitus with diabetic cataract, unspecified whether long term insulin use (HCC)      Other   (+) Dyslipidemia        Physical Exam    Airway    Mallampati score: III  TM Distance: >3 FB  Neck ROM: full     Dental        Cardiovascular  Cardiovascular exam normal    Pulmonary  Pulmonary exam normal     Other Findings        Anesthesia Plan  ASA Score- 3     Anesthesia Type- general with ASA Monitors.         Additional Monitors:     Airway Plan: ETT.           Plan Factors-Exercise tolerance (METS): >4 METS.    Chart reviewed. EKG reviewed.  Existing labs reviewed. Patient summary reviewed.    Patient is not a current smoker. Patient not instructed to abstain from smoking on day of procedure. Patient did not smoke on day of surgery.            Induction- intravenous.    Postoperative Plan- Plan for postoperative opioid use.     Perioperative Resuscitation Plan - Level 1 - Full Code.       Informed Consent- Anesthetic plan and risks discussed with patient and sibling.  I personally reviewed this patient with the CRNA. Discussed and agreed on the Anesthesia Plan  with the CRNA..      NPO Status:  No vitals data found for the desired time range.        Lab Results   Component Value Date    HGBA1C 7.7 (H) 04/21/2025       Lab Results   Component Value Date     12/18/2017    K 4.8 04/21/2025     04/21/2025    CO2 25 04/21/2025    BUN 19 04/21/2025    CREATININE 1.00 04/21/2025    GLUF 112 (H) 04/21/2025    CALCIUM 9.3 04/21/2025    AST 17 04/21/2025    ALT 12 04/21/2025    ALKPHOS 50 04/21/2025    PROT 6.5 12/18/2017    BILITOT 0.6 12/18/2017    EGFR 71 04/21/2025       Lab Results   Component Value Date    WBC 6.55 04/21/2025    HGB 12.5 04/21/2025    HCT 39.0 04/21/2025    MCV 91 04/21/2025     04/21/2025 June 2024 echo    Left Ventricle: Left ventricular cavity size is normal. Wall thickness is normal. The left ventricular ejection fraction is 65%. Systolic function is normal. Wall motion is normal. Diastolic function is normal.    Right Ventricle: Right ventricular cavity size is normal. Systolic function is normal. Wall thickness is normal.    Left Atrium: The atrium is mildly dilated (35-41 mL/m2).    Atrial Septum: No patent foramen ovale detected, confirmed by provocation with abdominal compression, using agitated saline contrast.     EKG NSR

## 2025-05-05 ENCOUNTER — HOSPITAL ENCOUNTER (OUTPATIENT)
Facility: HOSPITAL | Age: 80
Setting detail: OUTPATIENT SURGERY
Discharge: HOME | End: 2025-05-05
Attending: SURGERY | Admitting: SURGERY
Payer: MEDICARE

## 2025-05-05 ENCOUNTER — ANESTHESIA (OUTPATIENT)
Dept: PERIOP | Facility: HOSPITAL | Age: 80
End: 2025-05-05
Payer: MEDICARE

## 2025-05-05 VITALS
HEART RATE: 57 BPM | WEIGHT: 140 LBS | TEMPERATURE: 97.1 F | RESPIRATION RATE: 20 BRPM | OXYGEN SATURATION: 98 % | SYSTOLIC BLOOD PRESSURE: 145 MMHG | HEIGHT: 66 IN | DIASTOLIC BLOOD PRESSURE: 72 MMHG | BODY MASS INDEX: 22.5 KG/M2

## 2025-05-05 DIAGNOSIS — C44.219 BASAL CELL CARCINOMA (BCC) OF SKIN OF LEFT EAR: ICD-10-CM

## 2025-05-05 DIAGNOSIS — C44.519 BASAL CELL CARCINOMA (BCC) OF SKIN OF OTHER PART OF TORSO: ICD-10-CM

## 2025-05-05 PROBLEM — C44.91 BASAL CELL CARCINOMA: Status: ACTIVE | Noted: 2025-05-05

## 2025-05-05 LAB
GLUCOSE SERPL-MCNC: 172 MG/DL (ref 65–140)
GLUCOSE SERPL-MCNC: 182 MG/DL (ref 65–140)

## 2025-05-05 PROCEDURE — 14060 TIS TRNFR E/N/E/L 10 SQ CM/<: CPT | Performed by: PHYSICIAN ASSISTANT

## 2025-05-05 PROCEDURE — 82948 REAGENT STRIP/BLOOD GLUCOSE: CPT

## 2025-05-05 PROCEDURE — 14060 TIS TRNFR E/N/E/L 10 SQ CM/<: CPT | Performed by: SURGERY

## 2025-05-05 PROCEDURE — 14301 TIS TRNFR ANY 30.1-60 SQ CM: CPT | Performed by: SURGERY

## 2025-05-05 PROCEDURE — 14301 TIS TRNFR ANY 30.1-60 SQ CM: CPT | Performed by: PHYSICIAN ASSISTANT

## 2025-05-05 PROCEDURE — 88305 TISSUE EXAM BY PATHOLOGIST: CPT | Performed by: PATHOLOGY

## 2025-05-05 RX ORDER — HYDROMORPHONE HCL/PF 1 MG/ML
0.5 SYRINGE (ML) INJECTION
Status: DISCONTINUED | OUTPATIENT
Start: 2025-05-05 | End: 2025-05-05 | Stop reason: HOSPADM

## 2025-05-05 RX ORDER — FENTANYL CITRATE 50 UG/ML
INJECTION, SOLUTION INTRAMUSCULAR; INTRAVENOUS AS NEEDED
Status: DISCONTINUED | OUTPATIENT
Start: 2025-05-05 | End: 2025-05-05

## 2025-05-05 RX ORDER — LIDOCAINE HYDROCHLORIDE 10 MG/ML
INJECTION, SOLUTION EPIDURAL; INFILTRATION; INTRACAUDAL; PERINEURAL AS NEEDED
Status: DISCONTINUED | OUTPATIENT
Start: 2025-05-05 | End: 2025-05-05

## 2025-05-05 RX ORDER — MAGNESIUM HYDROXIDE 1200 MG/15ML
LIQUID ORAL AS NEEDED
Status: DISCONTINUED | OUTPATIENT
Start: 2025-05-05 | End: 2025-05-05 | Stop reason: HOSPADM

## 2025-05-05 RX ORDER — DEXAMETHASONE SODIUM PHOSPHATE 10 MG/ML
INJECTION, SOLUTION INTRAMUSCULAR; INTRAVENOUS AS NEEDED
Status: DISCONTINUED | OUTPATIENT
Start: 2025-05-05 | End: 2025-05-05

## 2025-05-05 RX ORDER — PROMETHAZINE HYDROCHLORIDE 25 MG/ML
12.5 INJECTION, SOLUTION INTRAMUSCULAR; INTRAVENOUS ONCE AS NEEDED
Status: DISCONTINUED | OUTPATIENT
Start: 2025-05-05 | End: 2025-05-05 | Stop reason: HOSPADM

## 2025-05-05 RX ORDER — PROPOFOL 10 MG/ML
INJECTION, EMULSION INTRAVENOUS AS NEEDED
Status: DISCONTINUED | OUTPATIENT
Start: 2025-05-05 | End: 2025-05-05

## 2025-05-05 RX ORDER — ROCURONIUM BROMIDE 10 MG/ML
INJECTION, SOLUTION INTRAVENOUS AS NEEDED
Status: DISCONTINUED | OUTPATIENT
Start: 2025-05-05 | End: 2025-05-05

## 2025-05-05 RX ORDER — PROPOFOL 10 MG/ML
INJECTION, EMULSION INTRAVENOUS CONTINUOUS PRN
Status: DISCONTINUED | OUTPATIENT
Start: 2025-05-05 | End: 2025-05-05

## 2025-05-05 RX ORDER — SODIUM CHLORIDE, SODIUM LACTATE, POTASSIUM CHLORIDE, CALCIUM CHLORIDE 600; 310; 30; 20 MG/100ML; MG/100ML; MG/100ML; MG/100ML
INJECTION, SOLUTION INTRAVENOUS CONTINUOUS PRN
Status: DISCONTINUED | OUTPATIENT
Start: 2025-05-05 | End: 2025-05-05

## 2025-05-05 RX ORDER — LIDOCAINE HYDROCHLORIDE AND EPINEPHRINE 10; 10 MG/ML; UG/ML
INJECTION, SOLUTION INFILTRATION; PERINEURAL AS NEEDED
Status: DISCONTINUED | OUTPATIENT
Start: 2025-05-05 | End: 2025-05-05 | Stop reason: HOSPADM

## 2025-05-05 RX ORDER — SODIUM CHLORIDE, SODIUM LACTATE, POTASSIUM CHLORIDE, CALCIUM CHLORIDE 600; 310; 30; 20 MG/100ML; MG/100ML; MG/100ML; MG/100ML
50 INJECTION, SOLUTION INTRAVENOUS CONTINUOUS
Status: DISCONTINUED | OUTPATIENT
Start: 2025-05-05 | End: 2025-05-05 | Stop reason: HOSPADM

## 2025-05-05 RX ORDER — ONDANSETRON 2 MG/ML
INJECTION INTRAMUSCULAR; INTRAVENOUS AS NEEDED
Status: DISCONTINUED | OUTPATIENT
Start: 2025-05-05 | End: 2025-05-05

## 2025-05-05 RX ORDER — CEFAZOLIN SODIUM 2 G/50ML
SOLUTION INTRAVENOUS AS NEEDED
Status: DISCONTINUED | OUTPATIENT
Start: 2025-05-05 | End: 2025-05-05

## 2025-05-05 RX ORDER — SODIUM CHLORIDE, SODIUM LACTATE, POTASSIUM CHLORIDE, CALCIUM CHLORIDE 600; 310; 30; 20 MG/100ML; MG/100ML; MG/100ML; MG/100ML
20 INJECTION, SOLUTION INTRAVENOUS CONTINUOUS
Status: DISCONTINUED | OUTPATIENT
Start: 2025-05-05 | End: 2025-05-05 | Stop reason: HOSPADM

## 2025-05-05 RX ORDER — EPHEDRINE SULFATE 50 MG/ML
INJECTION INTRAVENOUS AS NEEDED
Status: DISCONTINUED | OUTPATIENT
Start: 2025-05-05 | End: 2025-05-05

## 2025-05-05 RX ORDER — CEFAZOLIN SODIUM 2 G/50ML
2000 SOLUTION INTRAVENOUS ONCE
Status: DISCONTINUED | OUTPATIENT
Start: 2025-05-05 | End: 2025-05-05 | Stop reason: HOSPADM

## 2025-05-05 RX ORDER — FENTANYL CITRATE/PF 50 MCG/ML
25 SYRINGE (ML) INJECTION
Status: DISCONTINUED | OUTPATIENT
Start: 2025-05-05 | End: 2025-05-05 | Stop reason: HOSPADM

## 2025-05-05 RX ORDER — HYDROMORPHONE HCL IN WATER/PF 6 MG/30 ML
0.2 PATIENT CONTROLLED ANALGESIA SYRINGE INTRAVENOUS
Status: DISCONTINUED | OUTPATIENT
Start: 2025-05-05 | End: 2025-05-05 | Stop reason: HOSPADM

## 2025-05-05 RX ADMIN — EPHEDRINE SULFATE 5 MG: 50 INJECTION INTRAVENOUS at 12:55

## 2025-05-05 RX ADMIN — CEFAZOLIN SODIUM 2000 MG: 2 SOLUTION INTRAVENOUS at 12:41

## 2025-05-05 RX ADMIN — EPHEDRINE SULFATE 5 MG: 50 INJECTION INTRAVENOUS at 12:52

## 2025-05-05 RX ADMIN — ONDANSETRON 4 MG: 2 INJECTION, SOLUTION INTRAMUSCULAR; INTRAVENOUS at 12:53

## 2025-05-05 RX ADMIN — PROPOFOL 80 MCG/KG/MIN: 10 INJECTION, EMULSION INTRAVENOUS at 12:43

## 2025-05-05 RX ADMIN — FENTANYL CITRATE 50 MCG: 50 INJECTION INTRAMUSCULAR; INTRAVENOUS at 12:41

## 2025-05-05 RX ADMIN — PROPOFOL 150 MG: 10 INJECTION, EMULSION INTRAVENOUS at 12:41

## 2025-05-05 RX ADMIN — FENTANYL CITRATE 25 MCG: 50 INJECTION INTRAMUSCULAR; INTRAVENOUS at 13:25

## 2025-05-05 RX ADMIN — SUGAMMADEX 200 MG: 100 INJECTION, SOLUTION INTRAVENOUS at 13:13

## 2025-05-05 RX ADMIN — SODIUM CHLORIDE, SODIUM LACTATE, POTASSIUM CHLORIDE, AND CALCIUM CHLORIDE: .6; .31; .03; .02 INJECTION, SOLUTION INTRAVENOUS at 12:34

## 2025-05-05 RX ADMIN — ROCURONIUM BROMIDE 30 MG: 10 INJECTION, SOLUTION INTRAVENOUS at 12:41

## 2025-05-05 RX ADMIN — DEXAMETHASONE SODIUM PHOSPHATE 10 MG: 10 INJECTION INTRAMUSCULAR; INTRAVENOUS at 12:53

## 2025-05-05 RX ADMIN — LIDOCAINE HYDROCHLORIDE 50 MG: 10 INJECTION, SOLUTION EPIDURAL; INFILTRATION; INTRACAUDAL at 12:41

## 2025-05-05 NOTE — OP NOTE
OPERATIVE REPORT  PATIENT NAME: Artur Santana    :  1945  MRN: [id number]  Pt Location: AN OR ROOM 03    SURGERY DATE: 2025    Surgeons and Role:     * Lester Craig MD - Primary     * Kecia Phoenix PA-C - Assisting    Preop Diagnosis:  Basal cell carcinoma (BCC) of skin of right chest  Basal cell carcinoma (BCC) of skin of left ear [C44.219]    Postop diagnosis: Same as pre-op diagnosis    Procedure(s):  #1 excision of basal cell carcinoma of left ear 1.4 cm #2 adjacent tissue transfer/local flap reconstruction left ear 1.4 cm x 1 cm #2 excision of basal cell carcinoma of the right upper chest 8 cm #4 adjacent tissue transfer/local flap reconstruction right upper chest 8 cm x 4 cm    Specimen(s):  ID Type Source Tests Collected by Time Destination   1 : see clinical comments Tissue Ear, Left TISSUE EXAM Lester Craig MD 2025 1301    2 : see clinical comments Tissue Soft Tissue, Other TISSUE EXAM Lester Craig MD 2025 1306        Estimated Blood Loss:   Minimal    Drains:  * No LDAs found *    Anesthesia Type:   General    Operative Indications:  Basal cell carcinoma (BCC) of skin of other part of torso [C44.519]  Basal cell carcinoma (BCC) of skin of left ear [C44.219]  See preop diagnosis    Operative Findings:  As above      Complications:   None    Procedure and Technique:  Artur was seen preoperatively in the holding area, the surgical sites were marked with his participation, and I reviewed with him the planned procedures, potential risks, complications, and limitations.  He was taken to the operating room and underwent induction of general anesthesia by the anesthesia personnel.  The operative field was prepped and draped in sterile fashion and a proper timeout was performed.  2.5 loupe magnification was used throughout the procedure to aid in visualization.  Both the left ear and right chest surgical sites were marked to be excised to include a margin of  normal-appearing skin and local anesthesia was administered.  Starting at the left ear, the skin incisions were created with a 15 blade, carried down through the dermis and then the resection was completed utilizing curved iris scissors and the fine-tipped Bovie cautery.  The specimen was marked with sutures for orientation and placed in formalin.  Hemostasis was assured with the Bovie cautery.  Given the size and location of the defect, reconstruction was then planned with adjacent tissue transfer techniques.  Flaps were developed posterior and anterior to the defect, the incisions were created with a 15 blade, carried down through the dermis, dissection then proceeded in the plane of subcutaneous fat utilizing fine-tipped Bovie cautery.  The Bovie cautery was used for hemostasis.  Once hemostasis was adequate the wound was again irrigated.  The flaps were then advanced to fill the defect.  Closure was then accomplished utilizing 5-0 Vicryl sutures which were placed in an inverted, buried fashion to repair the deep layer, this was followed by additional five 0-0 sutures buried at the level of the deep dermis.  The flap skin margins were then reapproximated utilizing 5-0 nylon sutures placed in interrupted fashion.  The flap margins appeared to be fully viable and the wound was protected with bacitracin and Xeroform.  Attention was then turned to the right chest.  The skin incisions were created with a 15 blade, carried down to the dermis and into the plane of subcutaneous fat.  Dissection then proceeded full-thickness into the plane of subcutaneous fat down to the underlying muscle fascia.  The specimen was elevated from the underlying fascia utilizing a fine tip Bovie cautery.  The specimen was marked with sutures for orientation and placed in formalin.  The wound was copiously irrigated and hemostasis assured.  Given the size, location and depth of the defect, reconstruction was then planned with adjacent tissue  transfer techniques.  Flaps were developed medial and lateral to the defect, the incisions created with a 15 blade and carried down through the dermis.  Dissection then proceeded for the subcutaneous tissue utilizing the Bovie cautery.  The flaps were elevated from distal to proximal toward their base at the level of the superficial fascia utilizing the Bovie cautery.  The Bovie was used throughout for hemostasis.  Once adequate flap elevation/dissection had been completed the wound was copiously irrigated.  The flaps were then advanced toward the central aspect of the defect and closure was accomplished with 3-0 PDS sutures.  At the level of the deep dermis.  This was followed by 5-0 Vicryl, also buried at the level of the deep dermis, and the final layer of closure consisted of a running subcuticular strata fix.  The flap margins appeared to be fully viable and the wound was protected with benzoin Steri-Strips and a light pressure dressing.  The patient was then transferred to the recovery room under the care of the anesthesia personnel.   I was present for the entire procedure. and A qualified resident physician was not available.  The physician assistant provided assistance with retraction, exposure, hemostasis and wound closure.    Patient Disposition:  PACU                SIGNATURE: Lester Craig MD  DATE: May 5, 2025  TIME: 1:55 PM

## 2025-05-05 NOTE — INTERVAL H&P NOTE
H&P reviewed. After examining the patient I find no changes in the patients condition since the H&P had been written.    Vitals:    05/05/25 1026   BP: (!) 185/84   Pulse: (!) 51   Resp: 18   Temp: (!) 97.1 °F (36.2 °C)   SpO2: 100%

## 2025-05-05 NOTE — DISCHARGE INSTR - AVS FIRST PAGE
[facility]  Dr. CYRUS Craig Jr.  [address] [place] [address]  Phone: [phone]     Postoperative Instructions for Outpatient Surgery     These instructions are being provided by your doctor to give you basic guidelines during your post-op recovery. Please let our office know if your contact information has changed.      Please call the office today for an appointment in 12 days for postoperative care     Dressings: You can remove the yellow dressing tomorrow. You can remove the plastic dressing tomorrow. Leave steri-strip in place     Activity Restrictions: No strenuous activity     Bathing: You can shower tomorrow     Medications:    Resume pre-op medications.   You may take tylenol, aleve, or ibuprofen for pain control                 Other: Apply Bacitracin to ear 4 times a day for 5 days

## 2025-05-05 NOTE — ANESTHESIA POSTPROCEDURE EVALUATION
Post-Op Assessment Note    CV Status:  Stable    Pain management: adequate       Mental Status:  Alert   Hydration Status:  Stable   PONV Controlled:  None   Airway Patency:  Patent and adequate  Two or more mitigation strategies used for obstructive sleep apnea   Post Op Vitals Reviewed: Yes    No anethesia notable event occurred.    Staff: CRNA           Last Filed PACU Vitals:  Vitals Value Taken Time   Temp 97.0 F    Pulse 60    /75    Resp 16    SpO2 100%

## 2025-05-05 NOTE — ANESTHESIA POSTPROCEDURE EVALUATION
Post-Op Assessment Note    CV Status:  Stable  Pain Score: 2    Pain management: adequate       Mental Status:  Alert and awake   Hydration Status:  Euvolemic   PONV Controlled:  Controlled   Airway Patency:  Patent     Post Op Vitals Reviewed: Yes    No anethesia notable event occurred.    Staff: Anesthesiologist           Last Filed PACU Vitals:  Vitals Value Taken Time   Temp 97.1 °F (36.2 °C) 05/05/25 1400   Pulse 53 05/05/25 1400   /70 05/05/25 1400   Resp 21 05/05/25 1400   SpO2 100 % 05/05/25 1400   Vitals shown include unfiled device data.    Modified Brenda:     Vitals Value Taken Time   Activity 2 05/05/25 1400   Respiration 2 05/05/25 1400   Circulation 2 05/05/25 1400   Consciousness 2 05/05/25 1400   Oxygen Saturation 2 05/05/25 1400     Modified Brenda Score: 10

## 2025-05-09 PROCEDURE — 88305 TISSUE EXAM BY PATHOLOGIST: CPT | Performed by: PATHOLOGY

## 2025-05-13 ENCOUNTER — OFFICE VISIT (OUTPATIENT)
Age: 80
End: 2025-05-13

## 2025-05-13 DIAGNOSIS — C44.519 BASAL CELL CARCINOMA (BCC) OF SKIN OF OTHER PART OF TORSO: ICD-10-CM

## 2025-05-13 DIAGNOSIS — C44.219 BASAL CELL CARCINOMA (BCC) OF SKIN OF LEFT EAR: Primary | ICD-10-CM

## 2025-05-13 PROCEDURE — 99024 POSTOP FOLLOW-UP VISIT: CPT | Performed by: PHYSICIAN ASSISTANT

## 2025-05-13 NOTE — PROGRESS NOTES
Assessment/Plan:     Diagnoses and all orders for this visit:    Basal cell carcinoma (BCC) of skin of left ear  Basal cell carcinoma (BCC) of skin of other part of torso  He underwent excision of BCC left ear, adjacent tissue transfer/ local flap reconstruction left ear, excision of BCC right upper chest, adjacent tissue transfer/ local flap reconstruction right upper chest on 5/5 by Dr. Craig. Pathology showed no residual BCC. Left ear flap is well perfused. Sutures removed. Right chest wall flap is well perfused. We will see him back in 4-6 weeks.         Subjective:      Patient ID: Artur Santana is a 79 y.o. male.    HPI    Pt is here for a post-op visit. He underwent excision of BCC left ear, adjacent tissue transfer/ local flap reconstruction left ear, excision of BCC right upper chest, adjacent tissue transfer/ local flap reconstruction right upper chest on 5/5 by Dr. Craig. Pathology showed no residual BCC.     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    Functional diarrhea    Stage 3a chronic kidney disease (HCC)    Pseudopolyposis of colon without complication, unspecified part of colon (HCC)    Open wound of face    Abrasion of nose    Protein-calorie malnutrition, unspecified severity (HCC)    Vertigo    Presence of neurostimulator    Type 2 diabetes mellitus with diabetic chronic kidney disease (HCC)    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)    NPH  (normal pressure hydrocephalus) (HCC)    Basal cell carcinoma     No Known Allergies  Current Outpatient Medications on File Prior to Visit   Medication Sig    aspirin 81 MG tablet Take 81 mg by mouth as needed Last dose  5/10/23    atorvastatin (LIPITOR) 40 mg tablet Take 1 tablet (40 mg total) by mouth every evening    Empagliflozin (Jardiance) 25 MG TABS Take 1 tablet (25 mg total) by mouth daily    glipiZIDE (GLUCOTROL XL) 10 mg 24 hr tablet Take 1 tablet (10 mg total) by mouth daily    ketorolac (ACULAR) 0.5 % ophthalmic solution Didn't start yet    lisinopril (ZESTRIL) 5 mg tablet Take 1 tablet (5 mg total) by mouth daily    meclizine (ANTIVERT) 12.5 MG tablet Take 1 tablet (12.5 mg total) by mouth every 8 (eight) hours as needed for dizziness    metFORMIN (GLUCOPHAGE) 1000 MG tablet Take 1 tablet (1,000 mg total) by mouth 2 (two) times a day    mupirocin (BACTROBAN) 2 % ointment Apply topically 2 (two) times a day    ofloxacin (OCUFLOX) 0.3 % ophthalmic solution Didn't start yet    prednisoLONE acetate (PRED FORTE) 1 % ophthalmic suspension Didn't start yet    [DISCONTINUED] polyethylene glycol (GOLYTELY) 4000 mL solution Take 4,000 mL by mouth once for 1 dose     No current facility-administered medications on file prior to visit.     Family History   Problem Relation Age of Onset    Heart disease Mother     Diabetes Brother         mellitus     Colon polyps Brother     Throat cancer Sister     Colon cancer Neg Hx      Past Medical History:   Diagnosis Date    Acute pain of right shoulder 03/21/2022    Adenocarcinoma of prostate (HCC)     BCC    Anemia     Cancer (HCC)     Chronic kidney disease     Colon polyp     Diabetes mellitus (HCC)     History of Lyme disease     Hyperlipidemia     Hypertension     Proctitis     Prostate cancer (HCC)     Treated with radiation     Social History     Socioeconomic History    Marital status: Single     Spouse name: Not on file    Number of children: Not on file    Years  "of education: Not on file    Highest education level: Not on file   Occupational History    Not on file   Tobacco Use    Smoking status: Former     Current packs/day: 0.00     Average packs/day: 0.1 packs/day for 2.5 years (0.2 ttl pk-yrs)     Types: Cigarettes     Start date: 1975     Quit date: 1977     Years since quittin.9     Passive exposure: Past    Smokeless tobacco: Never    Tobacco comments:     never a smoker noted in \"allscripts\" - Denied: History of tobacco use noted in \"allscripts\"    Vaping Use    Vaping status: Never Used   Substance and Sexual Activity    Alcohol use: Not Currently    Drug use: No    Sexual activity: Not on file   Other Topics Concern    Not on file   Social History Narrative    Not on file     Social Drivers of Health     Financial Resource Strain: High Risk (2023)    Overall Financial Resource Strain (CARDIA)     Difficulty of Paying Living Expenses: Hard   Food Insecurity: No Food Insecurity (10/15/2024)    Nursing - Inadequate Food Risk Classification     Worried About Running Out of Food in the Last Year: Never true     Ran Out of Food in the Last Year: Never true     Ran Out of Food in the Last Year: Not on file   Transportation Needs: No Transportation Needs (10/15/2024)    PRAPARE - Transportation     Lack of Transportation (Medical): No     Lack of Transportation (Non-Medical): No   Physical Activity: Not on file   Stress: Not on file   Social Connections: Not on file   Intimate Partner Violence: Not on file   Housing Stability: High Risk (10/15/2024)    Housing Stability Vital Sign     Unable to Pay for Housing in the Last Year: No     Number of Times Moved in the Last Year: 2     Homeless in the Last Year: No     Past Surgical History:   Procedure Laterality Date    COLONOSCOPY  01/10/2018    COLONOSCOPY  01/10/2018     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, grade 2 internal hemorrhoids, 1 adenomatous polyp    RI CYSTOURETHROSCOPY W/INTERNAL " URETHROTOMY N/A 5/18/2023    Procedure: CYSTOSCOPY, DIRECT VISUAL INTERAL URETHROTOMY (DVIU), KENALOG INJECTION, BLADDER BIOPSY WITH FULGERATION;  Surgeon: Dimas Mendez MD;  [facility];  Service: Urology    VT EXCISION MALIGNANT LESION F/E/E/N/L >4.0 CM Left 5/5/2025    Procedure: EXCISION OF LEFT EAR BCC LOBULE, local flap;  Surgeon: Lester Craig MD;  [facility];  Service: Plastics    VT EXCISION MALIGNANT LESION TRUNK/ARM/LEG > 4.0 CM Right 5/5/2025    Procedure: EXCISION RIGHT UPPER CHEST BCC WITH LOCAL FLAP;  Surgeon: Lester Craig MD;  [facility];  Service: Plastics    VT INS/RPLC PERPH SAC/GSTRC NPG/RCVR PCKT CRTJ&CONN N/A 4/13/2023    Procedure: STAGE 2 AXONIC, INSERTION OF NEUROSTIMULATOR, ELECTRONIC ANALYSIS OF NEUROSTIMULATOR;  Surgeon: Dimas Mendez MD;  [facility];  Service: Urology    VT SPLT AGRFT T/A/L 1ST 100 SQCM/</1% BDY INFT/CHLD Left 6/22/2017    Procedure: ANTERIOR SHOULDER EXCISION BCC- COMPLEX CLOSURE vs FLAP vs STSG;  Surgeon: Lester Craig MD;  Location:  MAIN OR;  Service: Plastics    VT TRANSURETHRAL INCISION PROSTATE N/A 5/26/2022    Procedure: CYSTOSCOPY, TRANSURETHRAL INCISION OF PROSTATE (TUIP);  Surgeon: Dimas Mendez MD;  [facility];  Service: Urology    SACRAL NERVE STIMULATOR PLACEMENT N/A 4/6/2023    Procedure: PART 1, INCISION FOR IMPLANTATION OF NEUROSTIMULATOR, ELECTRONIC ANALYSIS NEUROSTIMULATOR, STAGE 1;  Surgeon: Dimas Mendez MD;  [facility];  Service: Urology    SKIN LESION EXCISION N/A 6/16/2022    Procedure: EXCISION WIDE LESION TRUNK/ABDOMEN/BACK;  Surgeon: Lester Craig MD;  Location:  [facility];  Service: Plastics         Review of Systems   All other systems reviewed and are negative.        Objective:      There were no vitals taken for this visit.         Physical Exam  Constitutional:       Appearance: Normal appearance. He is well-developed.   HENT:      Head: Normocephalic  and atraumatic.      Comments: Left ear flap is well perfused. Sutures removed. See photo.   Eyes:      Conjunctiva/sclera: Conjunctivae normal.   Pulmonary:      Effort: Pulmonary effort is normal.   Musculoskeletal:         General: Normal range of motion.      Cervical back: Normal range of motion.   Skin:     General: Skin is warm and dry.      Comments: Right chest wall flap is well perfused. See photo.    Neurological:      Mental Status: He is alert and oriented to person, place, and time.   Psychiatric:         Mood and Affect: Mood normal.         Behavior: Behavior normal.

## 2025-05-22 ENCOUNTER — TELEPHONE (OUTPATIENT)
Dept: NEUROLOGY | Facility: CLINIC | Age: 80
End: 2025-05-22

## 2025-05-22 NOTE — TELEPHONE ENCOUNTER
Spoke with sister and informed her that Dr. Segundo would like for PT to get his MRI complete before the next visit. Provided central scheduling number and told her to call us back whether he is able to get his MRI complete in time and if they need to reschedule for any reason. Gave her call back number as well.

## 2025-06-05 ENCOUNTER — OFFICE VISIT (OUTPATIENT)
Dept: URGENT CARE | Facility: CLINIC | Age: 80
End: 2025-06-05
Payer: MEDICARE

## 2025-06-05 VITALS
HEART RATE: 60 BPM | BODY MASS INDEX: 23.4 KG/M2 | DIASTOLIC BLOOD PRESSURE: 60 MMHG | SYSTOLIC BLOOD PRESSURE: 102 MMHG | WEIGHT: 145 LBS | OXYGEN SATURATION: 99 % | RESPIRATION RATE: 14 BRPM

## 2025-06-05 DIAGNOSIS — R58 ECCHYMOSIS: Primary | ICD-10-CM

## 2025-06-05 PROCEDURE — 99213 OFFICE O/P EST LOW 20 MIN: CPT | Performed by: PHYSICIAN ASSISTANT

## 2025-06-05 NOTE — PROGRESS NOTES
:  Assessment & Plan  Ecchymosis         Areas are consistent with bruising in an elderly patient.  PCP follow-up if not improving.    History of Present Illness     Artur Santana is a 79 y.o. male   Patient is a 79 year old male with a history of BCC and anemia of chronic disease presenting today due to new skin lesions he noticed on his L  arm this morning. Patient reports normal lesions of skin however, this morning noticed the lesions were more red than normal.  Also has one on the right thumb.  He denies pain or itching. He notes he commonly bumps his arm on things and this occurs and the lesions resolve on their own. He reports increased bruising but denies excessive bleeding. Denies fevers, chills, headaches, dizziness or vision changes. Denies SOB, chest pain or claudication.       Review of Systems   Constitutional:  Negative for chills and fever.   Respiratory:  Negative for cough, chest tightness and shortness of breath.    Cardiovascular:  Negative for chest pain, palpitations and leg swelling.   Gastrointestinal:  Negative for abdominal pain, nausea and vomiting.   Musculoskeletal:  Negative for arthralgias and myalgias.   Skin:  Positive for color change. Negative for pallor and wound.   Neurological:  Negative for dizziness, light-headedness, numbness and headaches.   Hematological:  Bruises/bleeds easily (specifically bruising).     Objective   /60   Pulse 60   Resp 14   Wt 65.8 kg (145 lb)   SpO2 99%   BMI 23.40 kg/m²      Physical Exam  Constitutional:       General: He is not in acute distress.     Appearance: Normal appearance. He is normal weight. He is not toxic-appearing or diaphoretic.   HENT:      Head: Normocephalic and atraumatic.     Cardiovascular:      Rate and Rhythm: Normal rate and regular rhythm.      Pulses: Normal pulses.      Heart sounds: Normal heart sounds.   Pulmonary:      Effort: Pulmonary effort is normal.      Breath sounds: Normal breath sounds. No  wheezing, rhonchi or rales.     Skin:     General: Skin is warm and dry.      Capillary Refill: Capillary refill takes less than 2 seconds.      Findings: Bruising and lesion present.           Comments: 2 areas on the left arm with ecchymosis/erythema.  1 smaller area on the patient's right thumb.     Neurological:      General: No focal deficit present.      Mental Status: He is alert and oriented to person, place, and time.     Psychiatric:         Mood and Affect: Mood normal.         Behavior: Behavior normal.

## 2025-06-14 ENCOUNTER — OFFICE VISIT (OUTPATIENT)
Dept: URGENT CARE | Facility: CLINIC | Age: 80
End: 2025-06-14
Payer: MEDICARE

## 2025-06-14 VITALS
OXYGEN SATURATION: 100 % | BODY MASS INDEX: 21.34 KG/M2 | WEIGHT: 132.2 LBS | RESPIRATION RATE: 28 BRPM | SYSTOLIC BLOOD PRESSURE: 161 MMHG | DIASTOLIC BLOOD PRESSURE: 70 MMHG | HEART RATE: 53 BPM | TEMPERATURE: 96.8 F

## 2025-06-14 DIAGNOSIS — R42 DIZZINESS: Primary | ICD-10-CM

## 2025-06-14 LAB — GLUCOSE SERPL-MCNC: 240 MG/DL (ref 65–140)

## 2025-06-14 PROCEDURE — 99213 OFFICE O/P EST LOW 20 MIN: CPT | Performed by: PREVENTIVE MEDICINE

## 2025-06-14 RX ORDER — MECLIZINE HYDROCHLORIDE 25 MG/1
25 TABLET ORAL EVERY 8 HOURS PRN
Qty: 15 TABLET | Refills: 0 | Status: SHIPPED | OUTPATIENT
Start: 2025-06-14

## 2025-06-14 NOTE — PROGRESS NOTES
[facility]        NAME: Artur Santana is a 79 y.o. male  : 1945    MRN: [id number]  DATE: 2025  TIME: 9:26 AM    Assessment and Plan   Dizziness [R42]  1. Dizziness  Fingerstick Glucose (POCT)            Patient Instructions       Follow up with PCP in 3-5 days.  Proceed to  ER if symptoms worsen.    If tests have been performed at [facility], our office will contact you with results if changes need to be made to the care plan discussed with you at the visit.  [facility]    Chief Complaint     Chief Complaint   Patient presents with    Dizziness     Reports dizziness last night and 1 x last week. Denies loss of consciousness or falling but dose say he need to brace himself on the wall. States he has been worked up for these symptoms about a year ago and was given medication but is unsure of what.          History of Present Illness       Longstanding history of vertigo and dizziness and lightheadedness.  Yesterday he had a bout of lightheadedness perhaps vertigo.  Today he feels stable and normal.    Dizziness        Review of Systems   Review of Systems   Neurological:  Positive for dizziness.         Current Medications     Current Medications[1]    Current Allergies     Allergies as of 2025    (No Known Allergies)            The following portions of the patient's history were reviewed and updated as appropriate: allergies, current medications, past family history, past medical history, past social history, past surgical history and problem list.     Past Medical History[2]    Past Surgical History[3]    Family History[4]      Medications have been verified.        Objective   /70 Comment: declines manual recheck  Pulse (!) 53   Temp (!) 96.8 °F (36 °C) (Tympanic)   Resp (!) 28   Wt 60 kg (132 lb 3.2 oz)   SpO2 100%   BMI 21.34 kg/m²   No LMP for male patient.       Physical Exam     Physical Exam    Neurological:       General: No focal deficit present.      Cranial Nerves: No cranial nerve deficit.      Motor: No weakness.      Coordination: Coordination normal.      Gait: Gait normal.                          [1]   Current Outpatient Medications:     aspirin 81 MG tablet, Take 81 mg by mouth as needed Last dose  5/10/23 (Patient taking differently: Take 81 mg by mouth as needed Last dose  5/10/23), Disp: , Rfl:     atorvastatin (LIPITOR) 40 mg tablet, Take 1 tablet (40 mg total) by mouth every evening, Disp: 30 tablet, Rfl: 0    glipiZIDE (GLUCOTROL XL) 10 mg 24 hr tablet, Take 1 tablet (10 mg total) by mouth daily, Disp: 30 tablet, Rfl: 5    lisinopril (ZESTRIL) 5 mg tablet, Take 1 tablet (5 mg total) by mouth daily, Disp: 90 tablet, Rfl: 0    metFORMIN (GLUCOPHAGE) 1000 MG tablet, Take 1 tablet (1,000 mg total) by mouth 2 (two) times a day, Disp: 180 tablet, Rfl: 1    Empagliflozin (Jardiance) 25 MG TABS, Take 1 tablet (25 mg total) by mouth daily, Disp: 90 tablet, Rfl: 0    ketorolac (ACULAR) 0.5 % ophthalmic solution, Didn't start yet (Patient not taking: Reported on 6/5/2025), Disp: , Rfl:     meclizine (ANTIVERT) 12.5 MG tablet, Take 1 tablet (12.5 mg total) by mouth every 8 (eight) hours as needed for dizziness (Patient not taking: Reported on 6/14/2025), Disp: 30 tablet, Rfl: 0    mupirocin (BACTROBAN) 2 % ointment, Apply topically 2 (two) times a day (Patient not taking: Reported on 6/5/2025), Disp: 22 g, Rfl: 0    ofloxacin (OCUFLOX) 0.3 % ophthalmic solution, Didn't start yet (Patient not taking: Reported on 6/5/2025), Disp: , Rfl:     prednisoLONE acetate (PRED FORTE) 1 % ophthalmic suspension, Didn't start yet (Patient not taking: Reported on 6/5/2025), Disp: , Rfl:   [2]   Past Medical History:  Diagnosis Date    Acute pain of right shoulder 03/21/2022    Adenocarcinoma of prostate (HCC)     BCC    Anemia     Cancer (HCC)     Chronic kidney disease     Colon polyp     Diabetes mellitus (HCC)     History of Lyme  disease     Hyperlipidemia     Hypertension     Proctitis     Prostate cancer (HCC)     Treated with radiation   [3]   Past Surgical History:  Procedure Laterality Date    COLONOSCOPY  01/10/2018    COLONOSCOPY  01/10/2018     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, grade 2 internal hemorrhoids, 1 adenomatous polyp    EYE SURGERY      NH CYSTOURETHROSCOPY W/INTERNAL URETHROTOMY N/A 05/18/2023    Procedure: CYSTOSCOPY, DIRECT VISUAL INTERAL URETHROTOMY (DVIU), KENALOG INJECTION, BLADDER BIOPSY WITH FULGERATION;  Surgeon: Dimas Mendez MD;  [facility];  Service: Urology    NH EXCISION MALIGNANT LESION F/E/E/N/L >4.0 CM Left 05/05/2025    Procedure: EXCISION OF LEFT EAR BCC LOBULE, local flap;  Surgeon: Lester Craig MD;  [facility];  Service: Plastics    NH EXCISION MALIGNANT LESION TRUNK/ARM/LEG > 4.0 CM Right 05/05/2025    Procedure: EXCISION RIGHT UPPER CHEST BCC WITH LOCAL FLAP;  Surgeon: Lester Craig MD;  [facility];  Service: Plastics    NH INS/RPLC PERPH SAC/GSTRC NPG/RCVR PCKT CRTJ&CONN N/A 04/13/2023    Procedure: STAGE 2 AXONIC, INSERTION OF NEUROSTIMULATOR, ELECTRONIC ANALYSIS OF NEUROSTIMULATOR;  Surgeon: Dimas Mendez MD;  [facility];  Service: Urology    NH SPLT AGRFT T/A/L 1ST 100 SQCM/</1% BDY INFT/CHLD Left 06/22/2017    Procedure: ANTERIOR SHOULDER EXCISION BCC- COMPLEX CLOSURE vs FLAP vs STSG;  Surgeon: Lester Craig MD;  Location:  MAIN OR;  Service: Plastics    NH TRANSURETHRAL INCISION PROSTATE N/A 05/26/2022    Procedure: CYSTOSCOPY, TRANSURETHRAL INCISION OF PROSTATE (TUIP);  Surgeon: Dimas Mendez MD;  [facility];  Service: Urology    SACRAL NERVE STIMULATOR PLACEMENT N/A 04/06/2023    Procedure: PART 1, INCISION FOR IMPLANTATION OF NEUROSTIMULATOR, ELECTRONIC ANALYSIS NEUROSTIMULATOR, STAGE 1;  Surgeon: Dimas Mendez MD;  [facility];  Service: Urology    SKIN LESION EXCISION N/A 06/16/2022     Procedure: EXCISION WIDE LESION TRUNK/ABDOMEN/BACK;  Surgeon: Lester Craig MD;  [facility]: [facility];  Service: Plastics   [4]   Family History  Problem Relation Name Age of Onset    Heart disease Mother      Diabetes Brother          mellitus     Colon polyps Brother      Throat cancer Sister      Colon cancer Neg Hx

## 2025-06-19 ENCOUNTER — OFFICE VISIT (OUTPATIENT)
Age: 80
End: 2025-06-19

## 2025-06-19 DIAGNOSIS — C44.519 BASAL CELL CARCINOMA (BCC) OF SKIN OF OTHER PART OF TORSO: ICD-10-CM

## 2025-06-19 DIAGNOSIS — C44.219 BASAL CELL CARCINOMA (BCC) OF SKIN OF LEFT EAR: Primary | ICD-10-CM

## 2025-06-19 PROCEDURE — 99024 POSTOP FOLLOW-UP VISIT: CPT

## 2025-06-19 NOTE — PROGRESS NOTES
[facility]  [address], [address], [place] [address]  [phone]    Patient Identification: Artur Santana is a 79 y.o. male     History of Present Illness: The patient is a 79 y.o.  year-old male  who presents to the office for a post-op visit. Patient is 45 days s/p EXCISION OF LEFT EAR BCC LOBULE, local flap - Left and Excision Right Upper Chest Bcc With Local Flap - Right  on 5/5/2025 by Dr. Craig.  Pathology showed no residual BCC. Patient has no complaints at this time.    Past Medical History[1]       Review of Systems  Constitutional: Denies fevers, chills or pain.  Skin: Denies any warmth, erythema, edema or mucopurulent drainage.     Physical Exam  General: AAOx3, cooperative, pleasant  Skin: Right chest scar healing well, flat, white, no hypertrophy or hyperpigmentation. Left ear flap perfused, scar well healed. No signs of infection.    Assessment and Plan:  The patient is an 79 y.o.  year-old male who presents to the office for a post-op visit. Patient is 45 days s/p EXCISION OF LEFT EAR BCC LOBULE, local flap - Left and Excision Right Upper Chest Bcc With Local Flap - Right  on 5/5/2025 by Dr. Craig      -At today's visit right chest scar and left ear evaluated, healing well, no concerns. Patient may continue to moisturize daily. Wear sunscreens when outdoors. Continue with regular skin exams with dermatology.  -The patient is to return for a scar check in 3 months or as needed at this time.  -The patient is to call the office with any questions or concerns. All of the patient's questions were answered at this time and they agree with the plan of care.      Cici Oh PA-C  [facility]           [1]   Past Medical History:  Diagnosis Date    Acute pain of right shoulder 03/21/2022    Adenocarcinoma of prostate (HCC)     BCC    Anemia     Cancer (HCC)     Chronic kidney disease     Colon polyp      Diabetes mellitus (HCC)     History of Lyme disease     Hyperlipidemia     Hypertension     Proctitis     Prostate cancer (HCC)     Treated with radiation

## 2025-06-30 DIAGNOSIS — E11.36 TYPE 2 DIABETES MELLITUS WITH DIABETIC CATARACT, UNSPECIFIED WHETHER LONG TERM INSULIN USE (HCC): ICD-10-CM

## 2025-06-30 RX ORDER — GLIPIZIDE 10 MG/1
10 TABLET, FILM COATED, EXTENDED RELEASE ORAL DAILY
Qty: 90 TABLET | Refills: 1 | Status: SHIPPED | OUTPATIENT
Start: 2025-06-30

## 2025-07-01 ENCOUNTER — TELEPHONE (OUTPATIENT)
Dept: FAMILY MEDICINE CLINIC | Facility: CLINIC | Age: 80
End: 2025-07-01

## 2025-07-02 RX ORDER — EMPAGLIFLOZIN 25 MG/1
25 TABLET, FILM COATED ORAL DAILY
Qty: 90 TABLET | Refills: 0 | OUTPATIENT
Start: 2025-07-02

## 2025-07-09 DIAGNOSIS — E11.9 TYPE 2 DIABETES MELLITUS WITHOUT COMPLICATION, WITHOUT LONG-TERM CURRENT USE OF INSULIN (HCC): ICD-10-CM

## 2025-07-09 DIAGNOSIS — I10 ESSENTIAL HYPERTENSION: ICD-10-CM

## 2025-07-09 RX ORDER — LISINOPRIL 5 MG/1
5 TABLET ORAL DAILY
Qty: 90 TABLET | Refills: 0 | Status: SHIPPED | OUTPATIENT
Start: 2025-07-09

## (undated) DEVICE — 3M™ TEGADERM™ TRANSPARENT FILM DRESSING FRAME STYLE, 1626W, 4 IN X 4-3/4 IN (10 CM X 12 CM), 50/CT 4CT/CASE: Brand: 3M™ TEGADERM™

## (undated) DEVICE — DRAPE LAPAROTOMY W/POUCHES

## (undated) DEVICE — NEEDLE 18 G X 1 1/2 SAFETY

## (undated) DEVICE — GAUZE SPONGES,16 PLY: Brand: CURITY

## (undated) DEVICE — BASIC DOUBLE BASIN 2-LF: Brand: MEDLINE INDUSTRIES, INC.

## (undated) DEVICE — INTENDED FOR TISSUE SEPARATION, AND OTHER PROCEDURES THAT REQUIRE A SHARP SURGICAL BLADE TO PUNCTURE OR CUT.: Brand: BARD-PARKER ®  SAFETY SCALPED

## (undated) DEVICE — SUT MONOCRYL 4-0 PC-3 18 IN Y845G

## (undated) DEVICE — ELECTRODE,CUTTING,STERILE.24FR: Brand: N.A.

## (undated) DEVICE — IV FLUSH NSS 10ML STERILE FIELD USE POSIFLUSH

## (undated) DEVICE — CYSTOSCOPY PACK: Brand: CONVERTORS

## (undated) DEVICE — BAG URINE DRAINAGE LEG

## (undated) DEVICE — CHLORAPREP HI-LITE 26ML ORANGE

## (undated) DEVICE — 3M™ STERI-STRIP™ REINFORCED ADHESIVE SKIN CLOSURES, R1547, 1/2 IN X 4 IN (12 MM X 100 MM), 6 STRIPS/ENVELOPE: Brand: 3M™ STERI-STRIP™

## (undated) DEVICE — 3M™ STERI-STRIP™ REINFORCED ADHESIVE SKIN CLOSURES, R1541, 1/4 IN X 3 IN (6 MM X 75 MM), 3 STRIPS/ENVELOPE: Brand: 3M™ STERI-STRIP™

## (undated) DEVICE — MINOR PROCEDURE DRAPE: Brand: CONVERTORS

## (undated) DEVICE — DRAPE UTILITY

## (undated) DEVICE — CYSTO TUBING TUR Y IRRIGATION

## (undated) DEVICE — NEEDLE 25G X 1 1/2

## (undated) DEVICE — DRESSING MEPORE FILM ADHESIVE 4 X 5IN

## (undated) DEVICE — TIBURON SPLIT SHEET: Brand: CONVERTORS

## (undated) DEVICE — GLOVE SRG BIOGEL 8

## (undated) DEVICE — 3M™ TEGADERM™ TRANSPARENT FILM DRESSING FRAME STYLE, 1628, 6 IN X 8 IN (15 CM X 20 CM), 10/CT 8CT/CASE: Brand: 3M™ TEGADERM™

## (undated) DEVICE — SUT ETHILON 5-0 P-1 18 IN G695G

## (undated) DEVICE — DRAPE SHEET X-LG

## (undated) DEVICE — SUT MONOCRYL 4-0 PS-2 27 IN Y426H

## (undated) DEVICE — SPONGE STICK WITH PVP-I: Brand: KENDALL

## (undated) DEVICE — SUT PDS II 3-0 RB-1 27 IN Z305H

## (undated) DEVICE — NEPTUNE E-SEP SMOKE EVACUATION PENCIL, COATED, 70MM BLADE, PUSH BUTTON SWITCH: Brand: NEPTUNE E-SEP

## (undated) DEVICE — TONGUE DEPRESSOR STERILE

## (undated) DEVICE — BUTTON SWITCH PENCIL HOLSTER: Brand: VALLEYLAB

## (undated) DEVICE — EVACUATOR BLADDER ELLIK DISP STRL

## (undated) DEVICE — GLOVE INDICATOR PI UNDERGLOVE SZ 7.5 BLUE

## (undated) DEVICE — LIGHT GLOVE GREEN

## (undated) DEVICE — ANTIBACTERIAL UNDYED BRAIDED (POLYGLACTIN 910), SYNTHETIC ABSORBABLE SUTURE: Brand: COATED VICRYL

## (undated) DEVICE — NEEDLE 27 G X 1 1/4

## (undated) DEVICE — WILLIAMS CYSTOSCOPIC INJECTION NEEDLE: Brand: WILLIAMS

## (undated) DEVICE — PENCILETTE PUSH BUTTON COATED

## (undated) DEVICE — DISPOSABLE OR TOWEL: Brand: CARDINAL HEALTH

## (undated) DEVICE — MAYO STAND COVER: Brand: CONVERTORS

## (undated) DEVICE — INTENDED FOR TISSUE SEPARATION, AND OTHER PROCEDURES THAT REQUIRE A SHARP SURGICAL BLADE TO PUNCTURE OR CUT.: Brand: BARD-PARKER ® CARBON RIB-BACK BLADES

## (undated) DEVICE — STANDARD SURGICAL GOWN, L: Brand: CONVERTORS

## (undated) DEVICE — PACK GENERAL LF

## (undated) DEVICE — CATH FOLEY COUNCIL 22FR 5ML 2 WAY LUBRICATH

## (undated) DEVICE — 3M™ STERI-STRIP™ COMPOUND BENZOIN TINCTURE 40 BAGS/CARTON 4 CARTONS/CASE C1544: Brand: 3M™ STERI-STRIP™

## (undated) DEVICE — 3M™ STERI-STRIP™ REINFORCED ADHESIVE SKIN CLOSURES, R1546, 1/4 IN X 4 IN (6 MM X 100 MM), 10 STRIPS/ENVELOPE: Brand: 3M™ STERI-STRIP™

## (undated) DEVICE — INSULATED NEEDLE ELECTRODE SAFETY SLEEVE(TM): Brand: EDGE

## (undated) DEVICE — REM POLYHESIVE ADULT PATIENT RETURN ELECTRODE: Brand: VALLEYLAB

## (undated) DEVICE — TOWEL SET X-RAY

## (undated) DEVICE — CURITY NON-ADHERENT STRIPS: Brand: CURITY

## (undated) DEVICE — SPECIMEN CONTAINER STERILE PEEL PACK

## (undated) DEVICE — POUCH UR CATCHER STERILE

## (undated) DEVICE — GROUNDING PAD UNIVERSAL SLW

## (undated) DEVICE — SUT VICRYL 2-0 SH 27 IN UNDYED J417H

## (undated) DEVICE — DRAPE C-ARMOUR

## (undated) DEVICE — 3M™ TEGADERM™ TRANSPARENT FILM DRESSING FRAME STYLE, 1624W, 2-3/8 IN X 2-3/4 IN (6 CM X 7 CM), 100/CT 4CT/CASE: Brand: 3M™ TEGADERM™

## (undated) DEVICE — SYRINGE 10ML LL

## (undated) DEVICE — BETHLEHEM UNIVERSAL OUTPATIENT: Brand: CARDINAL HEALTH

## (undated) DEVICE — SUT SILK 5-0 P-3 18 IN 640G

## (undated) DEVICE — GLOVE SRG LF STRL BGL SKNSNS 7 PF

## (undated) DEVICE — LUBRICANT SURGILUBE TUBE 4 OZ  FLIP TOP

## (undated) DEVICE — PENCIL ELECTROSURG E-Z CLEAN -0035H

## (undated) DEVICE — TRIAL STIMULATOR: Brand: AXONICS

## (undated) DEVICE — SUT VICRYL 4-0 PS-2 18 IN J496G

## (undated) DEVICE — SUT PDS II 2-0 CT-1 27 IN Z259H

## (undated) DEVICE — SPONGE GAUZE 4 X 9

## (undated) DEVICE — DECANTER: Brand: UNBRANDED

## (undated) DEVICE — POV-IOD SWAB STICKS

## (undated) DEVICE — LEAD IMPLANT KIT: Brand: AXONICS

## (undated) DEVICE — REMOTE CONTROL: Brand: AXONICS

## (undated) DEVICE — TUBING SUCTION 5MM X 12 FT

## (undated) DEVICE — SYRINGE 10ML LL CONTROL TOP

## (undated) DEVICE — GUIDEWIRE STRGHT TIP 0.035 IN  SOLO PLUS

## (undated) DEVICE — SKIN MARKER DUAL TIP WITH RULER CAP, FLEXIBLE RULER AND LABELS: Brand: DEVON

## (undated) DEVICE — DRAPE C-ARM X-RAY

## (undated) DEVICE — LAPAROTOMY SPONGE - RF AND X-RAY DETECTABLE PRE-WASHED: Brand: SITUATE

## (undated) DEVICE — GLOVE SRG BIOGEL 7

## (undated) DEVICE — SYRINGE BULB 2 OZ

## (undated) DEVICE — STERILE SURGICAL LUBRICANT,  TUBE: Brand: SURGILUBE

## (undated) DEVICE — INTENDED FOR TISSUE SEPARATION, AND OTHER PROCEDURES THAT REQUIRE A SHARP SURGICAL BLADE TO PUNCTURE OR CUT.: Brand: BARD-PARKER SAFETY BLADES SIZE 11, STERILE